# Patient Record
Sex: FEMALE | Race: BLACK OR AFRICAN AMERICAN | NOT HISPANIC OR LATINO | Employment: FULL TIME | URBAN - METROPOLITAN AREA
[De-identification: names, ages, dates, MRNs, and addresses within clinical notes are randomized per-mention and may not be internally consistent; named-entity substitution may affect disease eponyms.]

---

## 2017-01-16 ENCOUNTER — TELEPHONE (OUTPATIENT)
Dept: INTERNAL MEDICINE | Facility: CLINIC | Age: 47
End: 2017-01-16

## 2017-03-10 RX ORDER — SULFASALAZINE 500 MG/1
1000 TABLET ORAL 2 TIMES DAILY
Qty: 60 TABLET | Refills: 6 | Status: SHIPPED | OUTPATIENT
Start: 2017-03-10 | End: 2017-03-17 | Stop reason: SDUPTHER

## 2017-03-10 RX ORDER — ERGOCALCIFEROL 1.25 MG/1
50000 CAPSULE ORAL
Qty: 24 CAPSULE | Refills: 4 | Status: SHIPPED | OUTPATIENT
Start: 2017-03-13 | End: 2017-03-17 | Stop reason: SDUPTHER

## 2017-03-10 NOTE — TELEPHONE ENCOUNTER
----- Message from Silvestre Ocampo sent at 3/10/2017 10:04 AM CST -----  Contact: [facility] [phone]  Type: Rx    Name of medication(s):sulfaSALAzine (AZULFIDINE) 500 mg Tab,,, ergocalciferol (ERGOCALCIFEROL) 50,000 unit Cap    Is this a refill? New rx? refill    Who prescribed medication? Dr Robles    Pharmacy Name, Phone, & Location: [facility]     Comments:please advice    Thanks

## 2017-03-11 ENCOUNTER — LAB VISIT (OUTPATIENT)
Dept: LAB | Facility: HOSPITAL | Age: 47
End: 2017-03-11
Attending: INTERNAL MEDICINE
Payer: OTHER GOVERNMENT

## 2017-03-11 DIAGNOSIS — I10 ESSENTIAL HYPERTENSION: ICD-10-CM

## 2017-03-11 LAB
ALBUMIN SERPL BCP-MCNC: 3.5 G/DL
ALP SERPL-CCNC: 91 U/L
ALT SERPL W/O P-5'-P-CCNC: 7 U/L
ANION GAP SERPL CALC-SCNC: 11 MMOL/L
AST SERPL-CCNC: 17 U/L
BASOPHILS # BLD AUTO: 0.01 K/UL
BASOPHILS NFR BLD: 0.3 %
BILIRUB SERPL-MCNC: 0.3 MG/DL
BUN SERPL-MCNC: 10 MG/DL
CALCIUM SERPL-MCNC: 9.6 MG/DL
CHLORIDE SERPL-SCNC: 107 MMOL/L
CHOLEST/HDLC SERPL: 3.1 {RATIO}
CO2 SERPL-SCNC: 24 MMOL/L
CREAT SERPL-MCNC: 0.8 MG/DL
DIFFERENTIAL METHOD: ABNORMAL
EOSINOPHIL # BLD AUTO: 0.3 K/UL
EOSINOPHIL NFR BLD: 6.9 %
ERYTHROCYTE [DISTWIDTH] IN BLOOD BY AUTOMATED COUNT: 15.2 %
EST. GFR  (AFRICAN AMERICAN): >60 ML/MIN/1.73 M^2
EST. GFR  (NON AFRICAN AMERICAN): >60 ML/MIN/1.73 M^2
GLUCOSE SERPL-MCNC: 80 MG/DL
HCT VFR BLD AUTO: 37.8 %
HDL/CHOLESTEROL RATIO: 31.9 %
HDLC SERPL-MCNC: 163 MG/DL
HDLC SERPL-MCNC: 52 MG/DL
HGB BLD-MCNC: 11.7 G/DL
LDLC SERPL CALC-MCNC: 100.8 MG/DL
LYMPHOCYTES # BLD AUTO: 1.2 K/UL
LYMPHOCYTES NFR BLD: 31.3 %
MCH RBC QN AUTO: 25.5 PG
MCHC RBC AUTO-ENTMCNC: 31 %
MCV RBC AUTO: 83 FL
MONOCYTES # BLD AUTO: 0.3 K/UL
MONOCYTES NFR BLD: 7.2 %
NEUTROPHILS # BLD AUTO: 2.1 K/UL
NEUTROPHILS NFR BLD: 54.3 %
NONHDLC SERPL-MCNC: 111 MG/DL
PLATELET # BLD AUTO: 294 K/UL
PMV BLD AUTO: 9.5 FL
POTASSIUM SERPL-SCNC: 4.1 MMOL/L
PROT SERPL-MCNC: 8.3 G/DL
RBC # BLD AUTO: 4.58 M/UL
SODIUM SERPL-SCNC: 142 MMOL/L
TRIGL SERPL-MCNC: 51 MG/DL
TSH SERPL DL<=0.005 MIU/L-ACNC: 2.1 UIU/ML
WBC # BLD AUTO: 3.9 K/UL

## 2017-03-11 PROCEDURE — 85025 COMPLETE CBC W/AUTO DIFF WBC: CPT

## 2017-03-11 PROCEDURE — 84443 ASSAY THYROID STIM HORMONE: CPT

## 2017-03-11 PROCEDURE — 80053 COMPREHEN METABOLIC PANEL: CPT

## 2017-03-11 PROCEDURE — 80061 LIPID PANEL: CPT

## 2017-03-11 PROCEDURE — 36415 COLL VENOUS BLD VENIPUNCTURE: CPT

## 2017-03-17 ENCOUNTER — OFFICE VISIT (OUTPATIENT)
Dept: INTERNAL MEDICINE | Facility: CLINIC | Age: 47
End: 2017-03-17
Payer: OTHER GOVERNMENT

## 2017-03-17 VITALS
BODY MASS INDEX: 48.82 KG/M2 | OXYGEN SATURATION: 97 % | SYSTOLIC BLOOD PRESSURE: 110 MMHG | DIASTOLIC BLOOD PRESSURE: 70 MMHG | HEART RATE: 65 BPM | HEIGHT: 65 IN | WEIGHT: 293 LBS

## 2017-03-17 DIAGNOSIS — E55.9 VITAMIN D DEFICIENCY DISEASE: ICD-10-CM

## 2017-03-17 DIAGNOSIS — I10 ESSENTIAL HYPERTENSION: Primary | ICD-10-CM

## 2017-03-17 DIAGNOSIS — T78.3XXD ANGIOEDEMA, SUBSEQUENT ENCOUNTER: ICD-10-CM

## 2017-03-17 DIAGNOSIS — Z12.31 OTHER SCREENING MAMMOGRAM: ICD-10-CM

## 2017-03-17 DIAGNOSIS — M06.9 RHEUMATOID ARTHRITIS INVOLVING MULTIPLE SITES, UNSPECIFIED RHEUMATOID FACTOR PRESENCE: ICD-10-CM

## 2017-03-17 PROCEDURE — 99213 OFFICE O/P EST LOW 20 MIN: CPT | Mod: PBBFAC | Performed by: INTERNAL MEDICINE

## 2017-03-17 PROCEDURE — 99999 PR PBB SHADOW E&M-EST. PATIENT-LVL III: CPT | Mod: PBBFAC,,, | Performed by: INTERNAL MEDICINE

## 2017-03-17 PROCEDURE — 99214 OFFICE O/P EST MOD 30 MIN: CPT | Mod: S$PBB,,, | Performed by: INTERNAL MEDICINE

## 2017-03-17 RX ORDER — AMLODIPINE BESYLATE 5 MG/1
5 TABLET ORAL DAILY
Qty: 90 TABLET | Refills: 3 | Status: SHIPPED | OUTPATIENT
Start: 2017-03-17 | End: 2018-03-25 | Stop reason: SDUPTHER

## 2017-03-17 RX ORDER — ERGOCALCIFEROL 1.25 MG/1
50000 CAPSULE ORAL
Qty: 24 CAPSULE | Refills: 4 | Status: SHIPPED | OUTPATIENT
Start: 2017-03-20 | End: 2018-05-24 | Stop reason: SDUPTHER

## 2017-03-17 RX ORDER — SULFASALAZINE 500 MG/1
1000 TABLET ORAL 2 TIMES DAILY
Qty: 60 TABLET | Refills: 6 | Status: ON HOLD | OUTPATIENT
Start: 2017-03-17 | End: 2018-01-01 | Stop reason: HOSPADM

## 2017-03-17 NOTE — PROGRESS NOTES
CHIEF COMPLAINT: Follow up of rheumatoid arthritis, hypertension, vitamin D deficiency    HISTORY OF PRESENT ILLNESS: This is a 46 year old woman who presents for follow up of above.    She has been feeling well.  She is off lisinpril due to episode of angioedma. She has not had any other episodes of facial swelling. She is taking Norvasc 5 mg daily. BP has been doing well. No chest pain, headache, shortness of breath, palpitations cough, fever, chills, nausea, vomiting.       She is taking sulfasalazine 500 mg 2 tablets twice daily for her RA. Her inflammation rates are better but not normal.  No stomach upset from the sulfasalazine. Joints are doing well.       Work is going well. 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 Periods are getting more erratic.      She has adopted for 2 children ages 5 and 3 (siblings, boy and girl). She is enjoying parent [place]. Mother  on 12/7/15 from surgical complications of removal of her colon for colon mass - not cancerous yet. She had breast cancer in her 60's. ESRD on dialysis. She misses her mother. She feels that she is coping ok with her grief     She is taking vitamin D 50,000 units twice weekly     She has not had any periods. No hot flashes.         PAST MEDICAL HISTORY:   1. Early Rheumatoid arthritis  2. History of vitamin D deficiency.   3. History of amenorrhea   4. Morbid obesity.     PAST SURGICAL HISTORY: Negative.     ALLERGIES, MEDICATIONS: Updated on [facility]     SOCIAL HISTORY: No tobacco, alcohol use. 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 Father is living diabetes, hypertension and rheumatoid  "arthritis. Brother with diverticulosis. Brother healthy. Brother healthy.      PHYSICAL EXAMINATION:     /70  Pulse 65  Ht 5' 5" (1.651 m)  Wt (!) 152.6 kg (336 lb 6.8 oz)  SpO2 97%  BMI 55.98 kg/m2    General: Is alert, oriented, in no apparent distress. Affect is within   normal limits.   HEENT: Conjunctivae are anicteric. PERRL. TM's are clear. Oropharynx is   clear.   Neck: Supple. No cervical lymphadenopathy. No thyromegaly. Respiratory   effort is normal.   Lungs: Clear to auscultation bilaterally.   Heart: Regular rate and rhythm, no murmur, rub or gallop. No lower   extremity edema.     Labs 3/11/17 reviewed      ASSESSMENT AND PLAN:       1. Hypertension -stable on norvasc 5 mg daily.    2. Early Rheumatoid arthritis. -on sulfasalazine 500 mg 2 tablets twice daily.  Follow up with Dr Dillon  3. Obesity - continue to work on diet, exercise and weight loss.    4. Mild anemia -CBC stable  5. Vitamin D deficiency - vitamin D 50,000 units twice weekly  Screening - MMG 10/16. GYN exam 5/16  Will see her back in 6 months, sooner if problems arise      "

## 2017-10-10 ENCOUNTER — HOSPITAL ENCOUNTER (OUTPATIENT)
Dept: RADIOLOGY | Facility: HOSPITAL | Age: 47
Discharge: HOME | End: 2017-10-10
Attending: INTERNAL MEDICINE
Payer: OTHER GOVERNMENT

## 2017-10-10 DIAGNOSIS — Z12.31 OTHER SCREENING MAMMOGRAM: ICD-10-CM

## 2017-10-10 PROCEDURE — 77067 SCR MAMMO BI INCL CAD: CPT | Mod: TC

## 2017-10-10 PROCEDURE — 77067 SCR MAMMO BI INCL CAD: CPT | Mod: 26,,, | Performed by: RADIOLOGY

## 2017-10-16 NOTE — PROGRESS NOTES
CHIEF COMPLAINT:Annual exam    HISTORY OF PRESENT ILLNESS: This is a 47 year old woman who presents for follow up of above.     She has been feeling well.  She is off lisinpril due to episode of angioedma. She has not had any other episodes of facial swelling. She is taking Norvasc 5 mg daily. BP has been doing well. No chest pain, headache, shortness of breath, palpitations cough, fever, chills, nausea, vomiting, constipation, diarrhea,      She is taking sulfasalazine 500 mg 2 tablets twice daily for her RA. Her inflammation rates are better but not normal.  No stomach upset from the sulfasalazine. Joints have been achy the last several weeks.      Work is going well. 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       Periods are erratic. She has not had a period in 2 months. NO hot flashes or night sweats.      She has adopted for 2 children ages 6 and 4 (siblings, boy and girl). She is enjoying parent [employer]. Mother  on 12/7/15 from surgical complications of removal of her colon for colon mass - not cancerous yet. She had breast cancer in her 60's. ESRD on dialysis. She misses her mother. She feels that she is coping ok with her grief     She is taking vitamin D 50,000 units twice weekly          PAST MEDICAL HISTORY:   1. Early Rheumatoid arthritis  2. History of vitamin D deficiency.   3. History of amenorrhea   4. Morbid obesity.     PAST SURGICAL HISTORY: Negative.     ALLERGIES, MEDICATIONS: Updated on [facility]     SOCIAL HISTORY: No tobacco, alcohol use. 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 Father is living diabetes, hypertension and rheumatoid arthritis. Brother with diverticulosis. Brother healthy. Brother healthy.      PHYSICAL EXAMINATION:    /84  "  Pulse 60   Ht 5' 5" (1.651 m)   Wt (!) 160 kg (352 lb 11.8 oz)   SpO2 96%   BMI 58.70 kg/m²     General: Is alert, oriented, in no apparent distress. Affect is within   normal limits.   HEENT: Conjunctivae are anicteric. PERRL. TM's are clear. Oropharynx is   clear.   Neck: Supple. No cervical lymphadenopathy. No thyromegaly. Respiratory   effort is normal.   Lungs: Clear to auscultation bilaterally.   Heart: Regular rate and rhythm, no murmur, rub or gallop. No lower   extremity edema.      Labs 10/10/17 reviewed - ESR 70, CRP 7.8, CBC and CMP acceptable  mmg 10/10/17 reviewed      ASSESSMENT AND PLAN:      Annual exam - discussed diet, exercise and safety issues.       1. Hypertension -stable on norvasc 5 mg daily.    2. Early Rheumatoid arthritis. -on sulfasalazine 500 mg 2 tablets twice daily.  Follow up with Dr Dillon  3. Obesity - continue to work on diet, exercise and weight loss.    4. Mild anemia -CBC stable  5. Vitamin D deficiency - vitamin D 50,000 units twice weekly  6. High risk of breast cancer - MRI breast. And breast center evaluation.  Screening - MMG 10/17. GYN exam 5/16  Will see her back in 6 months, sooner if problems arise    "

## 2017-10-17 ENCOUNTER — OFFICE VISIT (OUTPATIENT)
Dept: INTERNAL MEDICINE | Facility: CLINIC | Age: 47
End: 2017-10-17
Payer: OTHER GOVERNMENT

## 2017-10-17 ENCOUNTER — IMMUNIZATION (OUTPATIENT)
Dept: INTERNAL MEDICINE | Facility: CLINIC | Age: 47
End: 2017-10-17
Payer: OTHER GOVERNMENT

## 2017-10-17 VITALS
BODY MASS INDEX: 48.82 KG/M2 | DIASTOLIC BLOOD PRESSURE: 84 MMHG | OXYGEN SATURATION: 96 % | HEIGHT: 65 IN | SYSTOLIC BLOOD PRESSURE: 130 MMHG | HEART RATE: 60 BPM | WEIGHT: 293 LBS

## 2017-10-17 DIAGNOSIS — M06.9 RHEUMATOID ARTHRITIS, INVOLVING UNSPECIFIED SITE, UNSPECIFIED RHEUMATOID FACTOR PRESENCE: ICD-10-CM

## 2017-10-17 DIAGNOSIS — E55.9 VITAMIN D DEFICIENCY DISEASE: ICD-10-CM

## 2017-10-17 DIAGNOSIS — Z91.89 AT HIGH RISK FOR BREAST CANCER: ICD-10-CM

## 2017-10-17 DIAGNOSIS — I10 ESSENTIAL HYPERTENSION: ICD-10-CM

## 2017-10-17 DIAGNOSIS — Z00.00 ROUTINE GENERAL MEDICAL EXAMINATION AT A HEALTH CARE FACILITY: Primary | ICD-10-CM

## 2017-10-17 PROCEDURE — G0008 ADMIN INFLUENZA VIRUS VAC: HCPCS | Mod: PBBFAC

## 2017-10-17 PROCEDURE — 99999 PR PBB SHADOW E&M-EST. PATIENT-LVL III: CPT | Mod: PBBFAC,,, | Performed by: INTERNAL MEDICINE

## 2017-10-17 PROCEDURE — 99213 OFFICE O/P EST LOW 20 MIN: CPT | Mod: PBBFAC,25 | Performed by: INTERNAL MEDICINE

## 2017-10-17 PROCEDURE — 99396 PREV VISIT EST AGE 40-64: CPT | Mod: S$PBB,,, | Performed by: INTERNAL MEDICINE

## 2017-11-13 ENCOUNTER — OFFICE VISIT (OUTPATIENT)
Dept: SURGERY | Facility: CLINIC | Age: 47
End: 2017-11-13
Payer: OTHER GOVERNMENT

## 2017-11-13 VITALS
HEART RATE: 57 BPM | HEIGHT: 65 IN | BODY MASS INDEX: 48.82 KG/M2 | SYSTOLIC BLOOD PRESSURE: 120 MMHG | DIASTOLIC BLOOD PRESSURE: 88 MMHG | TEMPERATURE: 98 F | WEIGHT: 293 LBS

## 2017-11-13 DIAGNOSIS — Z12.39 BREAST CANCER SCREENING, HIGH RISK PATIENT: Primary | ICD-10-CM

## 2017-11-13 PROCEDURE — 99999 PR PBB SHADOW E&M-EST. PATIENT-LVL III: CPT | Mod: PBBFAC,,, | Performed by: NURSE PRACTITIONER

## 2017-11-13 PROCEDURE — 99202 OFFICE O/P NEW SF 15 MIN: CPT | Mod: S$PBB,,, | Performed by: NURSE PRACTITIONER

## 2017-11-13 PROCEDURE — 99213 OFFICE O/P EST LOW 20 MIN: CPT | Mod: PBBFAC,PO | Performed by: NURSE PRACTITIONER

## 2017-11-13 NOTE — PROGRESS NOTES
Subjective:      Patient ID: Malika Hodgson is a 47 y.o. female.    Chief Complaint: Breast Cancer Screening (High Risk Screening)      HPI: (PF, EPF - 1-3) (Detailed, Comp, - 4) new patient presents to discuss breast cancer risk reported on recent mammogram. Patient denies palpable breast mass, pain, nipple discharge, redness, increased warmth. She reports recent normal clinical breast exam and does not desire CBE today    Menarche at 9    LMP 2017, irregular, no HRT, was previously on OCs      Screening mmg 10- with heterogeneously dense breasts, Tyrer-Cuzick: 29.31 %      Review of Systems  Objective:   Physical Exam  Assessment:       1. Breast cancer screening, high risk patient        Plan:       Reviewed results of mammogram and breast cancer risk per Tyrer Cuzick model. Discussed family history and sporadic verses family clustering verses hereditary breast cancer.   Discussed environmental and modifiable risk factors for breast cancer including obesity, alcohol use, smoking.   Discussed there are several models available to estimate a woman's risk for breast cancer with differences in variables contributing to the reported estimated risk compared to the general population risk for breast cancer. Tyrer Cuzick is the model chosen by [facility] and calculated risk was discussed. Discussed an individuals risk may be lower or higher than the score provided, and for this patient the score indicated may be higher than her actual risk. Her family history is suggestive of a sporadic breast cancer. Discussed general population risk for breast cancer and high risk now being defined by ACS and NCCN as 20% or greater. Discussed current recommendations for risk management by ACS and NCCN including increased breast cancer screenings: semiannual CBE, annual mmg and annual MRI to alternate every 6 months. Discussed pros and cons of screening breast MRI.  Discussed healthy diet and exercise, and  limit alcohol intake to less than one drink/day    At this time, she is somewhat hesitant to have MRI secondary to fears of enclosed spaces and her weight. I will plan on her returning in 6 months for CBE and breast MRI which she can cancel if she elects not to proceed at that time, monthly BSE encouraged. Discussed s/s of breast cancer

## 2017-11-13 NOTE — LETTER
November 13, 2017      Sarah Robles MD  [address]  [place] [address]           [facility]  [address] [facility] [address]  Phone: [phone]  Fax: [phone]          Patient: Malika Hodgson   MR Number: [id number]   YOB: 1970   Date of Visit: 11/13/2017       Dear Dr. Sarah Robles:    Thank you for referring Malika Hodgson to me for evaluation. Attached you will find relevant portions of my assessment and plan of care.    If you have questions, please do not hesitate to call me. I look forward to following Malika Hodgson along with you.    Sincerely,    Petra Gómez, MIRANDA-ERNESTINAP    Enclosure  CC:  No Recipients    If you would like to receive this communication electronically, please contact [email or web address] or [phone] to request more information on [employer] Link access.    For providers and/or their staff who would like to refer a patient to [facility], please contact us through our one-stop-shop provider referral line, [facility], at [phone].    If you feel you have received this communication in error or would no longer like to receive these types of communications, please e-mail [email or web address]

## 2017-11-15 ENCOUNTER — PATIENT MESSAGE (OUTPATIENT)
Dept: INTERNAL MEDICINE | Facility: CLINIC | Age: 47
End: 2017-11-15

## 2017-11-15 ENCOUNTER — TELEPHONE (OUTPATIENT)
Dept: INTERNAL MEDICINE | Facility: CLINIC | Age: 47
End: 2017-11-15

## 2017-11-15 NOTE — TELEPHONE ENCOUNTER
Pt is calling because she received a letter from her insurance regarding stating that pcp referred her to see Dr. Carlee Rendon an oncologist here at [facility]. Pt would like to know what is going on as she has not been told she needed to see someone. Pt also states that she has received a bill for services that she didn't receive here at [facility]. Pt would like a message sent back to her via my [facility] as she is at work and cant respond.

## 2017-11-15 NOTE — TELEPHONE ENCOUNTER
----- Message from Keeley Hyde sent at 11/15/2017 11:04 AM CST -----  Contact: self [phone]  Patient requesting a call back to discuss a PA referral and her health , stated she received a letter for insurance company she need to discuss  . Please advise, Thanks

## 2017-12-08 ENCOUNTER — LAB VISIT (OUTPATIENT)
Dept: LAB | Facility: HOSPITAL | Age: 47
End: 2017-12-08
Attending: INTERNAL MEDICINE
Payer: OTHER GOVERNMENT

## 2017-12-08 ENCOUNTER — OFFICE VISIT (OUTPATIENT)
Dept: RHEUMATOLOGY | Facility: CLINIC | Age: 47
End: 2017-12-08
Payer: OTHER GOVERNMENT

## 2017-12-08 VITALS
BODY MASS INDEX: 48.82 KG/M2 | TEMPERATURE: 98 F | WEIGHT: 293 LBS | SYSTOLIC BLOOD PRESSURE: 166 MMHG | HEART RATE: 76 BPM | HEIGHT: 65 IN | DIASTOLIC BLOOD PRESSURE: 86 MMHG

## 2017-12-08 DIAGNOSIS — M06.9 RA (RHEUMATOID ARTHRITIS): ICD-10-CM

## 2017-12-08 DIAGNOSIS — R70.0 ELEVATED SEDIMENTATION RATE: ICD-10-CM

## 2017-12-08 DIAGNOSIS — D84.9 IMMUNOSUPPRESSION: ICD-10-CM

## 2017-12-08 DIAGNOSIS — M05.79 RHEUMATOID ARTHRITIS INVOLVING MULTIPLE SITES WITH POSITIVE RHEUMATOID FACTOR: Primary | ICD-10-CM

## 2017-12-08 DIAGNOSIS — E66.01 MORBID OBESITY WITH BMI OF 50.0-59.9, ADULT: ICD-10-CM

## 2017-12-08 LAB
ALBUMIN SERPL BCP-MCNC: 3.2 G/DL
ALP SERPL-CCNC: 101 U/L
ALT SERPL W/O P-5'-P-CCNC: 6 U/L
ANION GAP SERPL CALC-SCNC: 6 MMOL/L
AST SERPL-CCNC: 15 U/L
BASOPHILS # BLD AUTO: 0.02 K/UL
BASOPHILS NFR BLD: 0.4 %
BILIRUB SERPL-MCNC: 0.2 MG/DL
BUN SERPL-MCNC: 12 MG/DL
CALCIUM SERPL-MCNC: 9.7 MG/DL
CHLORIDE SERPL-SCNC: 105 MMOL/L
CO2 SERPL-SCNC: 29 MMOL/L
CREAT SERPL-MCNC: 0.8 MG/DL
CRP SERPL-MCNC: 27.3 MG/L
DIFFERENTIAL METHOD: ABNORMAL
EOSINOPHIL # BLD AUTO: 0.2 K/UL
EOSINOPHIL NFR BLD: 4.5 %
ERYTHROCYTE [DISTWIDTH] IN BLOOD BY AUTOMATED COUNT: 15 %
ERYTHROCYTE [SEDIMENTATION RATE] IN BLOOD BY WESTERGREN METHOD: 89 MM/HR
EST. GFR  (AFRICAN AMERICAN): >60 ML/MIN/1.73 M^2
EST. GFR  (NON AFRICAN AMERICAN): >60 ML/MIN/1.73 M^2
GLUCOSE SERPL-MCNC: 84 MG/DL
HCT VFR BLD AUTO: 35.6 %
HGB BLD-MCNC: 10.6 G/DL
IMM GRANULOCYTES # BLD AUTO: 0.02 K/UL
IMM GRANULOCYTES NFR BLD AUTO: 0.4 %
LYMPHOCYTES # BLD AUTO: 1.2 K/UL
LYMPHOCYTES NFR BLD: 25.9 %
MCH RBC QN AUTO: 24.9 PG
MCHC RBC AUTO-ENTMCNC: 29.8 G/DL
MCV RBC AUTO: 84 FL
MONOCYTES # BLD AUTO: 0.3 K/UL
MONOCYTES NFR BLD: 7.3 %
NEUTROPHILS # BLD AUTO: 2.9 K/UL
NEUTROPHILS NFR BLD: 61.5 %
NRBC BLD-RTO: 0 /100 WBC
PLATELET # BLD AUTO: 293 K/UL
PMV BLD AUTO: 9.3 FL
POTASSIUM SERPL-SCNC: 4.4 MMOL/L
PROT SERPL-MCNC: 8.7 G/DL
RBC # BLD AUTO: 4.26 M/UL
SODIUM SERPL-SCNC: 140 MMOL/L
WBC # BLD AUTO: 4.64 K/UL

## 2017-12-08 PROCEDURE — 99999 PR PBB SHADOW E&M-EST. PATIENT-LVL III: CPT | Mod: PBBFAC,,, | Performed by: INTERNAL MEDICINE

## 2017-12-08 PROCEDURE — 99214 OFFICE O/P EST MOD 30 MIN: CPT | Mod: S$PBB,,, | Performed by: INTERNAL MEDICINE

## 2017-12-08 PROCEDURE — 36415 COLL VENOUS BLD VENIPUNCTURE: CPT

## 2017-12-08 PROCEDURE — 99213 OFFICE O/P EST LOW 20 MIN: CPT | Mod: PBBFAC | Performed by: INTERNAL MEDICINE

## 2017-12-08 PROCEDURE — 85025 COMPLETE CBC W/AUTO DIFF WBC: CPT

## 2017-12-08 PROCEDURE — 85651 RBC SED RATE NONAUTOMATED: CPT

## 2017-12-08 PROCEDURE — 86140 C-REACTIVE PROTEIN: CPT

## 2017-12-08 PROCEDURE — 80053 COMPREHEN METABOLIC PANEL: CPT

## 2017-12-08 NOTE — PATIENT INSTRUCTIONS
Hydroxychloroquine tablets  What is this medicine?  HYDROXYCHLOROQUINE (rogelio drox ee KLOR oh kwin) is used to treat rheumatoid arthritis and systemic lupus erythematosus. It is also used to treat malaria.  How should I use this medicine?  Take this medicine by mouth with a glass of water. Follow the directions on the prescription label. If this medicine upsets your stomach take it with food or milk. Take your doses at regular intervals. Do not take your medicine more often than directed.  Talk to your pediatrician regarding the use of this medicine in children. Special care may be needed.  What side effects may I notice from receiving this medicine?  Side effects that you should report to your doctor or health care professional as soon as possible:  · allergic reactions like skin rash, itching or hives, swelling of the face, lips, or tongue  · change in vision  · fever, infection  · hearing loss or ringing  · muscle weakness, tremor, or numbness  · redness, blistering, peeling or loosening of the skin, including inside the mouth  · seizures  · unusual bleeding or bruising  · unusually weak or tired  Side effects that usually do not require medical attention (report to your doctor or health care professional if they continue or are bothersome):  · change in coloration of the mouth or skin  · dizziness  · hair loss, lightening  · headache  · irritability, nervousness, nightmares  · loss of appetite  · stomach upset, diarrhea  What may interact with this medicine?  · antacids  · botulinum toxins  · digoxin  · kaolin  · penicillamine  What if I miss a dose?  If you miss a dose, take it as soon as you can. If it is almost time for your next dose, take only that dose. Do not take double or extra doses.  Where should I keep my medicine?  Keep out of the reach of children. In children, this medicine can cause overdose with small doses.  Store at room temperature between 15 and 30 degrees C (59 and 86 degrees F). Protect  from moisture and light. Throw away any unused medicine after the expiration date.  What should I tell my health care provider before I take this medicine?  They need to know if you have any of these conditions:  · alcoholism  · anemia or other blood disorder  · eye disease  · glucose 6-phosphate dehydrogenase (G6PD) deficiency  · liver disease  · porphyria  · psoriasis  · an unusual or allergic reaction to chloroquine, hydroxychloroquine, other medicines, foods, dyes, or preservatives  · pregnant or trying to get pregnant  · breast-feeding  What should I watch for while using this medicine?  Visit your doctor or health care professional for regular check ups. Tell your doctor if your symptoms do not improve. Arthritis symptoms may take several weeks to improve. If you are taking this medicine for a long time, you will need important blood work done. You will also need to have your eyes checked as directed.  This medicine can make you more sensitive to the sun. Keep out of the sun. If you cannot avoid being in the sun, wear protective clothing and use sunscreen. Do not use sun lamps or tanning beds/booths.  Avoid antacids and kaolin containing products for 2 hours before and after taking a dose of this medicine.  NOTE:This sheet is a summary. It may not cover all possible information. If you have questions about this medicine, talk to your doctor, pharmacist, or health care provider. Copyright© 2017 [employer]

## 2017-12-08 NOTE — PROGRESS NOTES
"Subjective:       Patient ID: Malika Hodgson is a 47 y.o. female.    Chief Complaint: Rheumatoid Arthritis      HPI:  Malika Hodgson is a 47 y.o. female diagnosed with early RA in 2/2009.   Now on sulfasalazine 1,000 mg twice daily being refilled by Dr. Robles.  She has been lost to follow up since 5/2016.      Notes pain and mild swelling since Sept 2017.    Prior to Sept 2017 would miss the morning dose.  Improves with naproxen and worsens with cold temps.  30 minutes of morning stiffness.  This morning had sharp pain under right shoulder blade.   No pain today but goes up to 7/10 ache causing difficulty lifting glass and gripping.       Review of Systems   Constitutional: Negative.    HENT: Negative.    Eyes: Negative.    Respiratory: Negative.    Cardiovascular: Negative.    Gastrointestinal: Positive for diarrhea (2 x this month; possibly lactose intolerant).   Endocrine: Negative.    Genitourinary: Negative.    Musculoskeletal: Positive for arthralgias and joint swelling.   Skin: Negative.    Allergic/Immunologic: Negative.    Neurological: Negative.    Hematological: Negative.    Psychiatric/Behavioral: Negative.          Objective:   BP (!) 178/96 (BP Location: Right arm, Patient Position: Sitting, BP Method: Large (Automatic))   Pulse 76   Temp 98.4 °F (36.9 °C)   Ht 5' 5" (1.651 m)   Wt (!) 161.9 kg (356 lb 14.4 oz)   BMI 59.39 kg/m²      Physical Exam   Constitutional: She is oriented to person, place, and time and well-developed, well-nourished, and in no distress.   HENT:   Head: Normocephalic and atraumatic.   Eyes: Conjunctivae and EOM are normal.   Neck: Neck supple.   Cardiovascular: Normal rate, regular rhythm and normal heart sounds.    Pulmonary/Chest: Effort normal and breath sounds normal.   Abdominal: Soft. Bowel sounds are normal.   Neurological: She is alert and oriented to person, place, and time. Gait normal.   Skin: Skin is warm and dry.     Psychiatric: Mood and affect normal. "   Musculoskeletal:   28 joint count: 2 swollen (bilateral 3rd PIP) and 2 tender same              LABS    Component      Latest Ref Rng & Units 10/10/2017 3/11/2017   WBC      3.90 - 12.70 K/uL 4.23 3.90   RBC      4.00 - 5.40 M/uL 4.63 4.58   Hemoglobin      12.0 - 16.0 g/dL 11.6 (L) 11.7 (L)   Hematocrit      37.0 - 48.5 % 37.7 37.8   MCV      82 - 98 fL 81 (L) 83   MCH      27.0 - 31.0 pg 25.1 (L) 25.5 (L)   MCHC      32.0 - 36.0 g/dL 30.8 (L) 31.0 (L)   RDW      11.5 - 14.5 % 15.5 (H) 15.2 (H)   Platelets      150 - 350 K/uL 295 294   MPV      9.2 - 12.9 fL 9.1 (L) 9.5   Gran #      1.8 - 7.7 K/uL 2.6 2.1   Lymph #      1.0 - 4.8 K/uL 1.1 1.2   Mono #      0.3 - 1.0 K/uL 0.3 0.3   Eos #      0.0 - 0.5 K/uL 0.2 0.3   Baso #      0.00 - 0.20 K/uL 0.02 0.01   Gran%      38.0 - 73.0 % 60.5 54.3   Lymph%      18.0 - 48.0 % 26.5 31.3   Mono%      4.0 - 15.0 % 7.3 7.2   Eosinophil%      0.0 - 8.0 % 5.0 6.9   Basophil%      0.0 - 1.9 % 0.5 0.3   Differential Method       Automated Automated   Sodium      136 - 145 mmol/L 140 142   Potassium      3.5 - 5.1 mmol/L 4.1 4.1   Chloride      95 - 110 mmol/L 105 107   CO2      23 - 29 mmol/L 27 24   Glucose      70 - 110 mg/dL 95 80   BUN, Bld      6 - 20 mg/dL 15 10   Creatinine      0.5 - 1.4 mg/dL 0.8 0.8   Calcium      8.7 - 10.5 mg/dL 9.6 9.6   Total Protein      6.0 - 8.4 g/dL 8.8 (H) 8.3   Albumin      3.5 - 5.2 g/dL 3.3 (L) 3.5   Total Bilirubin      0.1 - 1.0 mg/dL 0.2 0.3   Alkaline Phosphatase      55 - 135 U/L 92 91   AST      10 - 40 U/L 18 17   ALT      10 - 44 U/L 11 7 (L)   Anion Gap      8 - 16 mmol/L 8 11   eGFR if African American      >60 mL/min/1.73 m:2 >60 >60.0   eGFR if non African American      >60 mL/min/1.73 m:2 >60 >60.0   Cholesterol      120 - 199 mg/dL  163   Triglycerides      30 - 150 mg/dL  51   HDL      40 - 75 mg/dL  52   LDL Cholesterol      63.0 - 159.0 mg/dL  100.8   HDL/Chol Ratio      20.0 - 50.0 %  31.9   Total Cholesterol/HDL Ratio       2.0 - 5.0  3.1   Non-HDL Cholesterol      mg/dL  111   TSH      0.400 - 4.000 uIU/mL  2.102   CRP      0.0 - 8.2 mg/L 7.8    Sed Rate      0 - 20 mm/Hr 70 (H)        Assessment:       1. Rheumatoid arthritis. Manifested by previous small joint involvement of the hands (now resolved), +RF/CCP and elevated inflammatory markers.  Resolution of left elbow contracture and improved inflammatory markers. Now active on SSZ.  2. Elevated ESR    3. Morbid obesity. Patient has not been exercising at [place].  4. Abnormal SPEP without monoclonal gammopathy  5. Elevated BP. Repeat 166/86.  Patient to inform primary doctor if remain high      Plan:       1. Check labs   2. Encouraged again regular exercise and consider Weight Watchers.   3. Consider increase sulfasalazine 1500 mg bid versus adding another DMARD plaquenil (risk ocular toxicity discussed) vs MTX  RTO 6 months/prn

## 2017-12-09 ENCOUNTER — OFFICE VISIT (OUTPATIENT)
Dept: URGENT CARE | Facility: CLINIC | Age: 47
End: 2017-12-09
Payer: OTHER GOVERNMENT

## 2017-12-09 VITALS
WEIGHT: 293 LBS | OXYGEN SATURATION: 97 % | HEIGHT: 65 IN | SYSTOLIC BLOOD PRESSURE: 143 MMHG | TEMPERATURE: 98 F | DIASTOLIC BLOOD PRESSURE: 87 MMHG | HEART RATE: 80 BPM | BODY MASS INDEX: 48.82 KG/M2

## 2017-12-09 DIAGNOSIS — J40 BRONCHITIS: Primary | ICD-10-CM

## 2017-12-09 DIAGNOSIS — R05.9 COUGH: ICD-10-CM

## 2017-12-09 PROCEDURE — 99214 OFFICE O/P EST MOD 30 MIN: CPT | Mod: S$GLB,,, | Performed by: NURSE PRACTITIONER

## 2017-12-09 RX ORDER — BENZONATATE 100 MG/1
200 CAPSULE ORAL EVERY 6 HOURS PRN
Qty: 30 CAPSULE | Refills: 0 | Status: SHIPPED | OUTPATIENT
Start: 2017-12-09 | End: 2018-03-01

## 2017-12-09 RX ORDER — ALBUTEROL SULFATE 90 UG/1
2 AEROSOL, METERED RESPIRATORY (INHALATION) EVERY 6 HOURS PRN
Qty: 18 G | Refills: 0 | Status: ON HOLD | OUTPATIENT
Start: 2017-12-09 | End: 2018-01-01 | Stop reason: HOSPADM

## 2017-12-09 RX ORDER — DOXYCYCLINE 100 MG/1
100 CAPSULE ORAL 2 TIMES DAILY
Qty: 20 CAPSULE | Refills: 0 | Status: ON HOLD | OUTPATIENT
Start: 2017-12-09 | End: 2018-01-01 | Stop reason: HOSPADM

## 2017-12-09 NOTE — PROGRESS NOTES
"Subjective:       Patient ID: Malika Hodgson is a 47 y.o. female.    Vitals:  height is 5' 5" (1.651 m) and weight is 161.5 kg (356 lb) (abnormal). Her temperature is 98 °F (36.7 °C). Her blood pressure is 143/87 (abnormal) and her pulse is 80. Her oxygen saturation is 97%.     Chief Complaint: URI    URI    This is a new problem. Episode onset: 2-3 days. The problem has been unchanged. There has been no fever. Associated symptoms include chest pain, congestion and coughing. Pertinent negatives include no abdominal pain, ear pain, headaches, nausea, sore throat or wheezing. Treatments tried: flonase/zyrtec. The treatment provided mild relief.     Review of Systems   Constitution: Negative for chills, fever and malaise/fatigue.   HENT: Positive for congestion. Negative for ear pain, hoarse voice and sore throat.    Eyes: Negative for discharge and redness.   Cardiovascular: Positive for chest pain. Negative for dyspnea on exertion and leg swelling.   Respiratory: Positive for cough and shortness of breath. Negative for sputum production and wheezing.    Musculoskeletal: Negative for myalgias.   Gastrointestinal: Negative for abdominal pain and nausea.   Neurological: Negative for headaches.       Objective:      Physical Exam   Constitutional: She is oriented to person, place, and time. She appears well-developed and well-nourished. She is cooperative.  Non-toxic appearance. She does not appear ill. No distress.   HENT:   Head: Normocephalic and atraumatic.   Right Ear: Hearing, tympanic membrane and ear canal normal.   Left Ear: Hearing, tympanic membrane and ear canal normal.   Nose: Nose normal. No mucosal edema, rhinorrhea or nasal deformity. No epistaxis. Right sinus exhibits no maxillary sinus tenderness and no frontal sinus tenderness. Left sinus exhibits no maxillary sinus tenderness and no frontal sinus tenderness.   Mouth/Throat: Uvula is midline and mucous membranes are normal. No trismus in the jaw. " Normal dentition. No uvula swelling. Posterior oropharyngeal erythema present.   Eyes: Conjunctivae and lids are normal. No scleral icterus.   Sclera clear bilat   Neck: Trachea normal, full passive range of motion without pain and phonation normal. Neck supple.   Cardiovascular: Normal rate, regular rhythm and normal pulses.    Pulmonary/Chest: Effort normal. No respiratory distress. She has decreased breath sounds.   Abdominal: Soft. Normal appearance. She exhibits no distension. There is no tenderness.   Musculoskeletal: She exhibits no edema or deformity.   Neurological: She is alert and oriented to person, place, and time. She exhibits normal muscle tone. Coordination normal.   Skin: Skin is warm, dry and intact. She is not diaphoretic. No pallor.   Psychiatric: Her speech is normal. Cognition and memory are normal.   Nursing note and vitals reviewed.      Assessment:       1. Bronchitis    2. Cough        Plan:       Patient Instructions     Bronchitis, Antibiotic Treatment (Adult)    Bronchitis is an infection of the air passages (bronchial tubes) in your lungs. It often occurs when you have a cold. This illness is contagious during the first few days and is spread through the air by coughing and sneezing, or by direct contact (touching the sick person and then touching your own eyes, nose, or mouth).  Symptoms of bronchitis include cough with mucus (phlegm) and low-grade fever. Bronchitis usually lasts 7 to 14 days. Mild cases can be treated with simple home remedies. More severe infection is treated with an antibiotic.  Home care  Follow these guidelines when caring for yourself at home:  · If your symptoms are severe, rest at home for the first 2 to 3 days. When you go back to your usual activities, don't let yourself get too tired.  · Do not smoke. Also avoid being exposed to secondhand smoke.  · You may use over-the-counter medicines to control fever or pain, unless another medicine was prescribed.  (Note: If you have chronic liver or kidney disease or have ever had a stomach ulcer or gastrointestinal bleeding, talk with your healthcare provider before using these medicines. Also talk to your provider if you are taking medicine to prevent blood clots.) Aspirin should never be given to anyone younger than 18 years of age who is ill with a viral infection or fever. It may cause severe liver or brain damage.  · Your appetite may be poor, so a light diet is fine. Avoid dehydration by drinking 6 to 8 glasses of fluids per day (such as water, soft drinks, sports drinks, juices, tea, or soup). Extra fluids will help loosen secretions in the nose and lungs.  · Over-the-counter cough, cold, and sore-throat medicines will not shorten the length of the illness, but they may be helpful to reduce symptoms. (Note: Do not use decongestants if you have high blood pressure.)  · Finish all antibiotic medicine. Do this even if you are feeling better after only a few days.  Follow-up care  Follow up with your healthcare provider, or as advised. If you had an X-ray or ECG (electrocardiogram), a specialist will review it. You will be notified of any new findings that may affect your care.  Note: If you are age 65 or older, or if you have a chronic lung disease or condition that affects your immune system, or you smoke, talk to your healthcare provider about having pneumococcal vaccinations and a yearly influenza vaccination (flu shot).  When to seek medical advice  Call your healthcare provider right away if any of these occur:  · Fever of 100.4°F (38°C) or higher  · Coughing up increased amounts of colored sputum  · Weakness, drowsiness, headache, facial pain, ear pain, or a stiff neck  Call 911, or get immediate medical care  Contact emergency services right away if any of these occur.  · Coughing up blood  · Worsening weakness, drowsiness, headache, or stiff neck  · Trouble breathing, wheezing, or pain with breathing  Date Last  Reviewed: 9/13/2015  © 0299-2085 [employer]. [address] [place] [address], [place] [address]. All rights reserved. This information is not intended as a substitute for professional medical care. Always follow your healthcare professional's instructions.              Bronchitis    Cough  -     X-Ray Chest PA And Lateral; Future; Expected date: 12/09/2017    Other orders  -     benzonatate (TESSALON PERLES) 100 MG capsule; Take 2 capsules (200 mg total) by mouth every 6 (six) hours as needed for Cough.  Dispense: 30 capsule; Refill: 0  -     doxycycline (VIBRAMYCIN) 100 MG Cap; Take 1 capsule (100 mg total) by mouth 2 (two) times daily.  Dispense: 20 capsule; Refill: 0

## 2017-12-09 NOTE — PATIENT INSTRUCTIONS
Bronchitis, Antibiotic Treatment (Adult)    Bronchitis is an infection of the air passages (bronchial tubes) in your lungs. It often occurs when you have a cold. This illness is contagious during the first few days and is spread through the air by coughing and sneezing, or by direct contact (touching the sick person and then touching your own eyes, nose, or mouth).  Symptoms of bronchitis include cough with mucus (phlegm) and low-grade fever. Bronchitis usually lasts 7 to 14 days. Mild cases can be treated with simple home remedies. More severe infection is treated with an antibiotic.  Home care  Follow these guidelines when caring for yourself at home:  · If your symptoms are severe, rest at home for the first 2 to 3 days. When you go back to your usual activities, don't let yourself get too tired.  · Do not smoke. Also avoid being exposed to secondhand smoke.  · You may use over-the-counter medicines to control fever or pain, unless another medicine was prescribed. (Note: If you have chronic liver or kidney disease or have ever had a stomach ulcer or gastrointestinal bleeding, talk with your healthcare provider before using these medicines. Also talk to your provider if you are taking medicine to prevent blood clots.) Aspirin should never be given to anyone younger than 18 years of age who is ill with a viral infection or fever. It may cause severe liver or brain damage.  · Your appetite may be poor, so a light diet is fine. Avoid dehydration by drinking 6 to 8 glasses of fluids per day (such as water, soft drinks, sports drinks, juices, tea, or soup). Extra fluids will help loosen secretions in the nose and lungs.  · Over-the-counter cough, cold, and sore-throat medicines will not shorten the length of the illness, but they may be helpful to reduce symptoms. (Note: Do not use decongestants if you have high blood pressure.)  · Finish all antibiotic medicine. Do this even if you are feeling better after only a  few days.  Follow-up care  Follow up with your healthcare provider, or as advised. If you had an X-ray or ECG (electrocardiogram), a specialist will review it. You will be notified of any new findings that may affect your care.  Note: If you are age 65 or older, or if you have a chronic lung disease or condition that affects your immune system, or you smoke, talk to your healthcare provider about having pneumococcal vaccinations and a yearly influenza vaccination (flu shot).  When to seek medical advice  Call your healthcare provider right away if any of these occur:  · Fever of 100.4°F (38°C) or higher  · Coughing up increased amounts of colored sputum  · Weakness, drowsiness, headache, facial pain, ear pain, or a stiff neck  Call 911, or get immediate medical care  Contact emergency services right away if any of these occur.  · Coughing up blood  · Worsening weakness, drowsiness, headache, or stiff neck  · Trouble breathing, wheezing, or pain with breathing  Date Last Reviewed: 9/13/2015  © 9534-6442 [employer]. [address] [place] [address], [place] [address]. All rights reserved. This information is not intended as a substitute for professional medical care. Always follow your healthcare professional's instructions.

## 2017-12-11 ENCOUNTER — PATIENT MESSAGE (OUTPATIENT)
Dept: RHEUMATOLOGY | Facility: CLINIC | Age: 47
End: 2017-12-11

## 2017-12-12 ENCOUNTER — TELEPHONE (OUTPATIENT)
Dept: URGENT CARE | Facility: CLINIC | Age: 47
End: 2017-12-12

## 2017-12-14 ENCOUNTER — PATIENT MESSAGE (OUTPATIENT)
Dept: INTERNAL MEDICINE | Facility: CLINIC | Age: 47
End: 2017-12-14

## 2017-12-14 DIAGNOSIS — R05.9 COUGH: Primary | ICD-10-CM

## 2017-12-15 ENCOUNTER — PATIENT MESSAGE (OUTPATIENT)
Dept: RHEUMATOLOGY | Facility: CLINIC | Age: 47
End: 2017-12-15

## 2017-12-18 RX ORDER — PREDNISONE 10 MG/1
10 TABLET ORAL DAILY
Qty: 5 TABLET | Refills: 0 | Status: ON HOLD | OUTPATIENT
Start: 2017-12-18 | End: 2018-01-01

## 2017-12-20 ENCOUNTER — HOSPITAL ENCOUNTER (INPATIENT)
Facility: HOSPITAL | Age: 47
LOS: 12 days | Discharge: HOME | DRG: 189 | End: 2018-01-01
Attending: EMERGENCY MEDICINE | Admitting: INTERNAL MEDICINE
Payer: OTHER GOVERNMENT

## 2017-12-20 DIAGNOSIS — E55.9 VITAMIN D DEFICIENCY DISEASE: ICD-10-CM

## 2017-12-20 DIAGNOSIS — E66.01 MORBID OBESITY WITH BMI OF 50.0-59.9, ADULT: ICD-10-CM

## 2017-12-20 DIAGNOSIS — M25.529 ELBOW JOINT PAIN: ICD-10-CM

## 2017-12-20 DIAGNOSIS — M06.9 RA (RHEUMATOID ARTHRITIS): ICD-10-CM

## 2017-12-20 DIAGNOSIS — I50.9 ACUTE CONGESTIVE HEART FAILURE: ICD-10-CM

## 2017-12-20 DIAGNOSIS — I30.9 ACUTE PERICARDITIS: ICD-10-CM

## 2017-12-20 DIAGNOSIS — R06.02 SOB (SHORTNESS OF BREATH): ICD-10-CM

## 2017-12-20 DIAGNOSIS — I31.39 PERICARDIAL EFFUSION: ICD-10-CM

## 2017-12-20 DIAGNOSIS — J96.01 ACUTE HYPOXEMIC RESPIRATORY FAILURE: Primary | ICD-10-CM

## 2017-12-20 DIAGNOSIS — G47.00 INSOMNIA: ICD-10-CM

## 2017-12-20 DIAGNOSIS — R00.1 BRADYCARDIA: ICD-10-CM

## 2017-12-20 DIAGNOSIS — R70.0 ELEVATED SEDIMENTATION RATE: ICD-10-CM

## 2017-12-20 DIAGNOSIS — D84.9 IMMUNOSUPPRESSION: ICD-10-CM

## 2017-12-20 DIAGNOSIS — I50.9 ACUTE CONGESTIVE HEART FAILURE, UNSPECIFIED CONGESTIVE HEART FAILURE TYPE: ICD-10-CM

## 2017-12-20 DIAGNOSIS — M05.79 RHEUMATOID ARTHRITIS INVOLVING MULTIPLE SITES WITH POSITIVE RHEUMATOID FACTOR: ICD-10-CM

## 2017-12-20 DIAGNOSIS — R91.8 GROUND GLASS OPACITY PRESENT ON IMAGING OF LUNG: ICD-10-CM

## 2017-12-20 DIAGNOSIS — I10 ESSENTIAL HYPERTENSION: ICD-10-CM

## 2017-12-20 DIAGNOSIS — J81.1 PULMONARY EDEMA: ICD-10-CM

## 2017-12-20 DIAGNOSIS — E66.9 OBESITY: ICD-10-CM

## 2017-12-20 DIAGNOSIS — M25.40 EFFUSION INTO JOINT: ICD-10-CM

## 2017-12-20 LAB
ALBUMIN SERPL BCP-MCNC: 2.7 G/DL
ALP SERPL-CCNC: 182 U/L
ALT SERPL W/O P-5'-P-CCNC: 11 U/L
ANION GAP SERPL CALC-SCNC: 10 MMOL/L
AST SERPL-CCNC: 24 U/L
BASOPHILS # BLD AUTO: 0.03 K/UL
BASOPHILS NFR BLD: 0.3 %
BILIRUB SERPL-MCNC: 0.3 MG/DL
BNP SERPL-MCNC: 33 PG/ML
BUN SERPL-MCNC: 16 MG/DL
BUN SERPL-MCNC: 16 MG/DL (ref 6–30)
CALCIUM SERPL-MCNC: 9.8 MG/DL
CHLORIDE SERPL-SCNC: 98 MMOL/L (ref 95–110)
CHLORIDE SERPL-SCNC: 99 MMOL/L
CO2 SERPL-SCNC: 27 MMOL/L
CREAT SERPL-MCNC: 0.7 MG/DL (ref 0.5–1.4)
CREAT SERPL-MCNC: 0.8 MG/DL
DIFFERENTIAL METHOD: ABNORMAL
EOSINOPHIL # BLD AUTO: 0.1 K/UL
EOSINOPHIL NFR BLD: 0.7 %
ERYTHROCYTE [DISTWIDTH] IN BLOOD BY AUTOMATED COUNT: 15.2 %
EST. GFR  (AFRICAN AMERICAN): >60 ML/MIN/1.73 M^2
EST. GFR  (NON AFRICAN AMERICAN): >60 ML/MIN/1.73 M^2
GLUCOSE SERPL-MCNC: 121 MG/DL
GLUCOSE SERPL-MCNC: 127 MG/DL (ref 70–110)
HCT VFR BLD AUTO: 35.6 %
HCT VFR BLD CALC: 36 %PCV (ref 36–54)
HGB BLD-MCNC: 11 G/DL
IMM GRANULOCYTES # BLD AUTO: 0.08 K/UL
IMM GRANULOCYTES NFR BLD AUTO: 0.8 %
LYMPHOCYTES # BLD AUTO: 1.4 K/UL
LYMPHOCYTES NFR BLD: 14.6 %
MCH RBC QN AUTO: 24.7 PG
MCHC RBC AUTO-ENTMCNC: 30.9 G/DL
MCV RBC AUTO: 80 FL
MONOCYTES # BLD AUTO: 0.9 K/UL
MONOCYTES NFR BLD: 9.9 %
NEUTROPHILS # BLD AUTO: 7 K/UL
NEUTROPHILS NFR BLD: 73.7 %
NRBC BLD-RTO: 0 /100 WBC
PLATELET # BLD AUTO: 515 K/UL
PMV BLD AUTO: 11 FL
POC IONIZED CALCIUM: 1.19 MMOL/L (ref 1.06–1.42)
POC TCO2 (MEASURED): 27 MMOL/L (ref 23–29)
POTASSIUM BLD-SCNC: 4 MMOL/L (ref 3.5–5.1)
POTASSIUM SERPL-SCNC: 4 MMOL/L
PROT SERPL-MCNC: 8.8 G/DL
RBC # BLD AUTO: 4.46 M/UL
SAMPLE: ABNORMAL
SODIUM BLD-SCNC: 136 MMOL/L (ref 136–145)
SODIUM SERPL-SCNC: 136 MMOL/L
TROPONIN I SERPL DL<=0.01 NG/ML-MCNC: <0.006 NG/ML
WBC # BLD AUTO: 9.48 K/UL

## 2017-12-20 PROCEDURE — 25000003 PHARM REV CODE 250: Performed by: INTERNAL MEDICINE

## 2017-12-20 PROCEDURE — 93005 ELECTROCARDIOGRAM TRACING: CPT

## 2017-12-20 PROCEDURE — 84484 ASSAY OF TROPONIN QUANT: CPT

## 2017-12-20 PROCEDURE — 80053 COMPREHEN METABOLIC PANEL: CPT

## 2017-12-20 PROCEDURE — 99223 1ST HOSP IP/OBS HIGH 75: CPT | Mod: ,,, | Performed by: INTERNAL MEDICINE

## 2017-12-20 PROCEDURE — 93010 ELECTROCARDIOGRAM REPORT: CPT | Mod: ,,, | Performed by: INTERNAL MEDICINE

## 2017-12-20 PROCEDURE — 85025 COMPLETE CBC W/AUTO DIFF WBC: CPT

## 2017-12-20 PROCEDURE — 99285 EMERGENCY DEPT VISIT HI MDM: CPT | Mod: ,,, | Performed by: PHYSICIAN ASSISTANT

## 2017-12-20 PROCEDURE — 11000001 HC ACUTE MED/SURG PRIVATE ROOM

## 2017-12-20 PROCEDURE — 83880 ASSAY OF NATRIURETIC PEPTIDE: CPT

## 2017-12-20 PROCEDURE — 63600175 PHARM REV CODE 636 W HCPCS: Performed by: INTERNAL MEDICINE

## 2017-12-20 PROCEDURE — 99285 EMERGENCY DEPT VISIT HI MDM: CPT

## 2017-12-20 PROCEDURE — A4216 STERILE WATER/SALINE, 10 ML: HCPCS | Performed by: INTERNAL MEDICINE

## 2017-12-20 RX ORDER — SULFASALAZINE 500 MG/1
1000 TABLET ORAL 2 TIMES DAILY
Status: DISCONTINUED | OUTPATIENT
Start: 2017-12-20 | End: 2017-12-27

## 2017-12-20 RX ORDER — FUROSEMIDE 10 MG/ML
40 INJECTION INTRAMUSCULAR; INTRAVENOUS
Status: DISCONTINUED | OUTPATIENT
Start: 2017-12-20 | End: 2017-12-20

## 2017-12-20 RX ORDER — ERGOCALCIFEROL 1.25 MG/1
50000 CAPSULE ORAL
Status: DISCONTINUED | OUTPATIENT
Start: 2017-12-21 | End: 2018-01-01 | Stop reason: HOSPADM

## 2017-12-20 RX ORDER — SODIUM CHLORIDE 0.9 % (FLUSH) 0.9 %
3 SYRINGE (ML) INJECTION EVERY 8 HOURS
Status: DISCONTINUED | OUTPATIENT
Start: 2017-12-20 | End: 2018-01-01 | Stop reason: HOSPADM

## 2017-12-20 RX ORDER — FUROSEMIDE 10 MG/ML
40 INJECTION INTRAMUSCULAR; INTRAVENOUS DAILY
Status: DISCONTINUED | OUTPATIENT
Start: 2017-12-20 | End: 2017-12-20

## 2017-12-20 RX ORDER — SODIUM CHLORIDE 0.9 % (FLUSH) 0.9 %
3 SYRINGE (ML) INJECTION EVERY 8 HOURS
Status: DISCONTINUED | OUTPATIENT
Start: 2017-12-20 | End: 2017-12-20

## 2017-12-20 RX ORDER — ONDANSETRON 4 MG/1
4 TABLET, ORALLY DISINTEGRATING ORAL EVERY 8 HOURS PRN
Status: DISCONTINUED | OUTPATIENT
Start: 2017-12-20 | End: 2018-01-01 | Stop reason: HOSPADM

## 2017-12-20 RX ORDER — PREDNISONE 10 MG/1
10 TABLET ORAL DAILY
Status: DISCONTINUED | OUTPATIENT
Start: 2017-12-21 | End: 2017-12-25

## 2017-12-20 RX ORDER — AMOXICILLIN 250 MG
1 CAPSULE ORAL 2 TIMES DAILY
Status: DISCONTINUED | OUTPATIENT
Start: 2017-12-20 | End: 2018-01-01 | Stop reason: HOSPADM

## 2017-12-20 RX ORDER — ONDANSETRON 2 MG/ML
4 INJECTION INTRAMUSCULAR; INTRAVENOUS EVERY 8 HOURS PRN
Status: DISCONTINUED | OUTPATIENT
Start: 2017-12-20 | End: 2018-01-01 | Stop reason: HOSPADM

## 2017-12-20 RX ORDER — FUROSEMIDE 10 MG/ML
40 INJECTION INTRAMUSCULAR; INTRAVENOUS DAILY
Status: DISCONTINUED | OUTPATIENT
Start: 2017-12-20 | End: 2017-12-21

## 2017-12-20 RX ORDER — AMLODIPINE BESYLATE 5 MG/1
5 TABLET ORAL DAILY
Status: DISCONTINUED | OUTPATIENT
Start: 2017-12-21 | End: 2018-01-01 | Stop reason: HOSPADM

## 2017-12-20 RX ORDER — BENZONATATE 100 MG/1
200 CAPSULE ORAL EVERY 6 HOURS PRN
Status: DISCONTINUED | OUTPATIENT
Start: 2017-12-20 | End: 2018-01-01 | Stop reason: HOSPADM

## 2017-12-20 RX ORDER — ENOXAPARIN SODIUM 100 MG/ML
40 INJECTION SUBCUTANEOUS
Status: DISCONTINUED | OUTPATIENT
Start: 2017-12-20 | End: 2018-01-01 | Stop reason: HOSPADM

## 2017-12-20 RX ADMIN — FUROSEMIDE 40 MG: 10 INJECTION, SOLUTION INTRAMUSCULAR; INTRAVENOUS at 06:12

## 2017-12-20 RX ADMIN — SODIUM CHLORIDE, PRESERVATIVE FREE 3 ML: 5 INJECTION INTRAVENOUS at 09:12

## 2017-12-20 RX ADMIN — ENOXAPARIN SODIUM 40 MG: 100 INJECTION SUBCUTANEOUS at 06:12

## 2017-12-20 NOTE — ED PROVIDER NOTES
Encounter Date: 12/20/2017       History     Chief Complaint   Patient presents with    Cough     seen in urgent care on 9th. dx with bronchitis. finished abx on monday. not feeling better     Patient is a 47-year-old female with past medical history rheumatoid arthritis and hypertension presents to the emergency department due to a 1-1/2 week history of worsening dyspnea on exertion.  Patient states she developed a cough and pain with inspiration on 12/9/2017.  Patient states that she was seen in the urgent care for above symptoms and was diagnosed with bronchitis/pneumonia and discharged home on 10 days of antibiotic as well as Tessalon Perles and Ventolin.  Patient states that she finished her antibiotics on the 19th of her symptoms have been worsening.  Patient states she attempted to return to work on 12/13/2017 however while ambulating from her car to work and had worsening dyspnea on exertion and had to stop to take a breath.  Patient states that since then her shortness of breath has continued to worsen.  Patient states that she is no longer able to do twice around the house to include unloading the  or the washing machine.  Patient states that while doing these activities she is having to stop to catch her breath.  Patient also complains of pain on her right side that is worse with inspiration.  States her cough has resolved.  Patient denies any orthopnea, PND, lower extremity swelling, or weight gain.          Review of patient's allergies indicates:   Allergen Reactions    Lisinopril Swelling     Mouth swelled, had to go to ED    Ventolin [albuterol] Shortness Of Breath     Past Medical History:   Diagnosis Date    Arthritis     Hypertension 11/11/2013    Insomnia 2/26/2013    Obesity 2/26/2013     History reviewed. No pertinent surgical history.  Family History   Problem Relation Age of Onset    Hypertension Father     Diabetes Father     Rheum arthritis Father      methotrexate     Diabetes Mother     Hypertension Mother     Breast cancer Mother 62    Kidney disease Mother      on dialysis    Rheum arthritis Paternal Grandmother     Breast cancer Other 45    Lupus Neg Hx     Inflammatory bowel disease Neg Hx     Psoriasis Neg Hx     Thyroid disease Neg Hx     Ovarian cancer Neg Hx      Social History   Substance Use Topics    Smoking status: Never Smoker    Smokeless tobacco: Never Used      Comment: nurse;     Alcohol use No     Review of Systems   Constitutional: Negative for activity change, appetite change, diaphoresis, fatigue and fever.   HENT: Negative for congestion, dental problem, drooling, ear pain, facial swelling, sore throat and trouble swallowing.    Eyes: Negative for pain, discharge and visual disturbance.   Respiratory: Positive for cough and shortness of breath. Negative for apnea and chest tightness.         Pain with deep inspiration    Cardiovascular: Negative for chest pain and palpitations.   Gastrointestinal: Negative for abdominal distention, anal bleeding, blood in stool, diarrhea, nausea and vomiting.   Endocrine: Negative for cold intolerance and polydipsia.   Genitourinary: Negative for decreased urine volume, difficulty urinating, enuresis, frequency and hematuria.   Musculoskeletal: Negative for arthralgias, gait problem, myalgias and neck stiffness.   Skin: Negative for color change and pallor.   Allergic/Immunologic: Negative for environmental allergies.   Neurological: Negative for dizziness, syncope, numbness and headaches.   Psychiatric/Behavioral: Negative for agitation, confusion and dysphoric mood.       Physical Exam     Initial Vitals [12/20/17 1046]   BP Pulse Resp Temp SpO2   (!) 160/83 100 20 98.4 °F (36.9 °C) 95 %      MAP       108.67         Physical Exam    Nursing note and vitals reviewed.  Constitutional: She appears well-developed and well-nourished. She is not diaphoretic. No distress.   HENT:   Head: Normocephalic and  atraumatic.   Neck: Normal range of motion. Neck supple.   Cardiovascular: Normal rate, regular rhythm and normal heart sounds. Exam reveals no gallop and no friction rub.    No murmur heard.  Pulmonary/Chest: Breath sounds normal. She has no wheezes. She has no rhonchi. She has no rales.   Abdominal: Soft. Bowel sounds are normal. There is no tenderness. There is no rebound and no guarding.   Musculoskeletal: Normal range of motion.   Neurological: She is alert and oriented to person, place, and time.   Skin: Skin is warm and dry. No rash noted. No erythema.   Psychiatric: She has a normal mood and affect.         ED Course   Procedures  Labs Reviewed   CBC W/ AUTO DIFFERENTIAL - Abnormal; Notable for the following:        Result Value    Hemoglobin 11.0 (*)     Hematocrit 35.6 (*)     MCV 80 (*)     MCH 24.7 (*)     MCHC 30.9 (*)     RDW 15.2 (*)     Platelets 515 (*)     Immature Granulocytes 0.8 (*)     Immature Grans (Abs) 0.08 (*)     Gran% 73.7 (*)     Lymph% 14.6 (*)     All other components within normal limits    Narrative:     MALACHI SHARED   COMPREHENSIVE METABOLIC PANEL - Abnormal; Notable for the following:     Glucose 121 (*)     Total Protein 8.8 (*)     Albumin 2.7 (*)     Alkaline Phosphatase 182 (*)     All other components within normal limits   ISTAT PROCEDURE - Abnormal; Notable for the following:     POC Glucose 127 (*)     All other components within normal limits   B-TYPE NATRIURETIC PEPTIDE    Narrative:     LAVENDER SHARED   TROPONIN I    Narrative:     LAVENDER SHARED   HEMOGLOBIN A1C   HEMOGLOBIN A1C     EKG Readings: (Independently Interpreted)   Initial Reading: No STEMI. Rhythm: Normal Sinus Rhythm. ST Segments: Non-Specific ST Segment Depression.                APC / Resident Notes:   Patient is a 47-year-old female with past medical history rheumatoid arthritis and hypertension presents to the emergency department due to a 1-1/2 week history of worsening dyspnea on exertion.   Physical exam reveals a female in no acute distress.  Tachypneic on exam.  Heart regular rate and rhythm.  Lungs clear to auscultation bilaterally.  Abdomen soft nontender nondistended.  Differential diagnosis includes but is not excluded to ACS rule out, PE, CHF.  Will obtain lab work and chest x-ray.    CBC shows hemoglobin of 11, hematocrit 35.6, platelets 515.  BNP 33.  Troponin 0.006.  X-ray shows cardiomegaly, moderate edema, DJD, and worsening.  Patient will be given IV Lasix and admitted to hospital medicine for further workup of CHF exacerbation.  Plan of treatment discussed with attending physician and she is agreeable.          Attending Attestation:     Physician Attestation Statement for NP/PA:   I have conducted a face to face encounter with this patient in addition to the NP/PA, due to Medical Complexity    Other NP/PA Attestation Additions:    History of Present Illness: 48 yo f, worsening SOB/LYMAN x weeks, hypoxic, very tachypneic.  CXR 2 weeks ago concerning for CHF, tx'd for bronchitis.  Repeat CXR today significant worsening, bilateral pleural effusions.  All c/w new onset CHF.  Will give O2, lasix, admit for further diuresis and work-up                   ED Course      Clinical Impression:   The primary encounter diagnosis was Acute congestive heart failure, unspecified congestive heart failure type. Diagnoses of SOB (shortness of breath) and Pulmonary edema were also pertinent to this visit.    Disposition:   Disposition: Admitted  Condition: Stable                        Urszula Valdez PA-C  12/20/17 1837       Urszula Valdez PA-C  12/20/17 1846       Urszula Valdez PA-C  12/20/17 1848

## 2017-12-20 NOTE — ED TRIAGE NOTES
Presents to ER with SOB and coughing.  States that she was diagnosed with Pneumonia 12/9/17, was given ABX, but is no better.    GENERAL: The patient is a severely obese female in no apparent distress. Alert and oriented x4.                                                HEENT: Head is normocephalic and atraumatic. Extraocular muscles are intact. Pupils are equal, round, and reactive to light and accommodation. Nares appeared normal. Mouth is well hydrated and without lesions. Mucous membranes are moist. Posterior pharynx clear of any exudate or lesions.    NECK: Supple. No carotid bruits. No lymphadenopathy or thyromegaly.    LUNGS: Clear to auscultation.    HEART: Regular rate and rhythm without murmur.     ABDOMEN: Soft, nontender, and nondistended. Positive bowel sounds. No hepatosplenomegaly was noted.     EXTREMITIES: Without any cyanosis, clubbing, rash, lesions or edema.     NEUROLOGIC: Cranial nerves II through XII are grossly intact.     PSYCHIATRIC: Flat affect, but denies suicidal or homicidal ideations.    SKIN: No ulceration or induration present.

## 2017-12-20 NOTE — SUBJECTIVE & OBJECTIVE
Past Medical History:   Diagnosis Date    Arthritis     Hypertension 2013    Insomnia 2013    Obesity 2013       History reviewed. No pertinent surgical history.    Review of patient's allergies indicates:   Allergen Reactions    Lisinopril Swelling     Mouth swelled, had to go to ED    Ventolin [albuterol] Shortness Of Breath       No current facility-administered medications on file prior to encounter.      Current Outpatient Prescriptions on File Prior to Encounter   Medication Sig    amlodipine (NORVASC) 5 MG tablet Take 1 tablet (5 mg total) by mouth once daily.    benzonatate (TESSALON PERLES) 100 MG capsule Take 2 capsules (200 mg total) by mouth every 6 (six) hours as needed for Cough.    ergocalciferol (ERGOCALCIFEROL) 50,000 unit Cap Take 1 capsule (50,000 Units total) by mouth twice a week.    multivitamin with iron Tab Take 1 tablet by mouth once daily.    predniSONE (DELTASONE) 10 MG tablet Take 1 tablet (10 mg total) by mouth once daily.    sulfaSALAzine (AZULFIDINE) 500 mg Tab Take 2 tablets (1,000 mg total) by mouth 2 (two) times daily.    albuterol 90 mcg/actuation inhaler Inhale 2 puffs into the lungs every 6 (six) hours as needed for Wheezing. Rescue    [] doxycycline (VIBRAMYCIN) 100 MG Cap Take 1 capsule (100 mg total) by mouth 2 (two) times daily.     Family History     Problem Relation (Age of Onset)    Breast cancer Mother (62), Other (45)    Diabetes Father, Mother    Hypertension Father, Mother    Kidney disease Mother    Rheum arthritis Father, Paternal Grandmother        Social History Main Topics    Smoking status: Never Smoker    Smokeless tobacco: Never Used      Comment: nurse;     Alcohol use No    Drug use: No    Sexual activity: Not Currently     Partners: Male     Birth control/ protection: None     Review of Systems   Constitutional: Positive for fatigue.   HENT: Negative.    Eyes: Negative.    Respiratory: Positive for cough and  shortness of breath.    Cardiovascular: Negative.  Negative for chest pain.   Gastrointestinal: Negative.    Musculoskeletal: Negative.    Skin: Negative.    Allergic/Immunologic: Positive for immunocompromised state.   Neurological: Negative.      Objective:     Vital Signs (Most Recent):  Temp: 99 °F (37.2 °C) (12/20/17 1640)  Pulse: 99 (12/20/17 1640)  Resp: 16 (12/20/17 1640)  BP: 137/84 (12/20/17 1640)  SpO2: 99 % (12/20/17 1640) Vital Signs (24h Range):  Temp:  [98.4 °F (36.9 °C)-99 °F (37.2 °C)] 99 °F (37.2 °C)  Pulse:  [] 99  Resp:  [16-20] 16  SpO2:  [91 %-99 %] 99 %  BP: (137-161)/(72-84) 137/84     Weight: (!) 158.8 kg (350 lb)  Body mass index is 58.24 kg/m².    Physical Exam   Constitutional: She appears well-developed.  Non-toxic appearance.   HENT:   Head: Normocephalic.   Mouth/Throat: Mucous membranes are not pale and not cyanotic.   Eyes: Conjunctivae and lids are normal. Pupils are equal.   Neck: Neck supple.   Cardiovascular: Normal rate, regular rhythm, S1 normal and S2 normal.    Pulmonary/Chest: Effort normal. She has decreased breath sounds in the right lower field.   Abdominal: Soft. Bowel sounds are normal. There is no tenderness.   Musculoskeletal: She exhibits no edema.   Neurological: She is alert. She is not disoriented.   Skin: Skin is warm and dry. No cyanosis. Nails show no clubbing.   Psychiatric: She has a normal mood and affect. Cognition and memory are normal.         CRANIAL NERVES     CN III, IV, VI   Pupils: equal       Significant Labs:     CBC:   Recent Labs  Lab 12/20/17  1330 12/20/17  1627   WBC 9.48  --    HGB 11.0*  --    HCT 35.6* 36   *  --      CMP:   Recent Labs  Lab 12/20/17  1626      K 4.0   CL 99   CO2 27   *   BUN 16   CREATININE 0.8   CALCIUM 9.8   PROT 8.8*   ALBUMIN 2.7*   BILITOT 0.3   ALKPHOS 182*   AST 24   ALT 11   ANIONGAP 10   EGFRNONAA >60.0     Cardiac Markers:   Recent Labs  Lab 12/20/17  1330   BNP 33     Troponin:    Recent Labs  Lab 12/20/17  1330   TROPONINI <0.006     Baselines:  Hemoglobin   Date Value Ref Range Status   12/20/2017 11.0 (L) 12.0 - 16.0 g/dL Final   12/08/2017 10.6 (L) 12.0 - 16.0 g/dL Final   10/10/2017 11.6 (L) 12.0 - 16.0 g/dL Final   03/11/2017 11.7 (L) 12.0 - 16.0 g/dL Final     Creatinine   Date Value Ref Range Status   12/20/2017 0.8 0.5 - 1.4 mg/dL Final   12/08/2017 0.8 0.5 - 1.4 mg/dL Final   10/10/2017 0.8 0.5 - 1.4 mg/dL Final   03/11/2017 0.8 0.5 - 1.4 mg/dL Final     Diagnostics:  No results found for: EF      Significant Imaging: CXR: I have reviewed all pertinent results/findings within the past 24 hours and my personal findings are:  pulmonary edema, possible effusions  EKG: I have reviewed all pertinent results/findings within the past 24 hours and my personal findings are: sinus tachycardia

## 2017-12-21 LAB
ALBUMIN SERPL BCP-MCNC: 2.5 G/DL
ANION GAP SERPL CALC-SCNC: 8 MMOL/L
BUN SERPL-MCNC: 20 MG/DL
CALCIUM SERPL-MCNC: 9.4 MG/DL
CHLORIDE SERPL-SCNC: 99 MMOL/L
CO2 SERPL-SCNC: 28 MMOL/L
CREAT SERPL-MCNC: 0.9 MG/DL
DIASTOLIC DYSFUNCTION: NO
EST. GFR  (AFRICAN AMERICAN): >60 ML/MIN/1.73 M^2
EST. GFR  (NON AFRICAN AMERICAN): >60 ML/MIN/1.73 M^2
ESTIMATED PA SYSTOLIC PRESSURE: 28.3
GLOBAL PERICARDIAL EFFUSION: ABNORMAL
GLUCOSE SERPL-MCNC: 112 MG/DL
MAGNESIUM SERPL-MCNC: 1.8 MG/DL
PHOSPHATE SERPL-MCNC: 3.5 MG/DL
POTASSIUM SERPL-SCNC: 4.1 MMOL/L
RETIRED EF AND QEF - SEE NOTES: 68 (ref 55–65)
SODIUM SERPL-SCNC: 135 MMOL/L
TRICUSPID VALVE REGURGITATION: ABNORMAL

## 2017-12-21 PROCEDURE — 63600175 PHARM REV CODE 636 W HCPCS: Performed by: INTERNAL MEDICINE

## 2017-12-21 PROCEDURE — 93306 TTE W/DOPPLER COMPLETE: CPT | Mod: 26,,, | Performed by: INTERNAL MEDICINE

## 2017-12-21 PROCEDURE — 99231 SBSQ HOSP IP/OBS SF/LOW 25: CPT | Mod: ,,, | Performed by: INTERNAL MEDICINE

## 2017-12-21 PROCEDURE — 11000001 HC ACUTE MED/SURG PRIVATE ROOM

## 2017-12-21 PROCEDURE — 94761 N-INVAS EAR/PLS OXIMETRY MLT: CPT

## 2017-12-21 PROCEDURE — 3E0234Z INTRODUCTION OF SERUM, TOXOID AND VACCINE INTO MUSCLE, PERCUTANEOUS APPROACH: ICD-10-PCS | Performed by: INTERNAL MEDICINE

## 2017-12-21 PROCEDURE — 36415 COLL VENOUS BLD VENIPUNCTURE: CPT

## 2017-12-21 PROCEDURE — 90670 PCV13 VACCINE IM: CPT | Performed by: INTERNAL MEDICINE

## 2017-12-21 PROCEDURE — 25000003 PHARM REV CODE 250: Performed by: INTERNAL MEDICINE

## 2017-12-21 PROCEDURE — 90471 IMMUNIZATION ADMIN: CPT | Performed by: INTERNAL MEDICINE

## 2017-12-21 PROCEDURE — A4216 STERILE WATER/SALINE, 10 ML: HCPCS | Performed by: INTERNAL MEDICINE

## 2017-12-21 PROCEDURE — 80069 RENAL FUNCTION PANEL: CPT

## 2017-12-21 PROCEDURE — 93306 TTE W/DOPPLER COMPLETE: CPT

## 2017-12-21 PROCEDURE — 63600175 PHARM REV CODE 636 W HCPCS: Performed by: PHYSICIAN ASSISTANT

## 2017-12-21 PROCEDURE — 83735 ASSAY OF MAGNESIUM: CPT

## 2017-12-21 RX ORDER — FUROSEMIDE 10 MG/ML
40 INJECTION INTRAMUSCULAR; INTRAVENOUS 2 TIMES DAILY
Status: DISCONTINUED | OUTPATIENT
Start: 2017-12-21 | End: 2017-12-22

## 2017-12-21 RX ADMIN — ENOXAPARIN SODIUM 40 MG: 100 INJECTION SUBCUTANEOUS at 05:12

## 2017-12-21 RX ADMIN — FUROSEMIDE 40 MG: 10 INJECTION, SOLUTION INTRAMUSCULAR; INTRAVENOUS at 05:12

## 2017-12-21 RX ADMIN — SODIUM CHLORIDE, PRESERVATIVE FREE 3 ML: 5 INJECTION INTRAVENOUS at 05:12

## 2017-12-21 RX ADMIN — SODIUM CHLORIDE, PRESERVATIVE FREE 3 ML: 5 INJECTION INTRAVENOUS at 09:12

## 2017-12-21 RX ADMIN — ERGOCALCIFEROL 50000 UNITS: 1.25 CAPSULE ORAL at 08:12

## 2017-12-21 RX ADMIN — FUROSEMIDE 40 MG: 10 INJECTION, SOLUTION INTRAMUSCULAR; INTRAVENOUS at 10:12

## 2017-12-21 RX ADMIN — AMLODIPINE BESYLATE 5 MG: 5 TABLET ORAL at 08:12

## 2017-12-21 RX ADMIN — SODIUM CHLORIDE, PRESERVATIVE FREE 3 ML: 5 INJECTION INTRAVENOUS at 02:12

## 2017-12-21 RX ADMIN — PNEUMOCOCCAL 13-VALENT CONJUGATE VACCINE 0.5 ML: 2.2; 2.2; 2.2; 2.2; 2.2; 4.4; 2.2; 2.2; 2.2; 2.2; 2.2; 2.2; 2.2 INJECTION, SUSPENSION INTRAMUSCULAR at 08:12

## 2017-12-21 RX ADMIN — PREDNISONE 10 MG: 10 TABLET ORAL at 08:12

## 2017-12-21 NOTE — PROGRESS NOTES
"Upon entering pt room to admin 5 AM medications, pt noted to be SOB and states "It's hot!" Pt lying on abd and noted to have RR of 32 with Sats of 90 on 3 L NC.  Pt assisted to sitting position and lungs auscultated, all lobes clear, but RLL noted to be diminished.  Pt RR decreased to 20 with Sats remaining 90-91% on 3 L NC.  DIEGO OROSCO paged, awaiting return call. Will monitor.   "

## 2017-12-21 NOTE — H&P
[facility] Hospital Medicine  History & Physical    Patient Name: Malika Hodgson  MRN: [id number]  Admission Date: 12/20/2017  Attending Physician: Stephanie Reece MD   Primary Care Provider: Sarah Robles MD    [facility] Medicine Team: [facility] HOSP MED D Stephanie Reece MD     Patient information was obtained from patient, past medical records and ER records.     Subjective:     Principal Problem:Acute congestive heart failure    Chief Complaint:   Chief Complaint   Patient presents with    Cough     seen in urgent care on 9th. dx with bronchitis. finished abx on monday. not feeling better        HPI: 47 y.o. female presented to the ER with past medical history of RA (rheumatoid arthritis), immunosuppression, hypertension, morbid obesity.  She was in her usual state of stable health until the acute onset of URI / pneumonia symptoms beginning 2 - 3 weeks prior to admission with gradually improving course after treatment with Tessalon and doxycycline. Patient drank an increased amount of fluid and ate mostly high-sodium soups during this time. She developed significant dyspnea on exertion. Pertinent associated symptoms include shortness of breath on exertion; denies orthopnea and LE swelling.    Past Medical History:   Diagnosis Date    Arthritis     Hypertension 11/11/2013    Insomnia 2/26/2013    Obesity 2/26/2013       History reviewed. No pertinent surgical history.    Review of patient's allergies indicates:   Allergen Reactions    Lisinopril Swelling     Mouth swelled, had to go to ED    Ventolin [albuterol] Shortness Of Breath       No current facility-administered medications on file prior to encounter.      Current Outpatient Prescriptions on File Prior to Encounter   Medication Sig    amlodipine (NORVASC) 5 MG tablet Take 1 tablet (5 mg total) by mouth once daily.    benzonatate (TESSALON PERLES) 100 MG capsule Take 2 capsules (200 mg total) by mouth every 6 (six) hours as  needed for Cough.    ergocalciferol (ERGOCALCIFEROL) 50,000 unit Cap Take 1 capsule (50,000 Units total) by mouth twice a week.    multivitamin with iron Tab Take 1 tablet by mouth once daily.    predniSONE (DELTASONE) 10 MG tablet Take 1 tablet (10 mg total) by mouth once daily.    sulfaSALAzine (AZULFIDINE) 500 mg Tab Take 2 tablets (1,000 mg total) by mouth 2 (two) times daily.    albuterol 90 mcg/actuation inhaler Inhale 2 puffs into the lungs every 6 (six) hours as needed for Wheezing. Rescue    [] doxycycline (VIBRAMYCIN) 100 MG Cap Take 1 capsule (100 mg total) by mouth 2 (two) times daily.     Family History     Problem Relation (Age of Onset)    Breast cancer Mother (62), Other (45)    Diabetes Father, Mother    Hypertension Father, Mother    Kidney disease Mother    Rheum arthritis Father, Paternal Grandmother        Social History Main Topics    Smoking status: Never Smoker    Smokeless tobacco: Never Used      Comment: nurse;     Alcohol use No    Drug use: No    Sexual activity: Not Currently     Partners: Male     Birth control/ protection: None     Review of Systems   Constitutional: Positive for fatigue.   HENT: Negative.    Eyes: Negative.    Respiratory: Positive for cough and shortness of breath.    Cardiovascular: Negative.  Negative for chest pain.   Gastrointestinal: Negative.    Musculoskeletal: Negative.    Skin: Negative.    Allergic/Immunologic: Positive for immunocompromised state.   Neurological: Negative.      Objective:     Vital Signs (Most Recent):  Temp: 99 °F (37.2 °C) (17 1640)  Pulse: 99 (17 1640)  Resp: 16 (17 1640)  BP: 137/84 (17 1640)  SpO2: 99 % (17 1640) Vital Signs (24h Range):  Temp:  [98.4 °F (36.9 °C)-99 °F (37.2 °C)] 99 °F (37.2 °C)  Pulse:  [] 99  Resp:  [16-20] 16  SpO2:  [91 %-99 %] 99 %  BP: (137-161)/(72-84) 137/84     Weight: (!) 158.8 kg (350 lb)  Body mass index is 58.24 kg/m².    Physical Exam    Constitutional: She appears well-developed.  Non-toxic appearance.   HENT:   Head: Normocephalic.   Mouth/Throat: Mucous membranes are not pale and not cyanotic.   Eyes: Conjunctivae and lids are normal. Pupils are equal.   Neck: Neck supple.   Cardiovascular: Normal rate, regular rhythm, S1 normal and S2 normal.    Pulmonary/Chest: Effort normal. She has decreased breath sounds in the right lower field.   Abdominal: Soft. Bowel sounds are normal. There is no tenderness.   Musculoskeletal: She exhibits no edema.   Neurological: She is alert. She is not disoriented.   Skin: Skin is warm and dry. No cyanosis. Nails show no clubbing.   Psychiatric: She has a normal mood and affect. Cognition and memory are normal.         CRANIAL NERVES     CN III, IV, VI   Pupils: equal       Significant Labs:     CBC:   Recent Labs  Lab 12/20/17  1330 12/20/17  1627   WBC 9.48  --    HGB 11.0*  --    HCT 35.6* 36   *  --      CMP:   Recent Labs  Lab 12/20/17  1626      K 4.0   CL 99   CO2 27   *   BUN 16   CREATININE 0.8   CALCIUM 9.8   PROT 8.8*   ALBUMIN 2.7*   BILITOT 0.3   ALKPHOS 182*   AST 24   ALT 11   ANIONGAP 10   EGFRNONAA >60.0     Cardiac Markers:   Recent Labs  Lab 12/20/17  1330   BNP 33     Troponin:   Recent Labs  Lab 12/20/17  1330   TROPONINI <0.006     Baselines:  Hemoglobin   Date Value Ref Range Status   12/20/2017 11.0 (L) 12.0 - 16.0 g/dL Final   12/08/2017 10.6 (L) 12.0 - 16.0 g/dL Final   10/10/2017 11.6 (L) 12.0 - 16.0 g/dL Final   03/11/2017 11.7 (L) 12.0 - 16.0 g/dL Final     Creatinine   Date Value Ref Range Status   12/20/2017 0.8 0.5 - 1.4 mg/dL Final   12/08/2017 0.8 0.5 - 1.4 mg/dL Final   10/10/2017 0.8 0.5 - 1.4 mg/dL Final   03/11/2017 0.8 0.5 - 1.4 mg/dL Final     Diagnostics:  No results found for: EF      Significant Imaging: CXR: I have reviewed all pertinent results/findings within the past 24 hours and my personal findings are:  pulmonary edema, possible effusions  EKG:  I have reviewed all pertinent results/findings within the past 24 hours and my personal findings are: sinus tachycardia    Assessment/Plan:     Current Hospital Problem List:    Active Hospital Problems    Diagnosis  POA    *Acute congestive heart failure [I50.9]  Yes    Immunosuppression [D89.9]  Yes    Vitamin D deficiency disease [E55.9]  Yes    Essential hypertension [I10]  Yes    RA (rheumatoid arthritis) [M06.9]  Yes      Resolved Hospital Problems    Diagnosis Date Resolved POA   No resolved problems to display.       Ordered Medications for management of current problems:    [START ON 12/21/2017] amLODIPine  5 mg Oral Daily    enoxaparin  40 mg Subcutaneous Q12H    [START ON 12/21/2017] ergocalciferol  50,000 Units Oral Twice Weekly    furosemide  40 mg Intravenous Daily    [START ON 12/21/2017] pneumoc 13-sydnie conj-dip cr(PF)  0.5 mL Intramuscular Daily    [START ON 12/21/2017] predniSONE  10 mg Oral Daily    senna-docusate 8.6-50 mg  1 tablet Oral BID    sodium chloride 0.9%  3 mL Intravenous Q8H    sulfaSALAzine  1,000 mg Oral BID       Anticipated Disposition: Home or Self Care    Assessment and Plan by Problem:    Acute congestive heart failure    Apparently new onset CHF. HF pathway initiated. Echo pending.  Lasix 40 mg IV initiated. No ACEi due to allergy.        Essential hypertension    Continuing current management with amlodipine for now.        RA (rheumatoid arthritis)    Continuing home medications.          VTE Risk Mitigation         Ordered     enoxaparin injection 40 mg  Every 12 hours (non-standard times)     Route:  Subcutaneous        12/20/17 1701     Medium Risk of VTE  Once      12/20/17 1701             Stephanie Reece MD  Department of Hospital Medicine   [facility]

## 2017-12-21 NOTE — PLAN OF CARE
12/21/17 1124   Discharge Assessment   Assessment Type Discharge Planning Assessment   Confirmed/corrected address and phone number on facesheet? Yes   Assessment information obtained from? Medical Record   Prior to hospitilization cognitive status: Alert/Oriented   Prior to hospitalization functional status: Independent   Current cognitive status: Alert/Oriented   Current Functional Status: Independent   Lives With alone   Able to Return to Prior Arrangements yes   Is patient able to care for self after discharge? Yes   Readmission Within The Last 30 Days no previous admission in last 30 days   Patient currently being followed by outpatient case management? No   Patient currently receives any other outside agency services? No   Equipment Currently Used at Home none   Do you have any problems affording any of your prescribed medications? No   Is the patient taking medications as prescribed? yes   Does the patient have transportation home? Yes   Does the patient receive services at the [facility]? No   Discharge Plan A Home   Discharge Plan B Home with family   Patient/Family In Agreement With Plan yes

## 2017-12-21 NOTE — HPI
47 y.o. female presented to the ER with past medical history of RA (rheumatoid arthritis), immunosuppression, hypertension, morbid obesity.  She was in her usual state of stable health until the acute onset of URI / pneumonia symptoms beginning 2 - 3 weeks prior to admission with gradually improving course after treatment with Tessalon and doxycycline. Patient drank an increased amount of fluid and ate mostly high-sodium soups during this time. She developed significant dyspnea on exertion. Pertinent associated symptoms include shortness of breath on exertion; denies orthopnea and LE swelling.

## 2017-12-21 NOTE — ASSESSMENT & PLAN NOTE
Apparently new onset CHF. HF pathway initiated. Echo pending.  Lasix 40 mg IV initiated. No ACEi due to allergy.

## 2017-12-21 NOTE — PROGRESS NOTES
Pt refused sulfasalazine d/t it not being coated and it giving her severe diarreah. PALOMA Nur notified and stated that it is ok to have pt not take tonight and bring home med for morning.  Pt notified and ok. Will monitor.

## 2017-12-21 NOTE — CONSULTS
Food & Nutrition  Education    Diet Education:  Heart Failure  Time Spent: 0  Learners: 0      Nutrition Education provided with handouts:       Comments:  Patient out of room for medical tests and information left at bedside.        All questions and concerns answered. Dietitian's contact information provided.       Follow-Up:    Please Re-consult as needed        Thanks!

## 2017-12-22 ENCOUNTER — PATIENT MESSAGE (OUTPATIENT)
Dept: INTERNAL MEDICINE | Facility: CLINIC | Age: 47
End: 2017-12-22

## 2017-12-22 PROBLEM — R91.8 GROUND GLASS OPACITY PRESENT ON IMAGING OF LUNG: Status: ACTIVE | Noted: 2017-12-22

## 2017-12-22 LAB
ALBUMIN SERPL BCP-MCNC: 2.5 G/DL
ANION GAP SERPL CALC-SCNC: 13 MMOL/L
BUN SERPL-MCNC: 20 MG/DL
CALCIUM SERPL-MCNC: 9.5 MG/DL
CHLORIDE SERPL-SCNC: 100 MMOL/L
CO2 SERPL-SCNC: 22 MMOL/L
CREAT SERPL-MCNC: 0.9 MG/DL
EST. GFR  (AFRICAN AMERICAN): >60 ML/MIN/1.73 M^2
EST. GFR  (NON AFRICAN AMERICAN): >60 ML/MIN/1.73 M^2
GLUCOSE SERPL-MCNC: 114 MG/DL
LACTATE SERPL-SCNC: 1.2 MMOL/L
MAGNESIUM SERPL-MCNC: 1.7 MG/DL
PHOSPHATE SERPL-MCNC: 3.1 MG/DL
POTASSIUM SERPL-SCNC: 4.3 MMOL/L
PROCALCITONIN SERPL IA-MCNC: 0.11 NG/ML
SODIUM SERPL-SCNC: 135 MMOL/L

## 2017-12-22 PROCEDURE — 27000221 HC OXYGEN, UP TO 24 HOURS

## 2017-12-22 PROCEDURE — 11000001 HC ACUTE MED/SURG PRIVATE ROOM

## 2017-12-22 PROCEDURE — 83735 ASSAY OF MAGNESIUM: CPT

## 2017-12-22 PROCEDURE — 83605 ASSAY OF LACTIC ACID: CPT

## 2017-12-22 PROCEDURE — A4216 STERILE WATER/SALINE, 10 ML: HCPCS | Performed by: INTERNAL MEDICINE

## 2017-12-22 PROCEDURE — 80069 RENAL FUNCTION PANEL: CPT

## 2017-12-22 PROCEDURE — 25000003 PHARM REV CODE 250: Performed by: INTERNAL MEDICINE

## 2017-12-22 PROCEDURE — 36415 COLL VENOUS BLD VENIPUNCTURE: CPT

## 2017-12-22 PROCEDURE — 84145 PROCALCITONIN (PCT): CPT

## 2017-12-22 PROCEDURE — 99232 SBSQ HOSP IP/OBS MODERATE 35: CPT | Mod: ,,, | Performed by: INTERNAL MEDICINE

## 2017-12-22 PROCEDURE — 25000003 PHARM REV CODE 250: Performed by: PHYSICIAN ASSISTANT

## 2017-12-22 PROCEDURE — 63600175 PHARM REV CODE 636 W HCPCS: Performed by: PHYSICIAN ASSISTANT

## 2017-12-22 PROCEDURE — 63600175 PHARM REV CODE 636 W HCPCS: Performed by: INTERNAL MEDICINE

## 2017-12-22 PROCEDURE — 87040 BLOOD CULTURE FOR BACTERIA: CPT

## 2017-12-22 RX ORDER — ACETAMINOPHEN 325 MG/1
650 TABLET ORAL EVERY 6 HOURS PRN
Status: DISCONTINUED | OUTPATIENT
Start: 2017-12-22 | End: 2018-01-01 | Stop reason: HOSPADM

## 2017-12-22 RX ORDER — MOXIFLOXACIN HYDROCHLORIDE 400 MG/1
400 TABLET ORAL DAILY
Status: DISCONTINUED | OUTPATIENT
Start: 2017-12-22 | End: 2017-12-27

## 2017-12-22 RX ORDER — FUROSEMIDE 40 MG/1
40 TABLET ORAL DAILY
Status: DISCONTINUED | OUTPATIENT
Start: 2017-12-23 | End: 2017-12-23

## 2017-12-22 RX ORDER — FUROSEMIDE 10 MG/ML
40 INJECTION INTRAMUSCULAR; INTRAVENOUS DAILY
Status: DISCONTINUED | OUTPATIENT
Start: 2017-12-23 | End: 2017-12-22

## 2017-12-22 RX ADMIN — PREDNISONE 10 MG: 10 TABLET ORAL at 08:12

## 2017-12-22 RX ADMIN — MOXIFLOXACIN HYDROCHLORIDE 400 MG: 400 TABLET, FILM COATED ORAL at 02:12

## 2017-12-22 RX ADMIN — FUROSEMIDE 40 MG: 10 INJECTION, SOLUTION INTRAMUSCULAR; INTRAVENOUS at 08:12

## 2017-12-22 RX ADMIN — ACETAMINOPHEN 650 MG: 325 TABLET ORAL at 09:12

## 2017-12-22 RX ADMIN — AMLODIPINE BESYLATE 5 MG: 5 TABLET ORAL at 08:12

## 2017-12-22 RX ADMIN — SODIUM CHLORIDE, PRESERVATIVE FREE 3 ML: 5 INJECTION INTRAVENOUS at 06:12

## 2017-12-22 RX ADMIN — SODIUM CHLORIDE, PRESERVATIVE FREE 3 ML: 5 INJECTION INTRAVENOUS at 09:12

## 2017-12-22 RX ADMIN — SODIUM CHLORIDE, PRESERVATIVE FREE 3 ML: 5 INJECTION INTRAVENOUS at 02:12

## 2017-12-22 RX ADMIN — ENOXAPARIN SODIUM 40 MG: 100 INJECTION SUBCUTANEOUS at 05:12

## 2017-12-22 RX ADMIN — ENOXAPARIN SODIUM 40 MG: 100 INJECTION SUBCUTANEOUS at 06:12

## 2017-12-22 RX ADMIN — SULFASALAZINE 1000 MG: 500 TABLET ORAL at 08:12

## 2017-12-22 NOTE — PROGRESS NOTES
[facility] Heart Failure Program consult complete. Explained program details including home monitoring expectations, scale + router setup, weekly PharmD calls, and follow up appointment with labs. Addressed pt's questions and concerns to his/her satisfaction. Pt declined enrollment in 30-day monitoring program.     Pt declined program enrollment due to uncertainty regarding CHF diagnosis after she spoke with care team. Advised patient that we will continue to monitor her during hospital stay and will reassess her eligibility for program if needed.     Removed from Heart Failure list.

## 2017-12-22 NOTE — SUBJECTIVE & OBJECTIVE
Interval History: Patient with no events overnight, no new complaints. Less shortness of breath on exertion.  Data Review: Results recorded below were reviewed 12/21/2017.    Review of Systems   Respiratory: Positive for shortness of breath. Negative for chest tightness.    Cardiovascular: Negative for chest pain and leg swelling.     Objective:     Vital Signs (Most Recent):  Temp: 99.3 °F (37.4 °C) (12/21/17 2018)  Pulse: 107 (12/21/17 2018)  Resp: 20 (12/21/17 2018)  BP: (!) 140/89 (12/21/17 2018)  SpO2: 97 % (12/21/17 2018) Vital Signs (24h Range):  Temp:  [98.4 °F (36.9 °C)-99.8 °F (37.7 °C)] 99.3 °F (37.4 °C)  Pulse:  [] 107  Resp:  [16-20] 20  SpO2:  [91 %-99 %] 97 %  BP: (140-193)/(74-95) 140/89     Weight: (!) 159.6 kg (351 lb 13.7 oz)  Body mass index is 58.55 kg/m².    Intake/Output Summary (Last 24 hours) at 12/21/17 2055  Last data filed at 12/21/17 1800   Gross per 24 hour   Intake             1380 ml   Output             1625 ml   Net             -245 ml      Physical Exam   Constitutional: She appears well-developed.  Non-toxic appearance.   HENT:   Head: Normocephalic.   Mouth/Throat: Mucous membranes are not pale and not cyanotic.   Eyes: Conjunctivae and lids are normal. Pupils are equal.   Neck: Neck supple.   Cardiovascular: Normal rate, regular rhythm, S1 normal and S2 normal.    Pulmonary/Chest: Effort normal. She has decreased breath sounds in the right lower field. She has rales in the right lower field.   Abdominal: Soft. Bowel sounds are normal. There is no tenderness.   Musculoskeletal: She exhibits no edema.   Neurological: She is alert. She is not disoriented.   Skin: Skin is warm and dry. No cyanosis. Nails show no clubbing.   Psychiatric: She has a normal mood and affect. Cognition and memory are normal.       Significant Labs:     CBC:   Recent Labs  Lab 12/20/17  1330 12/20/17  1627   WBC 9.48  --    HGB 11.0*  --    HCT 35.6* 36   *  --      CMP:   Recent Labs  Lab  12/20/17  1626 12/21/17  0352    135*   K 4.0 4.1   CL 99 99   CO2 27 28   * 112*   BUN 16 20   CREATININE 0.8 0.9   CALCIUM 9.8 9.4   PROT 8.8*  --    ALBUMIN 2.7* 2.5*   BILITOT 0.3  --    ALKPHOS 182*  --    AST 24  --    ALT 11  --    ANIONGAP 10 8   EGFRNONAA >60.0 >60.0     Cardiac Markers:   Recent Labs  Lab 12/20/17  1330   BNP 33     Magnesium:   Recent Labs  Lab 12/21/17  0352   MG 1.8     Troponin:   Recent Labs  Lab 12/20/17  1330   TROPONINI <0.006

## 2017-12-22 NOTE — ASSESSMENT & PLAN NOTE
Apparently new onset CHF. HF pathway initiated. BNP low (falsely due to obesity?).  Lasix 40 mg IV initiated. No ACEi due to allergy.  Echo: CONCLUSIONS     1 - Technically difficult study (BMI 58.6).     2 - Normal left ventricular systolic function (EF 65-70%).     3 - Normal right ventricular systolic function .     4 - The estimated PA systolic pressure is greater than 28 mmHg.     5 - There is a small to perhaps moderately sized pericardial effusion and some collapse of the RV free wall is seen on parasternal long axis imaging.  The IVC cannot be visualized but the relatively dynamic LV function would support the possibility of   the patient being volume depleted.  Respiratory variation is seen in mitral inflow, but this is of questionable reliability and better signals from LVOT outflow do not show significant variability (as would be seen in tamponade).

## 2017-12-22 NOTE — TELEPHONE ENCOUNTER
Called and spoke to pt on the phone. She wanted to let Dr. Sarah Robles know that she is currently in the hospital. She states that she would just feel more comfortable with her knowing that she has been admitted in the hosptial.

## 2017-12-22 NOTE — PROGRESS NOTES
[facility] Hospital Medicine  Progress Note    Patient Name: Malika Hodgson  MRN: [id number]  Patient Class: IP- Inpatient   Admission Date: 12/20/2017  Length of Stay: 1 days  Attending Physician: Stephanie Reece MD  Primary Care Provider: Sarah Robles MD    [facility] Medicine Team: [facility] HOSP MED D Stephanie Reece MD    Subjective:     Principal Problem:Acute congestive heart failure    HPI:  47 y.o. female presented to the ER with past medical history of RA (rheumatoid arthritis), immunosuppression, hypertension, morbid obesity.  She was in her usual state of stable health until the acute onset of URI / pneumonia symptoms beginning 2 - 3 weeks prior to admission with gradually improving course after treatment with Tessalon and doxycycline. Patient drank an increased amount of fluid and ate mostly high-sodium soups during this time. She developed significant dyspnea on exertion. Pertinent associated symptoms include shortness of breath on exertion; denies orthopnea and LE swelling.    Hospital Course:  No notes on file    Interval History: Patient with no events overnight, no new complaints. Less shortness of breath on exertion.  Data Review: Results recorded below were reviewed 12/21/2017.    Review of Systems   Respiratory: Positive for shortness of breath. Negative for chest tightness.    Cardiovascular: Negative for chest pain and leg swelling.     Objective:     Vital Signs (Most Recent):  Temp: 99.3 °F (37.4 °C) (12/21/17 2018)  Pulse: 107 (12/21/17 2018)  Resp: 20 (12/21/17 2018)  BP: (!) 140/89 (12/21/17 2018)  SpO2: 97 % (12/21/17 2018) Vital Signs (24h Range):  Temp:  [98.4 °F (36.9 °C)-99.8 °F (37.7 °C)] 99.3 °F (37.4 °C)  Pulse:  [] 107  Resp:  [16-20] 20  SpO2:  [91 %-99 %] 97 %  BP: (140-193)/(74-95) 140/89     Weight: (!) 159.6 kg (351 lb 13.7 oz)  Body mass index is 58.55 kg/m².    Intake/Output Summary (Last 24 hours) at 12/21/17 2055  Last data filed at 12/21/17  1800   Gross per 24 hour   Intake             1380 ml   Output             1625 ml   Net             -245 ml      Physical Exam   Constitutional: She appears well-developed.  Non-toxic appearance.   HENT:   Head: Normocephalic.   Mouth/Throat: Mucous membranes are not pale and not cyanotic.   Eyes: Conjunctivae and lids are normal. Pupils are equal.   Neck: Neck supple.   Cardiovascular: Normal rate, regular rhythm, S1 normal and S2 normal.    Pulmonary/Chest: Effort normal. She has decreased breath sounds in the right lower field. She has rales in the right lower field.   Abdominal: Soft. Bowel sounds are normal. There is no tenderness.   Musculoskeletal: She exhibits no edema.   Neurological: She is alert. She is not disoriented.   Skin: Skin is warm and dry. No cyanosis. Nails show no clubbing.   Psychiatric: She has a normal mood and affect. Cognition and memory are normal.       Significant Labs:     CBC:   Recent Labs  Lab 12/20/17  1330 12/20/17  1627   WBC 9.48  --    HGB 11.0*  --    HCT 35.6* 36   *  --      CMP:   Recent Labs  Lab 12/20/17  1626 12/21/17  0352    135*   K 4.0 4.1   CL 99 99   CO2 27 28   * 112*   BUN 16 20   CREATININE 0.8 0.9   CALCIUM 9.8 9.4   PROT 8.8*  --    ALBUMIN 2.7* 2.5*   BILITOT 0.3  --    ALKPHOS 182*  --    AST 24  --    ALT 11  --    ANIONGAP 10 8   EGFRNONAA >60.0 >60.0     Cardiac Markers:   Recent Labs  Lab 12/20/17  1330   BNP 33     Magnesium:   Recent Labs  Lab 12/21/17  0352   MG 1.8     Troponin:   Recent Labs  Lab 12/20/17  1330   TROPONINI <0.006     Assessment/Plan:      Current Hospital Problem List:    Active Hospital Problems    Diagnosis  POA    *Acute congestive heart failure [I50.9]  Yes     Priority: 1 - High    Essential hypertension [I10]  Yes     Priority: 2     Immunosuppression [D89.9]  Yes    Vitamin D deficiency disease [E55.9]  Yes    RA (rheumatoid arthritis) [M06.9]  Yes      Resolved Hospital Problems    Diagnosis Date  Resolved POA   No resolved problems to display.       Ordered Medications for management of current problems:    amLODIPine  5 mg Oral Daily    enoxaparin  40 mg Subcutaneous Q12H    ergocalciferol  50,000 Units Oral Twice Weekly    furosemide  40 mg Intravenous Daily    predniSONE  10 mg Oral Daily    senna-docusate 8.6-50 mg  1 tablet Oral BID    sodium chloride 0.9%  3 mL Intravenous Q8H    sulfaSALAzine  1,000 mg Oral BID       Anticipated Disposition: Home or Self Care    Assessment and Plan by Problem:    * Acute congestive heart failure    Apparently new onset CHF. HF pathway initiated. BNP low (falsely due to obesity?).  Lasix 40 mg IV initiated. No ACEi due to allergy.  Echo: CONCLUSIONS     1 - Technically difficult study (BMI 58.6).     2 - Normal left ventricular systolic function (EF 65-70%).     3 - Normal right ventricular systolic function .     4 - The estimated PA systolic pressure is greater than 28 mmHg.     5 - There is a small to perhaps moderately sized pericardial effusion and some collapse of the RV free wall is seen on parasternal long axis imaging.  The IVC cannot be visualized but the relatively dynamic LV function would support the possibility of   the patient being volume depleted.  Respiratory variation is seen in mitral inflow, but this is of questionable reliability and better signals from LVOT outflow do not show significant variability (as would be seen in tamponade).         Essential hypertension    Continuing current management with amlodipine for now.  Better controlled.        RA (rheumatoid arthritis)    Continuing home medications.          VTE Risk Mitigation         Ordered     enoxaparin injection 40 mg  Every 12 hours (non-standard times)     Route:  Subcutaneous        12/20/17 1701     Medium Risk of VTE  Once      12/20/17 1701              Stephanie Reece MD  Department of Hospital Medicine   [facility]

## 2017-12-23 LAB
ALBUMIN SERPL BCP-MCNC: 2.4 G/DL
ANION GAP SERPL CALC-SCNC: 9 MMOL/L
BACTERIA #/AREA URNS AUTO: NORMAL /HPF
BASOPHILS # BLD AUTO: 0.03 K/UL
BASOPHILS NFR BLD: 0.2 %
BILIRUB UR QL STRIP: NEGATIVE
BUN SERPL-MCNC: 23 MG/DL
CALCIUM SERPL-MCNC: 9.6 MG/DL
CHLORIDE SERPL-SCNC: 96 MMOL/L
CLARITY UR REFRACT.AUTO: ABNORMAL
CO2 SERPL-SCNC: 29 MMOL/L
COLOR UR AUTO: ABNORMAL
CREAT SERPL-MCNC: 1 MG/DL
DIFFERENTIAL METHOD: ABNORMAL
EOSINOPHIL # BLD AUTO: 0.1 K/UL
EOSINOPHIL NFR BLD: 0.7 %
ERYTHROCYTE [DISTWIDTH] IN BLOOD BY AUTOMATED COUNT: 14.7 %
EST. GFR  (AFRICAN AMERICAN): >60 ML/MIN/1.73 M^2
EST. GFR  (NON AFRICAN AMERICAN): >60 ML/MIN/1.73 M^2
GLUCOSE SERPL-MCNC: 130 MG/DL
GLUCOSE UR QL STRIP: NEGATIVE
HCT VFR BLD AUTO: 30.7 %
HGB BLD-MCNC: 9.5 G/DL
HGB UR QL STRIP: NEGATIVE
HYALINE CASTS UR QL AUTO: 0 /LPF
IMM GRANULOCYTES # BLD AUTO: 0.1 K/UL
IMM GRANULOCYTES NFR BLD AUTO: 0.8 %
KETONES UR QL STRIP: NEGATIVE
LEUKOCYTE ESTERASE UR QL STRIP: NEGATIVE
LYMPHOCYTES # BLD AUTO: 1.2 K/UL
LYMPHOCYTES NFR BLD: 9.9 %
MAGNESIUM SERPL-MCNC: 1.9 MG/DL
MCH RBC QN AUTO: 24.5 PG
MCHC RBC AUTO-ENTMCNC: 30.9 G/DL
MCV RBC AUTO: 79 FL
MICROSCOPIC COMMENT: NORMAL
MONOCYTES # BLD AUTO: 1.2 K/UL
MONOCYTES NFR BLD: 9.8 %
NEUTROPHILS # BLD AUTO: 9.5 K/UL
NEUTROPHILS NFR BLD: 78.6 %
NITRITE UR QL STRIP: NEGATIVE
NRBC BLD-RTO: 0 /100 WBC
PH UR STRIP: 5 [PH] (ref 5–8)
PHOSPHATE SERPL-MCNC: 3.3 MG/DL
PLATELET # BLD AUTO: 563 K/UL
PMV BLD AUTO: 8.5 FL
POTASSIUM SERPL-SCNC: 4.1 MMOL/L
PROT UR QL STRIP: ABNORMAL
RBC # BLD AUTO: 3.87 M/UL
RBC #/AREA URNS AUTO: 2 /HPF (ref 0–4)
SODIUM SERPL-SCNC: 134 MMOL/L
SP GR UR STRIP: 1.02 (ref 1–1.03)
SQUAMOUS #/AREA URNS AUTO: 13 /HPF
URN SPEC COLLECT METH UR: ABNORMAL
UROBILINOGEN UR STRIP-ACNC: NEGATIVE EU/DL
WBC # BLD AUTO: 12.12 K/UL
WBC #/AREA URNS AUTO: 3 /HPF (ref 0–5)

## 2017-12-23 PROCEDURE — 85025 COMPLETE CBC W/AUTO DIFF WBC: CPT

## 2017-12-23 PROCEDURE — 25000003 PHARM REV CODE 250: Performed by: INTERNAL MEDICINE

## 2017-12-23 PROCEDURE — 93005 ELECTROCARDIOGRAM TRACING: CPT

## 2017-12-23 PROCEDURE — 93010 ELECTROCARDIOGRAM REPORT: CPT | Mod: ,,, | Performed by: INTERNAL MEDICINE

## 2017-12-23 PROCEDURE — 36415 COLL VENOUS BLD VENIPUNCTURE: CPT

## 2017-12-23 PROCEDURE — A4216 STERILE WATER/SALINE, 10 ML: HCPCS | Performed by: INTERNAL MEDICINE

## 2017-12-23 PROCEDURE — 83735 ASSAY OF MAGNESIUM: CPT

## 2017-12-23 PROCEDURE — 63600175 PHARM REV CODE 636 W HCPCS: Performed by: INTERNAL MEDICINE

## 2017-12-23 PROCEDURE — 80069 RENAL FUNCTION PANEL: CPT

## 2017-12-23 PROCEDURE — 99900035 HC TECH TIME PER 15 MIN (STAT)

## 2017-12-23 PROCEDURE — 94664 DEMO&/EVAL PT USE INHALER: CPT

## 2017-12-23 PROCEDURE — 99232 SBSQ HOSP IP/OBS MODERATE 35: CPT | Mod: ,,, | Performed by: INTERNAL MEDICINE

## 2017-12-23 PROCEDURE — 81001 URINALYSIS AUTO W/SCOPE: CPT

## 2017-12-23 PROCEDURE — 27000221 HC OXYGEN, UP TO 24 HOURS

## 2017-12-23 PROCEDURE — 11000001 HC ACUTE MED/SURG PRIVATE ROOM

## 2017-12-23 RX ADMIN — SODIUM CHLORIDE, PRESERVATIVE FREE 3 ML: 5 INJECTION INTRAVENOUS at 05:12

## 2017-12-23 RX ADMIN — PREDNISONE 10 MG: 10 TABLET ORAL at 09:12

## 2017-12-23 RX ADMIN — SODIUM CHLORIDE, PRESERVATIVE FREE 3 ML: 5 INJECTION INTRAVENOUS at 10:12

## 2017-12-23 RX ADMIN — AMLODIPINE BESYLATE 5 MG: 5 TABLET ORAL at 09:12

## 2017-12-23 RX ADMIN — SULFASALAZINE 1000 MG: 500 TABLET ORAL at 09:12

## 2017-12-23 RX ADMIN — FUROSEMIDE 40 MG: 40 TABLET ORAL at 09:12

## 2017-12-23 RX ADMIN — MOXIFLOXACIN HYDROCHLORIDE 400 MG: 400 TABLET, FILM COATED ORAL at 09:12

## 2017-12-23 RX ADMIN — ENOXAPARIN SODIUM 40 MG: 100 INJECTION SUBCUTANEOUS at 05:12

## 2017-12-23 RX ADMIN — STANDARDIZED SENNA CONCENTRATE AND DOCUSATE SODIUM 1 TABLET: 8.6; 5 TABLET, FILM COATED ORAL at 09:12

## 2017-12-23 RX ADMIN — SODIUM CHLORIDE, PRESERVATIVE FREE 3 ML: 5 INJECTION INTRAVENOUS at 02:12

## 2017-12-23 NOTE — ASSESSMENT & PLAN NOTE
"Chest CT revealed: "Patchy irregular multifocal consolidative opacities throughout both lung fields, with associated peribronchial cuffing, endobronchial debris, and groundglass opacities. These findings in addition to a moderate volume pericardial effusion and small volume bilateral pleural effusions could represent an infectious process (likely viral), noninfectious serosal inflammatory disease (rheumatoid arthritis, lupus...), or malignancy. Multiple enlarged bilateral axillary lymph nodes, favored to be reactive in nature."   Monitor closely for tamponade from pericardial effusion.  Adding Avelox; ordered sputum culture and viral studies.  "

## 2017-12-23 NOTE — SUBJECTIVE & OBJECTIVE
Interval History: Patient with no events overnight, no new complaints. Less shortness of breath on exertion but persistent and associated with hypoxia.  Data Review: Results recorded below were reviewed 12/22/2017.    Review of Systems   Respiratory: Positive for shortness of breath. Negative for chest tightness.    Cardiovascular: Negative for chest pain and leg swelling.     Objective:     Vital Signs (Most Recent):  Temp: (!) 100.6 °F (38.1 °C) (12/22/17 2147)  Pulse: (!) 116 (12/22/17 1949)  Resp: (!) 22 (12/22/17 1943)  BP: (!) 127/92 (12/22/17 1943)  SpO2: 96 % (12/22/17 1943) Vital Signs (24h Range):  Temp:  [98.6 °F (37 °C)-100.6 °F (38.1 °C)] 100.6 °F (38.1 °C)  Pulse:  [103-116] 116  Resp:  [18-22] 22  SpO2:  [92 %-96 %] 96 %  BP: (127-136)/(76-92) 127/92     Weight: (!) 156.8 kg (345 lb 10.9 oz)  Body mass index is 57.52 kg/m².    Intake/Output Summary (Last 24 hours) at 12/22/17 2324  Last data filed at 12/22/17 1400   Gross per 24 hour   Intake              740 ml   Output             1450 ml   Net             -710 ml      Physical Exam   Constitutional: She appears well-developed.  Non-toxic appearance.   HENT:   Head: Normocephalic.   Mouth/Throat: Mucous membranes are not pale and not cyanotic.   Eyes: Conjunctivae and lids are normal. Pupils are equal.   Neck: Neck supple.   Cardiovascular: Normal rate, regular rhythm, S1 normal and S2 normal.    Pulmonary/Chest: Effort normal. She has decreased breath sounds in the right lower field. She has rales in the right lower field.   Abdominal: Soft. Bowel sounds are normal. There is no tenderness.   Musculoskeletal: She exhibits no edema.   Neurological: She is alert. She is not disoriented.   Skin: Skin is warm and dry. No cyanosis. Nails show no clubbing.   Psychiatric: She has a normal mood and affect. Cognition and memory are normal.       Significant Labs:     CBC: No results for input(s): WBC, HGB, HCT, PLT in the last 48 hours.  CMP:     Recent  Labs  Lab 12/21/17 0352 12/22/17  0522   * 135*   K 4.1 4.3   CL 99 100   CO2 28 22*   * 114*   BUN 20 20   CREATININE 0.9 0.9   CALCIUM 9.4 9.5   ALBUMIN 2.5* 2.5*   ANIONGAP 8 13   EGFRNONAA >60.0 >60.0     Magnesium:     Recent Labs  Lab 12/21/17 0352 12/22/17  0522   MG 1.8 1.7

## 2017-12-23 NOTE — NURSING
"Pt has responded well to the tylenol given for 100.6 temp, pt states "I feel like my breathing has opened up and my chest isn't as tight as its been and I am not struggling as hard to breathe" pt remained afebrile for the duration of the shift but has been showing multiple PVC's on the Tele monitor and palpated an irregular heart rhythm. Yvette Nur ordered an EKG, pt resting comfortably in chair stating she can "breathe easier sitting in the chair but it makes my back hurt", [facility]  "

## 2017-12-23 NOTE — ASSESSMENT & PLAN NOTE
Apparently new onset CHF. HF pathway initiated. BNP low (falsely due to obesity?).  Lasix 40 mg IV initiated. No ACEi due to allergy.  Echo: CONCLUSIONS     1 - Technically difficult study (BMI 58.6).     2 - Normal left ventricular systolic function (EF 65-70%).     3 - Normal right ventricular systolic function .     4 - The estimated PA systolic pressure is greater than 28 mmHg.     5 - There is a small to perhaps moderately sized pericardial effusion and some collapse of the RV free wall is seen on parasternal long axis imaging.  The IVC cannot be visualized but the relatively dynamic LV function would support the possibility of   the patient being volume depleted.  Respiratory variation is seen in mitral inflow, but this is of questionable reliability and better signals from LVOT outflow do not show significant variability (as would be seen in tamponade).   Appears to be euvolemic; decreasing Lasix to 40 mg po daily. Monitoring.

## 2017-12-23 NOTE — PROGRESS NOTES
[facility] Hospital Medicine  Progress Note    Patient Name: Malika Hodgson  MRN: [id number]  Patient Class: IP- Inpatient   Admission Date: 12/20/2017  Length of Stay: 2 days  Attending Physician: Stephanie Reece MD  Primary Care Provider: Sarah Robles MD    [facility] Medicine Team: [facility] [place] Stephanie Reece MD    Subjective:     Principal Problem:Acute congestive heart failure    HPI:  47 y.o. female presented to the ER with past medical history of RA (rheumatoid arthritis), immunosuppression, hypertension, morbid obesity.  She was in her usual state of stable health until the acute onset of URI / pneumonia symptoms beginning 2 - 3 weeks prior to admission with gradually improving course after treatment with Tessalon and doxycycline. Patient drank an increased amount of fluid and ate mostly high-sodium soups during this time. She developed significant dyspnea on exertion. Pertinent associated symptoms include shortness of breath on exertion; denies orthopnea and LE swelling.    Hospital Course:  No notes on file    Interval History: Patient with no events overnight, no new complaints. Less shortness of breath on exertion but persistent and associated with hypoxia.  Data Review: Results recorded below were reviewed 12/22/2017.    Review of Systems   Respiratory: Positive for shortness of breath. Negative for chest tightness.    Cardiovascular: Negative for chest pain and leg swelling.     Objective:     Vital Signs (Most Recent):  Temp: (!) 100.6 °F (38.1 °C) (12/22/17 2147)  Pulse: (!) 116 (12/22/17 1949)  Resp: (!) 22 (12/22/17 1943)  BP: (!) 127/92 (12/22/17 1943)  SpO2: 96 % (12/22/17 1943) Vital Signs (24h Range):  Temp:  [98.6 °F (37 °C)-100.6 °F (38.1 °C)] 100.6 °F (38.1 °C)  Pulse:  [103-116] 116  Resp:  [18-22] 22  SpO2:  [92 %-96 %] 96 %  BP: (127-136)/(76-92) 127/92     Weight: (!) 156.8 kg (345 lb 10.9 oz)  Body mass index is 57.52 kg/m².    Intake/Output Summary (Last 24  hours) at 12/22/17 2324  Last data filed at 12/22/17 1400   Gross per 24 hour   Intake              740 ml   Output             1450 ml   Net             -710 ml      Physical Exam   Constitutional: She appears well-developed.  Non-toxic appearance.   HENT:   Head: Normocephalic.   Mouth/Throat: Mucous membranes are not pale and not cyanotic.   Eyes: Conjunctivae and lids are normal. Pupils are equal.   Neck: Neck supple.   Cardiovascular: Normal rate, regular rhythm, S1 normal and S2 normal.    Pulmonary/Chest: Effort normal. She has decreased breath sounds in the right lower field. She has rales in the right lower field.   Abdominal: Soft. Bowel sounds are normal. There is no tenderness.   Musculoskeletal: She exhibits no edema.   Neurological: She is alert. She is not disoriented.   Skin: Skin is warm and dry. No cyanosis. Nails show no clubbing.   Psychiatric: She has a normal mood and affect. Cognition and memory are normal.       Significant Labs:     CBC: No results for input(s): WBC, HGB, HCT, PLT in the last 48 hours.  CMP:     Recent Labs  Lab 12/21/17  0352 12/22/17  0522   * 135*   K 4.1 4.3   CL 99 100   CO2 28 22*   * 114*   BUN 20 20   CREATININE 0.9 0.9   CALCIUM 9.4 9.5   ALBUMIN 2.5* 2.5*   ANIONGAP 8 13   EGFRNONAA >60.0 >60.0     Magnesium:     Recent Labs  Lab 12/21/17  0352 12/22/17  0522   MG 1.8 1.7     Assessment/Plan:      Current Hospital Problem List:    Active Hospital Problems    Diagnosis  POA    *Acute congestive heart failure [I50.9]  Yes     Priority: 1 - High    Ground glass opacity present on imaging of lung [R91.8]  Yes     Priority: 2     RA (rheumatoid arthritis) [M06.9]  Yes     Priority: 3     Essential hypertension [I10]  Yes     Priority: 4     Immunosuppression [D89.9]  Yes    Vitamin D deficiency disease [E55.9]  Yes      Resolved Hospital Problems    Diagnosis Date Resolved POA   No resolved problems to display.       Ordered Medications for management  "of current problems:    amLODIPine  5 mg Oral Daily    enoxaparin  40 mg Subcutaneous Q12H    ergocalciferol  50,000 Units Oral Twice Weekly    [START ON 12/23/2017] furosemide  40 mg Oral Daily    moxifloxacin  400 mg Oral Daily    predniSONE  10 mg Oral Daily    senna-docusate 8.6-50 mg  1 tablet Oral BID    sodium chloride 0.9%  3 mL Intravenous Q8H    sulfaSALAzine  1,000 mg Oral BID       Anticipated Disposition: Home or Self Care    Assessment and Plan by Problem:    * Acute congestive heart failure    Apparently new onset CHF. HF pathway initiated. BNP low (falsely due to obesity?).  Lasix 40 mg IV initiated. No ACEi due to allergy.  Echo: CONCLUSIONS     1 - Technically difficult study (BMI 58.6).     2 - Normal left ventricular systolic function (EF 65-70%).     3 - Normal right ventricular systolic function .     4 - The estimated PA systolic pressure is greater than 28 mmHg.     5 - There is a small to perhaps moderately sized pericardial effusion and some collapse of the RV free wall is seen on parasternal long axis imaging.  The IVC cannot be visualized but the relatively dynamic LV function would support the possibility of   the patient being volume depleted.  Respiratory variation is seen in mitral inflow, but this is of questionable reliability and better signals from LVOT outflow do not show significant variability (as would be seen in tamponade).   Appears to be euvolemic; decreasing Lasix to 40 mg po daily. Monitoring.        Ground glass opacity present on imaging of lung    Chest CT revealed: "Patchy irregular multifocal consolidative opacities throughout both lung fields, with associated peribronchial cuffing, endobronchial debris, and groundglass opacities. These findings in addition to a moderate volume pericardial effusion and small volume bilateral pleural effusions could represent an infectious process (likely viral), noninfectious serosal inflammatory disease (rheumatoid " "arthritis, lupus...), or malignancy. Multiple enlarged bilateral axillary lymph nodes, favored to be reactive in nature."   Monitor closely for tamponade from pericardial effusion.  Adding Avelox; ordered sputum culture and viral studies.        RA (rheumatoid arthritis)    Continuing home medications.        Essential hypertension    Continuing current management with amlodipine for now.  Better controlled.          VTE Risk Mitigation         Ordered     enoxaparin injection 40 mg  Every 12 hours (non-standard times)     Route:  Subcutaneous        12/20/17 1701     Medium Risk of VTE  Once      12/20/17 1701              Stephanie Reece MD  Department of Hospital Medicine   [facility]  "

## 2017-12-24 PROBLEM — J96.01 ACUTE HYPOXEMIC RESPIRATORY FAILURE: Status: ACTIVE | Noted: 2017-12-24

## 2017-12-24 LAB
ALBUMIN SERPL BCP-MCNC: 2.3 G/DL
ANION GAP SERPL CALC-SCNC: 10 MMOL/L
BASOPHILS # BLD AUTO: 0.03 K/UL
BASOPHILS NFR BLD: 0.3 %
BUN SERPL-MCNC: 19 MG/DL
CALCIUM SERPL-MCNC: 9.3 MG/DL
CHLORIDE SERPL-SCNC: 96 MMOL/L
CO2 SERPL-SCNC: 29 MMOL/L
CREAT SERPL-MCNC: 0.8 MG/DL
DIFFERENTIAL METHOD: ABNORMAL
EOSINOPHIL # BLD AUTO: 0.1 K/UL
EOSINOPHIL NFR BLD: 0.8 %
ERYTHROCYTE [DISTWIDTH] IN BLOOD BY AUTOMATED COUNT: 14.8 %
EST. GFR  (AFRICAN AMERICAN): >60 ML/MIN/1.73 M^2
EST. GFR  (NON AFRICAN AMERICAN): >60 ML/MIN/1.73 M^2
GLUCOSE SERPL-MCNC: 120 MG/DL
HCT VFR BLD AUTO: 29.7 %
HGB BLD-MCNC: 9 G/DL
IMM GRANULOCYTES # BLD AUTO: 0.11 K/UL
IMM GRANULOCYTES NFR BLD AUTO: 1 %
LYMPHOCYTES # BLD AUTO: 1.3 K/UL
LYMPHOCYTES NFR BLD: 12.2 %
MAGNESIUM SERPL-MCNC: 1.8 MG/DL
MCH RBC QN AUTO: 24 PG
MCHC RBC AUTO-ENTMCNC: 30.3 G/DL
MCV RBC AUTO: 79 FL
MONOCYTES # BLD AUTO: 1 K/UL
MONOCYTES NFR BLD: 8.9 %
NEUTROPHILS # BLD AUTO: 8.2 K/UL
NEUTROPHILS NFR BLD: 76.8 %
NRBC BLD-RTO: 0 /100 WBC
PHOSPHATE SERPL-MCNC: 3.2 MG/DL
PLATELET # BLD AUTO: 565 K/UL
PMV BLD AUTO: 8.6 FL
POTASSIUM SERPL-SCNC: 3.8 MMOL/L
RBC # BLD AUTO: 3.75 M/UL
SODIUM SERPL-SCNC: 135 MMOL/L
WBC # BLD AUTO: 10.62 K/UL

## 2017-12-24 PROCEDURE — 94640 AIRWAY INHALATION TREATMENT: CPT

## 2017-12-24 PROCEDURE — 36415 COLL VENOUS BLD VENIPUNCTURE: CPT

## 2017-12-24 PROCEDURE — 25000003 PHARM REV CODE 250: Performed by: INTERNAL MEDICINE

## 2017-12-24 PROCEDURE — 87541 LEGION PNEUMO DNA AMP PROB: CPT

## 2017-12-24 PROCEDURE — 80069 RENAL FUNCTION PANEL: CPT

## 2017-12-24 PROCEDURE — 63600175 PHARM REV CODE 636 W HCPCS: Performed by: INTERNAL MEDICINE

## 2017-12-24 PROCEDURE — 25000242 PHARM REV CODE 250 ALT 637 W/ HCPCS: Performed by: INTERNAL MEDICINE

## 2017-12-24 PROCEDURE — 87205 SMEAR GRAM STAIN: CPT

## 2017-12-24 PROCEDURE — A4216 STERILE WATER/SALINE, 10 ML: HCPCS | Performed by: INTERNAL MEDICINE

## 2017-12-24 PROCEDURE — 94664 DEMO&/EVAL PT USE INHALER: CPT

## 2017-12-24 PROCEDURE — 87070 CULTURE OTHR SPECIMN AEROBIC: CPT

## 2017-12-24 PROCEDURE — 83735 ASSAY OF MAGNESIUM: CPT

## 2017-12-24 PROCEDURE — 99232 SBSQ HOSP IP/OBS MODERATE 35: CPT | Mod: ,,, | Performed by: INTERNAL MEDICINE

## 2017-12-24 PROCEDURE — 25000003 PHARM REV CODE 250: Performed by: PHYSICIAN ASSISTANT

## 2017-12-24 PROCEDURE — 85025 COMPLETE CBC W/AUTO DIFF WBC: CPT

## 2017-12-24 PROCEDURE — 27000221 HC OXYGEN, UP TO 24 HOURS

## 2017-12-24 PROCEDURE — 94761 N-INVAS EAR/PLS OXIMETRY MLT: CPT

## 2017-12-24 PROCEDURE — 99900035 HC TECH TIME PER 15 MIN (STAT)

## 2017-12-24 PROCEDURE — 11000001 HC ACUTE MED/SURG PRIVATE ROOM

## 2017-12-24 RX ORDER — CARVEDILOL 3.12 MG/1
3.12 TABLET ORAL 2 TIMES DAILY
Status: DISCONTINUED | OUTPATIENT
Start: 2017-12-24 | End: 2018-01-01 | Stop reason: HOSPADM

## 2017-12-24 RX ORDER — IPRATROPIUM BROMIDE 0.5 MG/2.5ML
0.5 SOLUTION RESPIRATORY (INHALATION)
Status: DISCONTINUED | OUTPATIENT
Start: 2017-12-24 | End: 2018-01-01 | Stop reason: HOSPADM

## 2017-12-24 RX ORDER — METRONIDAZOLE 500 MG/1
500 TABLET ORAL EVERY 8 HOURS
Status: DISCONTINUED | OUTPATIENT
Start: 2017-12-24 | End: 2017-12-27

## 2017-12-24 RX ADMIN — SODIUM CHLORIDE, PRESERVATIVE FREE 3 ML: 5 INJECTION INTRAVENOUS at 03:12

## 2017-12-24 RX ADMIN — ENOXAPARIN SODIUM 40 MG: 100 INJECTION SUBCUTANEOUS at 05:12

## 2017-12-24 RX ADMIN — IPRATROPIUM BROMIDE 0.5 MG: 0.5 SOLUTION RESPIRATORY (INHALATION) at 07:12

## 2017-12-24 RX ADMIN — SULFASALAZINE 1000 MG: 500 TABLET ORAL at 09:12

## 2017-12-24 RX ADMIN — MOXIFLOXACIN HYDROCHLORIDE 400 MG: 400 TABLET, FILM COATED ORAL at 09:12

## 2017-12-24 RX ADMIN — METRONIDAZOLE 500 MG: 500 TABLET ORAL at 08:12

## 2017-12-24 RX ADMIN — AMLODIPINE BESYLATE 5 MG: 5 TABLET ORAL at 09:12

## 2017-12-24 RX ADMIN — SODIUM CHLORIDE, PRESERVATIVE FREE 3 ML: 5 INJECTION INTRAVENOUS at 09:12

## 2017-12-24 RX ADMIN — ACETAMINOPHEN 650 MG: 325 TABLET ORAL at 08:12

## 2017-12-24 RX ADMIN — CARVEDILOL 3.12 MG: 3.12 TABLET, FILM COATED ORAL at 09:12

## 2017-12-24 RX ADMIN — IPRATROPIUM BROMIDE 0.5 MG: 0.5 SOLUTION RESPIRATORY (INHALATION) at 02:12

## 2017-12-24 RX ADMIN — CEFTRIAXONE SODIUM 2 G: 2 INJECTION, POWDER, FOR SOLUTION INTRAMUSCULAR; INTRAVENOUS at 08:12

## 2017-12-24 RX ADMIN — SODIUM CHLORIDE, PRESERVATIVE FREE 3 ML: 5 INJECTION INTRAVENOUS at 06:12

## 2017-12-24 RX ADMIN — IPRATROPIUM BROMIDE 0.5 MG: 0.5 SOLUTION RESPIRATORY (INHALATION) at 09:12

## 2017-12-24 RX ADMIN — CARVEDILOL 3.12 MG: 3.12 TABLET, FILM COATED ORAL at 08:12

## 2017-12-24 RX ADMIN — PREDNISONE 10 MG: 10 TABLET ORAL at 09:12

## 2017-12-24 NOTE — PLAN OF CARE
Problem: Patient Care Overview  Goal: Plan of Care Review  Outcome: Ongoing (interventions implemented as appropriate)  Pt sleeping comfortably in bed at 0600 med pass. Per pt, she is feeling much better and breathing much easier at this point during her care. Pt SR up x2, call bell in reach, bed in low and locked position, skid proof socks on. Care plan explained to patient, no additional complaints at this time. [place]

## 2017-12-24 NOTE — SUBJECTIVE & OBJECTIVE
Interval History: Patient with no events overnight, no new complaints. Feeling slightly better. Had low grade fever after Avelox started.  Data Review: Results recorded below were reviewed 12/23/2017.    Review of Systems   Respiratory: Positive for shortness of breath. Negative for chest tightness.    Cardiovascular: Negative for chest pain and leg swelling.     Objective:     Vital Signs (Most Recent):  Temp: 98.9 °F (37.2 °C) (12/23/17 1950)  Pulse: (!) 113 (12/23/17 1950)  Resp: 16 (12/23/17 1950)  BP: 133/84 (12/23/17 1950)  SpO2: 97 % (12/23/17 1950) Vital Signs (24h Range):  Temp:  [98.2 °F (36.8 °C)-100.6 °F (38.1 °C)] 98.9 °F (37.2 °C)  Pulse:  [] 113  Resp:  [16-22] 16  SpO2:  [94 %-97 %] 97 %  BP: (119-148)/(75-86) 133/84     Weight: (!) 156.4 kg (344 lb 12.8 oz)  Body mass index is 57.38 kg/m².    Intake/Output Summary (Last 24 hours) at 12/23/17 2135  Last data filed at 12/23/17 1959   Gross per 24 hour   Intake              420 ml   Output              300 ml   Net              120 ml      Physical Exam   Constitutional: She appears well-developed.  Non-toxic appearance.   HENT:   Head: Normocephalic.   Mouth/Throat: Mucous membranes are not pale and not cyanotic.   Eyes: Conjunctivae and lids are normal. Pupils are equal.   Neck: Neck supple.   Cardiovascular: Regular rhythm, S1 normal and S2 normal.  Tachycardia present.    Pulmonary/Chest: Effort normal. She has decreased breath sounds in the right lower field. She has rales in the right lower field.   Abdominal: Soft. Bowel sounds are normal. There is no tenderness.   Musculoskeletal: She exhibits no edema.   Neurological: She is alert. She is not disoriented.   Skin: Skin is warm and dry. No cyanosis. Nails show no clubbing.   Psychiatric: She has a normal mood and affect. Cognition and memory are normal.       Significant Labs:     CBC:     Recent Labs  Lab 12/23/17  0550   WBC 12.12   HGB 9.5*   HCT 30.7*   *     CMP:     Recent  Labs  Lab 12/22/17  0522 12/23/17  0550   * 134*   K 4.3 4.1    96   CO2 22* 29   * 130*   BUN 20 23*   CREATININE 0.9 1.0   CALCIUM 9.5 9.6   ALBUMIN 2.5* 2.4*   ANIONGAP 13 9   EGFRNONAA >60.0 >60.0     Magnesium:     Recent Labs  Lab 12/22/17  0522 12/23/17  0550   MG 1.7 1.9

## 2017-12-24 NOTE — ASSESSMENT & PLAN NOTE
Apparently new onset CHF. HF pathway initiated. BNP low (falsely due to obesity?).  Lasix 40 mg IV initiated. No ACEi due to allergy.  Echo: CONCLUSIONS     1 - Technically difficult study (BMI 58.6).     2 - Normal left ventricular systolic function (EF 65-70%).     3 - Normal right ventricular systolic function .     4 - The estimated PA systolic pressure is greater than 28 mmHg.     5 - There is a small to perhaps moderately sized pericardial effusion and some collapse of the RV free wall is seen on parasternal long axis imaging.  The IVC cannot be visualized but the relatively dynamic LV function would support the possibility of   the patient being volume depleted.  Respiratory variation is seen in mitral inflow, but this is of questionable reliability and better signals from LVOT outflow do not show significant variability (as would be seen in tamponade).   Appears to be euvolemic; decreasing Lasix to 40 mg po daily. Monitoring.  Mild tachycardia; discontinuing Lasix. Appears compensated (suspect pulmonary hypertension)

## 2017-12-24 NOTE — ASSESSMENT & PLAN NOTE
"Chest CT revealed: "Patchy irregular multifocal consolidative opacities throughout both lung fields, with associated peribronchial cuffing, endobronchial debris, and groundglass opacities. These findings in addition to a moderate volume pericardial effusion and small volume bilateral pleural effusions could represent an infectious process (likely viral), noninfectious serosal inflammatory disease (rheumatoid arthritis, lupus...), or malignancy. Multiple enlarged bilateral axillary lymph nodes, favored to be reactive in nature."   Monitor closely for tamponade from pericardial effusion.  Added Avelox 12/22/17; ordered sputum culture and viral studies.  Added acapella.  "

## 2017-12-24 NOTE — PROGRESS NOTES
Home Oxygen Evaluation    Date Performed: 2017    1) Patient's Home O2 Sat on room air, while at rest: 88%        If O2 sats on room air at rest are 88% or below, patient qualifies. No additional testing needed. Document N/A in steps 2 and 3. If 89% or above, complete steps 2.      2) Patient's O2 Sat on room air while exercisin%        If O2 sats on room air while exercising remain 89% or above patient does not qualify, no further testing needed Document N/A in step 3. If O2 sats on room air while exercising are 88% or below, continue to step 3.      3) Patient's O2 Sat while exercising on O2: 92% at 2 LPM         (Must show improvement from #2 for patients to qualify)    If O2 sats improve on oxygen, patient qualifies for portable oxygen. If not, the patient does not qualify.

## 2017-12-24 NOTE — PLAN OF CARE
Problem: Patient Care Overview  Goal: Plan of Care Review  Outcome: Ongoing (interventions implemented as appropriate)  Patient remained free from falls/injuries, tolerated treatment regimen, discussed plan of care with patient

## 2017-12-24 NOTE — PROGRESS NOTES
[facility] Hospital Medicine  Progress Note    Patient Name: Malika Hodgson  MRN: [id number]  Patient Class: IP- Inpatient   Admission Date: 12/20/2017  Length of Stay: 3 days  Attending Physician: Stephanie Reece MD  Primary Care Provider: Sarah Robles MD    [facility] Medicine Team: [facility] [place] MED D Stephanie Reece MD    Subjective:     Principal Problem:Acute congestive heart failure    HPI:  47 y.o. female presented to the ER with past medical history of RA (rheumatoid arthritis), immunosuppression, hypertension, morbid obesity.  She was in her usual state of stable health until the acute onset of URI / pneumonia symptoms beginning 2 - 3 weeks prior to admission with gradually improving course after treatment with Tessalon and doxycycline. Patient drank an increased amount of fluid and ate mostly high-sodium soups during this time. She developed significant dyspnea on exertion. Pertinent associated symptoms include shortness of breath on exertion; denies orthopnea and LE swelling.    Hospital Course:  No notes on file    Interval History: Patient with no events overnight, no new complaints. Feeling slightly better. Had low grade fever after Avelox started.  Data Review: Results recorded below were reviewed 12/23/2017.    Review of Systems   Respiratory: Positive for shortness of breath. Negative for chest tightness.    Cardiovascular: Negative for chest pain and leg swelling.     Objective:     Vital Signs (Most Recent):  Temp: 98.9 °F (37.2 °C) (12/23/17 1950)  Pulse: (!) 113 (12/23/17 1950)  Resp: 16 (12/23/17 1950)  BP: 133/84 (12/23/17 1950)  SpO2: 97 % (12/23/17 1950) Vital Signs (24h Range):  Temp:  [98.2 °F (36.8 °C)-100.6 °F (38.1 °C)] 98.9 °F (37.2 °C)  Pulse:  [] 113  Resp:  [16-22] 16  SpO2:  [94 %-97 %] 97 %  BP: (119-148)/(75-86) 133/84     Weight: (!) 156.4 kg (344 lb 12.8 oz)  Body mass index is 57.38 kg/m².    Intake/Output Summary (Last 24 hours) at 12/23/17  2135  Last data filed at 12/23/17 1959   Gross per 24 hour   Intake              420 ml   Output              300 ml   Net              120 ml      Physical Exam   Constitutional: She appears well-developed.  Non-toxic appearance.   HENT:   Head: Normocephalic.   Mouth/Throat: Mucous membranes are not pale and not cyanotic.   Eyes: Conjunctivae and lids are normal. Pupils are equal.   Neck: Neck supple.   Cardiovascular: Regular rhythm, S1 normal and S2 normal.  Tachycardia present.    Pulmonary/Chest: Effort normal. She has decreased breath sounds in the right lower field. She has rales in the right lower field.   Abdominal: Soft. Bowel sounds are normal. There is no tenderness.   Musculoskeletal: She exhibits no edema.   Neurological: She is alert. She is not disoriented.   Skin: Skin is warm and dry. No cyanosis. Nails show no clubbing.   Psychiatric: She has a normal mood and affect. Cognition and memory are normal.       Significant Labs:     CBC:     Recent Labs  Lab 12/23/17  0550   WBC 12.12   HGB 9.5*   HCT 30.7*   *     CMP:     Recent Labs  Lab 12/22/17  0522 12/23/17  0550   * 134*   K 4.3 4.1    96   CO2 22* 29   * 130*   BUN 20 23*   CREATININE 0.9 1.0   CALCIUM 9.5 9.6   ALBUMIN 2.5* 2.4*   ANIONGAP 13 9   EGFRNONAA >60.0 >60.0     Magnesium:     Recent Labs  Lab 12/22/17  0522 12/23/17  0550   MG 1.7 1.9     Assessment/Plan:      Current Hospital Problem List:    Active Hospital Problems    Diagnosis  POA    *Acute congestive heart failure [I50.9]  Yes     Priority: 2     Ground glass opacity present on imaging of lung [R91.8]  Yes     Priority: 1 - High    RA (rheumatoid arthritis) [M06.9]  Yes     Priority: 3     Essential hypertension [I10]  Yes     Priority: 4     Immunosuppression [D89.9]  Yes    Vitamin D deficiency disease [E55.9]  Yes      Resolved Hospital Problems    Diagnosis Date Resolved POA   No resolved problems to display.       Ordered Medications for  "management of current problems:    amLODIPine  5 mg Oral Daily    enoxaparin  40 mg Subcutaneous Q12H    ergocalciferol  50,000 Units Oral Twice Weekly    moxifloxacin  400 mg Oral Daily    predniSONE  10 mg Oral Daily    senna-docusate 8.6-50 mg  1 tablet Oral BID    sodium chloride 0.9%  3 mL Intravenous Q8H    sulfaSALAzine  1,000 mg Oral BID       Risk  Patient has a condition that poses threat to life and bodily function: Respiratory Distress    Anticipated Disposition: Home or Self Care    Assessment and Plan by Problem:    * Acute congestive heart failure    Apparently new onset CHF. HF pathway initiated. BNP low (falsely due to obesity?).  Lasix 40 mg IV initiated. No ACEi due to allergy.  Echo: CONCLUSIONS     1 - Technically difficult study (BMI 58.6).     2 - Normal left ventricular systolic function (EF 65-70%).     3 - Normal right ventricular systolic function .     4 - The estimated PA systolic pressure is greater than 28 mmHg.     5 - There is a small to perhaps moderately sized pericardial effusion and some collapse of the RV free wall is seen on parasternal long axis imaging.  The IVC cannot be visualized but the relatively dynamic LV function would support the possibility of   the patient being volume depleted.  Respiratory variation is seen in mitral inflow, but this is of questionable reliability and better signals from LVOT outflow do not show significant variability (as would be seen in tamponade).   Appears to be euvolemic; decreasing Lasix to 40 mg po daily. Monitoring.  Mild tachycardia; discontinuing Lasix. Appears compensated (suspect pulmonary hypertension)        Ground glass opacity present on imaging of lung    Chest CT revealed: "Patchy irregular multifocal consolidative opacities throughout both lung fields, with associated peribronchial cuffing, endobronchial debris, and groundglass opacities. These findings in addition to a moderate volume pericardial effusion and small " "volume bilateral pleural effusions could represent an infectious process (likely viral), noninfectious serosal inflammatory disease (rheumatoid arthritis, lupus...), or malignancy. Multiple enlarged bilateral axillary lymph nodes, favored to be reactive in nature."   Monitor closely for tamponade from pericardial effusion.  Added Avelox 12/22/17; ordered sputum culture and viral studies.  Added acapella.        RA (rheumatoid arthritis)    Continuing home medications.  Follow up with Rheumatology after possible infection resolves.        Essential hypertension    Continuing current management with amlodipine for now.  Better controlled.        Vitamin D deficiency disease    Continuing home medications.          VTE Risk Mitigation         Ordered     enoxaparin injection 40 mg  Every 12 hours (non-standard times)     Route:  Subcutaneous        12/20/17 1701     Medium Risk of VTE  Once      12/20/17 1701              Stephanie Reece MD  Department of Hospital Medicine   [facility]  "

## 2017-12-25 LAB
ALBUMIN SERPL BCP-MCNC: 2.3 G/DL
ANION GAP SERPL CALC-SCNC: 6 MMOL/L
BASOPHILS # BLD AUTO: 0.03 K/UL
BASOPHILS NFR BLD: 0.3 %
BUN SERPL-MCNC: 18 MG/DL
CALCIUM SERPL-MCNC: 9.4 MG/DL
CHLORIDE SERPL-SCNC: 96 MMOL/L
CO2 SERPL-SCNC: 32 MMOL/L
CREAT SERPL-MCNC: 0.9 MG/DL
DIFFERENTIAL METHOD: ABNORMAL
EOSINOPHIL # BLD AUTO: 0.2 K/UL
EOSINOPHIL NFR BLD: 1.5 %
ERYTHROCYTE [DISTWIDTH] IN BLOOD BY AUTOMATED COUNT: 14.7 %
EST. GFR  (AFRICAN AMERICAN): >60 ML/MIN/1.73 M^2
EST. GFR  (NON AFRICAN AMERICAN): >60 ML/MIN/1.73 M^2
GLUCOSE SERPL-MCNC: 115 MG/DL
HCT VFR BLD AUTO: 29.2 %
HGB BLD-MCNC: 9 G/DL
IMM GRANULOCYTES # BLD AUTO: 0.08 K/UL
IMM GRANULOCYTES NFR BLD AUTO: 0.8 %
LYMPHOCYTES # BLD AUTO: 1.3 K/UL
LYMPHOCYTES NFR BLD: 13.3 %
MAGNESIUM SERPL-MCNC: 2.1 MG/DL
MCH RBC QN AUTO: 24.5 PG
MCHC RBC AUTO-ENTMCNC: 30.8 G/DL
MCV RBC AUTO: 80 FL
MONOCYTES # BLD AUTO: 1.1 K/UL
MONOCYTES NFR BLD: 11.2 %
NEUTROPHILS # BLD AUTO: 7.3 K/UL
NEUTROPHILS NFR BLD: 72.9 %
NRBC BLD-RTO: 0 /100 WBC
PHOSPHATE SERPL-MCNC: 3.2 MG/DL
PLATELET # BLD AUTO: 565 K/UL
PMV BLD AUTO: 8.7 FL
POTASSIUM SERPL-SCNC: 4.1 MMOL/L
RBC # BLD AUTO: 3.67 M/UL
SODIUM SERPL-SCNC: 134 MMOL/L
WBC # BLD AUTO: 9.95 K/UL

## 2017-12-25 PROCEDURE — 25000242 PHARM REV CODE 250 ALT 637 W/ HCPCS: Performed by: INTERNAL MEDICINE

## 2017-12-25 PROCEDURE — 80069 RENAL FUNCTION PANEL: CPT

## 2017-12-25 PROCEDURE — 27000173 HC ACAPELLA DEVICE DH OR DM

## 2017-12-25 PROCEDURE — 94664 DEMO&/EVAL PT USE INHALER: CPT

## 2017-12-25 PROCEDURE — 94761 N-INVAS EAR/PLS OXIMETRY MLT: CPT

## 2017-12-25 PROCEDURE — 85025 COMPLETE CBC W/AUTO DIFF WBC: CPT

## 2017-12-25 PROCEDURE — 99232 SBSQ HOSP IP/OBS MODERATE 35: CPT | Mod: ,,, | Performed by: INTERNAL MEDICINE

## 2017-12-25 PROCEDURE — 83735 ASSAY OF MAGNESIUM: CPT

## 2017-12-25 PROCEDURE — 27000221 HC OXYGEN, UP TO 24 HOURS

## 2017-12-25 PROCEDURE — 63600175 PHARM REV CODE 636 W HCPCS: Performed by: INTERNAL MEDICINE

## 2017-12-25 PROCEDURE — 11000001 HC ACUTE MED/SURG PRIVATE ROOM

## 2017-12-25 PROCEDURE — 99900035 HC TECH TIME PER 15 MIN (STAT)

## 2017-12-25 PROCEDURE — 36415 COLL VENOUS BLD VENIPUNCTURE: CPT

## 2017-12-25 PROCEDURE — A4216 STERILE WATER/SALINE, 10 ML: HCPCS | Performed by: INTERNAL MEDICINE

## 2017-12-25 PROCEDURE — 94640 AIRWAY INHALATION TREATMENT: CPT

## 2017-12-25 PROCEDURE — 25000003 PHARM REV CODE 250: Performed by: INTERNAL MEDICINE

## 2017-12-25 RX ADMIN — METRONIDAZOLE 500 MG: 500 TABLET ORAL at 05:12

## 2017-12-25 RX ADMIN — CEFTRIAXONE SODIUM 2 G: 2 INJECTION, POWDER, FOR SOLUTION INTRAMUSCULAR; INTRAVENOUS at 08:12

## 2017-12-25 RX ADMIN — SODIUM CHLORIDE, PRESERVATIVE FREE 3 ML: 5 INJECTION INTRAVENOUS at 08:12

## 2017-12-25 RX ADMIN — PREDNISONE 10 MG: 10 TABLET ORAL at 08:12

## 2017-12-25 RX ADMIN — SULFASALAZINE 1000 MG: 500 TABLET ORAL at 08:12

## 2017-12-25 RX ADMIN — IPRATROPIUM BROMIDE 0.5 MG: 0.5 SOLUTION RESPIRATORY (INHALATION) at 07:12

## 2017-12-25 RX ADMIN — ENOXAPARIN SODIUM 40 MG: 100 INJECTION SUBCUTANEOUS at 05:12

## 2017-12-25 RX ADMIN — MOXIFLOXACIN HYDROCHLORIDE 400 MG: 400 TABLET, FILM COATED ORAL at 08:12

## 2017-12-25 RX ADMIN — CARVEDILOL 3.12 MG: 3.12 TABLET, FILM COATED ORAL at 08:12

## 2017-12-25 RX ADMIN — SODIUM CHLORIDE, PRESERVATIVE FREE 3 ML: 5 INJECTION INTRAVENOUS at 01:12

## 2017-12-25 RX ADMIN — METRONIDAZOLE 500 MG: 500 TABLET ORAL at 08:12

## 2017-12-25 RX ADMIN — IPRATROPIUM BROMIDE 0.5 MG: 0.5 SOLUTION RESPIRATORY (INHALATION) at 01:12

## 2017-12-25 RX ADMIN — AMLODIPINE BESYLATE 5 MG: 5 TABLET ORAL at 08:12

## 2017-12-25 RX ADMIN — ERGOCALCIFEROL 50000 UNITS: 1.25 CAPSULE ORAL at 08:12

## 2017-12-25 RX ADMIN — METRONIDAZOLE 500 MG: 500 TABLET ORAL at 01:12

## 2017-12-25 NOTE — ASSESSMENT & PLAN NOTE
Apparently new onset CHF. HF pathway initiated. BNP low (falsely due to obesity?).  Lasix 40 mg IV initiated. No ACEi due to allergy.  Echo: CONCLUSIONS     1 - Technically difficult study (BMI 58.6).     2 - Normal left ventricular systolic function (EF 65-70%).     3 - Normal right ventricular systolic function .     4 - The estimated PA systolic pressure is greater than 28 mmHg.     5 - There is a small to perhaps moderately sized pericardial effusion and some collapse of the RV free wall is seen on parasternal long axis imaging.  The IVC cannot be visualized but the relatively dynamic LV function would support the possibility of   the patient being volume depleted.  Respiratory variation is seen in mitral inflow, but this is of questionable reliability and better signals from LVOT outflow do not show significant variability (as would be seen in tamponade).   Appears to be euvolemic; decreasing Lasix to 40 mg po daily. Monitoring.  Mild tachycardia; discontinuing Lasix. Appears compensated (suspect pulmonary hypertension).

## 2017-12-25 NOTE — PLAN OF CARE
Problem: Fall Risk (Adult)  Goal: Absence of Falls  Patient will demonstrate the desired outcomes by discharge/transition of care.   Fall precaution maintained this shift call bell in reach bed in low position no acute distress noted. Vs stable o2 at 2L. Instructed pt to call for assistance

## 2017-12-25 NOTE — ASSESSMENT & PLAN NOTE
"Chest CT revealed: "Patchy irregular multifocal consolidative opacities throughout both lung fields, with associated peribronchial cuffing, endobronchial debris, and groundglass opacities. These findings in addition to a moderate volume pericardial effusion and small volume bilateral pleural effusions could represent an infectious process (likely viral), noninfectious serosal inflammatory disease (rheumatoid arthritis, lupus...), or malignancy. Multiple enlarged bilateral axillary lymph nodes, favored to be reactive in nature."   Monitor closely for tamponade from pericardial effusion.  Added Avelox 12/22/17; ordered sputum culture and viral studies.  Added acapella. Added Atrovent nebs as patient allergic to albuterol.  Patient remains hypoxic. Maximizing antibiotic coverage (do not suspect MRSA at this point) with Avelox, Flagyl, and ceftriaxone.  "

## 2017-12-25 NOTE — SUBJECTIVE & OBJECTIVE
Interval History: Patient with no events overnight, no new complaints. Feeling slightly better; remains hypoxic.  Data Review: Results recorded below were reviewed 12/24/2017.    Review of Systems   Constitutional: Positive for fever.   Respiratory: Positive for shortness of breath. Negative for chest tightness.    Cardiovascular: Negative for chest pain and leg swelling.     Objective:     Vital Signs (Most Recent):  Temp: 98.7 °F (37.1 °C) (12/24/17 1518)  Pulse: 108 (12/24/17 1900)  Resp: 17 (12/24/17 1518)  BP: (!) 154/77 (12/24/17 1518)  SpO2: (!) 92 % (12/24/17 1518) Vital Signs (24h Range):  Temp:  [98.1 °F (36.7 °C)-99 °F (37.2 °C)] 98.7 °F (37.1 °C)  Pulse:  [] 108  Resp:  [16-20] 17  SpO2:  [92 %-97 %] 92 %  BP: (131-154)/(70-95) 154/77     Weight: (!) 156.2 kg (344 lb 5.7 oz)  Body mass index is 57.3 kg/m².    Intake/Output Summary (Last 24 hours) at 12/24/17 1934  Last data filed at 12/24/17 0900   Gross per 24 hour   Intake              720 ml   Output              450 ml   Net              270 ml      Physical Exam   Constitutional: She appears well-developed.  Non-toxic appearance.   HENT:   Head: Normocephalic.   Mouth/Throat: Mucous membranes are not pale and not cyanotic.   Eyes: Conjunctivae and lids are normal. Pupils are equal.   Neck: Neck supple.   Cardiovascular: Regular rhythm, S1 normal and S2 normal.  Tachycardia present.    Pulmonary/Chest: Effort normal. She has decreased breath sounds in the right lower field. She has wheezes. She has rales in the right lower field.   Abdominal: Soft. Bowel sounds are normal. There is no tenderness.   Musculoskeletal: She exhibits no edema.   Neurological: She is alert. She is not disoriented.   Skin: Skin is warm and dry. No cyanosis. Nails show no clubbing.   Psychiatric: She has a normal mood and affect. Cognition and memory are normal.       Significant Labs:     CBC:     Recent Labs  Lab 12/23/17  0550 12/24/17  0505   WBC 12.12 10.62   HGB  9.5* 9.0*   HCT 30.7* 29.7*   * 565*     CMP:     Recent Labs  Lab 12/23/17  0550 12/24/17  0505   * 135*   K 4.1 3.8   CL 96 96   CO2 29 29   * 120*   BUN 23* 19   CREATININE 1.0 0.8   CALCIUM 9.6 9.3   ALBUMIN 2.4* 2.3*   ANIONGAP 9 10   EGFRNONAA >60.0 >60.0     Magnesium:     Recent Labs  Lab 12/23/17  0550 12/24/17  0505   MG 1.9 1.8

## 2017-12-25 NOTE — PROGRESS NOTES
[facility] Hospital Medicine  Progress Note    Patient Name: Malika Hodgson  MRN: [id number]  Patient Class: IP- Inpatient   Admission Date: 12/20/2017  Length of Stay: 4 days  Attending Physician: Stephanie Reece MD  Primary Care Provider: Sarah Robles MD    [facility] [facility] Stephanie Reece [place]    Subjective:     Principal Problem:Acute congestive heart failure    HPI:  47 y.o. female presented to the ER with past medical history of RA (rheumatoid arthritis), immunosuppression, hypertension, morbid obesity.  She was in her usual state of stable health until the acute onset of URI / pneumonia symptoms beginning 2 - 3 weeks prior to admission with gradually improving course after treatment with Tessalon and doxycycline. Patient drank an increased amount of fluid and ate mostly high-sodium soups during this time. She developed significant dyspnea on exertion. Pertinent associated symptoms include shortness of breath on exertion; denies orthopnea and LE swelling.    Hospital Course:  No notes on file    Interval History: Patient with no events overnight, no new complaints. Feeling slightly better; remains hypoxic.  Data Review: Results recorded below were reviewed 12/24/2017.    Review of Systems   Constitutional: Positive for fever.   Respiratory: Positive for shortness of breath. Negative for chest tightness.    Cardiovascular: Negative for chest pain and leg swelling.     Objective:     Vital Signs (Most Recent):  Temp: 98.7 °F (37.1 °C) (12/24/17 1518)  Pulse: 108 (12/24/17 1900)  Resp: 17 (12/24/17 1518)  BP: (!) 154/77 (12/24/17 1518)  SpO2: (!) 92 % (12/24/17 1518) Vital Signs (24h Range):  Temp:  [98.1 °F (36.7 °C)-99 °F (37.2 °C)] 98.7 °F (37.1 °C)  Pulse:  [] 108  Resp:  [16-20] 17  SpO2:  [92 %-97 %] 92 %  BP: (131-154)/(70-95) 154/77     Weight: (!) 156.2 kg (344 lb 5.7 oz)  Body mass index is 57.3 kg/m².    Intake/Output Summary (Last 24 hours) at  12/24/17 1934  Last data filed at 12/24/17 0900   Gross per 24 hour   Intake              720 ml   Output              450 ml   Net              270 ml      Physical Exam   Constitutional: She appears well-developed.  Non-toxic appearance.   HENT:   Head: Normocephalic.   Mouth/Throat: Mucous membranes are not pale and not cyanotic.   Eyes: Conjunctivae and lids are normal. Pupils are equal.   Neck: Neck supple.   Cardiovascular: Regular rhythm, S1 normal and S2 normal.  Tachycardia present.    Pulmonary/Chest: Effort normal. She has decreased breath sounds in the right lower field. She has wheezes. She has rales in the right lower field.   Abdominal: Soft. Bowel sounds are normal. There is no tenderness.   Musculoskeletal: She exhibits no edema.   Neurological: She is alert. She is not disoriented.   Skin: Skin is warm and dry. No cyanosis. Nails show no clubbing.   Psychiatric: She has a normal mood and affect. Cognition and memory are normal.       Significant Labs:     CBC:     Recent Labs  Lab 12/23/17  0550 12/24/17  0505   WBC 12.12 10.62   HGB 9.5* 9.0*   HCT 30.7* 29.7*   * 565*     CMP:     Recent Labs  Lab 12/23/17  0550 12/24/17  0505   * 135*   K 4.1 3.8   CL 96 96   CO2 29 29   * 120*   BUN 23* 19   CREATININE 1.0 0.8   CALCIUM 9.6 9.3   ALBUMIN 2.4* 2.3*   ANIONGAP 9 10   EGFRNONAA >60.0 >60.0     Magnesium:     Recent Labs  Lab 12/23/17  0550 12/24/17  0505   MG 1.9 1.8     Assessment/Plan:      Current Hospital Problem List:    Active Hospital Problems    Diagnosis  POA    *Acute congestive heart failure [I50.9]  Yes     Priority: 2     Ground glass opacity present on imaging of lung [R91.8]  Yes     Priority: 1 - High    RA (rheumatoid arthritis) [M06.9]  Yes     Priority: 3     Essential hypertension [I10]  Yes     Priority: 4     Immunosuppression [D89.9]  Yes    Vitamin D deficiency disease [E55.9]  Yes      Resolved Hospital Problems    Diagnosis Date Resolved POA   No  "resolved problems to display.       Ordered Medications for management of current problems:    amLODIPine  5 mg Oral Daily    carvedilol  3.125 mg Oral BID    cefTRIAXone (ROCEPHIN) IVPB  2 g Intravenous Q24H    enoxaparin  40 mg Subcutaneous Q12H    ergocalciferol  50,000 Units Oral Twice Weekly    ipratropium  0.5 mg Nebulization TID WAKE    metroNIDAZOLE  500 mg Oral Q8H    moxifloxacin  400 mg Oral Daily    predniSONE  10 mg Oral Daily    senna-docusate 8.6-50 mg  1 tablet Oral BID    sodium chloride 0.9%  3 mL Intravenous Q8H    sulfaSALAzine  1,000 mg Oral BID       Risk  Patient has a condition that poses threat to life and bodily function: Severe Respiratory Distress    Anticipated Disposition: Home or Self Care    Assessment and Plan by Problem:    * Acute congestive heart failure    Apparently new onset CHF. HF pathway initiated. BNP low (falsely due to obesity?).  Lasix 40 mg IV initiated. No ACEi due to allergy.  Echo: CONCLUSIONS     1 - Technically difficult study (BMI 58.6).     2 - Normal left ventricular systolic function (EF 65-70%).     3 - Normal right ventricular systolic function .     4 - The estimated PA systolic pressure is greater than 28 mmHg.     5 - There is a small to perhaps moderately sized pericardial effusion and some collapse of the RV free wall is seen on parasternal long axis imaging.  The IVC cannot be visualized but the relatively dynamic LV function would support the possibility of   the patient being volume depleted.  Respiratory variation is seen in mitral inflow, but this is of questionable reliability and better signals from LVOT outflow do not show significant variability (as would be seen in tamponade).   Appears to be euvolemic; decreasing Lasix to 40 mg po daily. Monitoring.  Mild tachycardia; discontinuing Lasix. Appears compensated (suspect pulmonary hypertension).        Ground glass opacity present on imaging of lung    Chest CT revealed: "Patchy " "irregular multifocal consolidative opacities throughout both lung fields, with associated peribronchial cuffing, endobronchial debris, and groundglass opacities. These findings in addition to a moderate volume pericardial effusion and small volume bilateral pleural effusions could represent an infectious process (likely viral), noninfectious serosal inflammatory disease (rheumatoid arthritis, lupus...), or malignancy. Multiple enlarged bilateral axillary lymph nodes, favored to be reactive in nature."   Monitor closely for tamponade from pericardial effusion.  Added Avelox 12/22/17; ordered sputum culture and viral studies.  Added acapella. Added Atrovent nebs as patient allergic to albuterol.  Patient remains hypoxic. Maximizing antibiotic coverage (do not suspect MRSA at this point) with Avelox, Flagyl, and ceftriaxone.        RA (rheumatoid arthritis)    Continuing home medications.  Follow up with Rheumatology after possible infection resolves.        Essential hypertension    Continuing current management with amlodipine for now.  Better controlled initially but now remaining above goal; added Coreg.        Vitamin D deficiency disease    Continuing home medications.          VTE Risk Mitigation         Ordered     enoxaparin injection 40 mg  Every 12 hours (non-standard times)     Route:  Subcutaneous        12/20/17 1701     Medium Risk of VTE  Once      12/20/17 1701              Stephanie Reece MD  Department of Hospital Medicine   [facility]  "

## 2017-12-25 NOTE — ASSESSMENT & PLAN NOTE
Continuing current management with amlodipine for now.  Better controlled initially but now remaining above goal; added Coreg.

## 2017-12-26 PROBLEM — I31.39 PERICARDIAL EFFUSION: Status: ACTIVE | Noted: 2017-12-26

## 2017-12-26 LAB
ALBUMIN SERPL BCP-MCNC: 2.3 G/DL
ANION GAP SERPL CALC-SCNC: 9 MMOL/L
BASOPHILS # BLD AUTO: 0.02 K/UL
BASOPHILS NFR BLD: 0.2 %
BUN SERPL-MCNC: 15 MG/DL
CALCIUM SERPL-MCNC: 9.2 MG/DL
CHLORIDE SERPL-SCNC: 97 MMOL/L
CO2 SERPL-SCNC: 30 MMOL/L
CREAT SERPL-MCNC: 0.8 MG/DL
DIFFERENTIAL METHOD: ABNORMAL
EOSINOPHIL # BLD AUTO: 0.2 K/UL
EOSINOPHIL NFR BLD: 1.6 %
ERYTHROCYTE [DISTWIDTH] IN BLOOD BY AUTOMATED COUNT: 14.7 %
EST. GFR  (AFRICAN AMERICAN): >60 ML/MIN/1.73 M^2
EST. GFR  (NON AFRICAN AMERICAN): >60 ML/MIN/1.73 M^2
GLUCOSE SERPL-MCNC: 122 MG/DL
HCT VFR BLD AUTO: 28.5 %
HGB BLD-MCNC: 8.7 G/DL
IMM GRANULOCYTES # BLD AUTO: 0.09 K/UL
IMM GRANULOCYTES NFR BLD AUTO: 1 %
LYMPHOCYTES # BLD AUTO: 1.4 K/UL
LYMPHOCYTES NFR BLD: 15.5 %
MAGNESIUM SERPL-MCNC: 2 MG/DL
MCH RBC QN AUTO: 24.4 PG
MCHC RBC AUTO-ENTMCNC: 30.5 G/DL
MCV RBC AUTO: 80 FL
MONOCYTES # BLD AUTO: 1 K/UL
MONOCYTES NFR BLD: 10.8 %
NEUTROPHILS # BLD AUTO: 6.5 K/UL
NEUTROPHILS NFR BLD: 70.9 %
NRBC BLD-RTO: 0 /100 WBC
PHOSPHATE SERPL-MCNC: 3.2 MG/DL
PLATELET # BLD AUTO: 559 K/UL
PMV BLD AUTO: 8.6 FL
POTASSIUM SERPL-SCNC: 4 MMOL/L
RBC # BLD AUTO: 3.57 M/UL
SODIUM SERPL-SCNC: 136 MMOL/L
WBC # BLD AUTO: 9.17 K/UL

## 2017-12-26 PROCEDURE — 36415 COLL VENOUS BLD VENIPUNCTURE: CPT

## 2017-12-26 PROCEDURE — 93307 TTE W/O DOPPLER COMPLETE: CPT | Mod: 26,,, | Performed by: INTERNAL MEDICINE

## 2017-12-26 PROCEDURE — A4216 STERILE WATER/SALINE, 10 ML: HCPCS | Performed by: INTERNAL MEDICINE

## 2017-12-26 PROCEDURE — 80069 RENAL FUNCTION PANEL: CPT

## 2017-12-26 PROCEDURE — 94761 N-INVAS EAR/PLS OXIMETRY MLT: CPT

## 2017-12-26 PROCEDURE — 83735 ASSAY OF MAGNESIUM: CPT

## 2017-12-26 PROCEDURE — 99223 1ST HOSP IP/OBS HIGH 75: CPT | Mod: ,,, | Performed by: INTERNAL MEDICINE

## 2017-12-26 PROCEDURE — 94640 AIRWAY INHALATION TREATMENT: CPT

## 2017-12-26 PROCEDURE — 94664 DEMO&/EVAL PT USE INHALER: CPT

## 2017-12-26 PROCEDURE — 25000003 PHARM REV CODE 250: Performed by: PHYSICIAN ASSISTANT

## 2017-12-26 PROCEDURE — 25000003 PHARM REV CODE 250: Performed by: INTERNAL MEDICINE

## 2017-12-26 PROCEDURE — 93308 TTE F-UP OR LMTD: CPT

## 2017-12-26 PROCEDURE — 63600175 PHARM REV CODE 636 W HCPCS: Performed by: INTERNAL MEDICINE

## 2017-12-26 PROCEDURE — 99232 SBSQ HOSP IP/OBS MODERATE 35: CPT | Mod: ,,, | Performed by: INTERNAL MEDICINE

## 2017-12-26 PROCEDURE — 11000001 HC ACUTE MED/SURG PRIVATE ROOM

## 2017-12-26 PROCEDURE — 27000221 HC OXYGEN, UP TO 24 HOURS

## 2017-12-26 PROCEDURE — 25000242 PHARM REV CODE 250 ALT 637 W/ HCPCS: Performed by: INTERNAL MEDICINE

## 2017-12-26 PROCEDURE — 85025 COMPLETE CBC W/AUTO DIFF WBC: CPT

## 2017-12-26 RX ADMIN — AMLODIPINE BESYLATE 5 MG: 5 TABLET ORAL at 09:12

## 2017-12-26 RX ADMIN — ENOXAPARIN SODIUM 40 MG: 100 INJECTION SUBCUTANEOUS at 05:12

## 2017-12-26 RX ADMIN — ACETAMINOPHEN 650 MG: 325 TABLET ORAL at 09:12

## 2017-12-26 RX ADMIN — SODIUM CHLORIDE, PRESERVATIVE FREE 3 ML: 5 INJECTION INTRAVENOUS at 09:12

## 2017-12-26 RX ADMIN — IPRATROPIUM BROMIDE 0.5 MG: 0.5 SOLUTION RESPIRATORY (INHALATION) at 07:12

## 2017-12-26 RX ADMIN — CEFTRIAXONE SODIUM 2 G: 2 INJECTION, POWDER, FOR SOLUTION INTRAMUSCULAR; INTRAVENOUS at 09:12

## 2017-12-26 RX ADMIN — IPRATROPIUM BROMIDE 0.5 MG: 0.5 SOLUTION RESPIRATORY (INHALATION) at 01:12

## 2017-12-26 RX ADMIN — MOXIFLOXACIN HYDROCHLORIDE 400 MG: 400 TABLET, FILM COATED ORAL at 09:12

## 2017-12-26 RX ADMIN — METRONIDAZOLE 500 MG: 500 TABLET ORAL at 09:12

## 2017-12-26 RX ADMIN — METRONIDAZOLE 500 MG: 500 TABLET ORAL at 02:12

## 2017-12-26 RX ADMIN — CARVEDILOL 3.12 MG: 3.12 TABLET, FILM COATED ORAL at 09:12

## 2017-12-26 RX ADMIN — IPRATROPIUM BROMIDE 0.5 MG: 0.5 SOLUTION RESPIRATORY (INHALATION) at 08:12

## 2017-12-26 RX ADMIN — SULFASALAZINE 1000 MG: 500 TABLET ORAL at 09:12

## 2017-12-26 RX ADMIN — METRONIDAZOLE 500 MG: 500 TABLET ORAL at 05:12

## 2017-12-26 RX ADMIN — SODIUM CHLORIDE, PRESERVATIVE FREE 3 ML: 5 INJECTION INTRAVENOUS at 05:12

## 2017-12-26 RX ADMIN — SODIUM CHLORIDE, PRESERVATIVE FREE 3 ML: 5 INJECTION INTRAVENOUS at 02:12

## 2017-12-26 NOTE — PLAN OF CARE
Problem: Patient Care Overview  Goal: Plan of Care Review  Outcome: Ongoing (interventions implemented as appropriate)  Pt is free from injury and falls during shift. Pt shows no signs of acute distress. Pt denies pain. AAO. Vitals stable. Up in chair during most of shift- more comfortable for pt. Skin intact. Bath during shift. Satting well on 2.5 L NC, only c/o SOB with exertion. I & O documented in flow sheet. Care plan reviewed with pt- pt verbalized understanding.Bed is in low position with breaks locked. Side rails are up x 2. Environment is clutter free. Call light is within reach of the patient. Will continue to monitor.

## 2017-12-26 NOTE — ASSESSMENT & PLAN NOTE
"Chest CT revealed: "Patchy irregular multifocal consolidative opacities throughout both lung fields, with associated peribronchial cuffing, endobronchial debris, and groundglass opacities. These findings in addition to a moderate volume pericardial effusion and small volume bilateral pleural effusions could represent an infectious process (likely viral), noninfectious serosal inflammatory disease (rheumatoid arthritis, lupus...), or malignancy. Multiple enlarged bilateral axillary lymph nodes, favored to be reactive in nature."   Monitor closely for tamponade from pericardial effusion.  Added Avelox 12/22/17; ordered sputum culture and viral studies.  Added acapella. Added Atrovent nebs as patient allergic to albuterol.  Patient remains hypoxic. Maximizing antibiotic coverage (do not suspect MRSA at this point) with Avelox, Flagyl, and ceftriaxone.  No improvement. Prednisone was started as OP for allergic reaction to albuterol; discontinued Prednisone 12/25/2017.  Consulting Pulmonology. Sputum culture and respiratory pathogen panel pending.  "

## 2017-12-26 NOTE — SUBJECTIVE & OBJECTIVE
Interval History: Patient with no events overnight, no new complaints. No improvement; remains hypoxic.  Data Review: Results recorded below were reviewed 12/26/2017.    Review of Systems   Constitutional: Positive for fatigue.   Respiratory: Positive for shortness of breath. Negative for chest tightness.    Cardiovascular: Negative for chest pain and leg swelling.     Objective:     Vital Signs (Most Recent):  Temp: 97.8 °F (36.6 °C) (12/26/17 1146)  Pulse: 96 (12/26/17 1500)  Resp: 16 (12/26/17 1321)  BP: 131/88 (12/26/17 1146)  SpO2: (!) 93 % (12/26/17 1500) Vital Signs (24h Range):  Temp:  [97.8 °F (36.6 °C)-99.3 °F (37.4 °C)] 97.8 °F (36.6 °C)  Pulse:  [] 96  Resp:  [16-22] 16  SpO2:  [92 %-98 %] 93 %  BP: (122-138)/(64-88) 131/88     Weight: (!) 160.7 kg (354 lb 4.5 oz)  Body mass index is 58.96 kg/m².    Intake/Output Summary (Last 24 hours) at 12/26/17 1532  Last data filed at 12/26/17 1430   Gross per 24 hour   Intake              600 ml   Output                0 ml   Net              600 ml      Physical Exam   Constitutional: She appears well-developed.  Non-toxic appearance.   HENT:   Head: Normocephalic.   Mouth/Throat: Mucous membranes are not pale and not cyanotic.   Eyes: Conjunctivae and lids are normal. Pupils are equal.   Neck: Neck supple.   Cardiovascular: Normal rate, regular rhythm, S1 normal and S2 normal.    Pulmonary/Chest: Effort normal. She has decreased breath sounds in the right lower field. She has wheezes. She has rales in the right lower field.   Abdominal: Soft. Bowel sounds are normal. There is no tenderness.   Musculoskeletal: She exhibits no edema.   Neurological: She is alert. She is not disoriented.   Skin: Skin is warm and dry. No cyanosis. Nails show no clubbing.   Psychiatric: She has a normal mood and affect. Cognition and memory are normal.       Significant Labs:     CBC:     Recent Labs  Lab 12/25/17  0336 12/26/17  0500   WBC 9.95 9.17   HGB 9.0* 8.7*   HCT 29.2*  28.5*   * 559*     CMP:     Recent Labs  Lab 12/25/17  0336 12/26/17  0500   * 136   K 4.1 4.0   CL 96 97   CO2 32* 30*   * 122*   BUN 18 15   CREATININE 0.9 0.8   CALCIUM 9.4 9.2   ALBUMIN 2.3* 2.3*   ANIONGAP 6* 9   EGFRNONAA >60.0 >60.0     Magnesium:     Recent Labs  Lab 12/25/17  0336 12/26/17  0500   MG 2.1 2.0

## 2017-12-26 NOTE — PROGRESS NOTES
[facility] Hospital Medicine  Progress Note    Patient Name: Malika Hodgson  MRN: [id number]  Patient Class: IP- Inpatient   Admission Date: 12/20/2017  Length of Stay: 5 days  Attending Physician: Stephanie Reece MD  Primary Care Provider: Sarah Robles MD    [facility]: [facility] Stephanie Reece MD    Subjective:     Principal Problem:Acute congestive heart failure    HPI:  47 y.o. female presented to the ER with past medical history of RA (rheumatoid arthritis), immunosuppression, hypertension, morbid obesity.  She was in her usual state of stable health until the acute onset of URI / pneumonia symptoms beginning 2 - 3 weeks prior to admission with gradually improving course after treatment with Tessalon and doxycycline. Patient drank an increased amount of fluid and ate mostly high-sodium soups during this time. She developed significant dyspnea on exertion. Pertinent associated symptoms include shortness of breath on exertion; denies orthopnea and LE swelling.    Hospital Course:  No notes on file    Interval History: Patient with no events overnight, no new complaints. No significant improvement; remains hypoxic.  Data Review: Results recorded below were reviewed 12/25/2017.    Review of Systems   Constitutional: Positive for fatigue.   Respiratory: Positive for shortness of breath. Negative for chest tightness.    Cardiovascular: Negative for chest pain and leg swelling.     Objective:     Vital Signs (Most Recent):  Temp: 98.1 °F (36.7 °C) (12/25/17 1607)  Pulse: 97 (12/25/17 1607)  Resp: 19 (12/25/17 1607)  BP: 135/88 (12/25/17 1607)  SpO2: 96 % (12/25/17 1607) Vital Signs (24h Range):  Temp:  [98 °F (36.7 °C)-98.6 °F (37 °C)] 98.1 °F (36.7 °C)  Pulse:  [] 97  Resp:  [17-20] 19  SpO2:  [92 %-97 %] 96 %  BP: (110-156)/(79-98) 135/88     Weight: (!) 157.4 kg (347 lb 0.1 oz)  Body mass index is 57.74 kg/m².    Intake/Output Summary (Last 24 hours) at 12/25/17  1854  Last data filed at 12/25/17 1505   Gross per 24 hour   Intake              330 ml   Output                0 ml   Net              330 ml      Physical Exam   Constitutional: She appears well-developed.  Non-toxic appearance.   HENT:   Head: Normocephalic.   Mouth/Throat: Mucous membranes are not pale and not cyanotic.   Eyes: Conjunctivae and lids are normal. Pupils are equal.   Neck: Neck supple.   Cardiovascular: Regular rhythm, S1 normal and S2 normal.  Tachycardia present.    Pulmonary/Chest: Effort normal. She has decreased breath sounds in the right lower field. She has wheezes. She has rales in the right lower field.   Abdominal: Soft. Bowel sounds are normal. There is no tenderness.   Musculoskeletal: She exhibits no edema.   Neurological: She is alert. She is not disoriented.   Skin: Skin is warm and dry. No cyanosis. Nails show no clubbing.   Psychiatric: She has a normal mood and affect. Cognition and memory are normal.       Significant Labs:     CBC:     Recent Labs  Lab 12/24/17  0505 12/25/17  0336   WBC 10.62 9.95   HGB 9.0* 9.0*   HCT 29.7* 29.2*   * 565*     CMP:     Recent Labs  Lab 12/24/17  0505 12/25/17  0336   * 134*   K 3.8 4.1   CL 96 96   CO2 29 32*   * 115*   BUN 19 18   CREATININE 0.8 0.9   CALCIUM 9.3 9.4   ALBUMIN 2.3* 2.3*   ANIONGAP 10 6*   EGFRNONAA >60.0 >60.0     Magnesium:     Recent Labs  Lab 12/24/17  0505 12/25/17  0336   MG 1.8 2.1     Assessment/Plan:      Current Hospital Problem List:    Active Hospital Problems    Diagnosis  POA    *Acute congestive heart failure [I50.9]  Yes     Priority: 2     Acute hypoxemic respiratory failure [J96.01]  Yes     Priority: 1 - High    Ground glass opacity present on imaging of lung [R91.8]  Yes     Priority: 1 - High    RA (rheumatoid arthritis) [M06.9]  Yes     Priority: 3     Essential hypertension [I10]  Yes     Priority: 4     Immunosuppression [D89.9]  Yes    Vitamin D deficiency disease [E55.9]  Yes       Resolved Hospital Problems    Diagnosis Date Resolved POA   No resolved problems to display.       Ordered Medications for management of current problems:    amLODIPine  5 mg Oral Daily    carvedilol  3.125 mg Oral BID    cefTRIAXone (ROCEPHIN) IVPB  2 g Intravenous Q24H    enoxaparin  40 mg Subcutaneous Q12H    ergocalciferol  50,000 Units Oral Twice Weekly    ipratropium  0.5 mg Nebulization TID WAKE    metroNIDAZOLE  500 mg Oral Q8H    moxifloxacin  400 mg Oral Daily    senna-docusate 8.6-50 mg  1 tablet Oral BID    sodium chloride 0.9%  3 mL Intravenous Q8H    sulfaSALAzine  1,000 mg Oral BID       Risk  Patient has a condition that poses threat to life and bodily function: Severe Respiratory Distress    Anticipated Disposition: Home or Self Care    Assessment and Plan by Problem:    * Acute congestive heart failure    Apparently new onset CHF. HF pathway initiated. BNP low (falsely due to obesity?).  Lasix 40 mg IV initiated. No ACEi due to allergy.  Echo: CONCLUSIONS     1 - Technically difficult study (BMI 58.6).     2 - Normal left ventricular systolic function (EF 65-70%).     3 - Normal right ventricular systolic function .     4 - The estimated PA systolic pressure is greater than 28 mmHg.     5 - There is a small to perhaps moderately sized pericardial effusion and some collapse of the RV free wall is seen on parasternal long axis imaging.  The IVC cannot be visualized but the relatively dynamic LV function would support the possibility of   the patient being volume depleted.  Respiratory variation is seen in mitral inflow, but this is of questionable reliability and better signals from LVOT outflow do not show significant variability (as would be seen in tamponade).   Appears to be euvolemic; decreasing Lasix to 40 mg po daily. Monitoring.  Mild tachycardia; discontinuing Lasix. Appears compensated (suspect pulmonary hypertension).        Acute hypoxemic respiratory failure    Etiology  "unclear. No history of hypoxia.  Cultures and viral studies are pending.        Ground glass opacity present on imaging of lung    Chest CT revealed: "Patchy irregular multifocal consolidative opacities throughout both lung fields, with associated peribronchial cuffing, endobronchial debris, and groundglass opacities. These findings in addition to a moderate volume pericardial effusion and small volume bilateral pleural effusions could represent an infectious process (likely viral), noninfectious serosal inflammatory disease (rheumatoid arthritis, lupus...), or malignancy. Multiple enlarged bilateral axillary lymph nodes, favored to be reactive in nature."   Monitor closely for tamponade from pericardial effusion.  Added Avelox 12/22/17; ordered sputum culture and viral studies.  Added acapella. Added Atrovent nebs as patient allergic to albuterol.  Patient remains hypoxic. Maximizing antibiotic coverage (do not suspect MRSA at this point) with Avelox, Flagyl, and ceftriaxone.  No improvement. Prednisone was started as OP for allergic reaction to albuterol; discontinued Prednisone 12/25/2017.  Consulting Pulmonology. Sputum culture and respiratory pathogen panel pending.        RA (rheumatoid arthritis)    Continuing home medications.  Follow up with Rheumatology after possible infection resolves.        Essential hypertension    Continuing current management with amlodipine for now.  Better controlled initially but now remaining above goal; added Coreg.        Vitamin D deficiency disease    Continuing home medications.          VTE Risk Mitigation         Ordered     enoxaparin injection 40 mg  Every 12 hours (non-standard times)     Route:  Subcutaneous        12/20/17 1701     Medium Risk of VTE  Once      12/20/17 1701              Stephanie Reece MD  Department of Hospital Medicine   [facility]  "

## 2017-12-26 NOTE — PLAN OF CARE
Problem: Patient Care Overview  Goal: Plan of Care Review  Outcome: Ongoing (interventions implemented as appropriate)  Pt is free from injury and falls during shift. Pt shows no signs of acute distress. Pt c/o shoulder pain once with relief from tylenol. AAO. Vitals stable. Up in chair during most of shift- more comfortable for pt. Skin intact. Satting well on 2 L NC with SOB on exertion. I & O documented in flow sheet. 2 BM during shift. Care plan reviewed with pt- pt verbalized understanding.Bed is in low position with breaks locked. Side rails are up x 2. Environment is clutter free. Call light is within reach of the patient. Will continue to monitor.

## 2017-12-26 NOTE — SUBJECTIVE & OBJECTIVE
Interval History: Patient with no events overnight, no new complaints. No significant improvement; remains hypoxic.  Data Review: Results recorded below were reviewed 12/25/2017.    Review of Systems   Constitutional: Positive for fatigue.   Respiratory: Positive for shortness of breath. Negative for chest tightness.    Cardiovascular: Negative for chest pain and leg swelling.     Objective:     Vital Signs (Most Recent):  Temp: 98.1 °F (36.7 °C) (12/25/17 1607)  Pulse: 97 (12/25/17 1607)  Resp: 19 (12/25/17 1607)  BP: 135/88 (12/25/17 1607)  SpO2: 96 % (12/25/17 1607) Vital Signs (24h Range):  Temp:  [98 °F (36.7 °C)-98.6 °F (37 °C)] 98.1 °F (36.7 °C)  Pulse:  [] 97  Resp:  [17-20] 19  SpO2:  [92 %-97 %] 96 %  BP: (110-156)/(79-98) 135/88     Weight: (!) 157.4 kg (347 lb 0.1 oz)  Body mass index is 57.74 kg/m².    Intake/Output Summary (Last 24 hours) at 12/25/17 1854  Last data filed at 12/25/17 1505   Gross per 24 hour   Intake              330 ml   Output                0 ml   Net              330 ml      Physical Exam   Constitutional: She appears well-developed.  Non-toxic appearance.   HENT:   Head: Normocephalic.   Mouth/Throat: Mucous membranes are not pale and not cyanotic.   Eyes: Conjunctivae and lids are normal. Pupils are equal.   Neck: Neck supple.   Cardiovascular: Regular rhythm, S1 normal and S2 normal.  Tachycardia present.    Pulmonary/Chest: Effort normal. She has decreased breath sounds in the right lower field. She has wheezes. She has rales in the right lower field.   Abdominal: Soft. Bowel sounds are normal. There is no tenderness.   Musculoskeletal: She exhibits no edema.   Neurological: She is alert. She is not disoriented.   Skin: Skin is warm and dry. No cyanosis. Nails show no clubbing.   Psychiatric: She has a normal mood and affect. Cognition and memory are normal.       Significant Labs:     CBC:     Recent Labs  Lab 12/24/17  0505 12/25/17  0336   WBC 10.62 9.95   HGB 9.0*  9.0*   HCT 29.7* 29.2*   * 565*     CMP:     Recent Labs  Lab 12/24/17  0505 12/25/17  0336   * 134*   K 3.8 4.1   CL 96 96   CO2 29 32*   * 115*   BUN 19 18   CREATININE 0.8 0.9   CALCIUM 9.3 9.4   ALBUMIN 2.3* 2.3*   ANIONGAP 10 6*   EGFRNONAA >60.0 >60.0     Magnesium:     Recent Labs  Lab 12/24/17  0505 12/25/17  0336   MG 1.8 2.1

## 2017-12-27 LAB
ALBUMIN SERPL BCP-MCNC: 2.2 G/DL
ALBUMIN SERPL BCP-MCNC: 2.2 G/DL
ALP SERPL-CCNC: 97 U/L
ALT SERPL W/O P-5'-P-CCNC: 14 U/L
ANION GAP SERPL CALC-SCNC: 9 MMOL/L
AST SERPL-CCNC: 26 U/L
BACTERIA BLD CULT: NORMAL
BACTERIA BLD CULT: NORMAL
BACTERIA SPEC AEROBE CULT: NORMAL
BASOPHILS # BLD AUTO: 0.03 K/UL
BASOPHILS NFR BLD: 0.3 %
BILIRUB DIRECT SERPL-MCNC: 0.2 MG/DL
BILIRUB SERPL-MCNC: 0.3 MG/DL
BILIRUB UR QL STRIP: NEGATIVE
BUN SERPL-MCNC: 12 MG/DL
C3 SERPL-MCNC: 137 MG/DL
C4 SERPL-MCNC: 15 MG/DL
CALCIUM SERPL-MCNC: 9.1 MG/DL
CHLORIDE SERPL-SCNC: 97 MMOL/L
CK MB SERPL-MCNC: 0.7 NG/ML
CK MB SERPL-RTO: 2.1 %
CK SERPL-CCNC: 33 U/L
CLARITY UR REFRACT.AUTO: CLEAR
CO2 SERPL-SCNC: 30 MMOL/L
COLOR UR AUTO: NORMAL
CREAT SERPL-MCNC: 0.8 MG/DL
CREAT UR-MCNC: 9 MG/DL
CRP SERPL-MCNC: 169.9 MG/L
DIASTOLIC DYSFUNCTION: NO
DIFFERENTIAL METHOD: ABNORMAL
EOSINOPHIL # BLD AUTO: 0.3 K/UL
EOSINOPHIL NFR BLD: 2.5 %
ERYTHROCYTE [DISTWIDTH] IN BLOOD BY AUTOMATED COUNT: 14.9 %
ERYTHROCYTE [SEDIMENTATION RATE] IN BLOOD BY WESTERGREN METHOD: 113 MM/HR
EST. GFR  (AFRICAN AMERICAN): >60 ML/MIN/1.73 M^2
EST. GFR  (NON AFRICAN AMERICAN): >60 ML/MIN/1.73 M^2
ESTIMATED PA SYSTOLIC PRESSURE: 43.96
GLOBAL PERICARDIAL EFFUSION: ABNORMAL
GLOBAL PERICARDIAL EFFUSION: ABNORMAL
GLUCOSE SERPL-MCNC: 111 MG/DL
GLUCOSE UR QL STRIP: NEGATIVE
GRAM STN SPEC: NORMAL
HBV CORE AB SERPL QL IA: NEGATIVE
HBV SURFACE AB SER-ACNC: POSITIVE M[IU]/ML
HBV SURFACE AG SERPL QL IA: NEGATIVE
HCT VFR BLD AUTO: 30 %
HCV AB SERPL QL IA: NEGATIVE
HGB BLD-MCNC: 9.2 G/DL
HGB UR QL STRIP: NEGATIVE
HIV 1+2 AB+HIV1 P24 AG SERPL QL IA: NEGATIVE
IMM GRANULOCYTES # BLD AUTO: 0.1 K/UL
IMM GRANULOCYTES NFR BLD AUTO: 1 %
KETONES UR QL STRIP: NEGATIVE
LEUKOCYTE ESTERASE UR QL STRIP: NEGATIVE
LYMPHOCYTES # BLD AUTO: 1.8 K/UL
LYMPHOCYTES NFR BLD: 17.9 %
MAGNESIUM SERPL-MCNC: 1.9 MG/DL
MCH RBC QN AUTO: 24.5 PG
MCHC RBC AUTO-ENTMCNC: 30.7 G/DL
MCV RBC AUTO: 80 FL
MONOCYTES # BLD AUTO: 1 K/UL
MONOCYTES NFR BLD: 10.1 %
NEUTROPHILS # BLD AUTO: 6.9 K/UL
NEUTROPHILS NFR BLD: 68.2 %
NITRITE UR QL STRIP: NEGATIVE
NRBC BLD-RTO: 0 /100 WBC
PH UR STRIP: 6 [PH] (ref 5–8)
PHOSPHATE SERPL-MCNC: 3.4 MG/DL
PLATELET # BLD AUTO: 552 K/UL
PMV BLD AUTO: 8.6 FL
POTASSIUM SERPL-SCNC: 3.9 MMOL/L
PROCALCITONIN SERPL IA-MCNC: 0.08 NG/ML
PROT SERPL-MCNC: 7.9 G/DL
PROT UR QL STRIP: NEGATIVE
PROT UR-MCNC: <7 MG/DL
PROT/CREAT RATIO, UR: ABNORMAL
RBC # BLD AUTO: 3.75 M/UL
RETIRED EF AND QEF - SEE NOTES: 60 (ref 55–65)
RETIRED EF AND QEF - SEE NOTES: 65 (ref 55–65)
SODIUM SERPL-SCNC: 136 MMOL/L
SP GR UR STRIP: 1 (ref 1–1.03)
TRICUSPID VALVE REGURGITATION: ABNORMAL
URN SPEC COLLECT METH UR: NORMAL
UROBILINOGEN UR STRIP-ACNC: NEGATIVE EU/DL
WBC # BLD AUTO: 10.14 K/UL

## 2017-12-27 PROCEDURE — 86706 HEP B SURFACE ANTIBODY: CPT

## 2017-12-27 PROCEDURE — 99233 SBSQ HOSP IP/OBS HIGH 50: CPT | Mod: ,,, | Performed by: INTERNAL MEDICINE

## 2017-12-27 PROCEDURE — 86703 HIV-1/HIV-2 1 RESULT ANTBDY: CPT

## 2017-12-27 PROCEDURE — 27000221 HC OXYGEN, UP TO 24 HOURS

## 2017-12-27 PROCEDURE — 83516 IMMUNOASSAY NONANTIBODY: CPT | Mod: 59

## 2017-12-27 PROCEDURE — 25000003 PHARM REV CODE 250: Performed by: PHYSICIAN ASSISTANT

## 2017-12-27 PROCEDURE — 86334 IMMUNOFIX E-PHORESIS SERUM: CPT

## 2017-12-27 PROCEDURE — 86235 NUCLEAR ANTIGEN ANTIBODY: CPT | Mod: 59

## 2017-12-27 PROCEDURE — 82553 CREATINE MB FRACTION: CPT

## 2017-12-27 PROCEDURE — 84165 PROTEIN E-PHORESIS SERUM: CPT | Mod: 26,,, | Performed by: PATHOLOGY

## 2017-12-27 PROCEDURE — 86140 C-REACTIVE PROTEIN: CPT

## 2017-12-27 PROCEDURE — 80076 HEPATIC FUNCTION PANEL: CPT

## 2017-12-27 PROCEDURE — 86704 HEP B CORE ANTIBODY TOTAL: CPT

## 2017-12-27 PROCEDURE — 99223 1ST HOSP IP/OBS HIGH 75: CPT | Mod: ,,, | Performed by: INTERNAL MEDICINE

## 2017-12-27 PROCEDURE — 86235 NUCLEAR ANTIGEN ANTIBODY: CPT

## 2017-12-27 PROCEDURE — 86334 IMMUNOFIX E-PHORESIS SERUM: CPT | Mod: 26,,, | Performed by: PATHOLOGY

## 2017-12-27 PROCEDURE — 86480 TB TEST CELL IMMUN MEASURE: CPT

## 2017-12-27 PROCEDURE — 63600175 PHARM REV CODE 636 W HCPCS: Performed by: INTERNAL MEDICINE

## 2017-12-27 PROCEDURE — 11000001 HC ACUTE MED/SURG PRIVATE ROOM

## 2017-12-27 PROCEDURE — 81003 URINALYSIS AUTO W/O SCOPE: CPT

## 2017-12-27 PROCEDURE — 85651 RBC SED RATE NONAUTOMATED: CPT

## 2017-12-27 PROCEDURE — 86160 COMPLEMENT ANTIGEN: CPT

## 2017-12-27 PROCEDURE — 94664 DEMO&/EVAL PT USE INHALER: CPT

## 2017-12-27 PROCEDURE — 93306 TTE W/DOPPLER COMPLETE: CPT

## 2017-12-27 PROCEDURE — 25000242 PHARM REV CODE 250 ALT 637 W/ HCPCS: Performed by: INTERNAL MEDICINE

## 2017-12-27 PROCEDURE — 93306 TTE W/DOPPLER COMPLETE: CPT | Mod: 26,,, | Performed by: INTERNAL MEDICINE

## 2017-12-27 PROCEDURE — A4216 STERILE WATER/SALINE, 10 ML: HCPCS | Performed by: INTERNAL MEDICINE

## 2017-12-27 PROCEDURE — 25000003 PHARM REV CODE 250: Performed by: INTERNAL MEDICINE

## 2017-12-27 PROCEDURE — 94761 N-INVAS EAR/PLS OXIMETRY MLT: CPT

## 2017-12-27 PROCEDURE — 84165 PROTEIN E-PHORESIS SERUM: CPT

## 2017-12-27 PROCEDURE — 94640 AIRWAY INHALATION TREATMENT: CPT

## 2017-12-27 PROCEDURE — 83516 IMMUNOASSAY NONANTIBODY: CPT

## 2017-12-27 PROCEDURE — 86039 ANTINUCLEAR ANTIBODIES (ANA): CPT

## 2017-12-27 PROCEDURE — 86255 FLUORESCENT ANTIBODY SCREEN: CPT | Mod: 91

## 2017-12-27 PROCEDURE — 85025 COMPLETE CBC W/AUTO DIFF WBC: CPT

## 2017-12-27 PROCEDURE — 84156 ASSAY OF PROTEIN URINE: CPT

## 2017-12-27 PROCEDURE — 83735 ASSAY OF MAGNESIUM: CPT

## 2017-12-27 PROCEDURE — 86803 HEPATITIS C AB TEST: CPT

## 2017-12-27 PROCEDURE — 80069 RENAL FUNCTION PANEL: CPT

## 2017-12-27 PROCEDURE — 84145 PROCALCITONIN (PCT): CPT

## 2017-12-27 PROCEDURE — 86038 ANTINUCLEAR ANTIBODIES: CPT

## 2017-12-27 PROCEDURE — 99900035 HC TECH TIME PER 15 MIN (STAT)

## 2017-12-27 PROCEDURE — 99232 SBSQ HOSP IP/OBS MODERATE 35: CPT | Mod: ,,, | Performed by: INTERNAL MEDICINE

## 2017-12-27 PROCEDURE — 87340 HEPATITIS B SURFACE AG IA: CPT

## 2017-12-27 PROCEDURE — 36415 COLL VENOUS BLD VENIPUNCTURE: CPT

## 2017-12-27 PROCEDURE — 83520 IMMUNOASSAY QUANT NOS NONAB: CPT

## 2017-12-27 RX ORDER — FUROSEMIDE 10 MG/ML
80 INJECTION INTRAMUSCULAR; INTRAVENOUS ONCE
Status: COMPLETED | OUTPATIENT
Start: 2017-12-27 | End: 2017-12-27

## 2017-12-27 RX ORDER — AZITHROMYCIN 250 MG/1
250 TABLET, FILM COATED ORAL DAILY
Status: COMPLETED | OUTPATIENT
Start: 2017-12-27 | End: 2018-01-01

## 2017-12-27 RX ADMIN — CEFTRIAXONE SODIUM 2 G: 2 INJECTION, POWDER, FOR SOLUTION INTRAMUSCULAR; INTRAVENOUS at 09:12

## 2017-12-27 RX ADMIN — SODIUM CHLORIDE, PRESERVATIVE FREE 3 ML: 5 INJECTION INTRAVENOUS at 06:12

## 2017-12-27 RX ADMIN — METHYLPREDNISOLONE SODIUM SUCCINATE 16 MG: 40 INJECTION, POWDER, FOR SOLUTION INTRAMUSCULAR; INTRAVENOUS at 04:12

## 2017-12-27 RX ADMIN — FUROSEMIDE 80 MG: 10 INJECTION, SOLUTION INTRAMUSCULAR; INTRAVENOUS at 11:12

## 2017-12-27 RX ADMIN — CARVEDILOL 3.12 MG: 3.12 TABLET, FILM COATED ORAL at 10:12

## 2017-12-27 RX ADMIN — METRONIDAZOLE 500 MG: 500 TABLET ORAL at 10:12

## 2017-12-27 RX ADMIN — ENOXAPARIN SODIUM 40 MG: 100 INJECTION SUBCUTANEOUS at 05:12

## 2017-12-27 RX ADMIN — SULFASALAZINE 1000 MG: 500 TABLET ORAL at 09:12

## 2017-12-27 RX ADMIN — MOXIFLOXACIN HYDROCHLORIDE 400 MG: 400 TABLET, FILM COATED ORAL at 09:12

## 2017-12-27 RX ADMIN — IPRATROPIUM BROMIDE 0.5 MG: 0.5 SOLUTION RESPIRATORY (INHALATION) at 01:12

## 2017-12-27 RX ADMIN — ACETAMINOPHEN 650 MG: 325 TABLET ORAL at 01:12

## 2017-12-27 RX ADMIN — IPRATROPIUM BROMIDE 0.5 MG: 0.5 SOLUTION RESPIRATORY (INHALATION) at 08:12

## 2017-12-27 RX ADMIN — SODIUM CHLORIDE, PRESERVATIVE FREE 3 ML: 5 INJECTION INTRAVENOUS at 01:12

## 2017-12-27 RX ADMIN — METHYLPREDNISOLONE SODIUM SUCCINATE 16 MG: 40 INJECTION, POWDER, FOR SOLUTION INTRAMUSCULAR; INTRAVENOUS at 10:12

## 2017-12-27 RX ADMIN — METRONIDAZOLE 500 MG: 500 TABLET ORAL at 01:12

## 2017-12-27 RX ADMIN — CARVEDILOL 3.12 MG: 3.12 TABLET, FILM COATED ORAL at 09:12

## 2017-12-27 RX ADMIN — AMLODIPINE BESYLATE 5 MG: 5 TABLET ORAL at 09:12

## 2017-12-27 RX ADMIN — IPRATROPIUM BROMIDE 0.5 MG: 0.5 SOLUTION RESPIRATORY (INHALATION) at 09:12

## 2017-12-27 RX ADMIN — ENOXAPARIN SODIUM 40 MG: 100 INJECTION SUBCUTANEOUS at 06:12

## 2017-12-27 RX ADMIN — METRONIDAZOLE 500 MG: 500 TABLET ORAL at 06:12

## 2017-12-27 RX ADMIN — SODIUM CHLORIDE, PRESERVATIVE FREE 3 ML: 5 INJECTION INTRAVENOUS at 10:12

## 2017-12-27 NOTE — SUBJECTIVE & OBJECTIVE
Past Medical History:   Diagnosis Date    Arthritis     Hypertension 11/11/2013    Insomnia 2/26/2013    Obesity 2/26/2013       History reviewed. No pertinent surgical history.    Review of patient's allergies indicates:   Allergen Reactions    Lisinopril Swelling     Mouth swelled, had to go to ED    Ventolin [albuterol] Shortness Of Breath       Family History     Problem Relation (Age of Onset)    Breast cancer Mother (62), Other (45)    Diabetes Father, Mother    Hypertension Father, Mother    Kidney disease Mother    Rheum arthritis Father, Paternal Grandmother        Social History Main Topics    Smoking status: Never Smoker    Smokeless tobacco: Never Used      Comment: nurse;     Alcohol use No    Drug use: No    Sexual activity: Not Currently     Partners: Male     Birth control/ protection: None         Review of Systems       Positive findings are in BOLD. Otherwise negative ROS as below.     CONSTITUTIONAL: weight loss, fever, chills, weakness or fatigue.   HEENT: visual loss, blurred vision, double vision or yellow sclerae. Ears, Nose, Throat: No hearing loss, sneezing, congestion, runny nose or sore throat.   CARDIOVASCULAR: chest pain, chest pressure or chest discomfort. No palpitations or edema.   RESPIRATORY: shortness of breath, cough or sputum.   GASTROINTESTINAL:anorexia, nausea, vomiting or diarrhea. No abdominal pain or blood.   NEUROLOGICAL: headache, dizziness, syncope, paralysis, ataxia, numbness or tingling in the extremities. No change in bowel or bladder control.   MUSCULOSKELETAL: muscle, back pain, joint pain or stiffness.   HEMATOLOGIC: anemia, bleeding or bruising.   LYMPHATICS: enlarged nodes. No history of splenectomy.   PSYCHIATRIC: history of depression or anxiety.   ENDOCRINOLOGIC: No reports of sweating, cold or heat intolerance. No polyuria or polydipsia.     Objective:     Vital Signs (Most Recent):  Temp: 98.6 °F (37 °C) (12/26/17 1559)  Pulse: 103  (12/26/17 1559)  Resp: 15 (12/26/17 1559)  BP: 126/74 (12/26/17 1559)  SpO2: 95 % (12/26/17 1559) Vital Signs (24h Range):  Temp:  [97.8 °F (36.6 °C)-99.3 °F (37.4 °C)] 98.6 °F (37 °C)  Pulse:  [] 103  Resp:  [15-22] 15  SpO2:  [92 %-98 %] 95 %  BP: (122-138)/(64-88) 126/74     Weight: (!) 160.7 kg (354 lb 4.5 oz)  Body mass index is 58.96 kg/m².      Intake/Output Summary (Last 24 hours) at 12/26/17 1855  Last data filed at 12/26/17 1430   Gross per 24 hour   Intake              600 ml   Output                0 ml   Net              600 ml       Physical Exam     Gen: alert and oriented. Obese, on Nc able to speak small sentences without having to pause.   HEENT: oral mucosa moist, free of lesions, no dental abnormalities, neck free of lymphadenopathy. JVD negative, no eye abnormalities. Pupils appears equal and reactive.   Chest: Bilateral breath sounds present. No wheezing.   Heart: RRR, No M/G/r.   Abdomen: soft, non tender, no masses. No g/r/r  Extremities: Mild pitting edema. No cyanosis. No deformities.       Vents:       Lines/Drains/Airways     Peripheral Intravenous Line                 Peripheral IV - Single Lumen 12/25/17 1015 Left Forearm 1 day                Significant Labs:    CBC/Anemia Profile:    Recent Labs  Lab 12/25/17  0336 12/26/17  0500   WBC 9.95 9.17   HGB 9.0* 8.7*   HCT 29.2* 28.5*   * 559*   MCV 80* 80*   RDW 14.7* 14.7*        Chemistries:    Recent Labs  Lab 12/25/17  0336 12/26/17  0500   * 136   K 4.1 4.0   CL 96 97   CO2 32* 30*   BUN 18 15   CREATININE 0.9 0.8   CALCIUM 9.4 9.2   ALBUMIN 2.3* 2.3*   MG 2.1 2.0   PHOS 3.2 3.2       Significant Imaging:   I have reviewed and interpreted all pertinent imaging results/findings within the past 24 hours.

## 2017-12-27 NOTE — ASSESSMENT & PLAN NOTE
47F with RA, morbid obesity here with 3 weeks of shortness of breath, orthopnea. Overall, feeling slightly better but still with persistent SOB, tachypnea, hypoxia. Has failed to significantly improve with multiple courses of abx (currently on broad spectrum), corticosteroids. Cardiology consulted to re-evaluate for tamponade in this setting. CT with significant bilateral opacities- infectious vs inflammatory vs autoimmune. No signs of significant volume overload. Echo without significant change from prior and of note, tamponade should not be causing her hypoxia.  -No tamponade physiology evident on bedside echo.  Would consider ILD given RA, CT findings, and hypoxia, agree with rheum consult   -Would also give mild additional diuresis as IVC dilated without significant respiratory variation suggestive of increased CVP  -Given difficulty of volume assessment, if not felt due to ILD or fails to improve, could consider RHC    -Although pericardial effusion would not likely account for patient's hypoxia, lasix usage may cause intravascular depletion and worsen tamponade. Rec stopping lasix mommentarily, if possible.  -Will repeat ECHO today to see if worsening effusion, and follow serial limited ECHO's to follow, if so. -Will determine if therapeutic tap is necessary.

## 2017-12-27 NOTE — CONSULTS
"[facility]  Rheumatology  Consult Note    Patient Name: Malika Hodgson  MRN: [id number]  Admission Date: 12/20/2017  Hospital Length of Stay: 7 days  Code Status: Full Code   Attending Provider: Ayse Reece MD  Primary Care Physician: Sarah Robles MD  Principal Problem:Acute congestive heart failure    Inpatient consult to Rheumatology  Consult performed by: KRANTHI SETHI  Consult ordered by: AYSE REECE        Subjective:     HPI: 47YF with RA ( +RF, +CCP), Hypovitaminosis D,Morbid Obesity presented with 3 week history of chest tightness, dyspnea, cough. Initially treated with doxycycline 12/12 at Urgent care and prednisone 10mg X 5days was added by PCP 12/14 with no improvement in her symptoms. Pt was then admitted for further workup. Pt was started on Rocephin and Flagyly 12/24,and moxifloxacin 12/22 for possible infection and was also diuresed for possible volume overload. Pt also received prednisone 10mg daily during hospital admission 12/21-12/25. CT chest 12/22 showed patchy irregular multifocal consolidative opacities throughout both lung fields, with associated peribronchial cuffing, endobronchial debris, and groundglass opacities in addition to a moderate volume pericardial effusion and small volume bilateral pleural effusions. 2D ECHO 12/21 showed EF 65-70% with small to perhaps moderately sized pericardial effusion and some collapse of the RV free wall is seen on parasternal long axis imaging.    Pulmonary was consulted and recommended Cardiology for evaluation for tamponade with pericardial effusion, trial of steriods and stated that bronch would not be tolerated at this time. As per Cardiology "No tamponade physiology evident on bedside echo or exam, give mild diuresis and may consider RHC .        RA history  Initially seen and dx by Dr Dillon ( Rheumatologist) in 02/2009 however pt opted not to start DMARD therapy as joint scans were normal and took NSAIDs " "instead prn in 2013. Pt was started on Sulfasalazine 07/15 due to elevated inflammatory makers (ESR 90,CRP 14.5) and L elbow contracture which initially improved after medication was started. Pt was then lost to follow up from 05/16 until 12/17 at which she was taking SSZ 1000mg BID which being managed by her PCP. On last correspondence 12/15/17, due to elevated inflammatory makers and joint pains plan was to increase SSZ to 1500mg BID but pt reported diagnosis of bronchitis/pneumonia.    Prior to admission, pt reported daily joint pains since Sept 2017 and was more complaint with her medication. Previously missing 1-2X weekly.     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 Adopted children.       Rheumatology was consulted for " RA,GGO on chest CT and pleural effusion".        Past Medical History:   Diagnosis Date    Arthritis     Hypertension 11/11/2013    Insomnia 2/26/2013    Obesity 2/26/2013       History reviewed. No pertinent surgical history.    Immunization History   Administered Date(s) Administered    DTP 1968, 1968, 1968, 04/04/1972    Hepatitis A 03/03/2014    Hepatitis A, Pediatric/Adolescent, 2 Dose 03/03/2014    Hepatitis B, Adult 05/24/2010, 06/24/2010, 12/30/2010    IPV 1968, 1968, 04/04/1972, 08/08/1978    Influenza 09/19/2007, 01/16/2009, 10/15/2010    Influenza - Quadrivalent - PF 10/17/2017    MMR 04/03/1995, 03/14/2009    Measles 04/01/1995    Meningococcal Conjugate (MCV4P) 05/24/2010    Mumps 04/01/1995 "    Pneumococcal Conjugate - 13 Valent 12/21/2017    Rubella 04/01/1995    Td (ADULT) 08/08/1978, 04/01/1995, 04/03/1995    Tdap 03/14/2009    Typhoid 03/03/2014    Typhoid - ViCPs 03/03/2014    Yellow Fever 03/03/2014       Review of patient's allergies indicates:   Allergen Reactions    Lisinopril Swelling     Mouth swelled, had to go to ED    Ventolin [albuterol] Shortness Of Breath     Current Facility-Administered Medications   Medication Frequency    acetaminophen tablet 650 mg Q6H PRN    amLODIPine tablet 5 mg Daily    benzonatate capsule 200 mg Q6H PRN    carvedilol tablet 3.125 mg BID    cefTRIAXone (ROCEPHIN) 2 g in dextrose 5 % 50 mL IVPB Q24H    enoxaparin injection 40 mg Q12H    ergocalciferol capsule 50,000 Units Twice Weekly    influenza (FLUZONE,FLUARIX QUADRIVALENT) vaccine 0.5 mL vaccine x 1 dose    ipratropium 0.02 % nebulizer solution 0.5 mg TID WAKE    metroNIDAZOLE tablet 500 mg Q8H    moxifloxacin tablet 400 mg Daily    ondansetron disintegrating tablet 4 mg Q8H PRN    ondansetron injection 4 mg Q8H PRN    senna-docusate 8.6-50 mg per tablet 1 tablet BID    sodium chloride 0.9% flush 3 mL Q8H    sulfaSALAzine tablet 1,000 mg BID     Family History     Problem Relation (Age of Onset)    Breast cancer Mother (62), Other (45)    Diabetes Father, Mother    Hypertension Father, Mother    Kidney disease Mother    Rheum arthritis Father, Paternal Grandmother        Social History Main Topics    Smoking status: Never Smoker    Smokeless tobacco: Never Used      Comment: nurse;     Alcohol use No    Drug use: No    Sexual activity: Not Currently     Partners: Male     Birth control/ protection: None     Review of Systems   Constitutional: Positive for fever.   HENT: Negative for dental problem, mouth sores and trouble swallowing.    Eyes: Negative for pain, redness and visual disturbance.   Respiratory: Positive for cough and shortness of breath.    Cardiovascular:  Positive for chest pain. Negative for palpitations and leg swelling.        Chest pain with inspiration which has resolved since hospital admission   Gastrointestinal: Negative for blood in stool, diarrhea and vomiting.   Genitourinary: Negative for difficulty urinating, frequency, hematuria and urgency.   Musculoskeletal: Positive for arthralgias and joint swelling. Negative for back pain and neck pain.   Skin: Negative for color change, rash and wound.   Allergic/Immunologic: Positive for immunocompromised state.   Neurological: Negative for dizziness, tremors and numbness.   Hematological: Does not bruise/bleed easily.   Psychiatric/Behavioral: Negative for decreased concentration and sleep disturbance. The patient is not nervous/anxious.      Objective:     Vital Signs (Most Recent):  Temp: 98.2 °F (36.8 °C) (12/27/17 0804)  Pulse: 96 (12/27/17 0859)  Resp: 20 (12/27/17 0851)  BP: 128/71 (12/27/17 0804)  SpO2: 96 % (12/27/17 0851)  O2 Device (Oxygen Therapy): nasal cannula w/ humidification (12/27/17 0851) Vital Signs (24h Range):  Temp:  [97.8 °F (36.6 °C)-100.4 °F (38 °C)] 98.2 °F (36.8 °C)  Pulse:  [] 96  Resp:  [15-20] 20  SpO2:  [92 %-96 %] 96 %  BP: (126-138)/(71-90) 128/71     Weight: (!) 159.8 kg (352 lb 4.7 oz) (12/27/17 0557)  Body mass index is 58.62 kg/m².  Body surface area is 2.71 meters squared.      Intake/Output Summary (Last 24 hours) at 12/27/17 0965  Last data filed at 12/26/17 1430   Gross per 24 hour   Intake              600 ml   Output                0 ml   Net              600 ml       Physical Exam   Constitutional: She is oriented to person, place, and time and well-developed, well-nourished, and in no distress.   Morbidly obese   HENT:   Head: Normocephalic and atraumatic.   Eyes: EOM are normal. Pupils are equal, round, and reactive to light.   Neck: Normal range of motion. Neck supple.   Cardiovascular: Normal rate and regular rhythm.    Distant heart sounds   Pulmonary/Chest:  Effort normal.   Diffuse crackles throughout all lung field   Abdominal: Soft. Bowel sounds are normal.   Lymphadenopathy:     She has no cervical adenopathy.   Neurological: She is alert and oriented to person, place, and time.   Skin: Skin is warm and dry. No rash noted.     Psychiatric: Affect normal.   Musculoskeletal: Normal range of motion. She exhibits no edema, tenderness or deformity.   Cspine FROM no tenderness  Tspine FROM no tenderness  Lspine FROM no tenderness.  TMJ: unremarkable  Shoulders: FROM; no synovitis;  Elbows: FROM; no synovitis; no tophi or nodules  Wrists: FROM; no synovitis;   MCPs: FROM; no synovitis; no metacarpalgia;  ok;  PIPs:FROM; no synovitis;   DIPs: FROM; no synovitis;   HIPS: FROM  Knees: FROM; no synovitis; no instability;  Ankles: FROM: no synovitis   Toes: ok; no metatarsalgia            Significant Labs:  Results for MARTIN BERGER (MRN [id number]) as of 12/27/2017 09:24   Ref. Range 12/26/2017 05:00 12/27/2017 04:49   WBC Latest Ref Range: 3.90 - 12.70 K/uL 9.17 10.14   RBC Latest Ref Range: 4.00 - 5.40 M/uL 3.57 (L) 3.75 (L)   Hemoglobin Latest Ref Range: 12.0 - 16.0 g/dL 8.7 (L) 9.2 (L)   Hematocrit Latest Ref Range: 37.0 - 48.5 % 28.5 (L) 30.0 (L)   MCV Latest Ref Range: 82 - 98 fL 80 (L) 80 (L)   MCH Latest Ref Range: 27.0 - 31.0 pg 24.4 (L) 24.5 (L)   MCHC Latest Ref Range: 32.0 - 36.0 g/dL 30.5 (L) 30.7 (L)   RDW Latest Ref Range: 11.5 - 14.5 % 14.7 (H) 14.9 (H)   Platelets Latest Ref Range: 150 - 350 K/uL 559 (H) 552 (H)   MPV Latest Ref Range: 9.2 - 12.9 fL 8.6 (L) 8.6 (L)   Gran% Latest Ref Range: 38.0 - 73.0 % 70.9 68.2   Gran # Latest Ref Range: 1.8 - 7.7 K/uL 6.5 6.9   Immature Granulocytes Latest Ref Range: 0.0 - 0.5 % 1.0 (H) 1.0 (H)   Immature Grans (Abs) Latest Ref Range: 0.00 - 0.04 K/uL 0.09 (H) 0.10 (H)   Lymph% Latest Ref Range: 18.0 - 48.0 % 15.5 (L) 17.9 (L)   Lymph # Latest Ref Range: 1.0 - 4.8 K/uL 1.4 1.8   Mono% Latest Ref Range: 4.0 - 15.0 %  10.8 10.1   Mono # Latest Ref Range: 0.3 - 1.0 K/uL 1.0 1.0   Eosinophil% Latest Ref Range: 0.0 - 8.0 % 1.6 2.5   Eos # Latest Ref Range: 0.0 - 0.5 K/uL 0.2 0.3   Basophil% Latest Ref Range: 0.0 - 1.9 % 0.2 0.3   Baso # Latest Ref Range: 0.00 - 0.20 K/uL 0.02 0.03   nRBC Latest Ref Range: 0 /100 WBC 0 0     Results for MARTIN BERGER (MRN [id number]) as of 12/27/2017 09:24   Ref. Range 12/27/2017 04:49   Sodium Latest Ref Range: 136 - 145 mmol/L 136   Potassium Latest Ref Range: 3.5 - 5.1 mmol/L 3.9   Chloride Latest Ref Range: 95 - 110 mmol/L 97   CO2 Latest Ref Range: 23 - 29 mmol/L 30 (H)   Anion Gap Latest Ref Range: 8 - 16 mmol/L 9   BUN, Bld Latest Ref Range: 6 - 20 mg/dL 12   Creatinine Latest Ref Range: 0.5 - 1.4 mg/dL 0.8   eGFR if non African American Latest Ref Range: >60 mL/min/1.73 m^2 >60.0   eGFR if African American Latest Ref Range: >60 mL/min/1.73 m^2 >60.0   Glucose Latest Ref Range: 70 - 110 mg/dL 111 (H)   Calcium Latest Ref Range: 8.7 - 10.5 mg/dL 9.1   Phosphorus Latest Ref Range: 2.7 - 4.5 mg/dL 3.4   Magnesium Latest Ref Range: 1.6 - 2.6 mg/dL 1.9   Albumin Latest Ref Range: 3.5 - 5.2 g/dL 2.2 (L)       Results for MARTIN BERGER (MRN [id number]) as of 12/27/2017 09:24   Ref. Range 1/28/2009 09:50   TIARA Latest Ref Range: Neg <1:160  Negative     Results for MARTIN BERGER (MRN [id number]) as of 12/27/2017 09:24   Ref. Range 2/26/2013 16:42 5/22/2013 10:41 12/12/2014 07:56   CCP Antibodies Latest Ref Range: 0 - 4.9 U/mL 15.8 (H)     Rheumatoid Factor Latest Ref Range: 0.0 - 15.0 IU/mL 60 (H)  43.0 (H)     Results for MARTIN BERGER (MRN [id number]) as of 12/27/2017 09:24   Ref. Range 12/23/2017 00:23   Specimen UA Unknown Urine, Clean Catch   Color, UA Latest Ref Range: Yellow, Straw, Mai  Mai   pH, UA Latest Ref Range: 5.0 - 8.0  5.0   Specific Gravity, UA Latest Ref Range: 1.005 - 1.030  1.025   Appearance, UA Latest Ref Range: Clear  Cloudy (A)   Protein, UA Latest Ref Range: Negative  1+  (A)   Glucose, UA Latest Ref Range: Negative  Negative   Ketones, UA Latest Ref Range: Negative  Negative   Occult Blood UA Latest Ref Range: Negative  Negative   Nitrite, UA Latest Ref Range: Negative  Negative   Urobilinogen, UA Latest Ref Range: <2.0 EU/dL Negative   Bilirubin (UA) Latest Ref Range: Negative  Negative   Leukocytes, UA Latest Ref Range: Negative  Negative   RBC, UA Latest Ref Range: 0 - 4 /hpf 2   WBC, UA Latest Ref Range: 0 - 5 /hpf 3   Bacteria, UA Latest Ref Range: None-Occ /hpf None   Squam Epithel, UA Latest Units: /hpf 13   Hyaline Casts, UA Latest Ref Range: 0-1/lpf /lpf 0   Microscopic Comment Unknown SEE COMMENT     Significant Imaging:  CT chest 12/22/17  Patchy irregular multifocal consolidative opacities throughout both lung fields, with associated peribronchial cuffing, endobronchial debris, and groundglass opacities. These findings in addition to a moderate volume pericardial effusion and small volume bilateral pleural effusions could represent an infectious process (likely viral), noninfectious serosal inflammatory disease (rheumatoid arthritis, lupus...), or malignancy.    Multiple enlarged bilateral axillary lymph nodes, favored to be reactive in nature    2D ECHO 12/21/17  CONCLUSIONS     1 - Technically difficult study (BMI 58.6).     2 - Normal left ventricular systolic function (EF 65-70%).     3 - Normal right ventricular systolic function .     4 - The estimated PA systolic pressure is greater than 28 mmHg.     5 - There is a small to perhaps moderately sized pericardial effusion and some collapse of the RV free wall is seen on parasternal long axis imaging.  The IVC cannot be visualized but the relatively dynamic LV function would support the possibility of   the patient being volume depleted.  Respiratory variation is seen in mitral inflow, but this is of questionable reliability and better signals from LVOT outflow do not show significant variability (as would be seen in  tamponade).    Xray arthritis survey 05/13  Moderate degenerative change seen in the lower cervical spine.  Atlanto-odontoid relationship is normal in flexion.  Knee show minimal degenerative changes.  Hands and wrists show minimal degenerative change with no erosions.  Both feet show mild hallux valgus deformity bilaterally.  No erosive changes    Assessment/Plan:     RA (rheumatoid arthritis)    47YF with RA ( +RF, +CCP), Hypovitaminosis D,Morbid Obesity presented with 3 week history of chest tightness, dyspnea, cough.    Initially seen and dx by Dr Dillon ( Rheumatologist) in 02/2009 however pt opted not to start DMARD therapy as joint scans were normal and took NSAIDs instead prn in 2013. Pt was started on Sulfasalazine 07/15 due to elevated inflammatory makers (ESR 90,CRP 14.5) and L elbow contracture which initially improved after medication was started. Pt was then lost to follow up from 05/16 until 12/17 at which she was taking SSZ 1000mg BID which being managed by her PCP. On last correspondence 12/15/17, due to elevated inflammatory makers and joint pains plan was to increase SSZ to 1500mg BID but pt reported diagnosis of bronchitis/pneumonia.    Pt with poor improvement in symptoms despite abx and low dose prednisone. CT chest with patchy consolidative irregular groundglass opacities. CBC shows no leucocytosis, microcytic anemia and elevated plts(likley related to inflammatory process). Metabolic profile shows normal renal function, elevated alkphos, low albumin. pro calcitonin wnl. Blood cx NGTD. Resp cx: normal syeda. Physical exam with no signs of active synovitis.     DDx Drug induced ILD (SSZ) vs RA-ILD vs infection vs malignancy vs other autoimmune disease    Plan  - Will repeat pro calcitonin and please start trial of methylprednisolone 16mg q8  - F/u pending labs (TIARA,C3,C4, Quantiferon gold TB, Hep panel,HIV,SPEP,IBIS,ESR,CRP,ANCA, MPO, PR3, scleroderma panel)  - Hold SSZ for now  - please consult  ID to assist with abx management  - Pt needs bronch to r/o other atypical infections(fungal) especially with steroid initiation, appreciate pulmonary reccs              Thank you for your consult. I will follow-up with patient. Please contact us if you have any additional questions.    Rene Mon MD  Rheumatology  [facility]    Patient seen and examined with   I agree with her recommendations.    47 year old  female with seropositive RF,CCP positive since 2009 fairly asymtpmatic since 2009,except for one episode of left elbow effusion in 2015 needing initiation of ssz now being admitted for sob,cough and dyspnea on exertion.It appears that her joint disease started first in September and this was followed by pulmonary symptoms.Antibiotics and low dose prednisone didn't seem to have helped her    Agree with treating her for RA-ILD,in the setting of active joint disease  Pleural and pericardial effusion might be due to active Ra,tamponade ruled out by cardiology.    Would appreciate if pulmonary team does bronchoscopy and ID helps manage any active infections    Will offer IV steroids for 3 days and taper to oral prednisone 0.5 mg/kg BW  PCP prophylaxis  Bone health    Stop sulfasalazine in the event this is SSZ related lung disease    Would repeat chest CT and echo to look for resolution of chest CT findings and effusions    Will have her see  in her clinic and steroids can be tapered,cellcept vs rituximab can be discussed as outpatient    Thank you for consulting  We will continue to follow the patient

## 2017-12-27 NOTE — CONSULTS
[facility]  Pulmonology  Consult Note    Patient Name: Malika Hogdson  MRN: [id number]  Admission Date: 12/20/2017  Hospital Length of Stay: 6 days  Code Status: Full Code  Attending Physician: Stephanie Reece MD  Primary Care Provider: Sarah Robles MD   Principal Problem: Acute congestive heart failure    Consults  Subjective:     HPI:  48 y/o Female with h/o RA (managed with sulfasalazine), Morbid obesity who presents this admission with complaints of cough, SOB.     Per history, she first started experiencing sx 2-3 weeks ago. Was evaluated at Urgent care and prescribed doxycycline. After which she was seen by PCP who added 10 mg of prednisone. Despite this, she notes no improvement in her sx prompting hospital admission. Here, she has been treated with abx for a possible infectious etiology and undergone diuresis for possible volume overload. A CT chest obtained revealed a pericardial effusion + bilateral parenchymal opacities.     Since admission, her prednisone has been stopped and overall, she does note feeling better. She is currently on rocephin, flagyl and moxifloxacin. Remains hypoxic requiring 2-3L Nc. We are consulted to assist with evaluation of pulmonary problems.     Past Medical History:   Diagnosis Date    Arthritis     Hypertension 11/11/2013    Insomnia 2/26/2013    Obesity 2/26/2013       History reviewed. No pertinent surgical history.    Review of patient's allergies indicates:   Allergen Reactions    Lisinopril Swelling     Mouth swelled, had to go to ED    Ventolin [albuterol] Shortness Of Breath       Family History     Problem Relation (Age of Onset)    Breast cancer Mother (62), Other (45)    Diabetes Father, Mother    Hypertension Father, Mother    Kidney disease Mother    Rheum arthritis Father, Paternal Grandmother        Social History Main Topics    Smoking status: Never Smoker    Smokeless tobacco: Never Used      Comment: nurse;     Alcohol  use No    Drug use: No    Sexual activity: Not Currently     Partners: Male     Birth control/ protection: None         Review of Systems       Positive findings are in BOLD. Otherwise negative ROS as below.     CONSTITUTIONAL: weight loss, fever, chills, weakness or fatigue.   HEENT: visual loss, blurred vision, double vision or yellow sclerae. Ears, Nose, Throat: No hearing loss, sneezing, congestion, runny nose or sore throat.   CARDIOVASCULAR: chest pain, chest pressure or chest discomfort. No palpitations or edema.   RESPIRATORY: shortness of breath, cough or sputum.   GASTROINTESTINAL:anorexia, nausea, vomiting or diarrhea. No abdominal pain or blood.   NEUROLOGICAL: headache, dizziness, syncope, paralysis, ataxia, numbness or tingling in the extremities. No change in bowel or bladder control.   MUSCULOSKELETAL: muscle, back pain, joint pain or stiffness.   HEMATOLOGIC: anemia, bleeding or bruising.   LYMPHATICS: enlarged nodes. No history of splenectomy.   PSYCHIATRIC: history of depression or anxiety.   ENDOCRINOLOGIC: No reports of sweating, cold or heat intolerance. No polyuria or polydipsia.     Objective:     Vital Signs (Most Recent):  Temp: 98.6 °F (37 °C) (12/26/17 1559)  Pulse: 103 (12/26/17 1559)  Resp: 15 (12/26/17 1559)  BP: 126/74 (12/26/17 1559)  SpO2: 95 % (12/26/17 1559) Vital Signs (24h Range):  Temp:  [97.8 °F (36.6 °C)-99.3 °F (37.4 °C)] 98.6 °F (37 °C)  Pulse:  [] 103  Resp:  [15-22] 15  SpO2:  [92 %-98 %] 95 %  BP: (122-138)/(64-88) 126/74     Weight: (!) 160.7 kg (354 lb 4.5 oz)  Body mass index is 58.96 kg/m².      Intake/Output Summary (Last 24 hours) at 12/26/17 1855  Last data filed at 12/26/17 1430   Gross per 24 hour   Intake              600 ml   Output                0 ml   Net              600 ml       Physical Exam     Gen: alert and oriented. Obese, on Nc able to speak small sentences without having to pause.   HEENT: oral mucosa moist, free of lesions, no dental  abnormalities, neck free of lymphadenopathy. JVD negative, no eye abnormalities. Pupils appears equal and reactive.   Chest: Bilateral breath sounds present. No wheezing.   Heart: RRR, No M/G/r.   Abdomen: soft, non tender, no masses. No g/r/r  Extremities: Mild pitting edema. No cyanosis. No deformities.       Vents:       Lines/Drains/Airways     Peripheral Intravenous Line                 Peripheral IV - Single Lumen 12/25/17 1015 Left Forearm 1 day                Significant Labs:    CBC/Anemia Profile:    Recent Labs  Lab 12/25/17  0336 12/26/17  0500   WBC 9.95 9.17   HGB 9.0* 8.7*   HCT 29.2* 28.5*   * 559*   MCV 80* 80*   RDW 14.7* 14.7*        Chemistries:    Recent Labs  Lab 12/25/17  0336 12/26/17  0500   * 136   K 4.1 4.0   CL 96 97   CO2 32* 30*   BUN 18 15   CREATININE 0.9 0.8   CALCIUM 9.4 9.2   ALBUMIN 2.3* 2.3*   MG 2.1 2.0   PHOS 3.2 3.2       Significant Imaging:   I have reviewed and interpreted all pertinent imaging results/findings within the past 24 hours.    Assessment/Plan:     Acute hypoxemic respiratory failure    Ct chest with Bilateral Pulmonary opacities     Suspected etiologies for hypoxemic respiratory failure: Infectious etiologies seem less likely given patient was on no immuno-supression long term as an outpatient. Additionally, she is afebrile, normal WBC, pro calcitonin, lactate. She has not really responded to ABx therapy either. Given that lately her rheumatological disease has been more active, it is plausible that her parenchymal opacities represent organizing pneumonia secondary to RA.     May consider a trial of prednisone 40 mg.   Follow up respiratory pathogen panel. Could continue antibiotics to treat for a full course of pneumonia for 7 days.   Given moderate sized pericardial effusion present on Ct- and Echo suggestive of possible compression of RV from effusion- Would recommend a cardiology consult to evaluate further.   Continue diuresis as tolerated.                Thank you for your consult. I will follow-up with patient. Please contact us if you have any additional questions.     Sharmila Noriega,   Pulmonology  [facility]

## 2017-12-27 NOTE — PHYSICIAN QUERY
"PT Name: Malika Hodgson  MR #: [id number]    Physician Query Form - Heart  Condition Clarification     CDS/: Bernardino Horner Jr, RN               Contact information:[phone]  This form is a permanent document in the medical record.     Query Date: December 27, 2017    By submitting this query, we are merely seeking further clarification of documentation. Please utilize your independent clinical judgment when addressing the question(s) below.    The medical record contains the following   Indicators     Supporting Clinical Findings Location in Medical Record   x BNP 33 12/20 Labs    EF      Radiology findings     x Echo Results Diastolic function is normal.     Normal left ventricular systolic function (EF 65-70%) 12/21 2D Echo    12/21 2D Echo    "Ascites" documented      "SOB" or "LYMAN" documented     x "Hypoxia" documented Acute hypoxemic respiratory failure 12/26 Hospital Medicine Progress Note   x Heart Failure documented Acute congestive heart failure   Apparently new onset CHF. HF pathway initiated. BNP low (falsely due to obesity?). 12/26 Hospital Medicine Progress Note    "Edema" documented      Diuretics/Meds     x Treatment: furosemide injection 40 mg Daily  furosemide injection 40 mg 2 Times Daily  furosemide injection 80 mg Once 12/20-12/21 MAR  12/21-12/22 MAR  12/27 MAR    Other:          Provider, please specify diagnosis or diagnoses associated with above clinical findings.                               [  ] Acute Diastolic Heart Failure ( EF > 40)*  [ xxxxx ] Other (please specify): __CHF RULED OUT______________________  [  ] Clinically Undetermined            *American [employer] Association                                                                                                          Please document in your progress notes daily for the duration of treatment until resolved and include in your discharge summary.    "

## 2017-12-27 NOTE — PROGRESS NOTES
[facility]  Cardiology  Progress Note    Patient Name: Malika Hodgson  MRN: [id number]  Admission Date: 12/20/2017  Hospital Length of Stay: 7 days  Code Status: Full Code   Attending Physician: Stephanie Reece MD   Primary Care Physician: Sarah Robles MD  Expected Discharge Date: 1/2/2018  Principal Problem:Acute congestive heart failure    Subjective:     HPI:  47 y.o. female presented to the ER with past medical history of RA (rheumatoid arthritis), immunosuppression, hypertension, morbid obesity.  She was in her usual state of stable health until the acute onset of URI / pneumonia symptoms beginning 2 - 3 weeks prior to admission with gradually improving course after treatment with Tessalon and doxycycline. Patient drank an increased amount of fluid and ate mostly high-sodium soups during this time. She developed significant dyspnea on exertion. Pertinent associated symptoms include shortness of breath on exertion; denies orthopnea and LE swelling.    Interval History: NAEON. Patient was started back on lasix this morning.    Review of Systems   Constitution: Negative for chills and malaise/fatigue.   HENT: Negative for congestion.    Cardiovascular: Positive for dyspnea on exertion and orthopnea. Negative for chest pain, irregular heartbeat, leg swelling, near-syncope, palpitations, paroxysmal nocturnal dyspnea and syncope.   Respiratory: Positive for cough and shortness of breath.    Skin: Negative for color change and rash.   Musculoskeletal: Negative for joint swelling.   Gastrointestinal: Negative for abdominal pain, diarrhea, melena, nausea and vomiting.   Genitourinary: Negative for hematuria.   Neurological: Negative for dizziness, focal weakness, headaches and light-headedness.   Psychiatric/Behavioral: Negative.      Objective:     Vital Signs (Most Recent):  Temp: 98.8 °F (37.1 °C) (12/27/17 1340)  Pulse: 104 (12/27/17 1340)  Resp: 19 (12/27/17 1340)  BP: (!) 136/90 (12/27/17  1340)  SpO2: 95 % (12/27/17 1340) Vital Signs (24h Range):  Temp:  [98.1 °F (36.7 °C)-100.4 °F (38 °C)] 98.8 °F (37.1 °C)  Pulse:  [] 104  Resp:  [15-20] 19  SpO2:  [90 %-96 %] 95 %  BP: (126-138)/(71-90) 136/90     Weight: (!) 159.8 kg (352 lb 4.7 oz)  Body mass index is 58.62 kg/m².     SpO2: 95 %  O2 Device (Oxygen Therapy): nasal cannula      Intake/Output Summary (Last 24 hours) at 12/27/17 1414  Last data filed at 12/27/17 1345   Gross per 24 hour   Intake              750 ml   Output             1500 ml   Net             -750 ml       Lines/Drains/Airways     Peripheral Intravenous Line                 Peripheral IV - Single Lumen 12/25/17 1015 Left Forearm 2 days                Physical Exam   Constitutional: She is oriented to person, place, and time. She appears well-developed and well-nourished. No distress.   Morbidly obese   HENT:   Head: Normocephalic and atraumatic.   Eyes: EOM are normal. Pupils are equal, round, and reactive to light. No scleral icterus.   Neck: Neck supple. No JVD present.   Cardiovascular: Normal rate, regular rhythm, normal heart sounds and intact distal pulses.  Exam reveals no gallop and no friction rub.    No murmur heard.  Pulmonary/Chest: No respiratory distress. She has no wheezes. She has rales.   +Tubular breath sounds and Egophony in RLL   Abdominal: Soft. Bowel sounds are normal. She exhibits no distension. There is no tenderness.   Musculoskeletal: Normal range of motion. She exhibits no edema.   Neurological: She is alert and oriented to person, place, and time. No cranial nerve deficit.   Skin: Skin is warm and dry. No erythema.   Psychiatric: She has a normal mood and affect.   Nursing note and vitals reviewed.      Significant Labs: All pertinent lab results from the last 24 hours have been reviewed.    Significant Imaging:     Imaging Results          CT Chest Without Contrast (Final result)  Result time 12/22/17 11:39:17    Final result by Shyanne Parsons,  MD (12/22/17 11:39:17)                 Impression:      Patchy irregular multifocal consolidative opacities throughout both lung fields, with associated peribronchial cuffing, endobronchial debris, and groundglass opacities. These findings in addition to a moderate volume pericardial effusion and small volume bilateral pleural effusions could represent an infectious process (likely viral), noninfectious serosal inflammatory disease (rheumatoid arthritis, lupus...), or malignancy.    Multiple enlarged bilateral axillary lymph nodes, favored to be reactive in nature.        ______________________________________     Electronically signed by resident: CANDIE KAUFMAN MD  Date:     12/22/17  Time:    11:35            As the supervising and teaching physician, I personally reviewed the images and resident's interpretation and I agree with the findings.            Electronically signed by: Shyanne Parsons MD  Date:     12/22/17  Time:    11:39              Narrative:    CT CHEST    TIME OF PROCEDURE: 12/22/17 09:40:00  ACCESSION #: [id number]    TECHNIQUE: The chest was surveyed from the lung apices through the costophrenic angles without the use of intravenous contrast material.  Data was reformatted for contiguous 5 mm images in the axial, sagittal, and coronal planes.  Data was post processed for extraction of 1.25 mm images for high-resolution CT imaging and 8-mm maximum intensity projection (MIP) images at 2-mm increments confined to the axial plane without additional radiation to the patient.     TOTAL EXAM DLP: 714.95 mGy-cm.    COMPARISON: Shortness of breath, edema, pericardial effusion.          FINDINGS:  Examination quality is significantly degraded by the patient's body habitus.    The structures at the base of the neck reveal no convincing evidence of disease.    There is a left-sided aortic arch with three branch vessels.  The thoracic aorta maintains normal caliber, contour, and course without significant  atherosclerotic calcification.    The heart is not enlarged. There is a moderate volume pericardial effusion.    The pulmonary arteries distribute normally.      There are multiple enlarged bilateral axillary lymph nodes. The largest on the right measures 1.5 cm. The largest on the left measures 2.1 cm. No definite mediastinal lymphadenopathy.    The esophagus maintains a normal course and caliber.    Degenerative changes of the spine are noted.    Limited views of the abdomen demonstrate nothing unusual.    The trachea and proximal airways are patent. There is a mild degree of tracheomalacia.    The lungs are symmetrically expanded. There are small volume bilateral pleural effusions, right greater than left, with minimal associated compressive atelectasis of adjacent lung parenchyma. There are patchy irregular multifocal consolidative opacities throughout both lung fields along with scattered peribronchial cuffing, endobronchial debris, and groundglass opacities. Findings are nonspecific and could represent infectious etiology, noninfectious serosal inflammatory process, or malignancy.                             X-Ray Chest PA And Lateral (Final result)  Result time 12/20/17 14:23:09    Final result by Maurizio Garcia III, MD (12/20/17 14:23:09)                 Narrative:    2 views: There is cardiomegaly, moderate edema, DJD, and worsening.      Electronically signed by: MAURIZIO GARCIA MD  Date:     12/20/17  Time:    14:23                               Echocardiogram:   2D echo with color flow doppler:   Results for orders placed or performed during the hospital encounter of 12/20/17   2D echo with color flow doppler   Result Value Ref Range    EF 68 55 - 65    Diastolic Dysfunction No     Est. PA Systolic Pressure 28.3     Pericardial Effusion SMALL (A)     Tricuspid Valve Regurgitation TRIVIAL        EKG 12/23/17:  Sinus tachycardia with Ventricular trigeminy  Nonspecific ST and T wave abnormality    -Abnormal  ECG when compared with ECG of 20-DEC-2017 13:49,  Premature ventricular complexes are now Present    Assessment and Plan:     47F with RA, morbid obesity here with 3 weeks of shortness of breath, orthopnea. Overall, feeling slightly better but still with persistent SOB, tachypnea, hypoxia. Has failed to significantly improve with multiple courses of abx (currently on broad spectrum), corticosteroids. Cardiology consulted to re-evaluate for tamponade in this setting.    Pericardial effusion    CT with significant bilateral opacities- infectious vs inflammatory vs autoimmune. No signs of significant volume overload. Echo without significant change from prior and of note, tamponade should not be causing her hypoxia.  -No tamponade physiology evident on bedside echo.  Would consider ILD given RA, CT findings, and hypoxia, agree with rheum consult   -Would also give mild additional diuresis as IVC dilated without significant respiratory variation suggestive of increased CVP  -Given difficulty of volume assessment, if not felt due to ILD or fails to improve, could consider RHC    -Although pericardial effusion would not likely account for patient's hypoxia, lasix usage may cause intravascular depletion and worsen effusion effects. Rec stopping lasix mommentarily, if possible.  -Will repeat ECHO today to see if worsening effusion, and follow serial limited ECHO's, if so.   -Will determine if therapeutic tap is necessary.              VTE Risk Mitigation         Ordered     enoxaparin injection 40 mg  Every 12 hours (non-standard times)     Route:  Subcutaneous        12/20/17 1701     Medium Risk of VTE  Once      12/20/17 1701          Hai Ramos MD  Cardiology  [facility]

## 2017-12-27 NOTE — ASSESSMENT & PLAN NOTE
Continuing current management with amlodipine for now.  Better controlled initially but then remaining above goal; added Coreg.  Improved.

## 2017-12-27 NOTE — PROGRESS NOTES
Pt transported to echo at this time via stretcher. 2 L NC in place.     1620- Back from echo at this time. Will continue to monitor.

## 2017-12-27 NOTE — ASSESSMENT & PLAN NOTE
Ct chest with Bilateral Pulmonary opacities     Suspected etiologies for hypoxemic respiratory failure: Infectious etiologies less likely (see consult note from 12/26 for full discussion). Plausible that her parenchymal opacities represent organizing pneumonia secondary to RA.     Cardiology eval with follow up of pericardial effusion is noted.   Consider trial of steroids to eval if her SOB improves. Rheumatological evaluation is noted.   Has developed microcytic anemia since admission - which may be contributing to her SOB. Consider evaluation with reticulocyte count, Fe studies, B12, folate.   Given Hypoxia - would suggest CT PE protocol to evaluate for pulmonary embolus.   Agree with cardiology recomendation of additional diuresis. Per patient, previously prescribed dose of furosemide were ineffective as she did not feel like she was making more urine than usual.

## 2017-12-27 NOTE — HPI
"47YF with RA ( +RF, +CCP), Hypovitaminosis D,Morbid Obesity presented with 3 week history of chest tightness, dyspnea, cough. Initially treated with doxycycline 12/12 at Urgent care and prednisone 10mg X 5days was added by PCP 12/14 with no improvement in her symptoms. Pt was then admitted for further workup. Pt was started on Rocephin and Flagyly 12/24,and moxifloxacin 12/22 for possible infection and was also diuresed for possible volume overload. Pt also received prednisone 10mg daily during hospital admission 12/21-12/25. CT chest 12/22 showed patchy irregular multifocal consolidative opacities throughout both lung fields, with associated peribronchial cuffing, endobronchial debris, and groundglass opacities in addition to a moderate volume pericardial effusion and small volume bilateral pleural effusions. 2D ECHO 12/21 showed EF 65-70% with small to perhaps moderately sized pericardial effusion and some collapse of the RV free wall is seen on parasternal long axis imaging.    Pulmonary was consulted and recommended Cardiology for evaluation for tamponade with pericardial effusion, trial of steriods and stated that bronch would not be tolerated at this time. As per Cardiology "No tamponade physiology evident on bedside echo or exam, give mild diuresis and may consider RHC .        RA history  Initially seen and dx by Dr Dillon ( Rheumatologist) in 02/2009 however pt opted not to start DMARD therapy as joint scans were normal and took NSAIDs instead prn in 2013. Pt was started on Sulfasalazine 07/15 due to elevated inflammatory makers (ESR 90,CRP 14.5) and L elbow contracture which initially improved after medication was started. Pt was then lost to follow up from 05/16 until 12/17 at which she was taking SSZ 1000mg BID which being managed by her PCP. On last correspondence 12/15/17, due to elevated inflammatory makers and joint pains plan was to increase SSZ to 1500mg BID but pt reported diagnosis of " "bronchitis/pneumonia.    Prior to admission, pt reported daily joint pains since Sept 2017 and was more complaint with her medication. Previously missing 1-2X weekly.     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 Adopted children.       Rheumatology was consulted for " RA,GGO on chest CT and pleural effusion".      "

## 2017-12-27 NOTE — ASSESSMENT & PLAN NOTE
Concern for symptomatic effusion, tamponade. Agree with Pulmonology that effusion may be significant. Consulting Cardiology.

## 2017-12-27 NOTE — ASSESSMENT & PLAN NOTE
47F with RA, morbid obesity here with 3 weeks of shortness of breath, orthopnea. Overall, feeling slightly better but still with persistent SOB, tachypnea, hypoxia. Has failed to significantly improve with multiple courses of abx (currently on broad spectrum), corticosteroids. Cardiology consulted to re-evaluate for tamponade in this setting. CT with significant bilateral opacities- infectious vs inflammatory vs autoimmune. No signs of significant volume overload. Echo without significant change from prior and of note, tamponade should not be causing her hypoxia.    -No tamponade physiology evident on bedside echo or exam (pulsus paradoxus <10, no significant JVD though limited by habitus). Would consider ILD given RA, CT findings, and hypoxia, agree with rheum consult   -Would also give mild additional diuresis as IVC dilated without significant respiratory variation suggestive of increased CVP  -Given difficulty of volume assessment, if not felt due to ILD or fails to improve, could consider RHC

## 2017-12-27 NOTE — PROGRESS NOTES
[facility]  Pulmonology  Progress Note    Patient Name: Malika Hodgson  MRN: [id number]  Admission Date: 12/20/2017  Hospital Length of Stay: 7 days  Code Status: Full Code  Attending Provider: Stephanie Reece MD  Primary Care Provider: Sarah Robles MD   Principal Problem: Acute congestive heart failure    Subjective:     Interval History:    Feels about the same to slightly improved. Noticeably less tachypneic today.   Temp of 100.4 F last night.      Objective:     Vital Signs (Most Recent):  Temp: 98.8 °F (37.1 °C) (12/27/17 1340)  Pulse: 104 (12/27/17 1340)  Resp: 19 (12/27/17 1340)  BP: (!) 136/90 (12/27/17 1340)  SpO2: 95 % (12/27/17 1340) Vital Signs (24h Range):  Temp:  [98.1 °F (36.7 °C)-100.4 °F (38 °C)] 98.8 °F (37.1 °C)  Pulse:  [] 104  Resp:  [15-20] 19  SpO2:  [90 %-96 %] 95 %  BP: (126-138)/(71-90) 136/90     Weight: (!) 159.8 kg (352 lb 4.7 oz)  Body mass index is 58.62 kg/m².      Intake/Output Summary (Last 24 hours) at 12/27/17 1421  Last data filed at 12/27/17 1345   Gross per 24 hour   Intake              750 ml   Output             1500 ml   Net             -750 ml       Physical Exam     Gen: alert and oriented. Obese, on Nc able to speak small sentences without having to pause.   HEENT: oral mucosa moist, free of lesions, no dental abnormalities, neck free of lymphadenopathy. JVD negative, no eye abnormalities. Pupils appears equal and reactive.   Chest: Bilateral breath sounds present. No wheezing.   Heart: RRR, No M/G/r.   Abdomen: soft, non tender, no masses. No g/r/r  Extremities: Mild pitting edema. No cyanosis. No deformities.     Vents:       Lines/Drains/Airways     Peripheral Intravenous Line                 Peripheral IV - Single Lumen 12/25/17 1015 Left Forearm 2 days                Significant Labs:    CBC/Anemia Profile:    Recent Labs  Lab 12/26/17  0500 12/27/17  0449   WBC 9.17 10.14   HGB 8.7* 9.2*   HCT 28.5* 30.0*   * 552*   MCV 80* 80*    RDW 14.7* 14.9*        Chemistries:    Recent Labs  Lab 12/26/17  0500 12/27/17  0449    136   K 4.0 3.9   CL 97 97   CO2 30* 30*   BUN 15 12   CREATININE 0.8 0.8   CALCIUM 9.2 9.1   ALBUMIN 2.3* 2.2*  2.2*   PROT  --  7.9   BILITOT  --  0.3   ALKPHOS  --  97   ALT  --  14   AST  --  26   MG 2.0 1.9   PHOS 3.2 3.4       Significant Imaging:  I have reviewed and interpreted all pertinent imaging results/findings within the past 24 hours.    Assessment/Plan:     Acute hypoxemic respiratory failure    Ct chest with Bilateral Pulmonary opacities     Suspected etiologies for hypoxemic respiratory failure: Infectious etiologies less likely (see consult note from 12/26 for full discussion). Plausible that her parenchymal opacities represent organizing pneumonia secondary to RA.     Cardiology eval with follow up of pericardial effusion is noted.   Consider trial of steroids to eval if her SOB improves. Rheumatological evaluation is noted.   Has developed microcytic anemia since admission - which may be contributing to her SOB. Consider evaluation with reticulocyte count, Fe studies, B12, folate.   Given Hypoxia - would suggest CT PE protocol to evaluate for pulmonary embolus.   Agree with cardiology recomendation of additional diuresis. Per patient, previously prescribed dose of furosemide were ineffective as she did not feel like she was making more urine than usual.                  Sharmila Noriega, DO  Pulmonology  [facility]

## 2017-12-27 NOTE — ASSESSMENT & PLAN NOTE
"Chest CT revealed: "Patchy irregular multifocal consolidative opacities throughout both lung fields, with associated peribronchial cuffing, endobronchial debris, and groundglass opacities. These findings in addition to a moderate volume pericardial effusion and small volume bilateral pleural effusions could represent an infectious process (likely viral), noninfectious serosal inflammatory disease (rheumatoid arthritis, lupus...), or malignancy. Multiple enlarged bilateral axillary lymph nodes, favored to be reactive in nature."   Monitor closely for tamponade from pericardial effusion.  Added Avelox 12/22/17; ordered sputum culture and viral studies.  Added acapella. Added Atrovent nebs as patient allergic to albuterol.  Patient remains hypoxic. Maximizing antibiotic coverage (do not suspect MRSA at this point) with Avelox, Flagyl, and ceftriaxone.  No improvement. Prednisone was started as OP for allergic reaction to albuterol; discontinued Prednisone 12/25/2017.  Consulted Pulmonology. Sputum culture and respiratory pathogen panel pending.  Concern for lack of significant improvement despite diuresis, antibiotics, and supportive care.  "

## 2017-12-27 NOTE — ASSESSMENT & PLAN NOTE
Ct chest with Bilateral Pulmonary opacities     Suspected etiologies for hypoxemic respiratory failure: Infectious etiologies seem less likely given patient was on no immuno-supression long term as an outpatient. Additionally, she is afebrile, normal WBC, pro calcitonin, lactate. She has not really responded to ABx therapy either. Given that lately her rheumatological disease has been more active, it is plausible that her parenchymal opacities represent organizing pneumonia secondary to RA.     May consider a trial of prednisone 40 mg.   Follow up respiratory pathogen panel. Could continue antibiotics to treat for a full course of pneumonia for 7 days.   Given moderate sized pericardial effusion present on Ct- and Echo suggestive of possible compression of RV from effusion- Would recommend a cardiology consult to evaluate further.   Continue diuresis as tolerated.

## 2017-12-27 NOTE — HPI
46 y/o Female with h/o RA (managed with sulfasalazine), Morbid obesity who presents this admission with complaints of cough, SOB.     Per history, she first started experiencing sx 2-3 weeks ago. Was evaluated at Urgent care and prescribed doxycycline. After which she was seen by PCP who added 10 mg of prednisone. Despite this, she notes no improvement in her sx prompting hospital admission. Here, she has been treated with abx for a possible infectious etiology and undergone diuresis for possible volume overload. A CT chest obtained revealed a pericardial effusion + bilateral parenchymal opacities.     Since admission, her prednisone has been stopped and overall, she does note feeling better. She is currently on rocephin, flagyl and moxifloxacin. Remains hypoxic requiring 2-3L Nc. We are consulted to assist with evaluation of pulmonary problems.

## 2017-12-27 NOTE — ASSESSMENT & PLAN NOTE
Apparently new onset CHF. HF pathway initiated. BNP low (falsely due to obesity?).  Lasix 40 mg IV initiated. No ACEi due to allergy.  Echo: CONCLUSIONS     1 - Technically difficult study (BMI 58.6).     2 - Normal left ventricular systolic function (EF 65-70%).     3 - Normal right ventricular systolic function .     4 - The estimated PA systolic pressure is greater than 28 mmHg.     5 - There is a small to perhaps moderately sized pericardial effusion and some collapse of the RV free wall is seen on parasternal long axis imaging.  The IVC cannot be visualized but the relatively dynamic LV function would support the possibility of   the patient being volume depleted.  Respiratory variation is seen in mitral inflow, but this is of questionable reliability and better signals from LVOT outflow do not show significant variability (as would be seen in tamponade).   Appears to be euvolemic; decreased Lasix to 40 mg po daily. Monitoring.  Mild tachycardia; discontinued Lasix 12/24/17. Appears compensated (suspect pulmonary hypertension).

## 2017-12-27 NOTE — SUBJECTIVE & OBJECTIVE
Interval History:    Feels about the same to slightly improved. Noticeably less tachypneic today.   Temp of 100.4 F last night.      Objective:     Vital Signs (Most Recent):  Temp: 98.8 °F (37.1 °C) (12/27/17 1340)  Pulse: 104 (12/27/17 1340)  Resp: 19 (12/27/17 1340)  BP: (!) 136/90 (12/27/17 1340)  SpO2: 95 % (12/27/17 1340) Vital Signs (24h Range):  Temp:  [98.1 °F (36.7 °C)-100.4 °F (38 °C)] 98.8 °F (37.1 °C)  Pulse:  [] 104  Resp:  [15-20] 19  SpO2:  [90 %-96 %] 95 %  BP: (126-138)/(71-90) 136/90     Weight: (!) 159.8 kg (352 lb 4.7 oz)  Body mass index is 58.62 kg/m².      Intake/Output Summary (Last 24 hours) at 12/27/17 1421  Last data filed at 12/27/17 1345   Gross per 24 hour   Intake              750 ml   Output             1500 ml   Net             -750 ml       Physical Exam     Gen: alert and oriented. Obese, on Nc able to speak small sentences without having to pause.   HEENT: oral mucosa moist, free of lesions, no dental abnormalities, neck free of lymphadenopathy. JVD negative, no eye abnormalities. Pupils appears equal and reactive.   Chest: Bilateral breath sounds present. No wheezing.   Heart: RRR, No M/G/r.   Abdomen: soft, non tender, no masses. No g/r/r  Extremities: Mild pitting edema. No cyanosis. No deformities.     Vents:       Lines/Drains/Airways     Peripheral Intravenous Line                 Peripheral IV - Single Lumen 12/25/17 1015 Left Forearm 2 days                Significant Labs:    CBC/Anemia Profile:    Recent Labs  Lab 12/26/17  0500 12/27/17  0449   WBC 9.17 10.14   HGB 8.7* 9.2*   HCT 28.5* 30.0*   * 552*   MCV 80* 80*   RDW 14.7* 14.9*        Chemistries:    Recent Labs  Lab 12/26/17  0500 12/27/17  0449    136   K 4.0 3.9   CL 97 97   CO2 30* 30*   BUN 15 12   CREATININE 0.8 0.8   CALCIUM 9.2 9.1   ALBUMIN 2.3* 2.2*  2.2*   PROT  --  7.9   BILITOT  --  0.3   ALKPHOS  --  97   ALT  --  14   AST  --  26   MG 2.0 1.9   PHOS 3.2 3.4       Significant  Imaging:  I have reviewed and interpreted all pertinent imaging results/findings within the past 24 hours.

## 2017-12-27 NOTE — ASSESSMENT & PLAN NOTE
47YF with RA ( +RF, +CCP), Hypovitaminosis D,Morbid Obesity presented with 3 week history of chest tightness, dyspnea, cough.    Initially seen and dx by Dr Dillon ( Rheumatologist) in 02/2009 however pt opted not to start DMARD therapy as joint scans were normal and took NSAIDs instead prn in 2013. Pt was started on Sulfasalazine 07/15 due to elevated inflammatory makers (ESR 90,CRP 14.5) and L elbow contracture which initially improved after medication was started. Pt was then lost to follow up from 05/16 until 12/17 at which she was taking SSZ 1000mg BID which being managed by her PCP. On last correspondence 12/15/17, due to elevated inflammatory makers and joint pains plan was to increase SSZ to 1500mg BID but pt reported diagnosis of bronchitis/pneumonia.    Pt with poor improvement in symptoms despite abx and low dose prednisone. CT chest with patchy consolidative irregular groundglass opacities. CBC shows no leucocytosis, microcytic anemia and elevated plts(likley related to inflammatory process). Metabolic profile shows normal renal function, elevated alkphos, low albumin. pro calcitonin wnl. Blood cx NGTD. Resp cx: normal syeda    DDx Drug induced ILD (SSZ) vs RA-ILD vs infection vs malignancy vs other autoimmune disease    Plan  - Will repeat pro calcitonin and please start trial of methylprednisolone 16mg q8  - F/u pending labs (TIARA,C3,C4, Quantiferon gold TB, Hep panel,HIV,SPEP,IBIS,ESR,CRP,ANCA, MPO, PR3, scleroderma panel)  - Hold SSZ for now  - please consult ID to assist with abx management  - Pt needs bronch to r/o other atypical infections(fungal) especially with steroid initiation, appreciate pulmonary reccs

## 2017-12-27 NOTE — ASSESSMENT & PLAN NOTE
Continuing home medication.  Recent increase in inflammatory markers associated with URI.  Consulting Rheumatology.

## 2017-12-27 NOTE — SUBJECTIVE & OBJECTIVE
Past Medical History:   Diagnosis Date    Arthritis     Hypertension 11/11/2013    Insomnia 2/26/2013    Obesity 2/26/2013       History reviewed. No pertinent surgical history.    Review of patient's allergies indicates:   Allergen Reactions    Lisinopril Swelling     Mouth swelled, had to go to ED    Ventolin [albuterol] Shortness Of Breath       No current facility-administered medications on file prior to encounter.      Current Outpatient Prescriptions on File Prior to Encounter   Medication Sig    amlodipine (NORVASC) 5 MG tablet Take 1 tablet (5 mg total) by mouth once daily.    benzonatate (TESSALON PERLES) 100 MG capsule Take 2 capsules (200 mg total) by mouth every 6 (six) hours as needed for Cough.    ergocalciferol (ERGOCALCIFEROL) 50,000 unit Cap Take 1 capsule (50,000 Units total) by mouth twice a week.    multivitamin with iron Tab Take 1 tablet by mouth once daily.    sulfaSALAzine (AZULFIDINE) 500 mg Tab Take 2 tablets (1,000 mg total) by mouth 2 (two) times daily.    albuterol 90 mcg/actuation inhaler Inhale 2 puffs into the lungs every 6 (six) hours as needed for Wheezing. Rescue     Family History     Problem Relation (Age of Onset)    Breast cancer Mother (62), Other (45)    Diabetes Father, Mother    Hypertension Father, Mother    Kidney disease Mother    Rheum arthritis Father, Paternal Grandmother        Social History Main Topics    Smoking status: Never Smoker    Smokeless tobacco: Never Used      Comment: nurse;     Alcohol use No    Drug use: No    Sexual activity: Not Currently     Partners: Male     Birth control/ protection: None     Review of Systems   Constitution: Negative for fever and malaise/fatigue.   Cardiovascular: Positive for dyspnea on exertion and orthopnea. Negative for chest pain, irregular heartbeat, leg swelling, near-syncope, palpitations, paroxysmal nocturnal dyspnea and syncope.   Respiratory: Positive for cough and shortness of breath.     Skin: Negative for color change and rash.   Gastrointestinal: Negative for abdominal pain, nausea and vomiting.   Neurological: Negative for dizziness, focal weakness and headaches.     Objective:     Vital Signs (Most Recent):  Temp: (!) 100.4 °F (38 °C) (12/26/17 2337)  Pulse: 108 (12/26/17 2337)  Resp: 16 (12/26/17 2337)  BP: (!) 134/90 (12/26/17 2337)  SpO2: (!) 92 % (12/26/17 2337) Vital Signs (24h Range):  Temp:  [97.8 °F (36.6 °C)-100.4 °F (38 °C)] 100.4 °F (38 °C)  Pulse:  [] 108  Resp:  [15-22] 16  SpO2:  [92 %-96 %] 92 %  BP: (122-134)/(64-90) 134/90     Weight: (!) 160.7 kg (354 lb 4.5 oz)  Body mass index is 58.96 kg/m².    SpO2: (!) 92 %  O2 Device (Oxygen Therapy): nasal cannula      Intake/Output Summary (Last 24 hours) at 12/27/17 0049  Last data filed at 12/26/17 1430   Gross per 24 hour   Intake              600 ml   Output                0 ml   Net              600 ml       Lines/Drains/Airways     Peripheral Intravenous Line                 Peripheral IV - Single Lumen 12/25/17 1015 Left Forearm 1 day                Physical Exam   Constitutional: She is oriented to person, place, and time. She appears well-nourished. No distress.   HENT:   Head: Atraumatic.   Eyes: EOM are normal. No scleral icterus.   Neck: Neck supple. No JVD present.   Cardiovascular: Normal rate, regular rhythm, normal heart sounds and intact distal pulses.  Exam reveals no gallop and no friction rub.    No murmur heard.  Pulsus paradoxus 6-8 mm Hg   Pulmonary/Chest: No respiratory distress. She has no wheezes. She has rales (dry, worse in bases).   Mildly increased WOB, tachypneic to mid 20s   Abdominal: Soft. Bowel sounds are normal. She exhibits no distension. There is no tenderness.   Musculoskeletal: She exhibits no edema.   Neurological: She is alert and oriented to person, place, and time. No cranial nerve deficit.   Skin: Skin is warm and dry. No erythema.       Significant Labs:   CMP   Recent Labs  Lab  12/25/17  0336 12/26/17  0500   * 136   K 4.1 4.0   CL 96 97   CO2 32* 30*   * 122*   BUN 18 15   CREATININE 0.9 0.8   CALCIUM 9.4 9.2   ALBUMIN 2.3* 2.3*   ANIONGAP 6* 9   ESTGFRAFRICA >60.0 >60.0   EGFRNONAA >60.0 >60.0   , CBC   Recent Labs  Lab 12/25/17  0336 12/26/17  0500   WBC 9.95 9.17   HGB 9.0* 8.7*   HCT 29.2* 28.5*   * 559*   , Lipid Panel No results for input(s): CHOL, HDL, LDLCALC, TRIG, CHOLHDL in the last 48 hours. and Troponin No results for input(s): TROPONINI in the last 48 hours.    Significant Imaging: Echocardiogram:   2D echo with color flow doppler:   Results for orders placed or performed during the hospital encounter of 12/20/17   2D echo with color flow doppler   Result Value Ref Range    EF 68 55 - 65    Diastolic Dysfunction No     Est. PA Systolic Pressure 28.3     Pericardial Effusion SMALL (A)     Tricuspid Valve Regurgitation TRIVIAL     and EKG: sinus rhythm, trigeminy, nml axis, low voltage in precordial leads

## 2017-12-27 NOTE — CONSULTS
[facility]  Cardiology  Consult Note    Patient Name: Malika Hodgson  MRN: [id number]  Admission Date: 12/20/2017  Hospital Length of Stay: 7 days  Code Status: Full Code   Attending Provider: Ayse Reece MD   Consulting Provider: Octavio Zhong MD  Primary Care Physician: Sarah Robles MD  Principal Problem:Acute congestive heart failure    Patient information was obtained from patient and ER records.     Inpatient consult to Cardiology  Consult performed by: OCTAVIO ZHONG  Consult ordered by: AYSE REECE        Subjective:     Chief Complaint:  Shortness of breath    HPI:   47F with hx of RA, morbid obesity here with 3 weeks of increased dyspnea, chest tightness, orthopnea. Initially treated with steroids and doxy as outpatient then re-presented here. Here, treated for pneumonia with prednisone and broad spectrum antibiotics as well as diuresed for possible CHF. BNP 33, CXR without significant pulm edema. Echo here very difficult; nml Ef, small-moderate effusion with respiratory mitral inflow variation, some RV free wall collapse but not to degree to be consistent with tamponade. CT with moderate effusion and bilateral GGO/parenchymal opacities.   SOB has failed to improve, she is also now hypoxic on 2L NC which is new onset as well. Reports not having prior respiratory issues. Has known Ra, recent flare several months ago. Rheumatology consult pending as well.    Past Medical History:   Diagnosis Date    Arthritis     Hypertension 11/11/2013    Insomnia 2/26/2013    Obesity 2/26/2013       History reviewed. No pertinent surgical history.    Review of patient's allergies indicates:   Allergen Reactions    Lisinopril Swelling     Mouth swelled, had to go to ED    Ventolin [albuterol] Shortness Of Breath       No current facility-administered medications on file prior to encounter.      Current Outpatient Prescriptions on File Prior to Encounter   Medication Sig     amlodipine (NORVASC) 5 MG tablet Take 1 tablet (5 mg total) by mouth once daily.    benzonatate (TESSALON PERLES) 100 MG capsule Take 2 capsules (200 mg total) by mouth every 6 (six) hours as needed for Cough.    ergocalciferol (ERGOCALCIFEROL) 50,000 unit Cap Take 1 capsule (50,000 Units total) by mouth twice a week.    multivitamin with iron Tab Take 1 tablet by mouth once daily.    sulfaSALAzine (AZULFIDINE) 500 mg Tab Take 2 tablets (1,000 mg total) by mouth 2 (two) times daily.    albuterol 90 mcg/actuation inhaler Inhale 2 puffs into the lungs every 6 (six) hours as needed for Wheezing. Rescue     Family History     Problem Relation (Age of Onset)    Breast cancer Mother (62), Other (45)    Diabetes Father, Mother    Hypertension Father, Mother    Kidney disease Mother    Rheum arthritis Father, Paternal Grandmother        Social History Main Topics    Smoking status: Never Smoker    Smokeless tobacco: Never Used      Comment: nurse;     Alcohol use No    Drug use: No    Sexual activity: Not Currently     Partners: Male     Birth control/ protection: None     Review of Systems   Constitution: Negative for fever and malaise/fatigue.   Cardiovascular: Positive for dyspnea on exertion and orthopnea. Negative for chest pain, irregular heartbeat, leg swelling, near-syncope, palpitations, paroxysmal nocturnal dyspnea and syncope.   Respiratory: Positive for cough and shortness of breath.    Skin: Negative for color change and rash.   Gastrointestinal: Negative for abdominal pain, nausea and vomiting.   Neurological: Negative for dizziness, focal weakness and headaches.     Objective:     Vital Signs (Most Recent):  Temp: (!) 100.4 °F (38 °C) (12/26/17 2337)  Pulse: 108 (12/26/17 2337)  Resp: 16 (12/26/17 2337)  BP: (!) 134/90 (12/26/17 2337)  SpO2: (!) 92 % (12/26/17 2337) Vital Signs (24h Range):  Temp:  [97.8 °F (36.6 °C)-100.4 °F (38 °C)] 100.4 °F (38 °C)  Pulse:  [] 108  Resp:  [15-22]  16  SpO2:  [92 %-96 %] 92 %  BP: (122-134)/(64-90) 134/90     Weight: (!) 160.7 kg (354 lb 4.5 oz)  Body mass index is 58.96 kg/m².    SpO2: (!) 92 %  O2 Device (Oxygen Therapy): nasal cannula      Intake/Output Summary (Last 24 hours) at 12/27/17 0049  Last data filed at 12/26/17 1430   Gross per 24 hour   Intake              600 ml   Output                0 ml   Net              600 ml       Lines/Drains/Airways     Peripheral Intravenous Line                 Peripheral IV - Single Lumen 12/25/17 1015 Left Forearm 1 day                Physical Exam   Constitutional: She is oriented to person, place, and time. She appears well-nourished. No distress.   HENT:   Head: Atraumatic.   Eyes: EOM are normal. No scleral icterus.   Neck: Neck supple. No JVD present.   Cardiovascular: Normal rate, regular rhythm, normal heart sounds and intact distal pulses.  Exam reveals no gallop and no friction rub.    No murmur heard.  Pulsus paradoxus 6-8 mm Hg   Pulmonary/Chest: No respiratory distress. She has no wheezes. She has rales (dry, worse in bases).   Mildly increased WOB, tachypneic to mid 20s   Abdominal: Soft. Bowel sounds are normal. She exhibits no distension. There is no tenderness.   Musculoskeletal: She exhibits no edema.   Neurological: She is alert and oriented to person, place, and time. No cranial nerve deficit.   Skin: Skin is warm and dry. No erythema.       Significant Labs:   CMP   Recent Labs  Lab 12/25/17  0336 12/26/17  0500   * 136   K 4.1 4.0   CL 96 97   CO2 32* 30*   * 122*   BUN 18 15   CREATININE 0.9 0.8   CALCIUM 9.4 9.2   ALBUMIN 2.3* 2.3*   ANIONGAP 6* 9   ESTGFRAFRICA >60.0 >60.0   EGFRNONAA >60.0 >60.0   , CBC   Recent Labs  Lab 12/25/17  0336 12/26/17  0500   WBC 9.95 9.17   HGB 9.0* 8.7*   HCT 29.2* 28.5*   * 559*   , Lipid Panel No results for input(s): CHOL, HDL, LDLCALC, TRIG, CHOLHDL in the last 48 hours. and Troponin No results for input(s): TROPONINI in the last 48  hours.    Significant Imaging: Echocardiogram:   2D echo with color flow doppler:   Results for orders placed or performed during the hospital encounter of 12/20/17   2D echo with color flow doppler   Result Value Ref Range    EF 68 55 - 65    Diastolic Dysfunction No     Est. PA Systolic Pressure 28.3     Pericardial Effusion SMALL (A)     Tricuspid Valve Regurgitation TRIVIAL     and EKG: sinus rhythm, trigeminy, nml axis, low voltage in precordial leads    Bedside echo: difficult study due to habitus  EF grossly normal, RV with free wall bowing (seen only in PLAX view) secondary to moderate anterior effusion, mitral inflow variation but less than 25%, no significant LVOT respiratory variation no RA collapse, IVC enlarged (1.9cm, no variation with sniff) suggesting elevated CVP    Assessment and Plan:     Pericardial effusion    47F with RA, morbid obesity here with 3 weeks of shortness of breath, orthopnea. Overall, feeling slightly better but still with persistent SOB, tachypnea, hypoxia. Has failed to significantly improve with multiple courses of abx (currently on broad spectrum), corticosteroids. Cardiology consulted to re-evaluate for tamponade in this setting. CT with significant bilateral opacities- infectious vs inflammatory vs autoimmune. No signs of significant volume overload. Echo without significant change from prior and of note, tamponade should not be causing her hypoxia.    -No tamponade physiology evident on bedside echo or exam (pulsus paradoxus <10, no significant JVD though limited by habitus). Would consider ILD given RA, CT findings, and hypoxia, agree with rheum consult   -Would also give mild additional diuresis as IVC dilated without significant respiratory variation suggestive of increased CVP  -Given difficulty of volume assessment, if not felt due to ILD or fails to improve, could consider RHC             VTE Risk Mitigation         Ordered     enoxaparin injection 40 mg  Every 12  hours (non-standard times)     Route:  Subcutaneous        12/20/17 1701     Medium Risk of VTE  Once      12/20/17 1701        Thank you for your consult. Discussed with Dr. Joce Rajput MD  Cardiology PGY4  [phone]  [facility]

## 2017-12-27 NOTE — SUBJECTIVE & OBJECTIVE
Interval History: NAEON. Patient was started back on lasix this morning.    Review of Systems   Constitution: Negative for chills and malaise/fatigue.   HENT: Negative for congestion.    Cardiovascular: Positive for dyspnea on exertion and orthopnea. Negative for chest pain, irregular heartbeat, leg swelling, near-syncope, palpitations, paroxysmal nocturnal dyspnea and syncope.   Respiratory: Positive for cough and shortness of breath.    Skin: Negative for color change and rash.   Musculoskeletal: Negative for joint swelling.   Gastrointestinal: Negative for abdominal pain, diarrhea, melena, nausea and vomiting.   Genitourinary: Negative for hematuria.   Neurological: Negative for dizziness, focal weakness, headaches and light-headedness.   Psychiatric/Behavioral: Negative.      Objective:     Vital Signs (Most Recent):  Temp: 98.8 °F (37.1 °C) (12/27/17 1340)  Pulse: 104 (12/27/17 1340)  Resp: 19 (12/27/17 1340)  BP: (!) 136/90 (12/27/17 1340)  SpO2: 95 % (12/27/17 1340) Vital Signs (24h Range):  Temp:  [98.1 °F (36.7 °C)-100.4 °F (38 °C)] 98.8 °F (37.1 °C)  Pulse:  [] 104  Resp:  [15-20] 19  SpO2:  [90 %-96 %] 95 %  BP: (126-138)/(71-90) 136/90     Weight: (!) 159.8 kg (352 lb 4.7 oz)  Body mass index is 58.62 kg/m².     SpO2: 95 %  O2 Device (Oxygen Therapy): nasal cannula      Intake/Output Summary (Last 24 hours) at 12/27/17 1414  Last data filed at 12/27/17 1345   Gross per 24 hour   Intake              750 ml   Output             1500 ml   Net             -750 ml       Lines/Drains/Airways     Peripheral Intravenous Line                 Peripheral IV - Single Lumen 12/25/17 1015 Left Forearm 2 days                Physical Exam   Constitutional: She is oriented to person, place, and time. She appears well-developed and well-nourished. No distress.   Morbidly obese   HENT:   Head: Normocephalic and atraumatic.   Eyes: EOM are normal. Pupils are equal, round, and reactive to light. No scleral icterus.    Neck: Neck supple. No JVD present.   Cardiovascular: Normal rate, regular rhythm, normal heart sounds and intact distal pulses.  Exam reveals no gallop and no friction rub.    No murmur heard.  Pulmonary/Chest: No respiratory distress. She has no wheezes. She has rales.   +Tubular breath sounds and Egophony in RLL   Abdominal: Soft. Bowel sounds are normal. She exhibits no distension. There is no tenderness.   Musculoskeletal: Normal range of motion. She exhibits no edema.   Neurological: She is alert and oriented to person, place, and time. No cranial nerve deficit.   Skin: Skin is warm and dry. No erythema.   Psychiatric: She has a normal mood and affect.   Nursing note and vitals reviewed.      Significant Labs: All pertinent lab results from the last 24 hours have been reviewed.    Significant Imaging:     Imaging Results          CT Chest Without Contrast (Final result)  Result time 12/22/17 11:39:17    Final result by Shyanne Parsons MD (12/22/17 11:39:17)                 Impression:      Patchy irregular multifocal consolidative opacities throughout both lung fields, with associated peribronchial cuffing, endobronchial debris, and groundglass opacities. These findings in addition to a moderate volume pericardial effusion and small volume bilateral pleural effusions could represent an infectious process (likely viral), noninfectious serosal inflammatory disease (rheumatoid arthritis, lupus...), or malignancy.    Multiple enlarged bilateral axillary lymph nodes, favored to be reactive in nature.        ______________________________________     Electronically signed by resident: CANDIE KAUFMAN MD  Date:     12/22/17  Time:    11:35            As the supervising and teaching physician, I personally reviewed the images and resident's interpretation and I agree with the findings.            Electronically signed by: Shyanne Parsons MD  Date:     12/22/17  Time:    11:39              Narrative:    CT CHEST    TIME  OF PROCEDURE: 12/22/17 09:40:00  ACCESSION #: [id number]    TECHNIQUE: The chest was surveyed from the lung apices through the costophrenic angles without the use of intravenous contrast material.  Data was reformatted for contiguous 5 mm images in the axial, sagittal, and coronal planes.  Data was post processed for extraction of 1.25 mm images for high-resolution CT imaging and 8-mm maximum intensity projection (MIP) images at 2-mm increments confined to the axial plane without additional radiation to the patient.     TOTAL EXAM DLP: 714.95 mGy-cm.    COMPARISON: Shortness of breath, edema, pericardial effusion.          FINDINGS:  Examination quality is significantly degraded by the patient's body habitus.    The structures at the base of the neck reveal no convincing evidence of disease.    There is a left-sided aortic arch with three branch vessels.  The thoracic aorta maintains normal caliber, contour, and course without significant atherosclerotic calcification.    The heart is not enlarged. There is a moderate volume pericardial effusion.    The pulmonary arteries distribute normally.      There are multiple enlarged bilateral axillary lymph nodes. The largest on the right measures 1.5 cm. The largest on the left measures 2.1 cm. No definite mediastinal lymphadenopathy.    The esophagus maintains a normal course and caliber.    Degenerative changes of the spine are noted.    Limited views of the abdomen demonstrate nothing unusual.    The trachea and proximal airways are patent. There is a mild degree of tracheomalacia.    The lungs are symmetrically expanded. There are small volume bilateral pleural effusions, right greater than left, with minimal associated compressive atelectasis of adjacent lung parenchyma. There are patchy irregular multifocal consolidative opacities throughout both lung fields along with scattered peribronchial cuffing, endobronchial debris, and groundglass opacities. Findings are  nonspecific and could represent infectious etiology, noninfectious serosal inflammatory process, or malignancy.                             X-Ray Chest PA And Lateral (Final result)  Result time 12/20/17 14:23:09    Final result by Maurizio Garcia III, MD (12/20/17 14:23:09)                 Narrative:    2 views: There is cardiomegaly, moderate edema, DJD, and worsening.      Electronically signed by: MAURIZIO GARCIA MD  Date:     12/20/17  Time:    14:23                               Echocardiogram:   2D echo with color flow doppler:   Results for orders placed or performed during the hospital encounter of 12/20/17   2D echo with color flow doppler   Result Value Ref Range    EF 68 55 - 65    Diastolic Dysfunction No     Est. PA Systolic Pressure 28.3     Pericardial Effusion SMALL (A)     Tricuspid Valve Regurgitation TRIVIAL        EKG 12/23/17:  Sinus tachycardia with Ventricular trigeminy  Nonspecific ST and T wave abnormality    -Abnormal ECG when compared with ECG of 20-DEC-2017 13:49,  Premature ventricular complexes are now Present

## 2017-12-27 NOTE — ASSESSMENT & PLAN NOTE
47YF with RA ( +RF, +CCP), Hypovitaminosis D,Morbid Obesity presented with 3 week history of chest tightness, dyspnea, cough.    Initially seen and dx by Dr Dillon ( Rheumatologist) in 02/2009 however pt opted not to start DMARD therapy as joint scans were normal and took NSAIDs instead prn in 2013. Pt was started on Sulfasalazine 07/15 due to elevated inflammatory makers (ESR 90,CRP 14.5) and L elbow contracture which initially improved after medication was started. Pt was then lost to follow up from 05/16 until 12/17 at which she was taking SSZ 1000mg BID which being managed by her PCP. On last correspondence 12/15/17, due to elevated inflammatory makers and joint pains plan was to increase SSZ to 1500mg BID but pt reported diagnosis of bronchitis/pneumonia and held off.     Pt with poor improvement in symptoms despite abx and low dose prednisone. CT chest with patchy consolidative irregular groundglass opacities. CBC shows no leucocytosis, microcytic anemia and elevated plts(likley related to inflammatory process). Metabolic profile shows normal renal function, elevated alkphos, low albumin. pro calcitonin wnl. Blood cx NGTD. Resp cx: normal syeda. Physical exam with no signs of active synovitis.     DDx Drug induced ILD (SSZ) vs RA related lung disease vs infection vs malignancy vs other autoimmune disease    Resp viral panel +ve corona virus and TEM - Staphylococcus aureus,TEM klebsiella could have also contributed to pt's symptoms. Labs indeterminate TB, neg MPO,ANCA neg, scl neg, normal complements. Hep B sab +ve related to vaccine, SPEP showed normal total protein, IBIS with no monoclonal peaks, elevated inflammatory markers. TIARA that was initially negative 2009 now positive 1: 2560 speckled, +SSA,SSB, rest of profile still pending. SLE now in differential, although pt does not meet SLICC criteria for now, pending TIARA profile, APS workup and NILS.      Pt still not significantly improved now with increase in  pleural effusion on CTA chest done 12/28/17.     Plan  - continue methylprednisolone 16mg q8 started on 12/27 D3 then switch to prednisone 60mg daily  - please order a repeat CT chest on 12/31/17 to evaluate pleural effusion  - F/u pending labs ( APS,NILS, CH50, PR3, scleroderma panel)  - continue to hold SSZ for now until clinic followup when it will be addressed  - appreciate pulmonary reccs  - if pt symptoms improves and repeat CT chest shows some improvement/stable and not worsening of pleural effusion, will consider discharge and will schedule a follow up appt with Dr Dillon ( Rheumatology) in clinic

## 2017-12-27 NOTE — SUBJECTIVE & OBJECTIVE
Past Medical History:   Diagnosis Date    Arthritis     Hypertension 11/11/2013    Insomnia 2/26/2013    Obesity 2/26/2013       History reviewed. No pertinent surgical history.    Immunization History   Administered Date(s) Administered    DTP 1968, 1968, 1968, 04/04/1972    Hepatitis A 03/03/2014    Hepatitis A, Pediatric/Adolescent, 2 Dose 03/03/2014    Hepatitis B, Adult 05/24/2010, 06/24/2010, 12/30/2010    IPV 1968, 1968, 04/04/1972, 08/08/1978    Influenza 09/19/2007, 01/16/2009, 10/15/2010    Influenza - Quadrivalent - PF 10/17/2017    MMR 04/03/1995, 03/14/2009    Measles 04/01/1995    Meningococcal Conjugate (MCV4P) 05/24/2010    Mumps 04/01/1995    Pneumococcal Conjugate - 13 Valent 12/21/2017    Rubella 04/01/1995    Td (ADULT) 08/08/1978, 04/01/1995, 04/03/1995    Tdap 03/14/2009    Typhoid 03/03/2014    Typhoid - ViCPs 03/03/2014    Yellow Fever 03/03/2014       Review of patient's allergies indicates:   Allergen Reactions    Lisinopril Swelling     Mouth swelled, had to go to ED    Ventolin [albuterol] Shortness Of Breath     Current Facility-Administered Medications   Medication Frequency    acetaminophen tablet 650 mg Q6H PRN    amLODIPine tablet 5 mg Daily    benzonatate capsule 200 mg Q6H PRN    carvedilol tablet 3.125 mg BID    cefTRIAXone (ROCEPHIN) 2 g in dextrose 5 % 50 mL IVPB Q24H    enoxaparin injection 40 mg Q12H    ergocalciferol capsule 50,000 Units Twice Weekly    influenza (FLUZONE,FLUARIX QUADRIVALENT) vaccine 0.5 mL vaccine x 1 dose    ipratropium 0.02 % nebulizer solution 0.5 mg TID WAKE    metroNIDAZOLE tablet 500 mg Q8H    moxifloxacin tablet 400 mg Daily    ondansetron disintegrating tablet 4 mg Q8H PRN    ondansetron injection 4 mg Q8H PRN    senna-docusate 8.6-50 mg per tablet 1 tablet BID    sodium chloride 0.9% flush 3 mL Q8H    sulfaSALAzine tablet 1,000 mg BID     Family History     Problem Relation (Age  of Onset)    Breast cancer Mother (62), Other (45)    Diabetes Father, Mother    Hypertension Father, Mother    Kidney disease Mother    Rheum arthritis Father, Paternal Grandmother        Social History Main Topics    Smoking status: Never Smoker    Smokeless tobacco: Never Used      Comment: nurse;     Alcohol use No    Drug use: No    Sexual activity: Not Currently     Partners: Male     Birth control/ protection: None     Review of Systems   Constitutional: Positive for fever.   HENT: Negative for dental problem, mouth sores and trouble swallowing.    Eyes: Negative for pain, redness and visual disturbance.   Respiratory: Positive for cough and shortness of breath.    Cardiovascular: Positive for chest pain. Negative for palpitations and leg swelling.        Chest pain with inspiration which has resolved since hospital admission   Gastrointestinal: Negative for blood in stool, diarrhea and vomiting.   Genitourinary: Negative for difficulty urinating, frequency, hematuria and urgency.   Musculoskeletal: Positive for arthralgias and joint swelling. Negative for back pain and neck pain.   Skin: Negative for color change, rash and wound.   Allergic/Immunologic: Positive for immunocompromised state.   Neurological: Negative for dizziness, tremors and numbness.   Hematological: Does not bruise/bleed easily.   Psychiatric/Behavioral: Negative for decreased concentration and sleep disturbance. The patient is not nervous/anxious.      Objective:     Vital Signs (Most Recent):  Temp: 98.2 °F (36.8 °C) (12/27/17 0804)  Pulse: 96 (12/27/17 0859)  Resp: 20 (12/27/17 0851)  BP: 128/71 (12/27/17 0804)  SpO2: 96 % (12/27/17 0851)  O2 Device (Oxygen Therapy): nasal cannula w/ humidification (12/27/17 0851) Vital Signs (24h Range):  Temp:  [97.8 °F (36.6 °C)-100.4 °F (38 °C)] 98.2 °F (36.8 °C)  Pulse:  [] 96  Resp:  [15-20] 20  SpO2:  [92 %-96 %] 96 %  BP: (126-138)/(71-90) 128/71     Weight: (!) 159.8 kg (352  lb 4.7 oz) (12/27/17 0557)  Body mass index is 58.62 kg/m².  Body surface area is 2.71 meters squared.      Intake/Output Summary (Last 24 hours) at 12/27/17 0990  Last data filed at 12/26/17 1430   Gross per 24 hour   Intake              600 ml   Output                0 ml   Net              600 ml       Physical Exam   Constitutional: She is oriented to person, place, and time and well-developed, well-nourished, and in no distress.   Morbidly obese   HENT:   Head: Normocephalic and atraumatic.   Eyes: EOM are normal. Pupils are equal, round, and reactive to light.   Neck: Normal range of motion. Neck supple.   Cardiovascular: Normal rate and regular rhythm.    Distant heart sounds   Pulmonary/Chest: Effort normal.   Diffuse crackles throughout all lung field   Abdominal: Soft. Bowel sounds are normal.   Lymphadenopathy:     She has no cervical adenopathy.   Neurological: She is alert and oriented to person, place, and time.   Skin: Skin is warm and dry. No rash noted.     Psychiatric: Affect normal.   Musculoskeletal: Normal range of motion. She exhibits no edema, tenderness or deformity.   Cspine FROM no tenderness  Tspine FROM no tenderness  Lspine FROM no tenderness.  TMJ: unremarkable  Shoulders: FROM; no synovitis;  Elbows: FROM; no synovitis; no tophi or nodules  Wrists: FROM; no synovitis;   MCPs: FROM; no synovitis; no metacarpalgia;  ok;  PIPs:FROM; no synovitis;   DIPs: FROM; no synovitis;   HIPS: FROM  Knees: FROM; no synovitis; no instability;  Ankles: FROM: no synovitis   Toes: ok; no metatarsalgia            Significant Labs:  Results for MARTIN BERGER (MRN [id number]) as of 12/27/2017 09:24   Ref. Range 12/26/2017 05:00 12/27/2017 04:49   WBC Latest Ref Range: 3.90 - 12.70 K/uL 9.17 10.14   RBC Latest Ref Range: 4.00 - 5.40 M/uL 3.57 (L) 3.75 (L)   Hemoglobin Latest Ref Range: 12.0 - 16.0 g/dL 8.7 (L) 9.2 (L)   Hematocrit Latest Ref Range: 37.0 - 48.5 % 28.5 (L) 30.0 (L)   MCV Latest Ref Range:  82 - 98 fL 80 (L) 80 (L)   MCH Latest Ref Range: 27.0 - 31.0 pg 24.4 (L) 24.5 (L)   MCHC Latest Ref Range: 32.0 - 36.0 g/dL 30.5 (L) 30.7 (L)   RDW Latest Ref Range: 11.5 - 14.5 % 14.7 (H) 14.9 (H)   Platelets Latest Ref Range: 150 - 350 K/uL 559 (H) 552 (H)   MPV Latest Ref Range: 9.2 - 12.9 fL 8.6 (L) 8.6 (L)   Gran% Latest Ref Range: 38.0 - 73.0 % 70.9 68.2   Gran # Latest Ref Range: 1.8 - 7.7 K/uL 6.5 6.9   Immature Granulocytes Latest Ref Range: 0.0 - 0.5 % 1.0 (H) 1.0 (H)   Immature Grans (Abs) Latest Ref Range: 0.00 - 0.04 K/uL 0.09 (H) 0.10 (H)   Lymph% Latest Ref Range: 18.0 - 48.0 % 15.5 (L) 17.9 (L)   Lymph # Latest Ref Range: 1.0 - 4.8 K/uL 1.4 1.8   Mono% Latest Ref Range: 4.0 - 15.0 % 10.8 10.1   Mono # Latest Ref Range: 0.3 - 1.0 K/uL 1.0 1.0   Eosinophil% Latest Ref Range: 0.0 - 8.0 % 1.6 2.5   Eos # Latest Ref Range: 0.0 - 0.5 K/uL 0.2 0.3   Basophil% Latest Ref Range: 0.0 - 1.9 % 0.2 0.3   Baso # Latest Ref Range: 0.00 - 0.20 K/uL 0.02 0.03   nRBC Latest Ref Range: 0 /100 WBC 0 0     Results for MARTIN BERGER (MRN [id number]) as of 12/27/2017 09:24   Ref. Range 12/27/2017 04:49   Sodium Latest Ref Range: 136 - 145 mmol/L 136   Potassium Latest Ref Range: 3.5 - 5.1 mmol/L 3.9   Chloride Latest Ref Range: 95 - 110 mmol/L 97   CO2 Latest Ref Range: 23 - 29 mmol/L 30 (H)   Anion Gap Latest Ref Range: 8 - 16 mmol/L 9   BUN, Bld Latest Ref Range: 6 - 20 mg/dL 12   Creatinine Latest Ref Range: 0.5 - 1.4 mg/dL 0.8   eGFR if non African American Latest Ref Range: >60 mL/min/1.73 m^2 >60.0   eGFR if African American Latest Ref Range: >60 mL/min/1.73 m^2 >60.0   Glucose Latest Ref Range: 70 - 110 mg/dL 111 (H)   Calcium Latest Ref Range: 8.7 - 10.5 mg/dL 9.1   Phosphorus Latest Ref Range: 2.7 - 4.5 mg/dL 3.4   Magnesium Latest Ref Range: 1.6 - 2.6 mg/dL 1.9   Albumin Latest Ref Range: 3.5 - 5.2 g/dL 2.2 (L)       Results for MARTIN BERGER (MRN [id number]) as of 12/27/2017 09:24   Ref. Range 1/28/2009 09:50    TIARA Latest Ref Range: Neg <1:160  Negative     Results for MARTIN BERGER (MRN [id number]) as of 12/27/2017 09:24   Ref. Range 2/26/2013 16:42 5/22/2013 10:41 12/12/2014 07:56   CCP Antibodies Latest Ref Range: 0 - 4.9 U/mL 15.8 (H)     Rheumatoid Factor Latest Ref Range: 0.0 - 15.0 IU/mL 60 (H)  43.0 (H)     Results for MARTIN BERGER (MRN [id number]) as of 12/27/2017 09:24   Ref. Range 12/23/2017 00:23   Specimen UA Unknown Urine, Clean Catch   Color, UA Latest Ref Range: Yellow, Straw, Mai  Mai   pH, UA Latest Ref Range: 5.0 - 8.0  5.0   Specific Gravity, UA Latest Ref Range: 1.005 - 1.030  1.025   Appearance, UA Latest Ref Range: Clear  Cloudy (A)   Protein, UA Latest Ref Range: Negative  1+ (A)   Glucose, UA Latest Ref Range: Negative  Negative   Ketones, UA Latest Ref Range: Negative  Negative   Occult Blood UA Latest Ref Range: Negative  Negative   Nitrite, UA Latest Ref Range: Negative  Negative   Urobilinogen, UA Latest Ref Range: <2.0 EU/dL Negative   Bilirubin (UA) Latest Ref Range: Negative  Negative   Leukocytes, UA Latest Ref Range: Negative  Negative   RBC, UA Latest Ref Range: 0 - 4 /hpf 2   WBC, UA Latest Ref Range: 0 - 5 /hpf 3   Bacteria, UA Latest Ref Range: None-Occ /hpf None   Squam Epithel, UA Latest Units: /hpf 13   Hyaline Casts, UA Latest Ref Range: 0-1/lpf /lpf 0   Microscopic Comment Unknown SEE COMMENT     Significant Imaging:  CT chest 12/22/17  Patchy irregular multifocal consolidative opacities throughout both lung fields, with associated peribronchial cuffing, endobronchial debris, and groundglass opacities. These findings in addition to a moderate volume pericardial effusion and small volume bilateral pleural effusions could represent an infectious process (likely viral), noninfectious serosal inflammatory disease (rheumatoid arthritis, lupus...), or malignancy.    Multiple enlarged bilateral axillary lymph nodes, favored to be reactive in nature    2D ECHO 12/21/17  CONCLUSIONS      1 - Technically difficult study (BMI 58.6).     2 - Normal left ventricular systolic function (EF 65-70%).     3 - Normal right ventricular systolic function .     4 - The estimated PA systolic pressure is greater than 28 mmHg.     5 - There is a small to perhaps moderately sized pericardial effusion and some collapse of the RV free wall is seen on parasternal long axis imaging.  The IVC cannot be visualized but the relatively dynamic LV function would support the possibility of   the patient being volume depleted.  Respiratory variation is seen in mitral inflow, but this is of questionable reliability and better signals from LVOT outflow do not show significant variability (as would be seen in tamponade).    Xray arthritis survey 05/13  Moderate degenerative change seen in the lower cervical spine.  Atlanto-odontoid relationship is normal in flexion.  Knee show minimal degenerative changes.  Hands and wrists show minimal degenerative change with no erosions.  Both feet show mild hallux valgus deformity bilaterally.  No erosive changes

## 2017-12-27 NOTE — PROGRESS NOTES
[facility] Hospital Medicine  Progress Note    Patient Name: Malika Hodgson  MRN: [id number]  Patient Class: IP- Inpatient   Admission Date: 12/20/2017  Length of Stay: 6 days  Attending Physician: Stephanie Reece MD  Primary Care Provider: Sarah Robles MD    [facility] Medicine Team: [facility] D Stephanie Reece MD    Subjective:     Principal Problem:Acute congestive heart failure    HPI:  47 y.o. female presented to the ER with past medical history of RA (rheumatoid arthritis), immunosuppression, hypertension, morbid obesity.  She was in her usual state of stable health until the acute onset of URI / pneumonia symptoms beginning 2 - 3 weeks prior to admission with gradually improving course after treatment with Tessalon and doxycycline. Patient drank an increased amount of fluid and ate mostly high-sodium soups during this time. She developed significant dyspnea on exertion. Pertinent associated symptoms include shortness of breath on exertion; denies orthopnea and LE swelling.    Hospital Course:  No notes on file    Interval History: Patient with no events overnight, no new complaints. No improvement; remains hypoxic.  Data Review: Results recorded below were reviewed 12/26/2017.    Review of Systems   Constitutional: Positive for fatigue.   Respiratory: Positive for shortness of breath. Negative for chest tightness.    Cardiovascular: Negative for chest pain and leg swelling.     Objective:     Vital Signs (Most Recent):  Temp: 97.8 °F (36.6 °C) (12/26/17 1146)  Pulse: 96 (12/26/17 1500)  Resp: 16 (12/26/17 1321)  BP: 131/88 (12/26/17 1146)  SpO2: (!) 93 % (12/26/17 1500) Vital Signs (24h Range):  Temp:  [97.8 °F (36.6 °C)-99.3 °F (37.4 °C)] 97.8 °F (36.6 °C)  Pulse:  [] 96  Resp:  [16-22] 16  SpO2:  [92 %-98 %] 93 %  BP: (122-138)/(64-88) 131/88     Weight: (!) 160.7 kg (354 lb 4.5 oz)  Body mass index is 58.96 kg/m².    Intake/Output Summary (Last 24 hours) at 12/26/17  1532  Last data filed at 12/26/17 1430   Gross per 24 hour   Intake              600 ml   Output                0 ml   Net              600 ml      Physical Exam   Constitutional: She appears well-developed.  Non-toxic appearance.   HENT:   Head: Normocephalic.   Mouth/Throat: Mucous membranes are not pale and not cyanotic.   Eyes: Conjunctivae and lids are normal. Pupils are equal.   Neck: Neck supple.   Cardiovascular: Normal rate, regular rhythm, S1 normal and S2 normal.    Pulmonary/Chest: Effort normal. She has decreased breath sounds in the right lower field. She has wheezes. She has rales in the right lower field.   Abdominal: Soft. Bowel sounds are normal. There is no tenderness.   Musculoskeletal: She exhibits no edema.   Neurological: She is alert. She is not disoriented.   Skin: Skin is warm and dry. No cyanosis. Nails show no clubbing.   Psychiatric: She has a normal mood and affect. Cognition and memory are normal.       Significant Labs:     CBC:     Recent Labs  Lab 12/25/17  0336 12/26/17  0500   WBC 9.95 9.17   HGB 9.0* 8.7*   HCT 29.2* 28.5*   * 559*     CMP:     Recent Labs  Lab 12/25/17  0336 12/26/17  0500   * 136   K 4.1 4.0   CL 96 97   CO2 32* 30*   * 122*   BUN 18 15   CREATININE 0.9 0.8   CALCIUM 9.4 9.2   ALBUMIN 2.3* 2.3*   ANIONGAP 6* 9   EGFRNONAA >60.0 >60.0     Magnesium:     Recent Labs  Lab 12/25/17  0336 12/26/17  0500   MG 2.1 2.0     Assessment/Plan:      Current Hospital Problem List:    Active Hospital Problems    Diagnosis  POA    *Acute congestive heart failure [I50.9]  Yes     Priority: 2     Pericardial effusion [I31.3]  Yes     Priority: 1 - High    Acute hypoxemic respiratory failure [J96.01]  Yes     Priority: 1 - High    Ground glass opacity present on imaging of lung [R91.8]  Yes     Priority: 1 - High    RA (rheumatoid arthritis) [M06.9]  Yes     Priority: 3     Essential hypertension [I10]  Yes     Priority: 4     Immunosuppression [D89.9]   Yes    Vitamin D deficiency disease [E55.9]  Yes      Resolved Hospital Problems    Diagnosis Date Resolved POA   No resolved problems to display.       Ordered Medications for management of current problems:    amLODIPine  5 mg Oral Daily    carvedilol  3.125 mg Oral BID    cefTRIAXone (ROCEPHIN) IVPB  2 g Intravenous Q24H    enoxaparin  40 mg Subcutaneous Q12H    ergocalciferol  50,000 Units Oral Twice Weekly    ipratropium  0.5 mg Nebulization TID WAKE    metroNIDAZOLE  500 mg Oral Q8H    moxifloxacin  400 mg Oral Daily    senna-docusate 8.6-50 mg  1 tablet Oral BID    sodium chloride 0.9%  3 mL Intravenous Q8H    sulfaSALAzine  1,000 mg Oral BID       Risk  Patient has a condition that poses threat to life and bodily function: Respiratory Distress    Anticipated Disposition: Home or Self Care    Assessment and Plan by Problem:    * Acute congestive heart failure    Apparently new onset CHF. HF pathway initiated. BNP low (falsely due to obesity?).  Lasix 40 mg IV initiated. No ACEi due to allergy.  Echo: CONCLUSIONS     1 - Technically difficult study (BMI 58.6).     2 - Normal left ventricular systolic function (EF 65-70%).     3 - Normal right ventricular systolic function .     4 - The estimated PA systolic pressure is greater than 28 mmHg.     5 - There is a small to perhaps moderately sized pericardial effusion and some collapse of the RV free wall is seen on parasternal long axis imaging.  The IVC cannot be visualized but the relatively dynamic LV function would support the possibility of   the patient being volume depleted.  Respiratory variation is seen in mitral inflow, but this is of questionable reliability and better signals from LVOT outflow do not show significant variability (as would be seen in tamponade).   Appears to be euvolemic; decreased Lasix to 40 mg po daily. Monitoring.  Mild tachycardia; discontinued Lasix 12/24/17. Appears compensated (suspect pulmonary hypertension).       "  Pericardial effusion    Concern for symptomatic effusion, tamponade. Agree with Pulmonology that effusion may be significant. Consulting Cardiology.        Acute hypoxemic respiratory failure    Etiology unclear. No history of hypoxia.  Cultures and viral studies are pending.        Ground glass opacity present on imaging of lung    Chest CT revealed: "Patchy irregular multifocal consolidative opacities throughout both lung fields, with associated peribronchial cuffing, endobronchial debris, and groundglass opacities. These findings in addition to a moderate volume pericardial effusion and small volume bilateral pleural effusions could represent an infectious process (likely viral), noninfectious serosal inflammatory disease (rheumatoid arthritis, lupus...), or malignancy. Multiple enlarged bilateral axillary lymph nodes, favored to be reactive in nature."   Monitor closely for tamponade from pericardial effusion.  Added Avelox 12/22/17; ordered sputum culture and viral studies.  Added acapella. Added Atrovent nebs as patient allergic to albuterol.  Patient remains hypoxic. Maximizing antibiotic coverage (do not suspect MRSA at this point) with Avelox, Flagyl, and ceftriaxone.  No improvement. Prednisone was started as OP for allergic reaction to albuterol; discontinued Prednisone 12/25/2017.  Consulted Pulmonology. Sputum culture and respiratory pathogen panel pending.  Concern for lack of significant improvement despite diuresis, antibiotics, and supportive care.        RA (rheumatoid arthritis)    Continuing home medication.  Recent increase in inflammatory markers associated with URI.  Consulting Rheumatology.        Essential hypertension    Continuing current management with amlodipine for now.  Better controlled initially but then remaining above goal; added Coreg.  Improved.        Vitamin D deficiency disease    Continuing home medications.          VTE Risk Mitigation         Ordered     enoxaparin " injection 40 mg  Every 12 hours (non-standard times)     Route:  Subcutaneous        12/20/17 1701     Medium Risk of VTE  Once      12/20/17 1701              Stephanie Reece MD  Department of Hospital Medicine   [facility]

## 2017-12-27 NOTE — HPI
47F with hx of RA, morbid obesity here with 3 weeks of increased dyspnea, chest tightness, orthopnea. Initially treated with steroids and doxy as outpatient then re-presented here. Here, treated for pneumonia with prednisone and broad spectrum antibiotics as well as diuresed for possible CHF. BNP 33, CXR without significant pulm edema. Echo here very difficult; nml Ef, small-moderate effusion with respiratory mitral inflow variation, some RV free wall collapse but not to degree to be consistent with tamponade. CT with moderate effusion and bilateral GGO/parenchymal opacities.   SOB has failed to improve, she is also now hypoxic on 2L NC which is new onset as well. Reports not having prior respiratory issues. Has known Ra, recent flare several months ago. Rheumatology consult pending as well.

## 2017-12-28 LAB
ALBUMIN SERPL BCP-MCNC: 2.3 G/DL
ALBUMIN SERPL ELPH-MCNC: 2.46 G/DL
ALPHA1 GLOB SERPL ELPH-MCNC: 0.62 G/DL
ALPHA2 GLOB SERPL ELPH-MCNC: 1.14 G/DL
ANA SER QL IF: POSITIVE
ANA TITR SER IF: NORMAL {TITER}
ANION GAP SERPL CALC-SCNC: 10 MMOL/L
ANTI-SSA ANTIBODY: 210.16 EU
ANTI-SSA INTERPRETATION: POSITIVE
ANTI-SSB ANTIBODY: 178.44 EU
ANTI-SSB INTERPRETATION: POSITIVE
B-GLOBULIN SERPL ELPH-MCNC: 1 G/DL
BASOPHILS # BLD AUTO: 0.01 K/UL
BASOPHILS NFR BLD: 0.1 %
BUN SERPL-MCNC: 14 MG/DL
CALCIUM SERPL-MCNC: 9.1 MG/DL
CHLORIDE SERPL-SCNC: 96 MMOL/L
CO2 SERPL-SCNC: 29 MMOL/L
CREAT SERPL-MCNC: 0.8 MG/DL
DIASTOLIC DYSFUNCTION: NO
DIFFERENTIAL METHOD: ABNORMAL
ENTEROVIRUS: NOT DETECTED
ENTEROVIRUS: NOT DETECTED
EOSINOPHIL # BLD AUTO: 0 K/UL
EOSINOPHIL NFR BLD: 0.1 %
ERYTHROCYTE [DISTWIDTH] IN BLOOD BY AUTOMATED COUNT: 14.7 %
EST. GFR  (AFRICAN AMERICAN): >60 ML/MIN/1.73 M^2
EST. GFR  (NON AFRICAN AMERICAN): >60 ML/MIN/1.73 M^2
GAMMA GLOB SERPL ELPH-MCNC: 1.89 G/DL
GLOBAL PERICARDIAL EFFUSION: ABNORMAL
GLUCOSE SERPL-MCNC: 154 MG/DL
HCT VFR BLD AUTO: 30.5 %
HGB BLD-MCNC: 9.2 G/DL
HUMAN BOCAVIRUS: NOT DETECTED
HUMAN BOCAVIRUS: NOT DETECTED
HUMAN CORONAVIRUS, COMMON COLD VIRUS: POSITIVE
HUMAN CORONAVIRUS, COMMON COLD VIRUS: POSITIVE
IMM GRANULOCYTES # BLD AUTO: 0.09 K/UL
IMM GRANULOCYTES NFR BLD AUTO: 0.8 %
INFLUENZA A - H1N1-09: NOT DETECTED
INFLUENZA A - H1N1-09: NOT DETECTED
INTERPRETATION SERPL IFE-IMP: NORMAL
LEGIONELLA PNEUMOPHILA: NOT DETECTED
LYMPHOCYTES # BLD AUTO: 0.8 K/UL
LYMPHOCYTES NFR BLD: 7.1 %
MAGNESIUM SERPL-MCNC: 1.9 MG/DL
MCH RBC QN AUTO: 23.8 PG
MCHC RBC AUTO-ENTMCNC: 30.2 G/DL
MCV RBC AUTO: 79 FL
MONOCYTES # BLD AUTO: 0.6 K/UL
MONOCYTES NFR BLD: 5.2 %
MORAXELLA CATARRHALIS: NOT DETECTED
NEUTROPHILS # BLD AUTO: 9.7 K/UL
NEUTROPHILS NFR BLD: 86.7 %
NRBC BLD-RTO: 0 /100 WBC
PARAINFLUENZA: NOT DETECTED
PARAINFLUENZA: NOT DETECTED
PATHOLOGIST INTERPRETATION IFE: NORMAL
PATHOLOGIST INTERPRETATION SPE: NORMAL
PHOSPHATE SERPL-MCNC: 3 MG/DL
PLATELET # BLD AUTO: 560 K/UL
PMV BLD AUTO: 8.8 FL
POTASSIUM SERPL-SCNC: 4.5 MMOL/L
PROT SERPL-MCNC: 7.1 G/DL
RBC # BLD AUTO: 3.87 M/UL
RETIRED EF AND QEF - SEE NOTES: 65 (ref 55–65)
RVP - ADENOVIRUS: NOT DETECTED
RVP - ADENOVIRUS: NOT DETECTED
RVP - HUMAN METAPNEUMOVIRUS (HMPV): NOT DETECTED
RVP - HUMAN METAPNEUMOVIRUS (HMPV): NOT DETECTED
RVP - INFLUENZA A: NOT DETECTED
RVP - INFLUENZA A: NOT DETECTED
RVP - INFLUENZA B: NOT DETECTED
RVP - INFLUENZA B: NOT DETECTED
RVP - RESPIRATORY SYNCTIAL VIRUS (RSV) A: NOT DETECTED
RVP - RESPIRATORY SYNCTIAL VIRUS (RSV) A: NOT DETECTED
RVP - RESPIRATORY VIRAL PANEL, SOURCE: ABNORMAL
RVP - RESPIRATORY VIRAL PANEL, SOURCE: ABNORMAL
RVP - RHINOVIRUS: NOT DETECTED
RVP - RHINOVIRUS: NOT DETECTED
SODIUM SERPL-SCNC: 135 MMOL/L
TEM - ACINETOBACTER BAUMANNII: NOT DETECTED
TEM - BORDETELLA PERTUSSIS: NOT DETECTED
TEM - CHLAMYDOPHILA PNEUMONIAE: NOT DETECTED
TEM - KLEBSIELLA PNEUMONIAE: POSITIVE
TEM - MRSA: NOT DETECTED
TEM - MYCOPLASMA PNEUMONIAE: NOT DETECTED
TEM - NEISSERIA MENINGITIDIS: NOT DETECTED
TEM - PANTON-VALENTINE: NOT DETECTED
TEM - PSEUDOMONAS AERUGINOSA: NOT DETECTED
TEM - STAPHYLOCOCCUS AUREUS: POSITIVE
TEM - STREPTOCOCCUS PNEUMONIAE: NOT DETECTED
TEM - STREPTOCOCCUS PYOGENES A: NOT DETECTED
TEM- HAEMOPHILUS INFLUENZAE B: NOT DETECTED
TEM- HAEMOPHILUS INFLUENZAE: NOT DETECTED
WBC # BLD AUTO: 11.2 K/UL

## 2017-12-28 PROCEDURE — 87449 NOS EACH ORGANISM AG IA: CPT

## 2017-12-28 PROCEDURE — 25000003 PHARM REV CODE 250: Performed by: INTERNAL MEDICINE

## 2017-12-28 PROCEDURE — 25500020 PHARM REV CODE 255: Performed by: INTERNAL MEDICINE

## 2017-12-28 PROCEDURE — 36415 COLL VENOUS BLD VENIPUNCTURE: CPT

## 2017-12-28 PROCEDURE — 93308 TTE F-UP OR LMTD: CPT | Mod: 26,,, | Performed by: INTERNAL MEDICINE

## 2017-12-28 PROCEDURE — 94640 AIRWAY INHALATION TREATMENT: CPT

## 2017-12-28 PROCEDURE — 25000242 PHARM REV CODE 250 ALT 637 W/ HCPCS: Performed by: INTERNAL MEDICINE

## 2017-12-28 PROCEDURE — 83735 ASSAY OF MAGNESIUM: CPT

## 2017-12-28 PROCEDURE — 11000001 HC ACUTE MED/SURG PRIVATE ROOM

## 2017-12-28 PROCEDURE — 94761 N-INVAS EAR/PLS OXIMETRY MLT: CPT

## 2017-12-28 PROCEDURE — 63600175 PHARM REV CODE 636 W HCPCS: Performed by: INTERNAL MEDICINE

## 2017-12-28 PROCEDURE — 99900035 HC TECH TIME PER 15 MIN (STAT)

## 2017-12-28 PROCEDURE — 85025 COMPLETE CBC W/AUTO DIFF WBC: CPT

## 2017-12-28 PROCEDURE — 94664 DEMO&/EVAL PT USE INHALER: CPT

## 2017-12-28 PROCEDURE — 80069 RENAL FUNCTION PANEL: CPT

## 2017-12-28 PROCEDURE — A4216 STERILE WATER/SALINE, 10 ML: HCPCS | Performed by: INTERNAL MEDICINE

## 2017-12-28 PROCEDURE — 27000221 HC OXYGEN, UP TO 24 HOURS

## 2017-12-28 PROCEDURE — 99232 SBSQ HOSP IP/OBS MODERATE 35: CPT | Mod: ,,, | Performed by: INTERNAL MEDICINE

## 2017-12-28 PROCEDURE — 93308 TTE F-UP OR LMTD: CPT

## 2017-12-28 PROCEDURE — 99233 SBSQ HOSP IP/OBS HIGH 50: CPT | Mod: ,,, | Performed by: INTERNAL MEDICINE

## 2017-12-28 PROCEDURE — 93321 DOPPLER ECHO F-UP/LMTD STD: CPT | Mod: 26,,, | Performed by: INTERNAL MEDICINE

## 2017-12-28 RX ADMIN — IPRATROPIUM BROMIDE 0.5 MG: 0.5 SOLUTION RESPIRATORY (INHALATION) at 07:12

## 2017-12-28 RX ADMIN — CARVEDILOL 3.12 MG: 3.12 TABLET, FILM COATED ORAL at 09:12

## 2017-12-28 RX ADMIN — METHYLPREDNISOLONE SODIUM SUCCINATE 16 MG: 40 INJECTION, POWDER, FOR SOLUTION INTRAMUSCULAR; INTRAVENOUS at 09:12

## 2017-12-28 RX ADMIN — ENOXAPARIN SODIUM 40 MG: 100 INJECTION SUBCUTANEOUS at 05:12

## 2017-12-28 RX ADMIN — SODIUM CHLORIDE, PRESERVATIVE FREE 3 ML: 5 INJECTION INTRAVENOUS at 06:12

## 2017-12-28 RX ADMIN — AMLODIPINE BESYLATE 5 MG: 5 TABLET ORAL at 08:12

## 2017-12-28 RX ADMIN — AZITHROMYCIN 250 MG: 250 TABLET, FILM COATED ORAL at 08:12

## 2017-12-28 RX ADMIN — METHYLPREDNISOLONE SODIUM SUCCINATE 16 MG: 40 INJECTION, POWDER, FOR SOLUTION INTRAMUSCULAR; INTRAVENOUS at 06:12

## 2017-12-28 RX ADMIN — ERGOCALCIFEROL 50000 UNITS: 1.25 CAPSULE ORAL at 09:12

## 2017-12-28 RX ADMIN — METHYLPREDNISOLONE SODIUM SUCCINATE 16 MG: 40 INJECTION, POWDER, FOR SOLUTION INTRAMUSCULAR; INTRAVENOUS at 01:12

## 2017-12-28 RX ADMIN — SODIUM CHLORIDE, PRESERVATIVE FREE 3 ML: 5 INJECTION INTRAVENOUS at 01:12

## 2017-12-28 RX ADMIN — IOHEXOL 100 ML: 350 INJECTION, SOLUTION INTRAVENOUS at 10:12

## 2017-12-28 RX ADMIN — ENOXAPARIN SODIUM 40 MG: 100 INJECTION SUBCUTANEOUS at 06:12

## 2017-12-28 RX ADMIN — AZITHROMYCIN 250 MG: 250 TABLET, FILM COATED ORAL at 12:12

## 2017-12-28 RX ADMIN — IPRATROPIUM BROMIDE 0.5 MG: 0.5 SOLUTION RESPIRATORY (INHALATION) at 12:12

## 2017-12-28 RX ADMIN — SODIUM CHLORIDE, PRESERVATIVE FREE 3 ML: 5 INJECTION INTRAVENOUS at 09:12

## 2017-12-28 RX ADMIN — CEFTRIAXONE SODIUM 2 G: 2 INJECTION, POWDER, FOR SOLUTION INTRAMUSCULAR; INTRAVENOUS at 09:12

## 2017-12-28 RX ADMIN — CARVEDILOL 3.12 MG: 3.12 TABLET, FILM COATED ORAL at 08:12

## 2017-12-28 NOTE — PROGRESS NOTES
[facility] Hospital Medicine  Progress Note    Patient Name: Malika Hodgson  MRN: [id number]  Patient Class: IP- Inpatient   Admission Date: 12/20/2017  Length of Stay: 7 days  Attending Physician: Stephanie Reece MD  Primary Care Provider: Sarah Robles MD    [facility] Medicine [facility]: [facility] [place] Stephanie Reece MD    Subjective:     Principal Problem:Acute hypoxemic respiratory failure    HPI:  47 y.o. female presented to the ER with past medical history of RA (rheumatoid arthritis), immunosuppression, hypertension, morbid obesity.  She was in her usual state of stable health until the acute onset of URI / pneumonia symptoms beginning 2 - 3 weeks prior to admission with gradually improving course after treatment with Tessalon and doxycycline. Patient drank an increased amount of fluid and ate mostly high-sodium soups during this time. She developed significant dyspnea on exertion. Pertinent associated symptoms include shortness of breath on exertion; denies orthopnea and LE swelling.    Hospital Course:  No notes on file    Interval History: Patient with no events overnight, no new complaints. Subjective improvement only; remains hypoxic.  Data Review: Results recorded below were reviewed 12/27/2017.    Review of Systems   Constitutional: Positive for fatigue.   Respiratory: Positive for shortness of breath. Negative for chest tightness.    Cardiovascular: Negative for chest pain and leg swelling.     Objective:     Vital Signs (Most Recent):  Temp: 98.8 °F (37.1 °C) (12/27/17 2056)  Pulse: (!) 111 (12/27/17 2126)  Resp: 18 (12/27/17 2126)  BP: 133/85 (12/27/17 2056)  SpO2: (!) 93 % (12/27/17 2126) Vital Signs (24h Range):  Temp:  [98.1 °F (36.7 °C)-100.4 °F (38 °C)] 98.8 °F (37.1 °C)  Pulse:  [] 111  Resp:  [16-94] 18  SpO2:  [90 %-96 %] 93 %  BP: (128-138)/(71-90) 133/85     Weight: (!) 159.8 kg (352 lb 4.7 oz)  Body mass index is 58.62 kg/m².    Intake/Output Summary (Last 24  hours) at 12/27/17 2241  Last data filed at 12/27/17 1635   Gross per 24 hour   Intake              650 ml   Output             2100 ml   Net            -1450 ml      Physical Exam   Constitutional: She appears well-developed.  Non-toxic appearance.   HENT:   Head: Normocephalic.   Mouth/Throat: Mucous membranes are not pale and not cyanotic.   Eyes: Conjunctivae and lids are normal. Pupils are equal.   Neck: Neck supple.   Cardiovascular: Normal rate, regular rhythm, S1 normal and S2 normal.    Pulmonary/Chest: Effort normal. She has decreased breath sounds in the right lower field. She has rales.   Abdominal: Soft. Bowel sounds are normal. There is no tenderness.   Musculoskeletal: She exhibits no edema.   Neurological: She is alert. She is not disoriented.   Skin: Skin is warm and dry. No cyanosis. Nails show no clubbing.   Psychiatric: She has a normal mood and affect. Cognition and memory are normal.       Significant Labs:     CBC:     Recent Labs  Lab 12/26/17  0500 12/27/17  0449   WBC 9.17 10.14   HGB 8.7* 9.2*   HCT 28.5* 30.0*   * 552*     CMP:     Recent Labs  Lab 12/26/17  0500 12/27/17  0449    136   K 4.0 3.9   CL 97 97   CO2 30* 30*   * 111*   BUN 15 12   CREATININE 0.8 0.8   CALCIUM 9.2 9.1   PROT  --  7.9   ALBUMIN 2.3* 2.2*  2.2*   BILITOT  --  0.3   ALKPHOS  --  97   AST  --  26   ALT  --  14   ANIONGAP 9 9   EGFRNONAA >60.0 >60.0     Magnesium:     Recent Labs  Lab 12/26/17  0500 12/27/17  0449   MG 2.0 1.9     Assessment/Plan:      Current Hospital Problem List:    Active Hospital Problems    Diagnosis  POA    *Acute hypoxemic respiratory failure [J96.01]  Yes     Priority: 1 - High    Pericardial effusion [I31.3]  Yes     Priority: 1 - High    Ground glass opacity present on imaging of lung [R91.8]  Yes     Priority: 1 - High    Acute congestive heart failure [I50.9]  Yes     Priority: 2     RA (rheumatoid arthritis) [M06.9]  Yes     Priority: 3     Essential  "hypertension [I10]  Yes     Priority: 4     Immunosuppression [D89.9]  Yes    Vitamin D deficiency disease [E55.9]  Yes      Resolved Hospital Problems    Diagnosis Date Resolved POA   No resolved problems to display.       Ordered Medications for management of current problems:    amLODIPine  5 mg Oral Daily    azithromycin  250 mg Oral Daily    carvedilol  3.125 mg Oral BID    cefTRIAXone (ROCEPHIN) IVPB  2 g Intravenous Q24H    enoxaparin  40 mg Subcutaneous Q12H    ergocalciferol  50,000 Units Oral Twice Weekly    ipratropium  0.5 mg Nebulization TID WAKE    methylPREDNISolone sodium succinate  16 mg Intravenous Q8H    senna-docusate 8.6-50 mg  1 tablet Oral BID    sodium chloride 0.9%  3 mL Intravenous Q8H       Risk  Patient has a condition that poses threat to life and bodily function: Severe Respiratory Distress    Anticipated Disposition: Home or Self Care    Assessment and Plan by Problem:    * Acute hypoxemic respiratory failure    Etiology unclear. No history of hypoxia.  Cultures and viral studies are pending.  Pulmonology, Cardiology,  and Rheumatology consulted.  Work up ongoing.        Pericardial effusion    Concern for symptomatic effusion, tamponade. Agree with Pulmonology that effusion may be significant.   Consulted Cardiology. Multiple Echos 12/27/2017 to assess for tamponade.   Rheumatology consulted.        Ground glass opacity present on imaging of lung    Chest CT revealed: "Patchy irregular multifocal consolidative opacities throughout both lung fields, with associated peribronchial cuffing, endobronchial debris, and groundglass opacities. These findings in addition to a moderate volume pericardial effusion and small volume bilateral pleural effusions could represent an infectious process (likely viral), noninfectious serosal inflammatory disease (rheumatoid arthritis, lupus...), or malignancy. Multiple enlarged bilateral axillary lymph nodes, favored to be reactive in nature." "   Monitor closely for tamponade from pericardial effusion.  Added Avelox 12/22/17; ordered sputum culture and viral studies.  Added acapella. Added Atrovent nebs as patient allergic to albuterol.  Patient remains hypoxic. Maximizing antibiotic coverage (do not suspect MRSA at this point) with Avelox, Flagyl, and ceftriaxone.  No improvement. Prednisone was started as OP for allergic reaction to albuterol; discontinued Prednisone 12/25/2017.  Consulted Pulmonology. Sputum culture and respiratory pathogen panel pending.  Concern for lack of significant improvement despite diuresis, antibiotics, and supportive care.  Discontinuing Flagyl and Avelox. Continuing current management with ceftriaxone and adding azithromycin.        Acute congestive heart failure    Apparently new onset CHF. HF pathway initiated. BNP low (falsely due to obesity?).  Lasix 40 mg IV initiated. No ACEi due to allergy.  Echo: CONCLUSIONS     1 - Technically difficult study (BMI 58.6).     2 - Normal left ventricular systolic function (EF 65-70%).     3 - Normal right ventricular systolic function .     4 - The estimated PA systolic pressure is greater than 28 mmHg.     5 - There is a small to perhaps moderately sized pericardial effusion and some collapse of the RV free wall is seen on parasternal long axis imaging.  The IVC cannot be visualized but the relatively dynamic LV function would support the possibility of   the patient being volume depleted.  Respiratory variation is seen in mitral inflow, but this is of questionable reliability and better signals from LVOT outflow do not show significant variability (as would be seen in tamponade).   Appears to be euvolemic; decreased Lasix to 40 mg po daily. Monitoring.  Mild tachycardia; discontinued Lasix 12/24/17. Appears compensated (suspect pulmonary hypertension).  Trial of diuresis 12/27/2017 as per Cardiology's recommendation; no improvement in hypoxia.   Doses not appear to be in  decompensated HF.        RA (rheumatoid arthritis)    Continuing home medication.  Recent increase in inflammatory markers associated with URI.  Consulted Rheumatology. Held SSZ 12/27/2017 as per recommendations..        Essential hypertension    Continuing current management with amlodipine for now.  Better controlled initially but then remaining above goal; added Coreg.  Improved.        Vitamin D deficiency disease    Continuing home medications.          VTE Risk Mitigation         Ordered     enoxaparin injection 40 mg  Every 12 hours (non-standard times)     Route:  Subcutaneous        12/20/17 1701     Medium Risk of VTE  Once      12/20/17 1701              Stephanie Reece MD  Department of Hospital Medicine   [facility]

## 2017-12-28 NOTE — ASSESSMENT & PLAN NOTE
Etiology unclear. No history of hypoxia.  Cultures and viral studies are pending.  Pulmonology, Cardiology,  and Rheumatology consulted.  Work up ongoing.

## 2017-12-28 NOTE — ASSESSMENT & PLAN NOTE
"Chest CT revealed: "Patchy irregular multifocal consolidative opacities throughout both lung fields, with associated peribronchial cuffing, endobronchial debris, and groundglass opacities. These findings in addition to a moderate volume pericardial effusion and small volume bilateral pleural effusions could represent an infectious process (likely viral), noninfectious serosal inflammatory disease (rheumatoid arthritis, lupus...), or malignancy. Multiple enlarged bilateral axillary lymph nodes, favored to be reactive in nature."   Monitor closely for tamponade from pericardial effusion.  Added Avelox 12/22/17; ordered sputum culture and viral studies.  Added acapella. Added Atrovent nebs as patient allergic to albuterol.  Patient remains hypoxic. Maximizing antibiotic coverage (do not suspect MRSA at this point) with Avelox, Flagyl, and ceftriaxone.  No improvement. Prednisone was started as OP for allergic reaction to albuterol; discontinued Prednisone 12/25/2017.  Consulted Pulmonology. Sputum culture and respiratory pathogen panel pending.  Concern for lack of significant improvement despite diuresis, antibiotics, and supportive care.  Discontinuing Flagyl and Avelox. Continuing current management with ceftriaxone and adding azithromycin.  "

## 2017-12-28 NOTE — PLAN OF CARE
"Problem: Patient Care Overview  Goal: Plan of Care Review  Outcome: Ongoing (interventions implemented as appropriate)  Pt is free from injury and falls during shift. Pt shows no signs of acute distress. Denies pain. AAO. Vitals stable. Up in chair during most of shift- more comfortable for pt. Skin intact. I & O documented in flow sheet. Pt states "I feel much better today." Upon nursing assessment, pt looks and sounds much better as compared to past two days. Care plan reviewed with pt- pt verbalized understanding.Bed is in low position with breaks locked. Side rails are up x 2. Environment is clutter free. Call light is within reach of the patient. Will continue to monitor.             "

## 2017-12-28 NOTE — SUBJECTIVE & OBJECTIVE
Interval History: Patient with no events overnight, no new complaints. Subjective improvement only; remains hypoxic.  Data Review: Results recorded below were reviewed 12/27/2017.    Review of Systems   Constitutional: Positive for fatigue.   Respiratory: Positive for shortness of breath. Negative for chest tightness.    Cardiovascular: Negative for chest pain and leg swelling.     Objective:     Vital Signs (Most Recent):  Temp: 98.8 °F (37.1 °C) (12/27/17 2056)  Pulse: (!) 111 (12/27/17 2126)  Resp: 18 (12/27/17 2126)  BP: 133/85 (12/27/17 2056)  SpO2: (!) 93 % (12/27/17 2126) Vital Signs (24h Range):  Temp:  [98.1 °F (36.7 °C)-100.4 °F (38 °C)] 98.8 °F (37.1 °C)  Pulse:  [] 111  Resp:  [16-94] 18  SpO2:  [90 %-96 %] 93 %  BP: (128-138)/(71-90) 133/85     Weight: (!) 159.8 kg (352 lb 4.7 oz)  Body mass index is 58.62 kg/m².    Intake/Output Summary (Last 24 hours) at 12/27/17 2241  Last data filed at 12/27/17 1635   Gross per 24 hour   Intake              650 ml   Output             2100 ml   Net            -1450 ml      Physical Exam   Constitutional: She appears well-developed.  Non-toxic appearance.   HENT:   Head: Normocephalic.   Mouth/Throat: Mucous membranes are not pale and not cyanotic.   Eyes: Conjunctivae and lids are normal. Pupils are equal.   Neck: Neck supple.   Cardiovascular: Normal rate, regular rhythm, S1 normal and S2 normal.    Pulmonary/Chest: Effort normal. She has decreased breath sounds in the right lower field. She has rales.   Abdominal: Soft. Bowel sounds are normal. There is no tenderness.   Musculoskeletal: She exhibits no edema.   Neurological: She is alert. She is not disoriented.   Skin: Skin is warm and dry. No cyanosis. Nails show no clubbing.   Psychiatric: She has a normal mood and affect. Cognition and memory are normal.       Significant Labs:     CBC:     Recent Labs  Lab 12/26/17  0500 12/27/17  0449   WBC 9.17 10.14   HGB 8.7* 9.2*   HCT 28.5* 30.0*   * 552*      CMP:     Recent Labs  Lab 12/26/17  0500 12/27/17  0449    136   K 4.0 3.9   CL 97 97   CO2 30* 30*   * 111*   BUN 15 12   CREATININE 0.8 0.8   CALCIUM 9.2 9.1   PROT  --  7.9   ALBUMIN 2.3* 2.2*  2.2*   BILITOT  --  0.3   ALKPHOS  --  97   AST  --  26   ALT  --  14   ANIONGAP 9 9   EGFRNONAA >60.0 >60.0     Magnesium:     Recent Labs  Lab 12/26/17  0500 12/27/17  0449   MG 2.0 1.9

## 2017-12-28 NOTE — PROGRESS NOTES
[facility]  Cardiology  Progress Note    Patient Name: Malika Hodgson  MRN: [id number]  Admission Date: 12/20/2017  Hospital Length of Stay: 8 days  Code Status: Full Code   Attending Physician: Stephanie Reece MD   Primary Care Physician: Sarah Robles MD  Expected Discharge Date: 1/2/2018  Principal Problem:Acute hypoxemic respiratory failure    Subjective:       HPI:  47 y.o. female presented to the ER with past medical history of RA (rheumatoid arthritis), immunosuppression, hypertension, morbid obesity.  She was in her usual state of stable health until the acute onset of URI / pneumonia symptoms beginning 2 - 3 weeks prior to admission with gradually improving course after treatment with Tessalon and doxycycline. Patient drank an increased amount of fluid and ate mostly high-sodium soups during this time. She developed significant dyspnea on exertion. Pertinent associated symptoms include shortness of breath on exertion; denies orthopnea and LE swelling.    Interval History: NAEON. Patient still requiring additional O2. ECHO showed stable Moderate Pericardial Effusion.     Review of Systems   Constitution: Negative for chills and malaise/fatigue.   HENT: Negative for congestion.    Cardiovascular: Positive for dyspnea on exertion and orthopnea. Negative for chest pain, irregular heartbeat, leg swelling, near-syncope, palpitations, paroxysmal nocturnal dyspnea and syncope.   Respiratory: Positive for cough and shortness of breath.    Skin: Negative for color change and rash.   Musculoskeletal: Negative for joint swelling.   Gastrointestinal: Negative for abdominal pain, diarrhea, melena, nausea and vomiting.   Genitourinary: Negative for hematuria.   Neurological: Negative for dizziness, focal weakness, headaches and light-headedness.   Psychiatric/Behavioral: Negative.      Objective:     Vital Signs (Most Recent):  Temp: 98.4 °F (36.9 °C) (12/28/17 0803)  Pulse: 92 (12/28/17  0826)  Resp: 16 (12/28/17 0803)  BP: (!) 157/87 (12/28/17 0803)  SpO2: 96 % (12/28/17 0803) Vital Signs (24h Range):  Temp:  [98.2 °F (36.8 °C)-98.8 °F (37.1 °C)] 98.4 °F (36.9 °C)  Pulse:  [] 92  Resp:  [16-94] 16  SpO2:  [90 %-96 %] 96 %  BP: (132-157)/(84-90) 157/87     Weight: (!) 159.8 kg (352 lb 4.7 oz)  Body mass index is 58.62 kg/m².     SpO2: 96 %  O2 Device (Oxygen Therapy): room air      Intake/Output Summary (Last 24 hours) at 12/28/17 0858  Last data filed at 12/27/17 1635   Gross per 24 hour   Intake              650 ml   Output             2100 ml   Net            -1450 ml       Lines/Drains/Airways     Peripheral Intravenous Line                 Peripheral IV - Single Lumen 12/25/17 1015 Left Forearm 2 days                Physical Exam   Constitutional: She is oriented to person, place, and time. She appears well-developed and well-nourished. No distress.   Morbidly obese   HENT:   Head: Normocephalic and atraumatic.   Eyes: EOM are normal. Pupils are equal, round, and reactive to light. No scleral icterus.   Neck: Neck supple. No JVD present.   Cardiovascular: Normal rate, regular rhythm, normal heart sounds and intact distal pulses.  Exam reveals no gallop and no friction rub.    No murmur heard.  Pulmonary/Chest: No respiratory distress. She has no wheezes. She has rales.   +Tubular breath sounds and Egophony in RLL   Abdominal: Soft. Bowel sounds are normal. She exhibits no distension. There is no tenderness.   Musculoskeletal: Normal range of motion. She exhibits no edema.   Neurological: She is alert and oriented to person, place, and time. No cranial nerve deficit.   Skin: Skin is warm and dry. No erythema.   Psychiatric: She has a normal mood and affect.   Nursing note and vitals reviewed.      Significant Labs: All pertinent lab results from the last 24 hours have been reviewed.    Significant Imaging:     Imaging Results          CT Chest Without Contrast (Final result)  Result time  12/22/17 11:39:17    Final result by Shyanne Parsons MD (12/22/17 11:39:17)                 Impression:      Patchy irregular multifocal consolidative opacities throughout both lung fields, with associated peribronchial cuffing, endobronchial debris, and groundglass opacities. These findings in addition to a moderate volume pericardial effusion and small volume bilateral pleural effusions could represent an infectious process (likely viral), noninfectious serosal inflammatory disease (rheumatoid arthritis, lupus...), or malignancy.    Multiple enlarged bilateral axillary lymph nodes, favored to be reactive in nature.        ______________________________________     Electronically signed by resident: CANDIE KAUFMAN MD  Date:     12/22/17  Time:    11:35            As the supervising and teaching physician, I personally reviewed the images and resident's interpretation and I agree with the findings.            Electronically signed by: Shyanne Parsons MD  Date:     12/22/17  Time:    11:39              Narrative:    CT CHEST    TIME OF PROCEDURE: 12/22/17 09:40:00  ACCESSION #: [id number]    TECHNIQUE: The chest was surveyed from the lung apices through the costophrenic angles without the use of intravenous contrast material.  Data was reformatted for contiguous 5 mm images in the axial, sagittal, and coronal planes.  Data was post processed for extraction of 1.25 mm images for high-resolution CT imaging and 8-mm maximum intensity projection (MIP) images at 2-mm increments confined to the axial plane without additional radiation to the patient.     TOTAL EXAM DLP: 714.95 mGy-cm.    COMPARISON: Shortness of breath, edema, pericardial effusion.          FINDINGS:  Examination quality is significantly degraded by the patient's body habitus.    The structures at the base of the neck reveal no convincing evidence of disease.    There is a left-sided aortic arch with three branch vessels.  The thoracic aorta maintains normal  caliber, contour, and course without significant atherosclerotic calcification.    The heart is not enlarged. There is a moderate volume pericardial effusion.    The pulmonary arteries distribute normally.      There are multiple enlarged bilateral axillary lymph nodes. The largest on the right measures 1.5 cm. The largest on the left measures 2.1 cm. No definite mediastinal lymphadenopathy.    The esophagus maintains a normal course and caliber.    Degenerative changes of the spine are noted.    Limited views of the abdomen demonstrate nothing unusual.    The trachea and proximal airways are patent. There is a mild degree of tracheomalacia.    The lungs are symmetrically expanded. There are small volume bilateral pleural effusions, right greater than left, with minimal associated compressive atelectasis of adjacent lung parenchyma. There are patchy irregular multifocal consolidative opacities throughout both lung fields along with scattered peribronchial cuffing, endobronchial debris, and groundglass opacities. Findings are nonspecific and could represent infectious etiology, noninfectious serosal inflammatory process, or malignancy.                             X-Ray Chest PA And Lateral (Final result)  Result time 12/20/17 14:23:09    Final result by Maurizio Garcia III, MD (12/20/17 14:23:09)                 Narrative:    2 views: There is cardiomegaly, moderate edema, DJD, and worsening.      Electronically signed by: MAURIZIO GARCIA MD  Date:     12/20/17  Time:    14:23                               Echocardiogram:   2D echo with color flow doppler:   Results for orders placed or performed during the hospital encounter of 12/20/17   2D echo with color flow doppler   Result Value Ref Range    EF 60 55 - 65    Est. PA Systolic Pressure 43.96 (A)     Pericardial Effusion MODERATE (A)     Tricuspid Valve Regurgitation TRIVIAL      Assessment and Plan:     47F with RA, morbid obesity here with 3 weeks of shortness  of breath, orthopnea. Overall, feeling slightly better but still with persistent SOB, tachypnea, hypoxia. Has failed to significantly improve with multiple courses of abx (currently on broad spectrum), corticosteroids. Cardiology consulted to re-evaluate for tamponade in this setting.     Pericardial effusion    CT with significant bilateral opacities- infectious vs inflammatory vs autoimmune. No signs of significant volume overload. Echo without significant change from prior and of note, tamponade should not be causing her hypoxia.  -No tamponade physiology evident on bedside echo.  Would consider ILD given RA, CT findings, and hypoxia, agree with rheum consult   -Given difficulty of volume assessment, if not felt due to ILD or fails to improve, could consider RHC   -Although pericardial effusion would not likely account for patient's hypoxia, lasix usage may cause intravascular depletion and worsen tamponade. Rec stopping lasix mommentarily, if possible.       -Repeat ECHOs showing stable moderate pericardial effusion.   No tamponade signs, patient asymptomatic in association with fluid.   Therapeutic tap not necessary at this juncture.   Rec ECHO as outpatient follow up only. No need for any more repeat/serial ECHOs this hospital stay, unless patient becomes hemodynamically unstable and clinically required.                VTE Risk Mitigation         Ordered     enoxaparin injection 40 mg  Every 12 hours (non-standard times)     Route:  Subcutaneous        12/20/17 1701     Medium Risk of VTE  Once      12/20/17 1701          Will sign-off now. Please call us with any questions or for any assistance whatsoever.     Hai Ramos MD  Cardiology  [facility]

## 2017-12-28 NOTE — ASSESSMENT & PLAN NOTE
Apparently new onset CHF. HF pathway initiated. BNP low (falsely due to obesity?).  Lasix 40 mg IV initiated. No ACEi due to allergy.  Echo: CONCLUSIONS     1 - Technically difficult study (BMI 58.6).     2 - Normal left ventricular systolic function (EF 65-70%).     3 - Normal right ventricular systolic function .     4 - The estimated PA systolic pressure is greater than 28 mmHg.     5 - There is a small to perhaps moderately sized pericardial effusion and some collapse of the RV free wall is seen on parasternal long axis imaging.  The IVC cannot be visualized but the relatively dynamic LV function would support the possibility of   the patient being volume depleted.  Respiratory variation is seen in mitral inflow, but this is of questionable reliability and better signals from LVOT outflow do not show significant variability (as would be seen in tamponade).   Appears to be euvolemic; decreased Lasix to 40 mg po daily. Monitoring.  Mild tachycardia; discontinued Lasix 12/24/17. Appears compensated (suspect pulmonary hypertension).  Trial of diuresis 12/27/2017 as per Cardiology's recommendation; no improvement in hypoxia.   Doses not appear to be in decompensated HF.

## 2017-12-28 NOTE — SUBJECTIVE & OBJECTIVE
Interval History: NAEON. Patient still requiring additional O2. ECHO showed stable Moderate Pericardial Effusion.     Review of Systems   Constitution: Negative for chills and malaise/fatigue.   HENT: Negative for congestion.    Cardiovascular: Positive for dyspnea on exertion and orthopnea. Negative for chest pain, irregular heartbeat, leg swelling, near-syncope, palpitations, paroxysmal nocturnal dyspnea and syncope.   Respiratory: Positive for cough and shortness of breath.    Skin: Negative for color change and rash.   Musculoskeletal: Negative for joint swelling.   Gastrointestinal: Negative for abdominal pain, diarrhea, melena, nausea and vomiting.   Genitourinary: Negative for hematuria.   Neurological: Negative for dizziness, focal weakness, headaches and light-headedness.   Psychiatric/Behavioral: Negative.      Objective:     Vital Signs (Most Recent):  Temp: 98.4 °F (36.9 °C) (12/28/17 0803)  Pulse: 92 (12/28/17 0826)  Resp: 16 (12/28/17 0803)  BP: (!) 157/87 (12/28/17 0803)  SpO2: 96 % (12/28/17 0803) Vital Signs (24h Range):  Temp:  [98.2 °F (36.8 °C)-98.8 °F (37.1 °C)] 98.4 °F (36.9 °C)  Pulse:  [] 92  Resp:  [16-94] 16  SpO2:  [90 %-96 %] 96 %  BP: (132-157)/(84-90) 157/87     Weight: (!) 159.8 kg (352 lb 4.7 oz)  Body mass index is 58.62 kg/m².     SpO2: 96 %  O2 Device (Oxygen Therapy): room air      Intake/Output Summary (Last 24 hours) at 12/28/17 0858  Last data filed at 12/27/17 1635   Gross per 24 hour   Intake              650 ml   Output             2100 ml   Net            -1450 ml       Lines/Drains/Airways     Peripheral Intravenous Line                 Peripheral IV - Single Lumen 12/25/17 1015 Left Forearm 2 days                Physical Exam   Constitutional: She is oriented to person, place, and time. She appears well-developed and well-nourished. No distress.   Morbidly obese   HENT:   Head: Normocephalic and atraumatic.   Eyes: EOM are normal. Pupils are equal, round, and  reactive to light. No scleral icterus.   Neck: Neck supple. No JVD present.   Cardiovascular: Normal rate, regular rhythm, normal heart sounds and intact distal pulses.  Exam reveals no gallop and no friction rub.    No murmur heard.  Pulmonary/Chest: No respiratory distress. She has no wheezes. She has rales.   +Tubular breath sounds and Egophony in RLL   Abdominal: Soft. Bowel sounds are normal. She exhibits no distension. There is no tenderness.   Musculoskeletal: Normal range of motion. She exhibits no edema.   Neurological: She is alert and oriented to person, place, and time. No cranial nerve deficit.   Skin: Skin is warm and dry. No erythema.   Psychiatric: She has a normal mood and affect.   Nursing note and vitals reviewed.      Significant Labs: All pertinent lab results from the last 24 hours have been reviewed.    Significant Imaging:     Imaging Results          CT Chest Without Contrast (Final result)  Result time 12/22/17 11:39:17    Final result by Shyanne Parsons MD (12/22/17 11:39:17)                 Impression:      Patchy irregular multifocal consolidative opacities throughout both lung fields, with associated peribronchial cuffing, endobronchial debris, and groundglass opacities. These findings in addition to a moderate volume pericardial effusion and small volume bilateral pleural effusions could represent an infectious process (likely viral), noninfectious serosal inflammatory disease (rheumatoid arthritis, lupus...), or malignancy.    Multiple enlarged bilateral axillary lymph nodes, favored to be reactive in nature.        ______________________________________     Electronically signed by resident: CANDIE KAUFMAN MD  Date:     12/22/17  Time:    11:35            As the supervising and teaching physician, I personally reviewed the images and resident's interpretation and I agree with the findings.            Electronically signed by: Shyanne Parsons MD  Date:     12/22/17  Time:    11:39               Narrative:    CT CHEST    TIME OF PROCEDURE: 12/22/17 09:40:00  ACCESSION #: [id number]    TECHNIQUE: The chest was surveyed from the lung apices through the costophrenic angles without the use of intravenous contrast material.  Data was reformatted for contiguous 5 mm images in the axial, sagittal, and coronal planes.  Data was post processed for extraction of 1.25 mm images for high-resolution CT imaging and 8-mm maximum intensity projection (MIP) images at 2-mm increments confined to the axial plane without additional radiation to the patient.     TOTAL EXAM DLP: 714.95 mGy-cm.    COMPARISON: Shortness of breath, edema, pericardial effusion.          FINDINGS:  Examination quality is significantly degraded by the patient's body habitus.    The structures at the base of the neck reveal no convincing evidence of disease.    There is a left-sided aortic arch with three branch vessels.  The thoracic aorta maintains normal caliber, contour, and course without significant atherosclerotic calcification.    The heart is not enlarged. There is a moderate volume pericardial effusion.    The pulmonary arteries distribute normally.      There are multiple enlarged bilateral axillary lymph nodes. The largest on the right measures 1.5 cm. The largest on the left measures 2.1 cm. No definite mediastinal lymphadenopathy.    The esophagus maintains a normal course and caliber.    Degenerative changes of the spine are noted.    Limited views of the abdomen demonstrate nothing unusual.    The trachea and proximal airways are patent. There is a mild degree of tracheomalacia.    The lungs are symmetrically expanded. There are small volume bilateral pleural effusions, right greater than left, with minimal associated compressive atelectasis of adjacent lung parenchyma. There are patchy irregular multifocal consolidative opacities throughout both lung fields along with scattered peribronchial cuffing, endobronchial debris, and  groundglass opacities. Findings are nonspecific and could represent infectious etiology, noninfectious serosal inflammatory process, or malignancy.                             X-Ray Chest PA And Lateral (Final result)  Result time 12/20/17 14:23:09    Final result by Maurizio Garcia III, MD (12/20/17 14:23:09)                 Narrative:    2 views: There is cardiomegaly, moderate edema, DJD, and worsening.      Electronically signed by: MAURIZIO GARCIA MD  Date:     12/20/17  Time:    14:23                               Echocardiogram:   2D echo with color flow doppler:   Results for orders placed or performed during the hospital encounter of 12/20/17   2D echo with color flow doppler   Result Value Ref Range    EF 60 55 - 65    Est. PA Systolic Pressure 43.96 (A)     Pericardial Effusion MODERATE (A)     Tricuspid Valve Regurgitation TRIVIAL

## 2017-12-28 NOTE — ASSESSMENT & PLAN NOTE
Concern for symptomatic effusion, tamponade. Agree with Pulmonology that effusion may be significant.   Consulted Cardiology. Multiple Echos 12/27/2017 to assess for tamponade.   Rheumatology consulted.

## 2017-12-28 NOTE — ASSESSMENT & PLAN NOTE
Continuing home medication.  Recent increase in inflammatory markers associated with URI.  Consulted Rheumatology. Held SSZ 12/27/2017 as per recommendations..

## 2017-12-28 NOTE — ASSESSMENT & PLAN NOTE
47F with RA, morbid obesity here with 3 weeks of shortness of breath, orthopnea. Overall, feeling slightly better but still with persistent SOB, tachypnea, hypoxia. Has failed to significantly improve with multiple courses of abx (currently on broad spectrum), corticosteroids. Cardiology consulted to re-evaluate for tamponade in this setting. CT with significant bilateral opacities- infectious vs inflammatory vs autoimmune. No signs of significant volume overload. Echo without significant change from prior and of note, tamponade should not be causing her hypoxia.  -No tamponade physiology evident on bedside echo.  Would consider ILD given RA, CT findings, and hypoxia, agree with rheum consult   -Would also give mild additional diuresis as IVC dilated without significant respiratory variation suggestive of increased CVP  -Given difficulty of volume assessment, if not felt due to ILD or fails to improve, could consider RHC  -Although pericardial effusion would not likely account for patient's hypoxia, lasix usage may cause intravascular depletion and worsen tamponade. Rec stopping lasix mommentarily, if possible.     -Repeat ECHOs showing stable moderate pericardial effusion. Patient asymptomatic in association with fluid. Therapeutic tap not necessary at this juncture. Rec limited ECHO prior to d/c to make sure pericardial fluid is still stable, or if patient becomes hemodynamically unstable.

## 2017-12-29 ENCOUNTER — TELEPHONE (OUTPATIENT)
Dept: PULMONOLOGY | Facility: CLINIC | Age: 47
End: 2017-12-29

## 2017-12-29 DIAGNOSIS — B97.29 PNEUMONIA DUE TO HUMAN CORONAVIRUS: Primary | ICD-10-CM

## 2017-12-29 DIAGNOSIS — J12.89 PNEUMONIA DUE TO HUMAN CORONAVIRUS: Primary | ICD-10-CM

## 2017-12-29 LAB
ALBUMIN SERPL BCP-MCNC: 2.2 G/DL
ANCA AB TITR SER IF: NORMAL TITER
ANION GAP SERPL CALC-SCNC: 7 MMOL/L
BASOPHILS # BLD AUTO: 0.03 K/UL
BASOPHILS NFR BLD: 0.2 %
BUN SERPL-MCNC: 13 MG/DL
CALCIUM SERPL-MCNC: 9.2 MG/DL
CHLORIDE SERPL-SCNC: 98 MMOL/L
CO2 SERPL-SCNC: 33 MMOL/L
CREAT SERPL-MCNC: 0.8 MG/DL
DIFFERENTIAL METHOD: ABNORMAL
ENA SCL70 AB SER-ACNC: 6 UNITS
EOSINOPHIL # BLD AUTO: 0 K/UL
EOSINOPHIL NFR BLD: 0 %
ERYTHROCYTE [DISTWIDTH] IN BLOOD BY AUTOMATED COUNT: 15 %
EST. GFR  (AFRICAN AMERICAN): >60 ML/MIN/1.73 M^2
EST. GFR  (NON AFRICAN AMERICAN): >60 ML/MIN/1.73 M^2
GLUCOSE SERPL-MCNC: 150 MG/DL
HCT VFR BLD AUTO: 30.3 %
HGB BLD-MCNC: 9.1 G/DL
IMM GRANULOCYTES # BLD AUTO: 0.14 K/UL
IMM GRANULOCYTES NFR BLD AUTO: 1 %
LYMPHOCYTES # BLD AUTO: 1.3 K/UL
LYMPHOCYTES NFR BLD: 9.4 %
MAGNESIUM SERPL-MCNC: 2.3 MG/DL
MCH RBC QN AUTO: 24.4 PG
MCHC RBC AUTO-ENTMCNC: 30 G/DL
MCV RBC AUTO: 81 FL
MITOGEN NIL: 0.21 IU/ML
MONOCYTES # BLD AUTO: 1.1 K/UL
MONOCYTES NFR BLD: 7.7 %
MYELOPEROXIDASE AB SER-ACNC: 3 UNITS
NEUTROPHILS # BLD AUTO: 11.6 K/UL
NEUTROPHILS NFR BLD: 81.7 %
NIL: 0.04 IU/ML
NRBC BLD-RTO: 0 /100 WBC
P-ANCA TITR SER IF: NORMAL TITER
PHOSPHATE SERPL-MCNC: 2.6 MG/DL
PLATELET # BLD AUTO: 569 K/UL
PMV BLD AUTO: 9 FL
POTASSIUM SERPL-SCNC: 4.6 MMOL/L
RBC # BLD AUTO: 3.73 M/UL
SODIUM SERPL-SCNC: 138 MMOL/L
TB ANTIGEN NIL: -0.01 IU/ML
TB ANTIGEN: 0.02 IU/ML
TB GOLD: ABNORMAL
WBC # BLD AUTO: 14.25 K/UL

## 2017-12-29 PROCEDURE — 99233 SBSQ HOSP IP/OBS HIGH 50: CPT | Mod: ,,, | Performed by: INTERNAL MEDICINE

## 2017-12-29 PROCEDURE — 94664 DEMO&/EVAL PT USE INHALER: CPT

## 2017-12-29 PROCEDURE — 94761 N-INVAS EAR/PLS OXIMETRY MLT: CPT

## 2017-12-29 PROCEDURE — A4216 STERILE WATER/SALINE, 10 ML: HCPCS | Performed by: INTERNAL MEDICINE

## 2017-12-29 PROCEDURE — 36415 COLL VENOUS BLD VENIPUNCTURE: CPT

## 2017-12-29 PROCEDURE — 25000003 PHARM REV CODE 250: Performed by: INTERNAL MEDICINE

## 2017-12-29 PROCEDURE — 27000221 HC OXYGEN, UP TO 24 HOURS

## 2017-12-29 PROCEDURE — 63600175 PHARM REV CODE 636 W HCPCS: Performed by: INTERNAL MEDICINE

## 2017-12-29 PROCEDURE — 80069 RENAL FUNCTION PANEL: CPT

## 2017-12-29 PROCEDURE — 85025 COMPLETE CBC W/AUTO DIFF WBC: CPT

## 2017-12-29 PROCEDURE — 25000242 PHARM REV CODE 250 ALT 637 W/ HCPCS: Performed by: INTERNAL MEDICINE

## 2017-12-29 PROCEDURE — 11000001 HC ACUTE MED/SURG PRIVATE ROOM

## 2017-12-29 PROCEDURE — 94640 AIRWAY INHALATION TREATMENT: CPT

## 2017-12-29 PROCEDURE — 99232 SBSQ HOSP IP/OBS MODERATE 35: CPT | Mod: ,,, | Performed by: INTERNAL MEDICINE

## 2017-12-29 PROCEDURE — 83735 ASSAY OF MAGNESIUM: CPT

## 2017-12-29 RX ADMIN — AZITHROMYCIN 250 MG: 250 TABLET, FILM COATED ORAL at 08:12

## 2017-12-29 RX ADMIN — AMLODIPINE BESYLATE 5 MG: 5 TABLET ORAL at 08:12

## 2017-12-29 RX ADMIN — ENOXAPARIN SODIUM 40 MG: 100 INJECTION SUBCUTANEOUS at 05:12

## 2017-12-29 RX ADMIN — SODIUM CHLORIDE, PRESERVATIVE FREE 3 ML: 5 INJECTION INTRAVENOUS at 05:12

## 2017-12-29 RX ADMIN — METHYLPREDNISOLONE SODIUM SUCCINATE 16 MG: 40 INJECTION, POWDER, FOR SOLUTION INTRAMUSCULAR; INTRAVENOUS at 01:12

## 2017-12-29 RX ADMIN — CARVEDILOL 3.12 MG: 3.12 TABLET, FILM COATED ORAL at 08:12

## 2017-12-29 RX ADMIN — IPRATROPIUM BROMIDE 0.5 MG: 0.5 SOLUTION RESPIRATORY (INHALATION) at 08:12

## 2017-12-29 RX ADMIN — SODIUM CHLORIDE, PRESERVATIVE FREE 3 ML: 5 INJECTION INTRAVENOUS at 09:12

## 2017-12-29 RX ADMIN — SODIUM CHLORIDE, PRESERVATIVE FREE 3 ML: 5 INJECTION INTRAVENOUS at 01:12

## 2017-12-29 RX ADMIN — METHYLPREDNISOLONE SODIUM SUCCINATE 16 MG: 40 INJECTION, POWDER, FOR SOLUTION INTRAMUSCULAR; INTRAVENOUS at 05:12

## 2017-12-29 RX ADMIN — IPRATROPIUM BROMIDE 0.5 MG: 0.5 SOLUTION RESPIRATORY (INHALATION) at 09:12

## 2017-12-29 RX ADMIN — METHYLPREDNISOLONE SODIUM SUCCINATE 16 MG: 40 INJECTION, POWDER, FOR SOLUTION INTRAMUSCULAR; INTRAVENOUS at 09:12

## 2017-12-29 RX ADMIN — CARVEDILOL 3.12 MG: 3.12 TABLET, FILM COATED ORAL at 09:12

## 2017-12-29 RX ADMIN — IPRATROPIUM BROMIDE 0.5 MG: 0.5 SOLUTION RESPIRATORY (INHALATION) at 01:12

## 2017-12-29 NOTE — ASSESSMENT & PLAN NOTE
This is a case of hypoxic respiratory failure with bilateral GGO noted on CT - this is most likely related to her rheumatoid arthritis with superimposed URI. Viral panel came back (+) for coronavirus. In the setting of normal WBC count, normal procalcitonin, and afebrile status, this is unlikely to be 2/2 to bacterial etiology. For this reason, would not recommend bronchoscopy - patient is a high risk candidate for complications. Patient continues to improve while on steroids and is feeling much better today. Could consider discontinuing antibiotics at this time.    CTA was (-) for PE. Pericardial and pleural effusions likely related to her rheumatoid arthritis and possibly contributing to some of her SOB. Additionally, can try higher Lasix dose for diuresis to see if patient feels some relief as last dose given did not seem to have any effect on her urination.    Patient will need pulmonary follow up with Dr. Latham in 4 weeks with CXR.

## 2017-12-29 NOTE — ASSESSMENT & PLAN NOTE
Etiology unclear. No history of hypoxia.  Cultures and viral studies are pending.  Pulmonology, Cardiology, and Rheumatology consulted.  Work up inconclusive. Doubt infectious etiology at this point. Vital panel was positive for Human Coronavirus.  Continuing supportive care..

## 2017-12-29 NOTE — PROGRESS NOTES
[facility]  Pulmonology  Progress Note    Patient Name: Malika Hodgson  MRN: [id number]  Admission Date: 12/20/2017  Hospital Length of Stay: 9 days  Code Status: Full Code  Attending Provider: Stephanie Reece MD  Primary Care Provider: Sarah Robles MD   Principal Problem: Acute hypoxemic respiratory failure    Subjective:     Interval History: No acute events. Patient continues to feel better today.    Objective:     Vital Signs (Most Recent):  Temp: 98.2 °F (36.8 °C) (12/29/17 0853)  Pulse: 96 (12/29/17 0853)  Resp: 19 (12/29/17 0853)  BP: (!) 150/72 (12/29/17 0853)  SpO2: (!) 92 % (12/29/17 0853) Vital Signs (24h Range):  Temp:  [97.5 °F (36.4 °C)-98.5 °F (36.9 °C)] 98.2 °F (36.8 °C)  Pulse:  [] 96  Resp:  [16-20] 19  SpO2:  [92 %-95 %] 92 %  BP: (124-150)/(60-78) 150/72     Weight: (!) 159.8 kg (352 lb 4.7 oz)  Body mass index is 58.62 kg/m².    Intake/Output Summary (Last 24 hours) at 12/29/17 0855  Last data filed at 12/29/17 0506   Gross per 24 hour   Intake              360 ml   Output                0 ml   Net              360 ml     Physical Exam   Constitutional: She is oriented to person, place, and time. She appears well-developed and well-nourished.   HENT:   Head: Normocephalic and atraumatic.   Eyes: Conjunctivae are normal. Pupils are equal, round, and reactive to light.   Neck: Normal range of motion. Neck supple.   Cardiovascular: Normal rate, regular rhythm and normal heart sounds.    Pulmonary/Chest: Effort normal.   +bibasilar crackles   Abdominal: Soft. Bowel sounds are normal.   Musculoskeletal: Normal range of motion.   Neurological: She is alert and oriented to person, place, and time.   Skin: Skin is warm and dry.   Nursing note and vitals reviewed.    Lines/Drains/Airways     Peripheral Intravenous Line                 Peripheral IV - Single Lumen 12/25/17 1015 Left Forearm 3 days              Significant Labs:    CBC/Anemia Profile:    Recent Labs  Lab  12/28/17  0400   WBC 11.20   HGB 9.2*   HCT 30.5*   *   MCV 79*   RDW 14.7*     Chemistries:    Recent Labs  Lab 12/28/17  0400 12/29/17  0437   * 138   K 4.5 4.6   CL 96 98   CO2 29 33*   BUN 14 13   CREATININE 0.8 0.8   CALCIUM 9.1 9.2   ALBUMIN 2.3* 2.2*   MG 1.9 2.3   PHOS 3.0 2.6*     All pertinent labs within the past 24 hours have been reviewed.    Significant Imaging:  I have reviewed and interpreted all pertinent imaging results/findings within the past 24 hours.    Assessment/Plan:     * Acute hypoxemic respiratory failure    This is a case of hypoxic respiratory failure with bilateral GGO noted on CT - this is most likely related to her rheumatoid arthritis with superimposed URI. Viral panel came back (+) for coronavirus. In the setting of normal WBC count, normal procalcitonin, and afebrile status, this is unlikely to be 2/2 to bacterial etiology. For this reason, would not recommend bronchoscopy - patient is a high risk candidate for complications. Patient continues to improve while on steroids and is feeling much better today. Could consider discontinuing antibiotics at this time.    CTA was (-) for PE. Pericardial and pleural effusions likely related to her rheumatoid arthritis and possibly contributing to some of her SOB. Additionally, can try higher Lasix dose for diuresis to see if patient feels some relief as last dose given did not seem to have any effect on her urination.    Patient will need pulmonary follow up with Dr. Latham in 4 weeks with CXR.        Thank you for involving us in the care of this patient. We will sign off. Please call with any questions.    Rosa Garcia MD  [facility], PGY 4  [facility]  Pager: [phone]

## 2017-12-29 NOTE — SUBJECTIVE & OBJECTIVE
Interval History: Patient with no events overnight, no new complaints. Remains hypoxic.  Data Review: Results recorded below were reviewed 12/28/2017.    Review of Systems   Constitutional: Negative for fever.   Respiratory: Positive for shortness of breath. Negative for chest tightness.    Cardiovascular: Negative for chest pain and leg swelling.     Objective:     Vital Signs (Most Recent):  Temp: 98 °F (36.7 °C) (12/28/17 2004)  Pulse: 95 (12/28/17 2004)  Resp: 18 (12/28/17 2004)  BP: 133/60 (12/28/17 2004)  SpO2: (!) 94 % (12/28/17 2004) Vital Signs (24h Range):  Temp:  [97.5 °F (36.4 °C)-98.5 °F (36.9 °C)] 98 °F (36.7 °C)  Pulse:  [] 95  Resp:  [16-20] 18  SpO2:  [93 %-96 %] 94 %  BP: (124-157)/(60-87) 133/60     Weight: (!) 159.8 kg (352 lb 4.7 oz)  Body mass index is 58.62 kg/m².  No intake or output data in the 24 hours ending 12/28/17 2206   Physical Exam   Constitutional: She appears well-developed.  Non-toxic appearance.   HENT:   Head: Normocephalic.   Mouth/Throat: Mucous membranes are not pale and not cyanotic.   Eyes: Conjunctivae and lids are normal. Pupils are equal.   Neck: Neck supple.   Cardiovascular: Normal rate, regular rhythm, S1 normal and S2 normal.    Pulmonary/Chest: Effort normal. She has no wheezes. She has rales in the right lower field.   Abdominal: Soft. Bowel sounds are normal. There is no tenderness.   Musculoskeletal: She exhibits no edema.   Neurological: She is alert. She is not disoriented.   Skin: Skin is warm and dry. No cyanosis. Nails show no clubbing.   Psychiatric: She has a normal mood and affect. Cognition and memory are normal.       Significant Labs:     CBC:     Recent Labs  Lab 12/27/17 0449 12/28/17  0400   WBC 10.14 11.20   HGB 9.2* 9.2*   HCT 30.0* 30.5*   * 560*     CMP:     Recent Labs  Lab 12/27/17 0449 12/28/17  0400    135*   K 3.9 4.5   CL 97 96   CO2 30* 29   * 154*   BUN 12 14   CREATININE 0.8 0.8   CALCIUM 9.1 9.1   PROT 7.9   --    ALBUMIN 2.2*  2.2* 2.3*   BILITOT 0.3  --    ALKPHOS 97  --    AST 26  --    ALT 14  --    ANIONGAP 9 10   EGFRNONAA >60.0 >60.0     Magnesium:     Recent Labs  Lab 12/27/17  0449 12/28/17  0400   MG 1.9 1.9

## 2017-12-29 NOTE — PROGRESS NOTES
[facility] Hospital Medicine  Progress Note    Patient Name: Malika Hodgson  MRN: [id number]  Patient Class: IP- Inpatient   Admission Date: 12/20/2017  Length of Stay: 8 days  Attending Physician: Stephanie Reece MD  Primary Care Provider: Sarah Robles MD    [facility] Medicine Team: [facility]OSP MED D Stephanie Reece MD    Subjective:     Principal Problem:Acute hypoxemic respiratory failure    HPI:  47 y.o. female presented to the ER with past medical history of RA (rheumatoid arthritis), immunosuppression, hypertension, morbid obesity.  She was in her usual state of stable health until the acute onset of URI / pneumonia symptoms beginning 2 - 3 weeks prior to admission with gradually improving course after treatment with Tessalon and doxycycline. Patient drank an increased amount of fluid and ate mostly high-sodium soups during this time. She developed significant dyspnea on exertion. Pertinent associated symptoms include shortness of breath on exertion; denies orthopnea and LE swelling.    Hospital Course:  No notes on file    Interval History: Patient with no events overnight, no new complaints. Remains hypoxic.  Data Review: Results recorded below were reviewed 12/28/2017.    Review of Systems   Constitutional: Negative for fever.   Respiratory: Positive for shortness of breath. Negative for chest tightness.    Cardiovascular: Negative for chest pain and leg swelling.     Objective:     Vital Signs (Most Recent):  Temp: 98 °F (36.7 °C) (12/28/17 2004)  Pulse: 95 (12/28/17 2004)  Resp: 18 (12/28/17 2004)  BP: 133/60 (12/28/17 2004)  SpO2: (!) 94 % (12/28/17 2004) Vital Signs (24h Range):  Temp:  [97.5 °F (36.4 °C)-98.5 °F (36.9 °C)] 98 °F (36.7 °C)  Pulse:  [] 95  Resp:  [16-20] 18  SpO2:  [93 %-96 %] 94 %  BP: (124-157)/(60-87) 133/60     Weight: (!) 159.8 kg (352 lb 4.7 oz)  Body mass index is 58.62 kg/m².  No intake or output data in the 24 hours ending 12/28/17 2351   Physical  Exam   Constitutional: She appears well-developed.  Non-toxic appearance.   HENT:   Head: Normocephalic.   Mouth/Throat: Mucous membranes are not pale and not cyanotic.   Eyes: Conjunctivae and lids are normal. Pupils are equal.   Neck: Neck supple.   Cardiovascular: Normal rate, regular rhythm, S1 normal and S2 normal.    Pulmonary/Chest: Effort normal. She has no wheezes. She has rales in the right lower field.   Abdominal: Soft. Bowel sounds are normal. There is no tenderness.   Musculoskeletal: She exhibits no edema.   Neurological: She is alert. She is not disoriented.   Skin: Skin is warm and dry. No cyanosis. Nails show no clubbing.   Psychiatric: She has a normal mood and affect. Cognition and memory are normal.       Significant Labs:     CBC:     Recent Labs  Lab 12/27/17 0449 12/28/17  0400   WBC 10.14 11.20   HGB 9.2* 9.2*   HCT 30.0* 30.5*   * 560*     CMP:     Recent Labs  Lab 12/27/17 0449 12/28/17  0400    135*   K 3.9 4.5   CL 97 96   CO2 30* 29   * 154*   BUN 12 14   CREATININE 0.8 0.8   CALCIUM 9.1 9.1   PROT 7.9  --    ALBUMIN 2.2*  2.2* 2.3*   BILITOT 0.3  --    ALKPHOS 97  --    AST 26  --    ALT 14  --    ANIONGAP 9 10   EGFRNONAA >60.0 >60.0     Magnesium:     Recent Labs  Lab 12/27/17 0449 12/28/17  0400   MG 1.9 1.9     Assessment/Plan:      Current Hospital Problem List:    Active Hospital Problems    Diagnosis  POA    *Acute hypoxemic respiratory failure [J96.01]  Yes     Priority: 1 - High    Pericardial effusion [I31.3]  Yes     Priority: 1 - High    Ground glass opacity present on imaging of lung [R91.8]  Yes     Priority: 1 - High    Acute congestive heart failure [I50.9]  Yes     Priority: 2     RA (rheumatoid arthritis) [M06.9]  Yes     Priority: 3     Essential hypertension [I10]  Yes     Priority: 4     Immunosuppression [D89.9]  Yes    Vitamin D deficiency disease [E55.9]  Yes      Resolved Hospital Problems    Diagnosis Date Resolved POA   No  "resolved problems to display.       Ordered Medications for management of current problems:    amLODIPine  5 mg Oral Daily    azithromycin  250 mg Oral Daily    carvedilol  3.125 mg Oral BID    enoxaparin  40 mg Subcutaneous Q12H    ergocalciferol  50,000 Units Oral Twice Weekly    ipratropium  0.5 mg Nebulization TID WAKE    methylPREDNISolone sodium succinate  16 mg Intravenous Q8H    senna-docusate 8.6-50 mg  1 tablet Oral BID    sodium chloride 0.9%  3 mL Intravenous Q8H       Risk  Patient has a condition that poses threat to life and bodily function: Respiratory Distress    Anticipated Disposition: Home-Health Care [facility]    Assessment and Plan by Problem:    * Acute hypoxemic respiratory failure    Etiology unclear. No history of hypoxia.  Cultures and viral studies are pending.  Pulmonology, Cardiology, and Rheumatology consulted.  Work up inconclusive. Doubt infectious etiology at this point. Vital panel was positive for Human Coronavirus.  Continuing supportive care..        Pericardial effusion    Concern for symptomatic effusion, tamponade. Agree with Pulmonology that effusion may be significant.   Consulted Cardiology. Multiple Echos 12/27/2017 to assess for tamponade; not found.  Rheumatology consulted. Work up in progress.        Ground glass opacity present on imaging of lung    Chest CT revealed: "Patchy irregular multifocal consolidative opacities throughout both lung fields, with associated peribronchial cuffing, endobronchial debris, and groundglass opacities. These findings in addition to a moderate volume pericardial effusion and small volume bilateral pleural effusions could represent an infectious process (likely viral), noninfectious serosal inflammatory disease (rheumatoid arthritis, lupus...), or malignancy. Multiple enlarged bilateral axillary lymph nodes, favored to be reactive in nature."   Monitor closely for tamponade from pericardial effusion.  Added Avelox 12/22/17; ordered " sputum culture and viral studies.  Added acapella. Added Atrovent nebs as patient allergic to albuterol.  Patient remains hypoxic. Maximizing antibiotic coverage (do not suspect MRSA at this point) with Avelox, Flagyl, and ceftriaxone.  No improvement. Prednisone was started as OP for allergic reaction to albuterol; discontinued Prednisone 12/25/2017.  Consulted Pulmonology. Sputum culture and respiratory pathogen panel pending.  Concern for lack of significant improvement despite diuresis, antibiotics, and supportive care.  Discontinuing Flagyl and Avelox. Continuing current management with ceftriaxone and adding azithromycin.  Patient has completed adequate courses of antibiotics; de-escalating to azithromycin to complete course.  Trial of steroids.        Acute congestive heart failure    Apparently new onset CHF. HF pathway initiated. BNP low (falsely due to obesity?).  Lasix 40 mg IV initiated. No ACEi due to allergy.  Echo: CONCLUSIONS     1 - Technically difficult study (BMI 58.6).     2 - Normal left ventricular systolic function (EF 65-70%).     3 - Normal right ventricular systolic function .     4 - The estimated PA systolic pressure is greater than 28 mmHg.     5 - There is a small to perhaps moderately sized pericardial effusion and some collapse of the RV free wall is seen on parasternal long axis imaging.  The IVC cannot be visualized but the relatively dynamic LV function would support the possibility of   the patient being volume depleted.  Respiratory variation is seen in mitral inflow, but this is of questionable reliability and better signals from LVOT outflow do not show significant variability (as would be seen in tamponade).   Appears to be euvolemic; decreased Lasix to 40 mg po daily. Monitoring.  Mild tachycardia; discontinued Lasix 12/24/17. Appears compensated (suspect pulmonary hypertension).  Trial of diuresis 12/27/2017 as per Cardiology's recommendation; no improvement in hypoxia.    Doses not appear to be in decompensated HF.        RA (rheumatoid arthritis)    Continuing home medication.  Recent increase in inflammatory markers associated with URI.  Consulted Rheumatology. Held SSZ 12/27/2017 as per recommendations..        Essential hypertension    Continuing current management with amlodipine for now.  Better controlled initially but then remaining above goal; added Coreg.  Improved.        Vitamin D deficiency disease    Continuing home medications.          VTE Risk Mitigation         Ordered     enoxaparin injection 40 mg  Every 12 hours (non-standard times)     Route:  Subcutaneous        12/20/17 1701     Medium Risk of VTE  Once      12/20/17 1701              Stephanie Reece MD  Department of Hospital Medicine   [facility]

## 2017-12-29 NOTE — PLAN OF CARE
Problem: Patient Care Overview  Goal: Plan of Care Review  Outcome: Ongoing (interventions implemented as appropriate)  Patient AAOx4. Patient's vitals remain stable. Patient remains free from falls/injury throughout shift. Continuous cardiac monitoring in place per MD orders. No complaints of pain this shift. Will continue to monitor.

## 2017-12-29 NOTE — ASSESSMENT & PLAN NOTE
Concern for symptomatic effusion, tamponade. Agree with Pulmonology that effusion may be significant.   Consulted Cardiology. Multiple Echos 12/27/2017 to assess for tamponade; not found.  Rheumatology consulted. Work up in progress.

## 2017-12-29 NOTE — ASSESSMENT & PLAN NOTE
This is a case of hypoxic respiratory failure with bilateral GGO noted on CT - this is most likely related to her rheumatoid arthritis. In the setting of normal WBC count, normal procalcitonin, and afebrile status, this is unlikely to be 2/2 to infectious etiology. For this reason, would not recommend bronchoscopy - patient is a high risk candidate for complications. Patient continues to improve while on steroids. CTA was (-) for PE. Pericardial and pleural effusions probably related to her rheumatoid arthritis likely contributing to some of her SOB as well. Additionally, can try higher Lasix dose for diuresis to see if patient feels some relief as last dose given did not seem to have any effect on her urination.

## 2017-12-29 NOTE — PROGRESS NOTES
[facility]  Pulmonology  Progress Note    Patient Name: Malika Hodgson  MRN: [id number]  Admission Date: 12/20/2017  Hospital Length of Stay: 8 days  Code Status: Full Code  Attending Provider: Stephanie Reece MD  Primary Care Provider: Sarah Robles MD   Principal Problem: Acute hypoxemic respiratory failure    Subjective:     Interval History: No acute events overnight. Patient feels like she is improving at this time. She continues to be on 2.5 L NC.    Objective:     Vital Signs (Most Recent):  Temp: 97.5 °F (36.4 °C) (12/28/17 1620)  Pulse: 100 (12/28/17 1620)  Resp: 20 (12/28/17 1620)  BP: 136/67 (12/28/17 1620)  SpO2: (!) 94 % (12/28/17 1620) Vital Signs (24h Range):  Temp:  [97.5 °F (36.4 °C)-98.8 °F (37.1 °C)] 97.5 °F (36.4 °C)  Pulse:  [] 100  Resp:  [16-94] 20  SpO2:  [92 %-96 %] 94 %  BP: (124-157)/(67-87) 136/67     Weight: (!) 159.8 kg (352 lb 4.7 oz)  Body mass index is 58.62 kg/m².     Physical Exam   Constitutional: She is oriented to person, place, and time. She appears well-developed and well-nourished. No distress.   HENT:   Head: Normocephalic and atraumatic.   Eyes: Conjunctivae are normal. Pupils are equal, round, and reactive to light.   Neck: Normal range of motion. Neck supple.   Cardiovascular: Normal rate, regular rhythm and normal heart sounds.    Pulmonary/Chest: Effort normal.   +bibasilar crackles   Abdominal: Soft. Bowel sounds are normal.   Musculoskeletal: Normal range of motion.   Neurological: She is alert and oriented to person, place, and time.   Skin: Skin is warm and dry.   Nursing note and vitals reviewed.    Lines/Drains/Airways     Peripheral Intravenous Line                 Peripheral IV - Single Lumen 12/25/17 1015 Left Forearm 3 days              Significant Labs:    CBC/Anemia Profile:    Recent Labs  Lab 12/27/17  0449 12/28/17  0400   WBC 10.14 11.20   HGB 9.2* 9.2*   HCT 30.0* 30.5*   * 560*   MCV 80* 79*   RDW 14.9* 14.7*      Chemistries:    Recent Labs  Lab 12/27/17  0449 12/28/17  0400    135*   K 3.9 4.5   CL 97 96   CO2 30* 29   BUN 12 14   CREATININE 0.8 0.8   CALCIUM 9.1 9.1   ALBUMIN 2.2*  2.2* 2.3*   PROT 7.9  --    BILITOT 0.3  --    ALKPHOS 97  --    ALT 14  --    AST 26  --    MG 1.9 1.9   PHOS 3.4 3.0     All pertinent labs within the past 24 hours have been reviewed.    Significant Imaging:  I have reviewed and interpreted all pertinent imaging results/findings within the past 24 hours.    Assessment/Plan:     * Acute hypoxemic respiratory failure    This is a case of hypoxic respiratory failure with bilateral GGO noted on CT - this is most likely related to her rheumatoid arthritis. In the setting of normal WBC count, normal procalcitonin, and afebrile status, this is unlikely to be 2/2 to infectious etiology. For this reason, would not recommend bronchoscopy - patient is a high risk candidate for complications. Patient continues to improve while on steroids. CTA was (-) for PE. Pericardial and pleural effusions probably related to her rheumatoid arthritis likely contributing to some of her SOB as well. Additionally, can try higher Lasix dose for diuresis to see if patient feels some relief as last dose given did not seem to have any effect on her urination.          Thank you for involving us in the care of this patient. We will continue to follow along. Please call with any questions.    Rosa Garcia MD  [facility], PGY 4  [facility]  Pager: [phone]

## 2017-12-29 NOTE — ASSESSMENT & PLAN NOTE
"Chest CT revealed: "Patchy irregular multifocal consolidative opacities throughout both lung fields, with associated peribronchial cuffing, endobronchial debris, and groundglass opacities. These findings in addition to a moderate volume pericardial effusion and small volume bilateral pleural effusions could represent an infectious process (likely viral), noninfectious serosal inflammatory disease (rheumatoid arthritis, lupus...), or malignancy. Multiple enlarged bilateral axillary lymph nodes, favored to be reactive in nature."   Monitor closely for tamponade from pericardial effusion.  Added Avelox 12/22/17; ordered sputum culture and viral studies.  Added acapella. Added Atrovent nebs as patient allergic to albuterol.  Patient remains hypoxic. Maximizing antibiotic coverage (do not suspect MRSA at this point) with Avelox, Flagyl, and ceftriaxone.  No improvement. Prednisone was started as OP for allergic reaction to albuterol; discontinued Prednisone 12/25/2017.  Consulted Pulmonology. Sputum culture and respiratory pathogen panel pending.  Concern for lack of significant improvement despite diuresis, antibiotics, and supportive care.  Discontinuing Flagyl and Avelox. Continuing current management with ceftriaxone and adding azithromycin.  Patient has completed adequate courses of antibiotics; de-escalating to azithromycin to complete course.  Trial of steroids.  "

## 2017-12-29 NOTE — SUBJECTIVE & OBJECTIVE
Interval History: No acute events. Patient continues to feel better today.    Objective:     Vital Signs (Most Recent):  Temp: 98.2 °F (36.8 °C) (12/29/17 0853)  Pulse: 96 (12/29/17 0853)  Resp: 19 (12/29/17 0853)  BP: (!) 150/72 (12/29/17 0853)  SpO2: (!) 92 % (12/29/17 0853) Vital Signs (24h Range):  Temp:  [97.5 °F (36.4 °C)-98.5 °F (36.9 °C)] 98.2 °F (36.8 °C)  Pulse:  [] 96  Resp:  [16-20] 19  SpO2:  [92 %-95 %] 92 %  BP: (124-150)/(60-78) 150/72     Weight: (!) 159.8 kg (352 lb 4.7 oz)  Body mass index is 58.62 kg/m².    Intake/Output Summary (Last 24 hours) at 12/29/17 0855  Last data filed at 12/29/17 0506   Gross per 24 hour   Intake              360 ml   Output                0 ml   Net              360 ml     Physical Exam   Constitutional: She is oriented to person, place, and time. She appears well-developed and well-nourished.   HENT:   Head: Normocephalic and atraumatic.   Eyes: Conjunctivae are normal. Pupils are equal, round, and reactive to light.   Neck: Normal range of motion. Neck supple.   Cardiovascular: Normal rate, regular rhythm and normal heart sounds.    Pulmonary/Chest: Effort normal.   +bibasilar crackles   Abdominal: Soft. Bowel sounds are normal.   Musculoskeletal: Normal range of motion.   Neurological: She is alert and oriented to person, place, and time.   Skin: Skin is warm and dry.   Nursing note and vitals reviewed.    Lines/Drains/Airways     Peripheral Intravenous Line                 Peripheral IV - Single Lumen 12/25/17 1015 Left Forearm 3 days              Significant Labs:    CBC/Anemia Profile:    Recent Labs  Lab 12/28/17  0400   WBC 11.20   HGB 9.2*   HCT 30.5*   *   MCV 79*   RDW 14.7*     Chemistries:    Recent Labs  Lab 12/28/17  0400 12/29/17  0437   * 138   K 4.5 4.6   CL 96 98   CO2 29 33*   BUN 14 13   CREATININE 0.8 0.8   CALCIUM 9.1 9.2   ALBUMIN 2.3* 2.2*   MG 1.9 2.3   PHOS 3.0 2.6*     All pertinent labs within the past 24 hours have been  reviewed.    Significant Imaging:  I have reviewed and interpreted all pertinent imaging results/findings within the past 24 hours.

## 2017-12-29 NOTE — SUBJECTIVE & OBJECTIVE
Past Medical History:   Diagnosis Date    Arthritis     Hypertension 11/11/2013    Insomnia 2/26/2013    Obesity 2/26/2013       History reviewed. No pertinent surgical history.    Immunization History   Administered Date(s) Administered    DTP 1968, 1968, 1968, 04/04/1972    Hepatitis A 03/03/2014    Hepatitis A, Pediatric/Adolescent, 2 Dose 03/03/2014    Hepatitis B, Adult 05/24/2010, 06/24/2010, 12/30/2010    IPV 1968, 1968, 04/04/1972, 08/08/1978    Influenza 09/19/2007, 01/16/2009, 10/15/2010    Influenza - Quadrivalent - PF 10/17/2017    MMR 04/03/1995, 03/14/2009    Measles 04/01/1995    Meningococcal Conjugate (MCV4P) 05/24/2010    Mumps 04/01/1995    Pneumococcal Conjugate - 13 Valent 12/21/2017    Rubella 04/01/1995    Td (ADULT) 08/08/1978, 04/01/1995, 04/03/1995    Tdap 03/14/2009    Typhoid 03/03/2014    Typhoid - ViCPs 03/03/2014    Yellow Fever 03/03/2014       Review of patient's allergies indicates:   Allergen Reactions    Lisinopril Swelling     Mouth swelled, had to go to ED    Ventolin [albuterol] Shortness Of Breath     Current Facility-Administered Medications   Medication Frequency    acetaminophen tablet 650 mg Q6H PRN    amLODIPine tablet 5 mg Daily    azithromycin tablet 250 mg Daily    benzonatate capsule 200 mg Q6H PRN    carvedilol tablet 3.125 mg BID    enoxaparin injection 40 mg Q12H    ergocalciferol capsule 50,000 Units Twice Weekly    influenza (FLUZONE,FLUARIX QUADRIVALENT) vaccine 0.5 mL vaccine x 1 dose    ipratropium 0.02 % nebulizer solution 0.5 mg TID WAKE    methylPREDNISolone sodium succinate injection 16 mg Q8H    ondansetron disintegrating tablet 4 mg Q8H PRN    ondansetron injection 4 mg Q8H PRN    senna-docusate 8.6-50 mg per tablet 1 tablet BID    sodium chloride 0.9% flush 3 mL Q8H     Family History     Problem Relation (Age of Onset)    Breast cancer Mother (62), Other (45)    Diabetes Father, Mother     Hypertension Father, Mother    Kidney disease Mother    Rheum arthritis Father, Paternal Grandmother        Social History Main Topics    Smoking status: Never Smoker    Smokeless tobacco: Never Used      Comment: nurse;     Alcohol use No    Drug use: No    Sexual activity: Not Currently     Partners: Male     Birth control/ protection: None     Review of Systems   Constitutional: Positive for fever.   HENT: Negative for dental problem, mouth sores and trouble swallowing.    Eyes: Negative for pain, redness and visual disturbance.   Respiratory: Positive for cough and shortness of breath.    Cardiovascular: Positive for chest pain. Negative for palpitations and leg swelling.        Chest pain with inspiration which has resolved since hospital admission   Gastrointestinal: Negative for blood in stool, diarrhea and vomiting.   Genitourinary: Negative for difficulty urinating, frequency, hematuria and urgency.   Musculoskeletal: Positive for arthralgias and joint swelling. Negative for back pain and neck pain.   Skin: Negative for color change, rash and wound.   Allergic/Immunologic: Positive for immunocompromised state.   Neurological: Negative for dizziness, tremors and numbness.   Hematological: Does not bruise/bleed easily.   Psychiatric/Behavioral: Negative for decreased concentration and sleep disturbance. The patient is not nervous/anxious.      Objective:     Vital Signs (Most Recent):  Temp: 98 °F (36.7 °C) (12/29/17 1635)  Pulse: 82 (12/29/17 1635)  Resp: 20 (12/29/17 1635)  BP: (!) 143/80 (12/29/17 1635)  SpO2: (!) 93 % (12/29/17 1635)  O2 Device (Oxygen Therapy): nasal cannula (12/29/17 1635) Vital Signs (24h Range):  Temp:  [97.5 °F (36.4 °C)-98.5 °F (36.9 °C)] 98 °F (36.7 °C)  Pulse:  [] 82  Resp:  [18-20] 20  SpO2:  [92 %-100 %] 93 %  BP: (126-150)/(60-80) 143/80     Weight: (!) 159.8 kg (352 lb 4.7 oz) (12/27/17 0557)  Body mass index is 58.62 kg/m².  Body surface area is 2.71  meters squared.      Intake/Output Summary (Last 24 hours) at 12/29/17 1756  Last data filed at 12/29/17 0506   Gross per 24 hour   Intake              360 ml   Output                0 ml   Net              360 ml       Physical Exam   Constitutional: She is oriented to person, place, and time and well-developed, well-nourished, and in no distress.   Morbidly obese   HENT:   Head: Normocephalic and atraumatic.   Eyes: EOM are normal. Pupils are equal, round, and reactive to light.   Neck: Normal range of motion. Neck supple.   Cardiovascular: Normal rate and regular rhythm.    Distant heart sounds   Pulmonary/Chest: Effort normal.   Diffuse crackles throughout all lung field   Abdominal: Soft. Bowel sounds are normal.   Lymphadenopathy:     She has no cervical adenopathy.   Neurological: She is alert and oriented to person, place, and time.   Skin: Skin is warm and dry. No rash noted.     Psychiatric: Affect normal.   Musculoskeletal: Normal range of motion. She exhibits no edema, tenderness or deformity.   Cspine FROM no tenderness  Tspine FROM no tenderness  Lspine FROM no tenderness.  TMJ: unremarkable  Shoulders: FROM; no synovitis;  Elbows: FROM; no synovitis; no tophi or nodules  Wrists: FROM; no synovitis;   MCPs: FROM; no synovitis; no metacarpalgia;  ok;  PIPs:FROM; no synovitis;   DIPs: FROM; no synovitis;   HIPS: FROM  Knees: FROM; no synovitis; no instability;  Ankles: FROM: no synovitis   Toes: ok; no metatarsalgia            Significant Labs:  Results for MARTIN BERGER (MRN [id number]) as of 12/29/2017 17:56   Ref. Range 12/29/2017 04:37   WBC Latest Ref Range: 3.90 - 12.70 K/uL 14.25 (H)   RBC Latest Ref Range: 4.00 - 5.40 M/uL 3.73 (L)   Hemoglobin Latest Ref Range: 12.0 - 16.0 g/dL 9.1 (L)   Hematocrit Latest Ref Range: 37.0 - 48.5 % 30.3 (L)   MCV Latest Ref Range: 82 - 98 fL 81 (L)   MCH Latest Ref Range: 27.0 - 31.0 pg 24.4 (L)   MCHC Latest Ref Range: 32.0 - 36.0 g/dL 30.0 (L)   RDW Latest  Ref Range: 11.5 - 14.5 % 15.0 (H)   Platelets Latest Ref Range: 150 - 350 K/uL 569 (H)   MPV Latest Ref Range: 9.2 - 12.9 fL 9.0 (L)   Gran% Latest Ref Range: 38.0 - 73.0 % 81.7 (H)   Gran # Latest Ref Range: 1.8 - 7.7 K/uL 11.6 (H)   Immature Granulocytes Latest Ref Range: 0.0 - 0.5 % 1.0 (H)   Immature Grans (Abs) Latest Ref Range: 0.00 - 0.04 K/uL 0.14 (H)   Lymph% Latest Ref Range: 18.0 - 48.0 % 9.4 (L)   Lymph # Latest Ref Range: 1.0 - 4.8 K/uL 1.3   Mono% Latest Ref Range: 4.0 - 15.0 % 7.7   Mono # Latest Ref Range: 0.3 - 1.0 K/uL 1.1 (H)   Eosinophil% Latest Ref Range: 0.0 - 8.0 % 0.0   Eos # Latest Ref Range: 0.0 - 0.5 K/uL 0.0   Basophil% Latest Ref Range: 0.0 - 1.9 % 0.2   Baso # Latest Ref Range: 0.00 - 0.20 K/uL 0.03   nRBC Latest Ref Range: 0 /100 WBC 0     Results for MARTIN BERGER (MRN [id number]) as of 12/29/2017 17:56   Ref. Range 12/29/2017 04:37   Sodium Latest Ref Range: 136 - 145 mmol/L 138   Potassium Latest Ref Range: 3.5 - 5.1 mmol/L 4.6   Chloride Latest Ref Range: 95 - 110 mmol/L 98   CO2 Latest Ref Range: 23 - 29 mmol/L 33 (H)   Anion Gap Latest Ref Range: 8 - 16 mmol/L 7 (L)   BUN, Bld Latest Ref Range: 6 - 20 mg/dL 13   Creatinine Latest Ref Range: 0.5 - 1.4 mg/dL 0.8   eGFR if non African American Latest Ref Range: >60 mL/min/1.73 m^2 >60.0   eGFR if African American Latest Ref Range: >60 mL/min/1.73 m^2 >60.0   Glucose Latest Ref Range: 70 - 110 mg/dL 150 (H)   Calcium Latest Ref Range: 8.7 - 10.5 mg/dL 9.2   Phosphorus Latest Ref Range: 2.7 - 4.5 mg/dL 2.6 (L)   Magnesium Latest Ref Range: 1.6 - 2.6 mg/dL 2.3   Albumin Latest Ref Range: 3.5 - 5.2 g/dL 2.2 (L)       Results for MARTIN BERGER (MRN [id number]) as of 12/27/2017 09:24   Ref. Range 1/28/2009 09:50   TIARA Latest Ref Range: Neg <1:160  Negative     Results for MARTIN BERGER (MRN [id number]) as of 12/27/2017 09:24   Ref. Range 2/26/2013 16:42 5/22/2013 10:41 12/12/2014 07:56   CCP Antibodies Latest Ref Range: 0 - 4.9 U/mL 15.8  (H)     Rheumatoid Factor Latest Ref Range: 0.0 - 15.0 IU/mL 60 (H)  43.0 (H)     Results for MARTIN BERGER (MRN [id number]) as of 12/27/2017 09:24   Ref. Range 12/23/2017 00:23   Specimen UA Unknown Urine, Clean Catch   Color, UA Latest Ref Range: Yellow, Straw, Mai  Mai   pH, UA Latest Ref Range: 5.0 - 8.0  5.0   Specific Gravity, UA Latest Ref Range: 1.005 - 1.030  1.025   Appearance, UA Latest Ref Range: Clear  Cloudy (A)   Protein, UA Latest Ref Range: Negative  1+ (A)   Glucose, UA Latest Ref Range: Negative  Negative   Ketones, UA Latest Ref Range: Negative  Negative   Occult Blood UA Latest Ref Range: Negative  Negative   Nitrite, UA Latest Ref Range: Negative  Negative   Urobilinogen, UA Latest Ref Range: <2.0 EU/dL Negative   Bilirubin (UA) Latest Ref Range: Negative  Negative   Leukocytes, UA Latest Ref Range: Negative  Negative   RBC, UA Latest Ref Range: 0 - 4 /hpf 2   WBC, UA Latest Ref Range: 0 - 5 /hpf 3   Bacteria, UA Latest Ref Range: None-Occ /hpf None   Squam Epithel, UA Latest Units: /hpf 13   Hyaline Casts, UA Latest Ref Range: 0-1/lpf /lpf 0   Microscopic Comment Unknown SEE COMMENT     Significant Imaging:    CTA chest  Technically limited study perhaps due to to slow delivery of intravenous contrast medium was oh in suboptimal opacification of the pulmonary arteries.  Within the limits of the study I detect no evidence of pulmonary thromboembolism or pulmonary infarct.  2.  Interval increase in RIGHT pleural fluid results in further compression of the RIGHT lung.  Abundant pericardial fluid persists.  3.  No gilda pulmonary edema or pneumonia detected.  There is no pneumothorax, pneumomediastinum or pneumoperitoneum    CT chest 12/22/17  Patchy irregular multifocal consolidative opacities throughout both lung fields, with associated peribronchial cuffing, endobronchial debris, and groundglass opacities. These findings in addition to a moderate volume pericardial effusion and small volume  bilateral pleural effusions could represent an infectious process (likely viral), noninfectious serosal inflammatory disease (rheumatoid arthritis, lupus...), or malignancy.    Multiple enlarged bilateral axillary lymph nodes, favored to be reactive in nature    2D ECHO 12/21/17  CONCLUSIONS     1 - Technically difficult study (BMI 58.6).     2 - Normal left ventricular systolic function (EF 65-70%).     3 - Normal right ventricular systolic function .     4 - The estimated PA systolic pressure is greater than 28 mmHg.     5 - There is a small to perhaps moderately sized pericardial effusion and some collapse of the RV free wall is seen on parasternal long axis imaging.  The IVC cannot be visualized but the relatively dynamic LV function would support the possibility of   the patient being volume depleted.  Respiratory variation is seen in mitral inflow, but this is of questionable reliability and better signals from LVOT outflow do not show significant variability (as would be seen in tamponade).    Xray arthritis survey 05/13  Moderate degenerative change seen in the lower cervical spine.  Atlanto-odontoid relationship is normal in flexion.  Knee show minimal degenerative changes.  Hands and wrists show minimal degenerative change with no erosions.  Both feet show mild hallux valgus deformity bilaterally.  No erosive changes

## 2017-12-29 NOTE — SUBJECTIVE & OBJECTIVE
Interval History: No acute events overnight. Patient feels like she is improving at this time. She continues to be on 2.5 L NC.    Objective:     Vital Signs (Most Recent):  Temp: 97.5 °F (36.4 °C) (12/28/17 1620)  Pulse: 100 (12/28/17 1620)  Resp: 20 (12/28/17 1620)  BP: 136/67 (12/28/17 1620)  SpO2: (!) 94 % (12/28/17 1620) Vital Signs (24h Range):  Temp:  [97.5 °F (36.4 °C)-98.8 °F (37.1 °C)] 97.5 °F (36.4 °C)  Pulse:  [] 100  Resp:  [16-94] 20  SpO2:  [92 %-96 %] 94 %  BP: (124-157)/(67-87) 136/67     Weight: (!) 159.8 kg (352 lb 4.7 oz)  Body mass index is 58.62 kg/m².     Physical Exam   Constitutional: She is oriented to person, place, and time. She appears well-developed and well-nourished. No distress.   HENT:   Head: Normocephalic and atraumatic.   Eyes: Conjunctivae are normal. Pupils are equal, round, and reactive to light.   Neck: Normal range of motion. Neck supple.   Cardiovascular: Normal rate, regular rhythm and normal heart sounds.    Pulmonary/Chest: Effort normal.   +bibasilar crackles   Abdominal: Soft. Bowel sounds are normal.   Musculoskeletal: Normal range of motion.   Neurological: She is alert and oriented to person, place, and time.   Skin: Skin is warm and dry.   Nursing note and vitals reviewed.    Lines/Drains/Airways     Peripheral Intravenous Line                 Peripheral IV - Single Lumen 12/25/17 1015 Left Forearm 3 days              Significant Labs:    CBC/Anemia Profile:    Recent Labs  Lab 12/27/17  0449 12/28/17  0400   WBC 10.14 11.20   HGB 9.2* 9.2*   HCT 30.0* 30.5*   * 560*   MCV 80* 79*   RDW 14.9* 14.7*     Chemistries:    Recent Labs  Lab 12/27/17  0449 12/28/17  0400    135*   K 3.9 4.5   CL 97 96   CO2 30* 29   BUN 12 14   CREATININE 0.8 0.8   CALCIUM 9.1 9.1   ALBUMIN 2.2*  2.2* 2.3*   PROT 7.9  --    BILITOT 0.3  --    ALKPHOS 97  --    ALT 14  --    AST 26  --    MG 1.9 1.9   PHOS 3.4 3.0     All pertinent labs within the past 24 hours have  been reviewed.    Significant Imaging:  I have reviewed and interpreted all pertinent imaging results/findings within the past 24 hours.

## 2017-12-30 LAB
1,3 BETA GLUCAN SPEC-MCNC: <31 PG/ML
ALBUMIN SERPL BCP-MCNC: 2.2 G/DL
ANION GAP SERPL CALC-SCNC: 6 MMOL/L
BASOPHILS # BLD AUTO: 0.02 K/UL
BASOPHILS NFR BLD: 0.2 %
BLD GP AB SCN CELLS X3 SERPL QL: NORMAL
BUN SERPL-MCNC: 17 MG/DL
CALCIUM SERPL-MCNC: 9.1 MG/DL
CHLORIDE SERPL-SCNC: 100 MMOL/L
CO2 SERPL-SCNC: 32 MMOL/L
CREAT SERPL-MCNC: 0.7 MG/DL
CRP SERPL-MCNC: 52.8 MG/L
DAT IGG-SP REAG RBC-IMP: NORMAL
DIFFERENTIAL METHOD: ABNORMAL
EOSINOPHIL # BLD AUTO: 0.1 K/UL
EOSINOPHIL NFR BLD: 0.4 %
ERYTHROCYTE [DISTWIDTH] IN BLOOD BY AUTOMATED COUNT: 15.1 %
ERYTHROCYTE [SEDIMENTATION RATE] IN BLOOD BY WESTERGREN METHOD: 107 MM/HR
EST. GFR  (AFRICAN AMERICAN): >60 ML/MIN/1.73 M^2
EST. GFR  (NON AFRICAN AMERICAN): >60 ML/MIN/1.73 M^2
GLUCOSE SERPL-MCNC: 163 MG/DL
HCT VFR BLD AUTO: 30.2 %
HGB BLD-MCNC: 8.9 G/DL
IMM GRANULOCYTES # BLD AUTO: 0.15 K/UL
IMM GRANULOCYTES NFR BLD AUTO: 1.1 %
LYMPHOCYTES # BLD AUTO: 2.2 K/UL
LYMPHOCYTES NFR BLD: 17 %
MAGNESIUM SERPL-MCNC: 2.1 MG/DL
MCH RBC QN AUTO: 24.3 PG
MCHC RBC AUTO-ENTMCNC: 29.5 G/DL
MCV RBC AUTO: 82 FL
MONOCYTES # BLD AUTO: 1.1 K/UL
MONOCYTES NFR BLD: 8.2 %
NEUTROPHILS # BLD AUTO: 9.6 K/UL
NEUTROPHILS NFR BLD: 73.1 %
NRBC BLD-RTO: 0 /100 WBC
PHOSPHATE SERPL-MCNC: 3 MG/DL
PLATELET # BLD AUTO: 600 K/UL
PMV BLD AUTO: 8.9 FL
POTASSIUM SERPL-SCNC: 4.2 MMOL/L
RBC # BLD AUTO: 3.67 M/UL
SODIUM SERPL-SCNC: 138 MMOL/L
WBC # BLD AUTO: 13.14 K/UL

## 2017-12-30 PROCEDURE — 80069 RENAL FUNCTION PANEL: CPT

## 2017-12-30 PROCEDURE — 83735 ASSAY OF MAGNESIUM: CPT

## 2017-12-30 PROCEDURE — 85598 HEXAGNAL PHOSPH PLTLT NEUTRL: CPT

## 2017-12-30 PROCEDURE — 86140 C-REACTIVE PROTEIN: CPT

## 2017-12-30 PROCEDURE — 99900035 HC TECH TIME PER 15 MIN (STAT)

## 2017-12-30 PROCEDURE — 86146 BETA-2 GLYCOPROTEIN ANTIBODY: CPT | Mod: 59

## 2017-12-30 PROCEDURE — 85651 RBC SED RATE NONAUTOMATED: CPT

## 2017-12-30 PROCEDURE — 25000003 PHARM REV CODE 250: Performed by: INTERNAL MEDICINE

## 2017-12-30 PROCEDURE — 86850 RBC ANTIBODY SCREEN: CPT

## 2017-12-30 PROCEDURE — 85025 COMPLETE CBC W/AUTO DIFF WBC: CPT

## 2017-12-30 PROCEDURE — 86860 RBC ANTIBODY ELUTION: CPT

## 2017-12-30 PROCEDURE — 27000221 HC OXYGEN, UP TO 24 HOURS

## 2017-12-30 PROCEDURE — 85613 RUSSELL VIPER VENOM DILUTED: CPT

## 2017-12-30 PROCEDURE — 86147 CARDIOLIPIN ANTIBODY EA IG: CPT

## 2017-12-30 PROCEDURE — 36415 COLL VENOUS BLD VENIPUNCTURE: CPT

## 2017-12-30 PROCEDURE — 94664 DEMO&/EVAL PT USE INHALER: CPT

## 2017-12-30 PROCEDURE — 86162 COMPLEMENT TOTAL (CH50): CPT

## 2017-12-30 PROCEDURE — 94640 AIRWAY INHALATION TREATMENT: CPT

## 2017-12-30 PROCEDURE — 25000242 PHARM REV CODE 250 ALT 637 W/ HCPCS: Performed by: INTERNAL MEDICINE

## 2017-12-30 PROCEDURE — 94761 N-INVAS EAR/PLS OXIMETRY MLT: CPT

## 2017-12-30 PROCEDURE — 63600175 PHARM REV CODE 636 W HCPCS: Performed by: INTERNAL MEDICINE

## 2017-12-30 PROCEDURE — A4216 STERILE WATER/SALINE, 10 ML: HCPCS | Performed by: INTERNAL MEDICINE

## 2017-12-30 PROCEDURE — 11000001 HC ACUTE MED/SURG PRIVATE ROOM

## 2017-12-30 PROCEDURE — 86880 COOMBS TEST DIRECT: CPT

## 2017-12-30 PROCEDURE — 99231 SBSQ HOSP IP/OBS SF/LOW 25: CPT | Mod: ,,, | Performed by: INTERNAL MEDICINE

## 2017-12-30 PROCEDURE — 99233 SBSQ HOSP IP/OBS HIGH 50: CPT | Mod: ,,, | Performed by: INTERNAL MEDICINE

## 2017-12-30 RX ADMIN — SODIUM CHLORIDE, PRESERVATIVE FREE 3 ML: 5 INJECTION INTRAVENOUS at 05:12

## 2017-12-30 RX ADMIN — AZITHROMYCIN 250 MG: 250 TABLET, FILM COATED ORAL at 09:12

## 2017-12-30 RX ADMIN — CARVEDILOL 3.12 MG: 3.12 TABLET, FILM COATED ORAL at 09:12

## 2017-12-30 RX ADMIN — AMLODIPINE BESYLATE 5 MG: 5 TABLET ORAL at 09:12

## 2017-12-30 RX ADMIN — SODIUM CHLORIDE, PRESERVATIVE FREE 3 ML: 5 INJECTION INTRAVENOUS at 02:12

## 2017-12-30 RX ADMIN — IPRATROPIUM BROMIDE 0.5 MG: 0.5 SOLUTION RESPIRATORY (INHALATION) at 03:12

## 2017-12-30 RX ADMIN — METHYLPREDNISOLONE SODIUM SUCCINATE 16 MG: 40 INJECTION, POWDER, FOR SOLUTION INTRAMUSCULAR; INTRAVENOUS at 05:12

## 2017-12-30 RX ADMIN — IPRATROPIUM BROMIDE 0.5 MG: 0.5 SOLUTION RESPIRATORY (INHALATION) at 07:12

## 2017-12-30 RX ADMIN — ENOXAPARIN SODIUM 40 MG: 100 INJECTION SUBCUTANEOUS at 05:12

## 2017-12-30 RX ADMIN — METHYLPREDNISOLONE SODIUM SUCCINATE 16 MG: 40 INJECTION, POWDER, FOR SOLUTION INTRAMUSCULAR; INTRAVENOUS at 02:12

## 2017-12-30 RX ADMIN — SODIUM CHLORIDE, PRESERVATIVE FREE 3 ML: 5 INJECTION INTRAVENOUS at 10:12

## 2017-12-30 RX ADMIN — PREDNISONE 30 MG: 20 TABLET ORAL at 09:12

## 2017-12-30 NOTE — ASSESSMENT & PLAN NOTE
Apparently new onset CHF. HF pathway initiated. BNP low (falsely due to obesity?).  Lasix 40 mg IV initiated. No ACEi due to allergy.  Echo: CONCLUSIONS     1 - Technically difficult study (BMI 58.6).     2 - Normal left ventricular systolic function (EF 65-70%).     3 - Normal right ventricular systolic function .     4 - The estimated PA systolic pressure is greater than 28 mmHg.     5 - There is a small to perhaps moderately sized pericardial effusion and some collapse of the RV free wall is seen on parasternal long axis imaging.  The IVC cannot be visualized but the relatively dynamic LV function would support the possibility of   the patient being volume depleted.  Respiratory variation is seen in mitral inflow, but this is of questionable reliability and better signals from LVOT outflow do not show significant variability (as would be seen in tamponade).   Appears to be euvolemic; decreased Lasix to 40 mg po daily. Monitoring.  Mild tachycardia; discontinued Lasix 12/24/17. Appears compensated (suspect pulmonary hypertension).  Trial of diuresis 12/27/2017 as per Cardiology's recommendation; no improvement in hypoxia.   Doses not appear to be in decompensated HF. Diagnosis of CHF is unclear, possible pulmonary hypertension.

## 2017-12-30 NOTE — SUBJECTIVE & OBJECTIVE
Interval History: Patient with no events overnight, no new complaints. Remains hypoxic but overall, clinically improved since admission.  Data Review: Results recorded below were reviewed 12/29/2017.    Review of Systems   Constitutional: Negative for fever.   Respiratory: Positive for shortness of breath. Negative for chest tightness.    Cardiovascular: Negative for chest pain and leg swelling.     Objective:     Vital Signs (Most Recent):  Temp: 97.2 °F (36.2 °C) (12/29/17 1951)  Pulse: 93 (12/29/17 2300)  Resp: 20 (12/29/17 2102)  BP: (!) 141/83 (12/29/17 1951)  SpO2: 98 % (12/29/17 2300) Vital Signs (24h Range):  Temp:  [97.2 °F (36.2 °C)-98.5 °F (36.9 °C)] 97.2 °F (36.2 °C)  Pulse:  [] 93  Resp:  [18-20] 20  SpO2:  [92 %-100 %] 98 %  BP: (126-150)/(60-83) 141/83     Weight: (!) 159.8 kg (352 lb 4.7 oz)  Body mass index is 58.62 kg/m².    Intake/Output Summary (Last 24 hours) at 12/29/17 2316  Last data filed at 12/29/17 0506   Gross per 24 hour   Intake              360 ml   Output                0 ml   Net              360 ml      Physical Exam   Constitutional: She appears well-developed.  Non-toxic appearance.   HENT:   Head: Normocephalic.   Mouth/Throat: Mucous membranes are not pale and not cyanotic.   Eyes: Conjunctivae and lids are normal. Pupils are equal.   Neck: Neck supple.   Cardiovascular: Normal rate, regular rhythm, S1 normal and S2 normal.    Pulmonary/Chest: Effort normal. She has decreased breath sounds in the right lower field. She has no wheezes. She has rales in the right lower field.   Abdominal: Soft. Bowel sounds are normal. There is no tenderness.   Musculoskeletal: She exhibits no edema.   Neurological: She is alert. She is not disoriented.   Skin: Skin is warm and dry. No cyanosis. Nails show no clubbing.   Psychiatric: She has a normal mood and affect. Cognition and memory are normal.       Significant Labs:     CBC:     Recent Labs  Lab 12/28/17  0400 12/29/17  0437   WBC  11.20 14.25*   HGB 9.2* 9.1*   HCT 30.5* 30.3*   * 569*     CMP:     Recent Labs  Lab 12/28/17  0400 12/29/17 0437   * 138   K 4.5 4.6   CL 96 98   CO2 29 33*   * 150*   BUN 14 13   CREATININE 0.8 0.8   CALCIUM 9.1 9.2   ALBUMIN 2.3* 2.2*   ANIONGAP 10 7*   EGFRNONAA >60.0 >60.0     Magnesium:     Recent Labs  Lab 12/28/17 0400 12/29/17 0437   MG 1.9 2.3

## 2017-12-30 NOTE — PROGRESS NOTES
"[facility]  Rheumatology  Progress Note    Patient Name: Malika Hodgson  MRN: [id number]  Admission Date: 12/20/2017  Hospital Length of Stay: 9 days  Code Status: Full Code   Attending Provider: Stephanie Reece MD  Primary Care Physician: Sarah Robles MD  Principal Problem: Acute hypoxemic respiratory failure    Subjective:     HPI: 47YF with RA ( +RF, +CCP), Hypovitaminosis D,Morbid Obesity presented with 3 week history of chest tightness, dyspnea, cough. Initially treated with doxycycline 12/12 at Urgent care and prednisone 10mg X 5days was added by PCP 12/14 with no improvement in her symptoms. Pt was then admitted for further workup. Pt was started on Rocephin and Flagyly 12/24,and moxifloxacin 12/22 for possible infection and was also diuresed for possible volume overload. Pt also received prednisone 10mg daily during hospital admission 12/21-12/25. CT chest 12/22 showed patchy irregular multifocal consolidative opacities throughout both lung fields, with associated peribronchial cuffing, endobronchial debris, and groundglass opacities in addition to a moderate volume pericardial effusion and small volume bilateral pleural effusions. 2D ECHO 12/21 showed EF 65-70% with small to perhaps moderately sized pericardial effusion and some collapse of the RV free wall is seen on parasternal long axis imaging.    Pulmonary was consulted and recommended Cardiology for evaluation for tamponade with pericardial effusion, trial of steriods and stated that bronch would not be tolerated at this time. As per Cardiology "No tamponade physiology evident on bedside echo or exam, give mild diuresis and may consider RHC .        RA history  Initially seen and dx by Dr Dillon ( Rheumatologist) in 02/2009 however pt opted not to start DMARD therapy as joint scans were normal and took NSAIDs instead prn in 2013. Pt was started on Sulfasalazine 07/15 due to elevated inflammatory makers (ESR 90,CRP 14.5) and L " "elbow contracture which initially improved after medication was started. Pt was then lost to follow up from 05/16 until 12/17 at which she was taking SSZ 1000mg BID which being managed by her PCP. On last correspondence 12/15/17, due to elevated inflammatory makers and joint pains plan was to increase SSZ to 1500mg BID but pt reported diagnosis of bronchitis/pneumonia.    Prior to admission, pt reported daily joint pains since Sept 2017 and was more complaint with her medication. Previously missing 1-2X weekly.     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 Adopted children.       Rheumatology was consulted for " RA,GGO on chest CT and pleural effusion".        Past Medical History:   Diagnosis Date    Arthritis     Hypertension 11/11/2013    Insomnia 2/26/2013    Obesity 2/26/2013       History reviewed. No pertinent surgical history.    Immunization History   Administered Date(s) Administered    DTP 1968, 1968, 1968, 04/04/1972    Hepatitis A 03/03/2014    Hepatitis A, Pediatric/Adolescent, 2 Dose 03/03/2014    Hepatitis B, Adult 05/24/2010, 06/24/2010, 12/30/2010    IPV 1968, 1968, 04/04/1972, 08/08/1978    Influenza 09/19/2007, 01/16/2009, 10/15/2010    Influenza - Quadrivalent - PF 10/17/2017    MMR 04/03/1995, 03/14/2009    Measles 04/01/1995    Meningococcal Conjugate (MCV4P) 05/24/2010    Mumps 04/01/1995    Pneumococcal Conjugate - 13 Valent 12/21/2017    Rubella 04/01/1995    Td (ADULT) 08/08/1978, 04/01/1995, " 04/03/1995    Tdap 03/14/2009    Typhoid 03/03/2014    Typhoid - ViCPs 03/03/2014    Yellow Fever 03/03/2014       Review of patient's allergies indicates:   Allergen Reactions    Lisinopril Swelling     Mouth swelled, had to go to ED    Ventolin [albuterol] Shortness Of Breath     Current Facility-Administered Medications   Medication Frequency    acetaminophen tablet 650 mg Q6H PRN    amLODIPine tablet 5 mg Daily    azithromycin tablet 250 mg Daily    benzonatate capsule 200 mg Q6H PRN    carvedilol tablet 3.125 mg BID    enoxaparin injection 40 mg Q12H    ergocalciferol capsule 50,000 Units Twice Weekly    influenza (FLUZONE,FLUARIX QUADRIVALENT) vaccine 0.5 mL vaccine x 1 dose    ipratropium 0.02 % nebulizer solution 0.5 mg TID WAKE    methylPREDNISolone sodium succinate injection 16 mg Q8H    ondansetron disintegrating tablet 4 mg Q8H PRN    ondansetron injection 4 mg Q8H PRN    senna-docusate 8.6-50 mg per tablet 1 tablet BID    sodium chloride 0.9% flush 3 mL Q8H     Family History     Problem Relation (Age of Onset)    Breast cancer Mother (62), Other (45)    Diabetes Father, Mother    Hypertension Father, Mother    Kidney disease Mother    Rheum arthritis Father, Paternal Grandmother        Social History Main Topics    Smoking status: Never Smoker    Smokeless tobacco: Never Used      Comment: nurse;     Alcohol use No    Drug use: No    Sexual activity: Not Currently     Partners: Male     Birth control/ protection: None     Review of Systems   Constitutional: Positive for fever.   HENT: Negative for dental problem, mouth sores and trouble swallowing.    Eyes: Negative for pain, redness and visual disturbance.   Respiratory: Positive for cough and shortness of breath.    Cardiovascular: Positive for chest pain. Negative for palpitations and leg swelling.        Chest pain with inspiration which has resolved since hospital admission   Gastrointestinal: Negative for blood in  stool, diarrhea and vomiting.   Genitourinary: Negative for difficulty urinating, frequency, hematuria and urgency.   Musculoskeletal: Positive for arthralgias and joint swelling. Negative for back pain and neck pain.   Skin: Negative for color change, rash and wound.   Allergic/Immunologic: Positive for immunocompromised state.   Neurological: Negative for dizziness, tremors and numbness.   Hematological: Does not bruise/bleed easily.   Psychiatric/Behavioral: Negative for decreased concentration and sleep disturbance. The patient is not nervous/anxious.      Objective:     Vital Signs (Most Recent):  Temp: 98 °F (36.7 °C) (12/29/17 1635)  Pulse: 82 (12/29/17 1635)  Resp: 20 (12/29/17 1635)  BP: (!) 143/80 (12/29/17 1635)  SpO2: (!) 93 % (12/29/17 1635)  O2 Device (Oxygen Therapy): nasal cannula (12/29/17 1635) Vital Signs (24h Range):  Temp:  [97.5 °F (36.4 °C)-98.5 °F (36.9 °C)] 98 °F (36.7 °C)  Pulse:  [] 82  Resp:  [18-20] 20  SpO2:  [92 %-100 %] 93 %  BP: (126-150)/(60-80) 143/80     Weight: (!) 159.8 kg (352 lb 4.7 oz) (12/27/17 0557)  Body mass index is 58.62 kg/m².  Body surface area is 2.71 meters squared.      Intake/Output Summary (Last 24 hours) at 12/29/17 1756  Last data filed at 12/29/17 0506   Gross per 24 hour   Intake              360 ml   Output                0 ml   Net              360 ml       Physical Exam   Constitutional: She is oriented to person, place, and time and well-developed, well-nourished, and in no distress.   Morbidly obese   HENT:   Head: Normocephalic and atraumatic.   Eyes: EOM are normal. Pupils are equal, round, and reactive to light.   Neck: Normal range of motion. Neck supple.   Cardiovascular: Normal rate and regular rhythm.    Distant heart sounds   Pulmonary/Chest: Effort normal.   Diffuse crackles throughout all lung field   Abdominal: Soft. Bowel sounds are normal.   Lymphadenopathy:     She has no cervical adenopathy.   Neurological: She is alert and oriented  to person, place, and time.   Skin: Skin is warm and dry. No rash noted.     Psychiatric: Affect normal.   Musculoskeletal: Normal range of motion. She exhibits no edema, tenderness or deformity.   Cspine FROM no tenderness  Tspine FROM no tenderness  Lspine FROM no tenderness.  TMJ: unremarkable  Shoulders: FROM; no synovitis;  Elbows: FROM; no synovitis; no tophi or nodules  Wrists: FROM; no synovitis;   MCPs: FROM; no synovitis; no metacarpalgia;  ok;  PIPs:FROM; no synovitis;   DIPs: FROM; no synovitis;   HIPS: FROM  Knees: FROM; no synovitis; no instability;  Ankles: FROM: no synovitis   Toes: ok; no metatarsalgia            Significant Labs:  Results for MARTIN BERGER (MRN [id number]) as of 12/29/2017 17:56   Ref. Range 12/29/2017 04:37   WBC Latest Ref Range: 3.90 - 12.70 K/uL 14.25 (H)   RBC Latest Ref Range: 4.00 - 5.40 M/uL 3.73 (L)   Hemoglobin Latest Ref Range: 12.0 - 16.0 g/dL 9.1 (L)   Hematocrit Latest Ref Range: 37.0 - 48.5 % 30.3 (L)   MCV Latest Ref Range: 82 - 98 fL 81 (L)   MCH Latest Ref Range: 27.0 - 31.0 pg 24.4 (L)   MCHC Latest Ref Range: 32.0 - 36.0 g/dL 30.0 (L)   RDW Latest Ref Range: 11.5 - 14.5 % 15.0 (H)   Platelets Latest Ref Range: 150 - 350 K/uL 569 (H)   MPV Latest Ref Range: 9.2 - 12.9 fL 9.0 (L)   Gran% Latest Ref Range: 38.0 - 73.0 % 81.7 (H)   Gran # Latest Ref Range: 1.8 - 7.7 K/uL 11.6 (H)   Immature Granulocytes Latest Ref Range: 0.0 - 0.5 % 1.0 (H)   Immature Grans (Abs) Latest Ref Range: 0.00 - 0.04 K/uL 0.14 (H)   Lymph% Latest Ref Range: 18.0 - 48.0 % 9.4 (L)   Lymph # Latest Ref Range: 1.0 - 4.8 K/uL 1.3   Mono% Latest Ref Range: 4.0 - 15.0 % 7.7   Mono # Latest Ref Range: 0.3 - 1.0 K/uL 1.1 (H)   Eosinophil% Latest Ref Range: 0.0 - 8.0 % 0.0   Eos # Latest Ref Range: 0.0 - 0.5 K/uL 0.0   Basophil% Latest Ref Range: 0.0 - 1.9 % 0.2   Baso # Latest Ref Range: 0.00 - 0.20 K/uL 0.03   nRBC Latest Ref Range: 0 /100 WBC 0     Results for MARTIN BERGER (MRN [id number]) as  of 12/29/2017 17:56   Ref. Range 12/29/2017 04:37   Sodium Latest Ref Range: 136 - 145 mmol/L 138   Potassium Latest Ref Range: 3.5 - 5.1 mmol/L 4.6   Chloride Latest Ref Range: 95 - 110 mmol/L 98   CO2 Latest Ref Range: 23 - 29 mmol/L 33 (H)   Anion Gap Latest Ref Range: 8 - 16 mmol/L 7 (L)   BUN, Bld Latest Ref Range: 6 - 20 mg/dL 13   Creatinine Latest Ref Range: 0.5 - 1.4 mg/dL 0.8   eGFR if non African American Latest Ref Range: >60 mL/min/1.73 m^2 >60.0   eGFR if African American Latest Ref Range: >60 mL/min/1.73 m^2 >60.0   Glucose Latest Ref Range: 70 - 110 mg/dL 150 (H)   Calcium Latest Ref Range: 8.7 - 10.5 mg/dL 9.2   Phosphorus Latest Ref Range: 2.7 - 4.5 mg/dL 2.6 (L)   Magnesium Latest Ref Range: 1.6 - 2.6 mg/dL 2.3   Albumin Latest Ref Range: 3.5 - 5.2 g/dL 2.2 (L)       Results for MARTIN BERGER (MRN [id number]) as of 12/27/2017 09:24   Ref. Range 1/28/2009 09:50   TIARA Latest Ref Range: Neg <1:160  Negative     Results for MARTIN BERGER (MRN [id number]) as of 12/27/2017 09:24   Ref. Range 2/26/2013 16:42 5/22/2013 10:41 12/12/2014 07:56   CCP Antibodies Latest Ref Range: 0 - 4.9 U/mL 15.8 (H)     Rheumatoid Factor Latest Ref Range: 0.0 - 15.0 IU/mL 60 (H)  43.0 (H)     Results for MARTIN BERGER (MRN [id number]) as of 12/27/2017 09:24   Ref. Range 12/23/2017 00:23   Specimen UA Unknown Urine, Clean Catch   Color, UA Latest Ref Range: Yellow, Straw, Mai  Mai   pH, UA Latest Ref Range: 5.0 - 8.0  5.0   Specific Gravity, UA Latest Ref Range: 1.005 - 1.030  1.025   Appearance, UA Latest Ref Range: Clear  Cloudy (A)   Protein, UA Latest Ref Range: Negative  1+ (A)   Glucose, UA Latest Ref Range: Negative  Negative   Ketones, UA Latest Ref Range: Negative  Negative   Occult Blood UA Latest Ref Range: Negative  Negative   Nitrite, UA Latest Ref Range: Negative  Negative   Urobilinogen, UA Latest Ref Range: <2.0 EU/dL Negative   Bilirubin (UA) Latest Ref Range: Negative  Negative   Leukocytes, UA Latest  Ref Range: Negative  Negative   RBC, UA Latest Ref Range: 0 - 4 /hpf 2   WBC, UA Latest Ref Range: 0 - 5 /hpf 3   Bacteria, UA Latest Ref Range: None-Occ /hpf None   Squam Epithel, UA Latest Units: /hpf 13   Hyaline Casts, UA Latest Ref Range: 0-1/lpf /lpf 0   Microscopic Comment Unknown SEE COMMENT     Significant Imaging:    CTA chest  Technically limited study perhaps due to to slow delivery of intravenous contrast medium was oh in suboptimal opacification of the pulmonary arteries.  Within the limits of the study I detect no evidence of pulmonary thromboembolism or pulmonary infarct.  2.  Interval increase in RIGHT pleural fluid results in further compression of the RIGHT lung.  Abundant pericardial fluid persists.  3.  No gilda pulmonary edema or pneumonia detected.  There is no pneumothorax, pneumomediastinum or pneumoperitoneum    CT chest 12/22/17  Patchy irregular multifocal consolidative opacities throughout both lung fields, with associated peribronchial cuffing, endobronchial debris, and groundglass opacities. These findings in addition to a moderate volume pericardial effusion and small volume bilateral pleural effusions could represent an infectious process (likely viral), noninfectious serosal inflammatory disease (rheumatoid arthritis, lupus...), or malignancy.    Multiple enlarged bilateral axillary lymph nodes, favored to be reactive in nature    2D ECHO 12/21/17  CONCLUSIONS     1 - Technically difficult study (BMI 58.6).     2 - Normal left ventricular systolic function (EF 65-70%).     3 - Normal right ventricular systolic function .     4 - The estimated PA systolic pressure is greater than 28 mmHg.     5 - There is a small to perhaps moderately sized pericardial effusion and some collapse of the RV free wall is seen on parasternal long axis imaging.  The IVC cannot be visualized but the relatively dynamic LV function would support the possibility of   the patient being volume depleted.   Respiratory variation is seen in mitral inflow, but this is of questionable reliability and better signals from LVOT outflow do not show significant variability (as would be seen in tamponade).    Xray arthritis survey 05/13  Moderate degenerative change seen in the lower cervical spine.  Atlanto-odontoid relationship is normal in flexion.  Knee show minimal degenerative changes.  Hands and wrists show minimal degenerative change with no erosions.  Both feet show mild hallux valgus deformity bilaterally.  No erosive changes    Assessment/Plan:     RA (rheumatoid arthritis)    47YF with RA ( +RF, +CCP), Hypovitaminosis D,Morbid Obesity presented with 3 week history of chest tightness, dyspnea, cough.    Initially seen and dx by Dr Dillon ( Rheumatologist) in 02/2009 however pt opted not to start DMARD therapy as joint scans were normal and took NSAIDs instead prn in 2013. Pt was started on Sulfasalazine 07/15 due to elevated inflammatory makers (ESR 90,CRP 14.5) and L elbow contracture which initially improved after medication was started. Pt was then lost to follow up from 05/16 until 12/17 at which she was taking SSZ 1000mg BID which being managed by her PCP. On last correspondence 12/15/17, due to elevated inflammatory makers and joint pains plan was to increase SSZ to 1500mg BID but pt reported diagnosis of bronchitis/pneumonia and held off.     Pt with poor improvement in symptoms despite abx and low dose prednisone. CT chest with patchy consolidative irregular groundglass opacities. CBC shows no leucocytosis, microcytic anemia and elevated plts(likley related to inflammatory process). Metabolic profile shows normal renal function, elevated alkphos, low albumin. pro calcitonin wnl. Blood cx NGTD. Resp cx: normal syeda. Physical exam with no signs of active synovitis.     DDx Drug induced ILD (SSZ) vs RA related lung disease vs infection vs malignancy vs other autoimmune disease    Resp viral panel +ve corona  virus and TEM - Staphylococcus aureus,TEM klebsiella could have also contributed to pt's symptoms. Labs indeterminate TB, neg MPO,ANCA neg, scl neg, normal complements. Hep B sab +ve related to vaccine, SPEP showed normal total protein, IBIS with no monoclonal peaks, elevated inflammatory markers. TIARA that was initially negative 2009 now positive 1: 2560 speckled, +SSA,SSB, rest of profile still pending. SLE now in differential, although pt does not meet SLICC criteria for now, pending TIARA profile, APS workup and NILS.      Pt still not significantly improved now with increase in pleural effusion on CTA chest done 12/28/17.     Plan  - continue methylprednisolone 16mg q8 started on 12/27 D3 then switch to prednisone 60mg daily  - please order a repeat CT chest on 12/31/17 to evaluate pleural effusion  - F/u pending labs ( APS,NILS, CH50, PR3, scleroderma panel)  - continue to hold SSZ for now until clinic followup when it will be addressed  - appreciate pulmonary reccs  - if pt symptoms improves and repeat CT chest shows some improvement/stable and not worsening of pleural effusion, will consider discharge and will schedule a follow up appt with Dr Dillon ( Rheumatology) in clinic                Rene Mon MD  Rheumatology  [facility]

## 2017-12-30 NOTE — ASSESSMENT & PLAN NOTE
Concern for symptomatic effusion, tamponade. Agree with Pulmonology that effusion may be significant.   Consulted Cardiology. Multiple Echos 12/27/2017 to assess for tamponade; not found.  Rheumatology consulted. Work up in progress. Risk of tamponade appears minimal now; avoiding volume depletion as patient becomes tachycardic with Lasix.

## 2017-12-30 NOTE — ASSESSMENT & PLAN NOTE
Etiology unclear. No history of hypoxia.  Cultures and viral studies are pending.  Pulmonology, Cardiology, and Rheumatology consulted.  Work up inconclusive. Doubt infectious etiology from bacterial source at this point. Vital panel was positive for Human Coronavirus.  Continuing supportive care..

## 2017-12-30 NOTE — PROGRESS NOTES
[facility] Hospital Medicine  Progress Note    Patient Name: Malika Hodgson  MRN: [id number]  Patient Class: IP- Inpatient   Admission Date: 12/20/2017  Length of Stay: 9 days  Attending Physician: Stephanie Reece MD  Primary Care Provider: Sarah Robles MD    [facility] Medicine Team: [facility]OSP MED D Stephanie Reece MD    Subjective:     Principal Problem:Acute hypoxemic respiratory failure    HPI:  47 y.o. female presented to the ER with past medical history of RA (rheumatoid arthritis), immunosuppression, hypertension, morbid obesity.  She was in her usual state of stable health until the acute onset of URI / pneumonia symptoms beginning 2 - 3 weeks prior to admission with gradually improving course after treatment with Tessalon and doxycycline. Patient drank an increased amount of fluid and ate mostly high-sodium soups during this time. She developed significant dyspnea on exertion. Pertinent associated symptoms include shortness of breath on exertion; denies orthopnea and LE swelling.    Hospital Course:  No notes on file    Interval History: Patient with no events overnight, no new complaints. Remains hypoxic but overall, clinically improved since admission.  Data Review: Results recorded below were reviewed 12/29/2017.    Review of Systems   Constitutional: Negative for fever.   Respiratory: Positive for shortness of breath. Negative for chest tightness.    Cardiovascular: Negative for chest pain and leg swelling.     Objective:     Vital Signs (Most Recent):  Temp: 97.2 °F (36.2 °C) (12/29/17 1951)  Pulse: 93 (12/29/17 2300)  Resp: 20 (12/29/17 2102)  BP: (!) 141/83 (12/29/17 1951)  SpO2: 98 % (12/29/17 2300) Vital Signs (24h Range):  Temp:  [97.2 °F (36.2 °C)-98.5 °F (36.9 °C)] 97.2 °F (36.2 °C)  Pulse:  [] 93  Resp:  [18-20] 20  SpO2:  [92 %-100 %] 98 %  BP: (126-150)/(60-83) 141/83     Weight: (!) 159.8 kg (352 lb 4.7 oz)  Body mass index is 58.62 kg/m².    Intake/Output  Summary (Last 24 hours) at 12/29/17 2316  Last data filed at 12/29/17 0506   Gross per 24 hour   Intake              360 ml   Output                0 ml   Net              360 ml      Physical Exam   Constitutional: She appears well-developed.  Non-toxic appearance.   HENT:   Head: Normocephalic.   Mouth/Throat: Mucous membranes are not pale and not cyanotic.   Eyes: Conjunctivae and lids are normal. Pupils are equal.   Neck: Neck supple.   Cardiovascular: Normal rate, regular rhythm, S1 normal and S2 normal.    Pulmonary/Chest: Effort normal. She has decreased breath sounds in the right lower field. She has no wheezes. She has rales in the right lower field.   Abdominal: Soft. Bowel sounds are normal. There is no tenderness.   Musculoskeletal: She exhibits no edema.   Neurological: She is alert. She is not disoriented.   Skin: Skin is warm and dry. No cyanosis. Nails show no clubbing.   Psychiatric: She has a normal mood and affect. Cognition and memory are normal.       Significant Labs:     CBC:     Recent Labs  Lab 12/28/17  0400 12/29/17  0437   WBC 11.20 14.25*   HGB 9.2* 9.1*   HCT 30.5* 30.3*   * 569*     CMP:     Recent Labs  Lab 12/28/17  0400 12/29/17  0437   * 138   K 4.5 4.6   CL 96 98   CO2 29 33*   * 150*   BUN 14 13   CREATININE 0.8 0.8   CALCIUM 9.1 9.2   ALBUMIN 2.3* 2.2*   ANIONGAP 10 7*   EGFRNONAA >60.0 >60.0     Magnesium:     Recent Labs  Lab 12/28/17  0400 12/29/17  0437   MG 1.9 2.3     Assessment/Plan:      Current Hospital Problem List:    Active Hospital Problems    Diagnosis  POA    *Acute hypoxemic respiratory failure [J96.01]  Yes     Priority: 1 - High    Pericardial effusion [I31.3]  Yes     Priority: 1 - High    Ground glass opacity present on imaging of lung [R91.8]  Yes     Priority: 1 - High    Acute congestive heart failure [I50.9]  Yes     Priority: 2     RA (rheumatoid arthritis) [M06.9]  Yes     Priority: 3     Essential hypertension [I10]  Yes      "Priority: 4     Immunosuppression [D89.9]  Yes    Vitamin D deficiency disease [E55.9]  Yes      Resolved Hospital Problems    Diagnosis Date Resolved POA   No resolved problems to display.       Ordered Medications for management of current problems:    amLODIPine  5 mg Oral Daily    azithromycin  250 mg Oral Daily    carvedilol  3.125 mg Oral BID    enoxaparin  40 mg Subcutaneous Q12H    ergocalciferol  50,000 Units Oral Twice Weekly    ipratropium  0.5 mg Nebulization TID WAKE    methylPREDNISolone sodium succinate  16 mg Intravenous Q8H    senna-docusate 8.6-50 mg  1 tablet Oral BID    sodium chloride 0.9%  3 mL Intravenous Q8H       Risk  Patient has a condition that poses threat to life and bodily function: Respiratory Distress    Anticipated Disposition: Home or Self Care    Assessment and Plan by Problem:    * Acute hypoxemic respiratory failure    Etiology unclear. No history of hypoxia.  Cultures and viral studies are pending.  Pulmonology, Cardiology, and Rheumatology consulted.  Work up inconclusive. Doubt infectious etiology from bacterial source at this point. Vital panel was positive for Human Coronavirus.  Continuing supportive care..        Pericardial effusion    Concern for symptomatic effusion, tamponade. Agree with Pulmonology that effusion may be significant.   Consulted Cardiology. Multiple Echos 12/27/2017 to assess for tamponade; not found.  Rheumatology consulted. Work up in progress. Risk of tamponade appears minimal now; avoiding volume depletion as patient becomes tachycardic with Lasix.        Ground glass opacity present on imaging of lung    Chest CT revealed: "Patchy irregular multifocal consolidative opacities throughout both lung fields, with associated peribronchial cuffing, endobronchial debris, and groundglass opacities. These findings in addition to a moderate volume pericardial effusion and small volume bilateral pleural effusions could represent an infectious " "process (likely viral), noninfectious serosal inflammatory disease (rheumatoid arthritis, lupus...), or malignancy. Multiple enlarged bilateral axillary lymph nodes, favored to be reactive in nature."   Monitor closely for tamponade from pericardial effusion.  Added Avelox 12/22/17; ordered sputum culture and viral studies.  Added acapella. Added Atrovent nebs as patient allergic to albuterol.  Patient remains hypoxic. Maximizing antibiotic coverage (do not suspect MRSA at this point) with Avelox, Flagyl, and ceftriaxone.  No improvement. Prednisone was started as OP for allergic reaction to albuterol; discontinued Prednisone 12/25/2017.  Consulted Pulmonology. Sputum culture and respiratory pathogen panel pending.  Concern for lack of significant improvement despite diuresis, antibiotics, and supportive care.  Discontinuing Flagyl and Avelox. Continuing current management with ceftriaxone and adding azithromycin.  Patient has completed adequate courses of antibiotics; de-escalating to azithromycin to complete course.  Trial of steroids.  Despite findings on chest CTA, patient is clinically improving.        Acute congestive heart failure    Apparently new onset CHF. HF pathway initiated. BNP low (falsely due to obesity?).  Lasix 40 mg IV initiated. No ACEi due to allergy.  Echo: CONCLUSIONS     1 - Technically difficult study (BMI 58.6).     2 - Normal left ventricular systolic function (EF 65-70%).     3 - Normal right ventricular systolic function .     4 - The estimated PA systolic pressure is greater than 28 mmHg.     5 - There is a small to perhaps moderately sized pericardial effusion and some collapse of the RV free wall is seen on parasternal long axis imaging.  The IVC cannot be visualized but the relatively dynamic LV function would support the possibility of   the patient being volume depleted.  Respiratory variation is seen in mitral inflow, but this is of questionable reliability and better signals " from LVOT outflow do not show significant variability (as would be seen in tamponade).   Appears to be euvolemic; decreased Lasix to 40 mg po daily. Monitoring.  Mild tachycardia; discontinued Lasix 12/24/17. Appears compensated (suspect pulmonary hypertension).  Trial of diuresis 12/27/2017 as per Cardiology's recommendation; no improvement in hypoxia.   Doses not appear to be in decompensated HF. Diagnosis of CHF is unclear, possible pulmonary hypertension.        RA (rheumatoid arthritis)    Continuing home medication.  Recent increase in inflammatory markers associated with URI.  Consulted Rheumatology. Held SSZ 12/27/2017 as per recommendations.  Multiple inflammatory markers are elevated. Cannot rule out flare at this point.   Rheumatology plans to monitor for several days as steroid course is changed to oral then repeat CT.        Essential hypertension    Continuing current management with amlodipine for now.  Better controlled initially but then remaining above goal; added Coreg.  Improved.        Vitamin D deficiency disease    Continuing home medications.          VTE Risk Mitigation         Ordered     enoxaparin injection 40 mg  Every 12 hours (non-standard times)     Route:  Subcutaneous        12/20/17 1701     Medium Risk of VTE  Once      12/20/17 1701              Stephanie Reece MD  Department of Hospital Medicine   [facility]

## 2017-12-30 NOTE — ASSESSMENT & PLAN NOTE
"Chest CT revealed: "Patchy irregular multifocal consolidative opacities throughout both lung fields, with associated peribronchial cuffing, endobronchial debris, and groundglass opacities. These findings in addition to a moderate volume pericardial effusion and small volume bilateral pleural effusions could represent an infectious process (likely viral), noninfectious serosal inflammatory disease (rheumatoid arthritis, lupus...), or malignancy. Multiple enlarged bilateral axillary lymph nodes, favored to be reactive in nature."   Monitor closely for tamponade from pericardial effusion.  Added Avelox 12/22/17; ordered sputum culture and viral studies.  Added acapella. Added Atrovent nebs as patient allergic to albuterol.  Patient remains hypoxic. Maximizing antibiotic coverage (do not suspect MRSA at this point) with Avelox, Flagyl, and ceftriaxone.  No improvement. Prednisone was started as OP for allergic reaction to albuterol; discontinued Prednisone 12/25/2017.  Consulted Pulmonology. Sputum culture and respiratory pathogen panel pending.  Concern for lack of significant improvement despite diuresis, antibiotics, and supportive care.  Discontinuing Flagyl and Avelox. Continuing current management with ceftriaxone and adding azithromycin.  Patient has completed adequate courses of antibiotics; de-escalating to azithromycin to complete course.  Trial of steroids.  Despite findings on chest CTA, patient is clinically improving.  "

## 2017-12-30 NOTE — PLAN OF CARE
Problem: Patient Care Overview  Goal: Plan of Care Review  Outcome: Ongoing (interventions implemented as appropriate)  VSS and afebrile. Free from injury and falls. AOx4.    MARITZA this shift. O2 sats remained 92% c/ supplemental O2 delivery.    Will monitor.

## 2017-12-30 NOTE — ASSESSMENT & PLAN NOTE
Continuing home medication.  Recent increase in inflammatory markers associated with URI.  Consulted Rheumatology. Held SSZ 12/27/2017 as per recommendations.  Multiple inflammatory markers are elevated. Cannot rule out flare at this point.   Rheumatology plans to monitor for several days as steroid course is changed to oral then repeat CT.

## 2017-12-31 LAB
ALBUMIN SERPL BCP-MCNC: 2.2 G/DL
ANION GAP SERPL CALC-SCNC: 8 MMOL/L
BASOPHILS # BLD AUTO: 0.02 K/UL
BASOPHILS NFR BLD: 0.2 %
BUN SERPL-MCNC: 15 MG/DL
CALCIUM SERPL-MCNC: 9.2 MG/DL
CHLORIDE SERPL-SCNC: 101 MMOL/L
CO2 SERPL-SCNC: 29 MMOL/L
CREAT SERPL-MCNC: 0.8 MG/DL
DIFFERENTIAL METHOD: ABNORMAL
EOSINOPHIL # BLD AUTO: 0 K/UL
EOSINOPHIL NFR BLD: 0.3 %
ERYTHROCYTE [DISTWIDTH] IN BLOOD BY AUTOMATED COUNT: 15.1 %
EST. GFR  (AFRICAN AMERICAN): >60 ML/MIN/1.73 M^2
EST. GFR  (NON AFRICAN AMERICAN): >60 ML/MIN/1.73 M^2
GLUCOSE SERPL-MCNC: 255 MG/DL
HAPTOGLOB SERPL-MCNC: 408 MG/DL
HCT VFR BLD AUTO: 30.3 %
HGB BLD-MCNC: 9.2 G/DL
IMM GRANULOCYTES # BLD AUTO: 0.08 K/UL
IMM GRANULOCYTES NFR BLD AUTO: 0.8 %
LDH SERPL L TO P-CCNC: 263 U/L
LYMPHOCYTES # BLD AUTO: 1.2 K/UL
LYMPHOCYTES NFR BLD: 11.8 %
MAGNESIUM SERPL-MCNC: 1.9 MG/DL
MCH RBC QN AUTO: 24.3 PG
MCHC RBC AUTO-ENTMCNC: 30.4 G/DL
MCV RBC AUTO: 80 FL
MONOCYTES # BLD AUTO: 0.6 K/UL
MONOCYTES NFR BLD: 6.1 %
NEUTROPHILS # BLD AUTO: 8.3 K/UL
NEUTROPHILS NFR BLD: 80.8 %
NRBC BLD-RTO: 0 /100 WBC
PHOSPHATE SERPL-MCNC: 2.8 MG/DL
PLATELET # BLD AUTO: 532 K/UL
PMV BLD AUTO: 9 FL
POTASSIUM SERPL-SCNC: 4.9 MMOL/L
RBC # BLD AUTO: 3.78 M/UL
SODIUM SERPL-SCNC: 138 MMOL/L
WBC # BLD AUTO: 10.24 K/UL

## 2017-12-31 PROCEDURE — 36415 COLL VENOUS BLD VENIPUNCTURE: CPT

## 2017-12-31 PROCEDURE — 94664 DEMO&/EVAL PT USE INHALER: CPT

## 2017-12-31 PROCEDURE — 63600175 PHARM REV CODE 636 W HCPCS: Performed by: INTERNAL MEDICINE

## 2017-12-31 PROCEDURE — 99232 SBSQ HOSP IP/OBS MODERATE 35: CPT | Mod: ,,, | Performed by: INTERNAL MEDICINE

## 2017-12-31 PROCEDURE — 83735 ASSAY OF MAGNESIUM: CPT

## 2017-12-31 PROCEDURE — 80069 RENAL FUNCTION PANEL: CPT

## 2017-12-31 PROCEDURE — 94640 AIRWAY INHALATION TREATMENT: CPT

## 2017-12-31 PROCEDURE — 25000242 PHARM REV CODE 250 ALT 637 W/ HCPCS: Performed by: INTERNAL MEDICINE

## 2017-12-31 PROCEDURE — 99900035 HC TECH TIME PER 15 MIN (STAT)

## 2017-12-31 PROCEDURE — 94761 N-INVAS EAR/PLS OXIMETRY MLT: CPT

## 2017-12-31 PROCEDURE — 25000003 PHARM REV CODE 250: Performed by: INTERNAL MEDICINE

## 2017-12-31 PROCEDURE — 27000221 HC OXYGEN, UP TO 24 HOURS

## 2017-12-31 PROCEDURE — A4216 STERILE WATER/SALINE, 10 ML: HCPCS | Performed by: INTERNAL MEDICINE

## 2017-12-31 PROCEDURE — 11000001 HC ACUTE MED/SURG PRIVATE ROOM

## 2017-12-31 PROCEDURE — 83615 LACTATE (LD) (LDH) ENZYME: CPT

## 2017-12-31 PROCEDURE — 85025 COMPLETE CBC W/AUTO DIFF WBC: CPT

## 2017-12-31 PROCEDURE — 83010 ASSAY OF HAPTOGLOBIN QUANT: CPT

## 2017-12-31 RX ORDER — HYDROXYCHLOROQUINE SULFATE 200 MG/1
200 TABLET, FILM COATED ORAL 2 TIMES DAILY
Status: DISCONTINUED | OUTPATIENT
Start: 2018-01-01 | End: 2018-01-01 | Stop reason: HOSPADM

## 2017-12-31 RX ADMIN — CARVEDILOL 3.12 MG: 3.12 TABLET, FILM COATED ORAL at 10:12

## 2017-12-31 RX ADMIN — IPRATROPIUM BROMIDE 0.5 MG: 0.5 SOLUTION RESPIRATORY (INHALATION) at 07:12

## 2017-12-31 RX ADMIN — ENOXAPARIN SODIUM 40 MG: 100 INJECTION SUBCUTANEOUS at 05:12

## 2017-12-31 RX ADMIN — CARVEDILOL 3.12 MG: 3.12 TABLET, FILM COATED ORAL at 09:12

## 2017-12-31 RX ADMIN — AMLODIPINE BESYLATE 5 MG: 5 TABLET ORAL at 09:12

## 2017-12-31 RX ADMIN — AZITHROMYCIN 250 MG: 250 TABLET, FILM COATED ORAL at 09:12

## 2017-12-31 RX ADMIN — IPRATROPIUM BROMIDE 0.5 MG: 0.5 SOLUTION RESPIRATORY (INHALATION) at 03:12

## 2017-12-31 RX ADMIN — STANDARDIZED SENNA CONCENTRATE AND DOCUSATE SODIUM 1 TABLET: 8.6; 5 TABLET, FILM COATED ORAL at 10:12

## 2017-12-31 RX ADMIN — SODIUM CHLORIDE, PRESERVATIVE FREE 3 ML: 5 INJECTION INTRAVENOUS at 05:12

## 2017-12-31 RX ADMIN — SODIUM CHLORIDE, PRESERVATIVE FREE 3 ML: 5 INJECTION INTRAVENOUS at 10:12

## 2017-12-31 RX ADMIN — PREDNISONE 30 MG: 20 TABLET ORAL at 09:12

## 2017-12-31 RX ADMIN — PREDNISONE 30 MG: 20 TABLET ORAL at 05:12

## 2017-12-31 RX ADMIN — SODIUM CHLORIDE, PRESERVATIVE FREE 3 ML: 5 INJECTION INTRAVENOUS at 02:12

## 2017-12-31 NOTE — ASSESSMENT & PLAN NOTE
"Chest CT revealed: "Patchy irregular multifocal consolidative opacities throughout both lung fields, with associated peribronchial cuffing, endobronchial debris, and groundglass opacities. These findings in addition to a moderate volume pericardial effusion and small volume bilateral pleural effusions could represent an infectious process (likely viral), noninfectious serosal inflammatory disease (rheumatoid arthritis, lupus...), or malignancy. Multiple enlarged bilateral axillary lymph nodes, favored to be reactive in nature."   Monitor closely for tamponade from pericardial effusion.  Added Avelox 12/22/17; ordered sputum culture and viral studies.  Added acapella. Added Atrovent nebs as patient allergic to albuterol.  Patient remains hypoxic. Maximizing antibiotic coverage (do not suspect MRSA at this point) with Avelox, Flagyl, and ceftriaxone.  No improvement. Prednisone was started as OP for allergic reaction to albuterol; discontinued Prednisone 12/25/2017.  Consulted Pulmonology. Sputum culture and respiratory pathogen panel pending.  Concern for lack of significant improvement despite diuresis, antibiotics, and supportive care.  Discontinuing Flagyl and Avelox. Continuing current management with ceftriaxone and adding azithromycin.  Patient has completed adequate courses of antibiotics; de-escalating to azithromycin to complete course.  Trial of steroids. IV changed to oral 12/30/2017  Despite findings on chest CTA, patient is clinically improving.  Rheumatology recommends repeat Chest CT.  "

## 2017-12-31 NOTE — PROGRESS NOTES
[facility] Hospital Medicine  Progress Note    Patient Name: Malika Hodgson  MRN: [id number]  Patient Class: IP- Inpatient   Admission Date: 12/20/2017  Length of Stay: 10 days  Attending Physician: Stephanie Reece MD  Primary Care Provider: Sarah Robles MD    [facility] Medicine Team: [facility]OSP MED D Stephanie Reece MD    Subjective:     Principal Problem:Acute hypoxemic respiratory failure    HPI:  47 y.o. female presented to the ER with past medical history of RA (rheumatoid arthritis), immunosuppression, hypertension, morbid obesity.  She was in her usual state of stable health until the acute onset of URI / pneumonia symptoms beginning 2 - 3 weeks prior to admission with gradually improving course after treatment with Tessalon and doxycycline. Patient drank an increased amount of fluid and ate mostly high-sodium soups during this time. She developed significant dyspnea on exertion. Pertinent associated symptoms include shortness of breath on exertion; denies orthopnea and LE swelling.    Hospital Course:  No notes on file    Interval History: Patient with no events overnight, no new complaints. Clinically improved since admission.  Data Review: Results recorded below were reviewed 12/30/2017.    Review of Systems   Constitutional: Negative for fever.   Respiratory: Positive for shortness of breath. Negative for chest tightness.    Cardiovascular: Negative for chest pain and leg swelling.     Objective:     Vital Signs (Most Recent):  Temp: 98.1 °F (36.7 °C) (12/30/17 1700)  Pulse: 80 (12/30/17 1943)  Resp: 16 (12/30/17 1943)  BP: 136/84 (12/30/17 1700)  SpO2: 96 % (12/30/17 2123) Vital Signs (24h Range):  Temp:  [97.4 °F (36.3 °C)-99 °F (37.2 °C)] 98.1 °F (36.7 °C)  Pulse:  [] 80  Resp:  [16-20] 16  SpO2:  [86 %-98 %] 96 %  BP: (114-136)/(59-84) 136/84     Weight: (!) 159.8 kg (352 lb 4.7 oz)  Body mass index is 58.62 kg/m².    Intake/Output Summary (Last 24 hours) at 12/30/17  2158  Last data filed at 12/30/17 0559   Gross per 24 hour   Intake              120 ml   Output                0 ml   Net              120 ml      Physical Exam   Constitutional: She appears well-developed.  Non-toxic appearance.   HENT:   Head: Normocephalic.   Mouth/Throat: Mucous membranes are not pale and not cyanotic.   Eyes: Conjunctivae and lids are normal. Pupils are equal.   Neck: Neck supple.   Cardiovascular: Normal rate, regular rhythm, S1 normal and S2 normal.    Pulmonary/Chest: Effort normal. She has decreased breath sounds in the right lower field. She has no wheezes. She has rales in the right lower field.   Abdominal: Soft. Bowel sounds are normal. There is no tenderness.   Musculoskeletal: She exhibits no edema.   Neurological: She is alert. She is not disoriented.   Skin: Skin is warm and dry. No cyanosis. Nails show no clubbing.   Psychiatric: She has a normal mood and affect. Cognition and memory are normal.       Significant Labs:     CBC:     Recent Labs  Lab 12/29/17 0437 12/30/17  0530   WBC 14.25* 13.14*   HGB 9.1* 8.9*   HCT 30.3* 30.2*   * 600*     CMP:     Recent Labs  Lab 12/29/17 0437 12/30/17  0529    138   K 4.6 4.2   CL 98 100   CO2 33* 32*   * 163*   BUN 13 17   CREATININE 0.8 0.7   CALCIUM 9.2 9.1   ALBUMIN 2.2* 2.2*   ANIONGAP 7* 6*   EGFRNONAA >60.0 >60.0     Magnesium:     Recent Labs  Lab 12/29/17 0437 12/30/17  0529   MG 2.3 2.1     Assessment/Plan:      Current Hospital Problem List:    Active Hospital Problems    Diagnosis  POA    *Acute hypoxemic respiratory failure [J96.01]  Yes     Priority: 1 - High    Pericardial effusion [I31.3]  Yes     Priority: 1 - High    Ground glass opacity present on imaging of lung [R91.8]  Yes     Priority: 1 - High    Acute congestive heart failure [I50.9]  Yes     Priority: 2     RA (rheumatoid arthritis) [M06.9]  Yes     Priority: 3     Essential hypertension [I10]  Yes     Priority: 4     Immunosuppression  "[D89.9]  Yes    Vitamin D deficiency disease [E55.9]  Yes      Resolved Hospital Problems    Diagnosis Date Resolved POA   No resolved problems to display.       Ordered Medications for management of current problems:    amLODIPine  5 mg Oral Daily    azithromycin  250 mg Oral Daily    carvedilol  3.125 mg Oral BID    enoxaparin  40 mg Subcutaneous Q12H    ergocalciferol  50,000 Units Oral Twice Weekly    ipratropium  0.5 mg Nebulization TID WAKE    predniSONE  30 mg Oral BID    senna-docusate 8.6-50 mg  1 tablet Oral BID    sodium chloride 0.9%  3 mL Intravenous Q8H       Risk  Patient has a condition that poses threat to life and bodily function: Respiratory Distress    Anticipated Disposition: Home-Health Care Svc    Assessment and Plan by Problem:    * Acute hypoxemic respiratory failure    Etiology unclear. No history of hypoxia.  Cultures and viral studies are pending.  Pulmonology, Cardiology, and Rheumatology consulted.  Work up initially inconclusive. Doubt infectious etiology from bacterial source at this point. Viral panel was positive for Human Coronavirus.  Continuing supportive care..        Pericardial effusion    Concern for symptomatic effusion, tamponade. Agree with Pulmonology that effusion may be significant.   Consulted Cardiology. Multiple Echos 12/27/2017 to assess for tamponade; not found.  Rheumatology consulted. Work up in progress.   Risk of tamponade appears minimal now; avoiding volume depletion as patient becomes tachycardic with Lasix.  Trial of steroids.        Ground glass opacity present on imaging of lung    Chest CT revealed: "Patchy irregular multifocal consolidative opacities throughout both lung fields, with associated peribronchial cuffing, endobronchial debris, and groundglass opacities. These findings in addition to a moderate volume pericardial effusion and small volume bilateral pleural effusions could represent an infectious process (likely viral), noninfectious " "serosal inflammatory disease (rheumatoid arthritis, lupus...), or malignancy. Multiple enlarged bilateral axillary lymph nodes, favored to be reactive in nature."   Monitor closely for tamponade from pericardial effusion.  Added Avelox 12/22/17; ordered sputum culture and viral studies.  Added acapella. Added Atrovent nebs as patient allergic to albuterol.  Patient remains hypoxic. Maximizing antibiotic coverage (do not suspect MRSA at this point) with Avelox, Flagyl, and ceftriaxone.  No improvement. Prednisone was started as OP for allergic reaction to albuterol; discontinued Prednisone 12/25/2017.  Consulted Pulmonology. Sputum culture and respiratory pathogen panel pending.  Concern for lack of significant improvement despite diuresis, antibiotics, and supportive care.  Discontinuing Flagyl and Avelox. Continuing current management with ceftriaxone and adding azithromycin.  Patient has completed adequate courses of antibiotics; de-escalating to azithromycin to complete course.  Trial of steroids. IV changed to oral 12/30/2017  Despite findings on chest CTA, patient is clinically improving.  Rheumatology recommends repeat Chest CT.        Acute congestive heart failure    Apparently new onset CHF. HF pathway initiated. BNP low (falsely due to obesity?).  Lasix 40 mg IV initiated. No ACEi due to allergy.  Echo: CONCLUSIONS     1 - Technically difficult study (BMI 58.6).     2 - Normal left ventricular systolic function (EF 65-70%).     3 - Normal right ventricular systolic function .     4 - The estimated PA systolic pressure is greater than 28 mmHg.     5 - There is a small to perhaps moderately sized pericardial effusion and some collapse of the RV free wall is seen on parasternal long axis imaging.  The IVC cannot be visualized but the relatively dynamic LV function would support the possibility of   the patient being volume depleted.  Respiratory variation is seen in mitral inflow, but this is of questionable " reliability and better signals from LVOT outflow do not show significant variability (as would be seen in tamponade).   Appears to be euvolemic; decreased Lasix to 40 mg po daily. Monitoring.  Mild tachycardia; discontinued Lasix 12/24/17. Appears compensated (suspect pulmonary hypertension).  Trial of diuresis 12/27/2017 as per Cardiology's recommendation; no improvement in hypoxia.   Doses not appear to be in decompensated HF. Diagnosis of CHF is unclear, possible pulmonary hypertension.        RA (rheumatoid arthritis)    Continuing home medication.  Recent increase in inflammatory markers associated with URI.  Consulted Rheumatology. Held SSZ 12/27/2017 as per recommendations.  Multiple inflammatory markers are elevated. Cannot rule out flare at this point.   Rheumatology plans to monitor for several days as steroid course is changed to oral then repeat CT.        Essential hypertension    Continuing current management with amlodipine for now.  Better controlled initially but then remaining above goal; added Coreg.  Improved.        Vitamin D deficiency disease    Continuing home medications.          VTE Risk Mitigation         Ordered     enoxaparin injection 40 mg  Every 12 hours (non-standard times)     Route:  Subcutaneous        12/20/17 1701     Medium Risk of VTE  Once      12/20/17 1701              Stephanie Reece MD  Department of Hospital Medicine   [facility]

## 2017-12-31 NOTE — PROGRESS NOTES
"[facility]  Rheumatology  Progress Note    Patient Name: Malika Hodgson  MRN: [id number]  Admission Date: 12/20/2017  Hospital Length of Stay: 10 days  Code Status: Full Code   Attending Provider: Stephanie Reece MD  Primary Care Physician: Sarah Robles MD  Principal Problem: Acute hypoxemic respiratory failure    Subjective:     HPI: 47YF with RA ( +RF, +CCP), Hypovitaminosis D,Morbid Obesity presented with 3 week history of chest tightness, dyspnea, cough. Initially treated with doxycycline 12/12 at Urgent care and prednisone 10mg X 5days was added by PCP 12/14 with no improvement in her symptoms. Pt was then admitted for further workup. Pt was started on Rocephin and Flagyly 12/24,and moxifloxacin 12/22 for possible infection and was also diuresed for possible volume overload. Pt also received prednisone 10mg daily during hospital admission 12/21-12/25. CT chest 12/22 showed patchy irregular multifocal consolidative opacities throughout both lung fields, with associated peribronchial cuffing, endobronchial debris, and groundglass opacities in addition to a moderate volume pericardial effusion and small volume bilateral pleural effusions. 2D ECHO 12/21 showed EF 65-70% with small to perhaps moderately sized pericardial effusion and some collapse of the RV free wall is seen on parasternal long axis imaging.    Pulmonary was consulted and recommended Cardiology for evaluation for tamponade with pericardial effusion, trial of steriods and stated that bronch would not be tolerated at this time. As per Cardiology "No tamponade physiology evident on bedside echo or exam, give mild diuresis and may consider RHC .        RA history  Initially seen and dx by Dr Dillon ( Rheumatologist) in 02/2009 however pt opted not to start DMARD therapy as joint scans were normal and took NSAIDs instead prn in 2013. Pt was started on Sulfasalazine 07/15 due to elevated inflammatory makers (ESR 90,CRP 14.5) and L " "elbow contracture which initially improved after medication was started. Pt was then lost to follow up from 05/16 until 12/17 at which she was taking SSZ 1000mg BID which being managed by her PCP. On last correspondence 12/15/17, due to elevated inflammatory makers and joint pains plan was to increase SSZ to 1500mg BID but pt reported diagnosis of bronchitis/pneumonia.    Prior to admission, pt reported daily joint pains since Sept 2017 and was more complaint with her medication. Previously missing 1-2X weekly.     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 Adopted children.       Rheumatology was consulted for " RA,GGO on chest CT and pleural effusion".        Interval History: Pt reports some improvement in her SOB.  Afebrile. No acute events overnight.    Current Facility-Administered Medications   Medication Frequency    acetaminophen tablet 650 mg Q6H PRN    amLODIPine tablet 5 mg Daily    azithromycin tablet 250 mg Daily    benzonatate capsule 200 mg Q6H PRN    carvedilol tablet 3.125 mg BID    enoxaparin injection 40 mg Q12H    ergocalciferol capsule 50,000 Units Twice Weekly    influenza (FLUZONE,FLUARIX QUADRIVALENT) vaccine 0.5 mL vaccine x 1 dose    ipratropium 0.02 % nebulizer solution 0.5 mg TID WAKE    ondansetron disintegrating tablet 4 mg Q8H PRN    ondansetron injection 4 mg Q8H PRN    predniSONE tablet 30 mg BID    senna-docusate 8.6-50 mg per tablet 1 tablet BID    sodium chloride 0.9% flush 3 mL Q8H     Objective:     Vital Signs (Most " Recent):  Temp: 98.1 °F (36.7 °C) (12/30/17 1700)  Pulse: 80 (12/30/17 1943)  Resp: 16 (12/30/17 1943)  BP: 136/84 (12/30/17 1700)  SpO2: 96 % (12/30/17 2123)  O2 Device (Oxygen Therapy): nasal cannula (12/30/17 2123) Vital Signs (24h Range):  Temp:  [97.4 °F (36.3 °C)-99 °F (37.2 °C)] 98.1 °F (36.7 °C)  Pulse:  [] 80  Resp:  [16-20] 16  SpO2:  [86 %-96 %] 96 %  BP: (114-136)/(59-84) 136/84     Weight: (!) 159.8 kg (352 lb 4.7 oz) (12/27/17 0557)  Body mass index is 58.62 kg/m².  Body surface area is 2.71 meters squared.      Intake/Output Summary (Last 24 hours) at 12/30/17 2304  Last data filed at 12/30/17 0559   Gross per 24 hour   Intake              120 ml   Output                0 ml   Net              120 ml       Physical Exam   Constitutional: She is oriented to person, place, and time and well-developed, well-nourished, and in no distress.   HENT:   Head: Normocephalic and atraumatic.   Eyes: EOM are normal. Pupils are equal, round, and reactive to light.   Neck: Normal range of motion. Neck supple.   Cardiovascular: Normal rate and regular rhythm.    Distant heart sounds due to body habitus   Pulmonary/Chest: Effort normal.   Diffuse crackles throughout all lung field    Abdominal: Soft. Bowel sounds are normal.   Lymphadenopathy:     She has no cervical adenopathy.   Neurological: She is alert and oriented to person, place, and time.   Skin: Skin is warm and dry. No rash noted.     Psychiatric: Affect normal.   Musculoskeletal: Normal range of motion. She exhibits no edema, tenderness or deformity.   No synovitis         Significant Labs:  CBC:   Recent Labs  Lab 12/30/17  0530   WBC 13.14*   HGB 8.9*   HCT 30.2*   *     CMP:   Recent Labs  Lab 12/30/17  0529   *   CALCIUM 9.1   ALBUMIN 2.2*      K 4.2   CO2 32*      BUN 17   CREATININE 0.7     CRP:   Recent Labs  Lab 12/30/17 0529   CRP 52.8*     ESR:   Recent Labs  Lab 12/30/17 0529   SEDRATE 107*     Liver Function  Test:   Recent Labs  Lab 12/30/17  0529   ALBUMIN 2.2*     Results for MARTIN BERGER (MRN [id number]) as of 12/30/2017 22:52   Ref. Range 12/27/2017 10:31 12/27/2017 10:31 12/27/2017 13:45   TIARA HEP-2 Titer Unknown Positive >=1:2560...     Anti-SSA Antibody Latest Ref Range: 0.00 - 19.99 .16 (H) 210.16 (H)    Anti-SSA Interpretation Latest Ref Range: Negative  Positive (A) Positive (A)    Anti-SSB Antibody Latest Ref Range: 0.00 - 19.99 .44 (H) 178.44 (H)    Anti-SSB Interpretation Latest Ref Range: Negative  Positive (A) Positive (A)    Anti Sm Antibody Latest Ref Range: 0.00 - 19.99 EU  2.16    Anti-Sm Interpretation Latest Ref Range: Negative   Negative    Anti Sm/RNP Antibody Latest Ref Range: 0.00 - 19.99 EU  3.51    Anti-Sm/RNP Interpretation Latest Ref Range: Negative   Negative    Cytoplasmic Neutrophilic Ab Latest Ref Range: <1:20 Titer   <1:20   Perinuclear (P-ANCA) Latest Ref Range: <1:20 Titer   <1:20   Scleroderma SCL- Latest Ref Range: <20 UNITS   6   MPO Latest Ref Range: <=20 UNITS   3     Results for MARTIN BERGER (MRN [id number]) as of 12/30/2017 22:52   Ref. Range 12/27/2017 10:32   Complement (C-3) Latest Ref Range: 50 - 180 mg/dL 137   Complement (C-4) Latest Ref Range: 11 - 44 mg/dL 15     Results for MARTIN BERGER (MRN [id number]) as of 12/30/2017 22:52   Ref. Range 2/26/2013 16:42 12/12/2014 07:56   CCP Antibodies Latest Ref Range: 0 - 4.9 U/mL 15.8 (H)    Rheumatoid Factor Latest Ref Range: 0.0 - 15.0 IU/mL 60 (H) 43.0 (H)   Results for MARTIN BERGER (MRN [id number]) as of 12/30/2017 22:52   Ref. Range 12/27/2017 10:32   Hep B Core Total Ab Unknown Negative   Hep B S Ab Unknown Positive (A)   Hepatitis B Surface Ag Unknown Negative   Hepatitis C Ab Unknown Negative     Results for MARTIN BERGER (MRN [id number]) as of 12/30/2017 22:52   Ref. Range 12/24/2017 14:13   Enterovirus Latest Ref Range: Not Detected  Not Detected   Human Bocavirus Latest Ref Range: Not Detected  Not  Detected   Human Coronavirus Latest Ref Range: Not Detected  Positive (A)   Influenza A - X2Y7-50 Latest Ref Range: Not Detected  Not Detected   Parainfluenza Latest Ref Range: Not Detected  Not Detected   TEM - Acinetobacter baumannii Latest Ref Range: Not Detected  Not Detected   TEM - Bordetella pertussis Latest Ref Range: Not Detected  Not Detected   TEM - Chlamydophila pneumoniae Latest Ref Range: Not Detected  Not Detected   TEM- Haemophilus influenzae Latest Ref Range: Not Detected  Not Detected   TEM- Haemophilus influenzae B Latest Ref Range: Not Detected  Not Detected   TEM - Klebsiella pneumoniae Latest Ref Range: Not Detected  Positive (A)   TEM - Legionella pneumophila Latest Ref Range: Not Detected  Not Detected   TEM - Staphylococcus aureus Latest Ref Range: Not Detected  Positive (A)   TEM - MRSA Latest Ref Range: Not Detected  Not Detected   TEM - Pawan -Amaya Latest Ref Range: Not Detected  Not Detected   TEM - Mycoplasma pneumoniae Latest Ref Range: Not Detected  Not Detected   TEM - Neisseria meningiditis Latest Ref Range: Not Detected  Not Detected   TEM - Pseudomonas aeruginosa Latest Ref Range: Not Detected  Not Detected   TEM - Streptococcus pneumoniae Latest Ref Range: Not Detected  Not Detected   TEM - Streptococcus pyogenes A Latest Ref Range: Not Detected  Not Detected   Respiratory Syncytial VirusVirus (RSV) A Latest Ref Range: Not Detected  Not Detected   TEM - Moraxella catarrhalis Latest Ref Range: Not Detected  Not Detected     Results for MARTIN BERGER (MRN [id number]) as of 12/30/2017 22:52   Ref. Range 12/27/2017 14:00   Specimen UA Unknown Urine, Clean Catch   Color, UA Latest Ref Range: Yellow, Straw, Mai  Straw   pH, UA Latest Ref Range: 5.0 - 8.0  6.0   Specific Gravity, UA Latest Ref Range: 1.005 - 1.030  1.005   Appearance, UA Latest Ref Range: Clear  Clear   Protein, UA Latest Ref Range: Negative  Negative   Glucose, UA Latest Ref Range: Negative  Negative   Ketones,  UA Latest Ref Range: Negative  Negative   Occult Blood UA Latest Ref Range: Negative  Negative   Nitrite, UA Latest Ref Range: Negative  Negative   Urobilinogen, UA Latest Ref Range: <2.0 EU/dL Negative   Bilirubin (UA) Latest Ref Range: Negative  Negative   Leukocytes, UA Latest Ref Range: Negative  Negative   Prot/Creat Ratio, Ur Latest Ref Range: 0.00 - 0.20  Unable to calculate   Protein, Urine Random Latest Ref Range: 0 - 15 mg/dL <7       Significant Imaging:  Imaging results within the past 24 hours have been reviewed.    Assessment/Plan:     RA (rheumatoid arthritis)    47YF with RA ( +RF, +CCP), Hypovitaminosis D,Morbid Obesity presented with 3 week history of chest tightness, dyspnea, cough.    Initially seen and dx by Dr Dillon ( Rheumatologist) in 02/2009 however pt opted not to start DMARD therapy as joint scans were normal and took NSAIDs instead prn in 2013. Pt was started on Sulfasalazine 07/15 due to elevated inflammatory makers (ESR 90,CRP 14.5) and L elbow contracture which initially improved after medication was started. Pt was then lost to follow up from 05/16 until 12/17 at which she was taking SSZ 1000mg BID which being managed by her PCP. On last correspondence 12/15/17, due to elevated inflammatory makers and joint pains plan was to increase SSZ to 1500mg BID but pt reported diagnosis of bronchitis/pneumonia and held off.     Pt with poor improvement in symptoms despite abx and low dose prednisone. CT chest with patchy consolidative irregular groundglass opacities. CBC shows no leucocytosis, microcytic anemia and elevated plts(likley related to inflammatory process). Metabolic profile shows normal renal function, elevated alkphos, low albumin. pro calcitonin wnl. Blood cx NGTD. Resp cx: normal syeda. Physical exam with no signs of active synovitis.     DDx Drug induced ILD (SSZ) vs RA related lung disease vs infection vs malignancy vs other autoimmune disease    Resp viral panel +ve corona  virus and TEM - Staphylococcus aureus,TEM klebsiella could have also contributed to pt's symptoms. Labs indeterminate TB, neg MPO,ANCA neg, scl neg, normal complements. Hep B sab +ve related to vaccine, SPEP showed normal total protein, IBIS with no monoclonal peaks, elevated inflammatory markers down trending.     TIARA that was initially negative 2009 now positive 1: 2560 speckled, +SSA,SSB, rest of profile still pending. SLE now higher on differential,( pericardial /pleural effusions)  pt does not meet SLICC criteria for now( serositis +TIARA, positive hallie) , pending complete TIARA profile, APS workup.     Pt was not significantly improved with increase in pleural effusion on CTA chest done 12/28/17.     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   - F/u pending labs ( APS,LDH,haptoglobin, CH50, PR3, scleroderma panel)  - continue to hold SSZ for now until clinic followup when it will be readdressed  - appreciate pulmonary reccs  - if pt symptoms improves and repeat CT chest shows some improvement/stable and not worsening of pleural effusion, will consider discharge and will schedule a follow up appt with Dr Dillon ( Rheumatology) in clinic in 1-2 weeks                Rene Mon MD  Rheumatology  [facility]

## 2017-12-31 NOTE — ASSESSMENT & PLAN NOTE
Concern for symptomatic effusion, tamponade. Agree with Pulmonology that effusion may be significant.   Consulted Cardiology. Multiple Echos 12/27/2017 to assess for tamponade; not found.  Rheumatology consulted. Work up in progress.   Risk of tamponade appears minimal now; avoiding volume depletion as patient becomes tachycardic with Lasix.  Trial of steroids.

## 2017-12-31 NOTE — ASSESSMENT & PLAN NOTE
47YF with RA ( +RF, +CCP), Hypovitaminosis D,Morbid Obesity presented with 3 week history of chest tightness, dyspnea, cough.    Initially seen and dx by Dr Dillon ( Rheumatologist) in 02/2009 however pt opted not to start DMARD therapy as joint scans were normal and took NSAIDs instead prn in 2013. Pt was started on Sulfasalazine 07/15 due to elevated inflammatory makers (ESR 90,CRP 14.5) and L elbow contracture which initially improved after medication was started. Pt was then lost to follow up from 05/16 until 12/17 at which she was taking SSZ 1000mg BID which being managed by her PCP. On last correspondence 12/15/17, due to elevated inflammatory makers and joint pains plan was to increase SSZ to 1500mg BID but pt reported diagnosis of bronchitis/pneumonia and held off.     Pt with poor improvement in symptoms despite abx and low dose prednisone. CT chest with patchy consolidative irregular groundglass opacities. CBC shows no leucocytosis, microcytic anemia and elevated plts(likley related to inflammatory process). Metabolic profile shows normal renal function, elevated alkphos, low albumin. pro calcitonin wnl. Blood cx NGTD. Resp cx: normal syeda. Physical exam with no signs of active synovitis.     DDx Drug induced ILD (SSZ) vs RA related lung disease vs infection vs malignancy vs other autoimmune disease    Resp viral panel +ve corona virus and TEM - Staphylococcus aureus,TEM klebsiella could have also contributed to pt's symptoms. Labs indeterminate TB, neg MPO,ANCA neg, scl neg, normal complements. Hep B sab +ve related to vaccine, SPEP showed normal total protein, IBIS with no monoclonal peaks, elevated inflammatory markers down trending.     TIARA that was initially negative 2009 now positive 1: 2560 speckled, +SSA,SSB, rest of profile still pending. SLE now higher on differential,( pericardial /pleural effusions)  pt does not meet SLICC criteria for now( serositis +TIARA, positive hallie) , pending complete TIARA  profile, APS workup.     Pt was not significantly improved with increase in pleural effusion on CTA chest done 12/28/17.     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   - F/u pending labs ( APS,LDH,haptoglobin, CH50, PR3, scleroderma panel)  - continue to hold SSZ for now until clinic followup when it will be readdressed  - appreciate pulmonary reccs  - if pt symptoms improves and repeat CT chest shows some improvement/stable and not worsening of pleural effusion, will consider discharge and will schedule a follow up appt with Dr Dillon ( Rheumatology) in clinic in 1-2 weeks

## 2017-12-31 NOTE — SUBJECTIVE & OBJECTIVE
Interval History: Patient with no events overnight, no new complaints. Clinically improved since admission.  Data Review: Results recorded below were reviewed 12/30/2017.    Review of Systems   Constitutional: Negative for fever.   Respiratory: Positive for shortness of breath. Negative for chest tightness.    Cardiovascular: Negative for chest pain and leg swelling.     Objective:     Vital Signs (Most Recent):  Temp: 98.1 °F (36.7 °C) (12/30/17 1700)  Pulse: 80 (12/30/17 1943)  Resp: 16 (12/30/17 1943)  BP: 136/84 (12/30/17 1700)  SpO2: 96 % (12/30/17 2123) Vital Signs (24h Range):  Temp:  [97.4 °F (36.3 °C)-99 °F (37.2 °C)] 98.1 °F (36.7 °C)  Pulse:  [] 80  Resp:  [16-20] 16  SpO2:  [86 %-98 %] 96 %  BP: (114-136)/(59-84) 136/84     Weight: (!) 159.8 kg (352 lb 4.7 oz)  Body mass index is 58.62 kg/m².    Intake/Output Summary (Last 24 hours) at 12/30/17 2158  Last data filed at 12/30/17 0559   Gross per 24 hour   Intake              120 ml   Output                0 ml   Net              120 ml      Physical Exam   Constitutional: She appears well-developed.  Non-toxic appearance.   HENT:   Head: Normocephalic.   Mouth/Throat: Mucous membranes are not pale and not cyanotic.   Eyes: Conjunctivae and lids are normal. Pupils are equal.   Neck: Neck supple.   Cardiovascular: Normal rate, regular rhythm, S1 normal and S2 normal.    Pulmonary/Chest: Effort normal. She has decreased breath sounds in the right lower field. She has no wheezes. She has rales in the right lower field.   Abdominal: Soft. Bowel sounds are normal. There is no tenderness.   Musculoskeletal: She exhibits no edema.   Neurological: She is alert. She is not disoriented.   Skin: Skin is warm and dry. No cyanosis. Nails show no clubbing.   Psychiatric: She has a normal mood and affect. Cognition and memory are normal.       Significant Labs:     CBC:     Recent Labs  Lab 12/29/17  0437 12/30/17  0530   WBC 14.25* 13.14*   HGB 9.1* 8.9*   HCT  30.3* 30.2*   * 600*     CMP:     Recent Labs  Lab 12/29/17  0437 12/30/17  0529    138   K 4.6 4.2   CL 98 100   CO2 33* 32*   * 163*   BUN 13 17   CREATININE 0.8 0.7   CALCIUM 9.2 9.1   ALBUMIN 2.2* 2.2*   ANIONGAP 7* 6*   EGFRNONAA >60.0 >60.0     Magnesium:     Recent Labs  Lab 12/29/17 0437 12/30/17  0529   MG 2.3 2.1

## 2017-12-31 NOTE — NURSING
Pt free of falls/injuries entire shift. Did O2 walk test with pt. While sitting up in recliner without oxygen pt sustained O2 sat of 91%. While walking without oxygen, pt sustained 86% O2 sat. SOB noted on exertion and tachypneic. While resting with oxygen pt sats between 94% and 96%. While walking with oxygen pt sustains between 91% and 94%. VSS. No s/s distress noted. No acute events over night. No c/o pain. Pt sleeps in recliner all shift. All scheduled meds given as ordered. Call light within reach. Will continue to monitor pt

## 2017-12-31 NOTE — ASSESSMENT & PLAN NOTE
Etiology unclear. No history of hypoxia.  Cultures and viral studies are pending.  Pulmonology, Cardiology, and Rheumatology consulted.  Work up initially inconclusive. Doubt infectious etiology from bacterial source at this point. Viral panel was positive for Human Coronavirus.  Continuing supportive care..

## 2018-01-01 VITALS
TEMPERATURE: 98 F | DIASTOLIC BLOOD PRESSURE: 81 MMHG | OXYGEN SATURATION: 98 % | SYSTOLIC BLOOD PRESSURE: 144 MMHG | HEIGHT: 65 IN | WEIGHT: 293 LBS | RESPIRATION RATE: 18 BRPM | HEART RATE: 81 BPM | BODY MASS INDEX: 48.82 KG/M2

## 2018-01-01 PROBLEM — M32.8: Status: ACTIVE | Noted: 2018-01-01

## 2018-01-01 LAB
ALBUMIN SERPL BCP-MCNC: 2.1 G/DL
ANION GAP SERPL CALC-SCNC: 6 MMOL/L
BASOPHILS # BLD AUTO: 0.02 K/UL
BASOPHILS NFR BLD: 0.2 %
BUN SERPL-MCNC: 17 MG/DL
CALCIUM SERPL-MCNC: 9.4 MG/DL
CH50 SERPL-ACNC: 52 U/ML
CHLORIDE SERPL-SCNC: 102 MMOL/L
CO2 SERPL-SCNC: 31 MMOL/L
CREAT SERPL-MCNC: 0.8 MG/DL
DIFFERENTIAL METHOD: ABNORMAL
EOSINOPHIL # BLD AUTO: 0 K/UL
EOSINOPHIL NFR BLD: 0.3 %
ERYTHROCYTE [DISTWIDTH] IN BLOOD BY AUTOMATED COUNT: 15 %
EST. GFR  (AFRICAN AMERICAN): >60 ML/MIN/1.73 M^2
EST. GFR  (NON AFRICAN AMERICAN): >60 ML/MIN/1.73 M^2
ESTIMATED AVG GLUCOSE: 128 MG/DL
GLUCOSE SERPL-MCNC: 186 MG/DL
HBA1C MFR BLD HPLC: 6.1 %
HCT VFR BLD AUTO: 30.7 %
HGB BLD-MCNC: 9.2 G/DL
IMM GRANULOCYTES # BLD AUTO: 0.11 K/UL
IMM GRANULOCYTES NFR BLD AUTO: 1 %
LYMPHOCYTES # BLD AUTO: 2.2 K/UL
LYMPHOCYTES NFR BLD: 19.7 %
MAGNESIUM SERPL-MCNC: 1.8 MG/DL
MCH RBC QN AUTO: 24.3 PG
MCHC RBC AUTO-ENTMCNC: 30 G/DL
MCV RBC AUTO: 81 FL
MONOCYTES # BLD AUTO: 0.8 K/UL
MONOCYTES NFR BLD: 7.5 %
NEUTROPHILS # BLD AUTO: 8 K/UL
NEUTROPHILS NFR BLD: 71.3 %
NRBC BLD-RTO: 0 /100 WBC
PHOSPHATE SERPL-MCNC: 3.3 MG/DL
PLATELET # BLD AUTO: 508 K/UL
PMV BLD AUTO: 8.8 FL
POTASSIUM SERPL-SCNC: 4.5 MMOL/L
RBC # BLD AUTO: 3.78 M/UL
SODIUM SERPL-SCNC: 139 MMOL/L
WBC # BLD AUTO: 11.21 K/UL

## 2018-01-01 PROCEDURE — 25000242 PHARM REV CODE 250 ALT 637 W/ HCPCS: Performed by: INTERNAL MEDICINE

## 2018-01-01 PROCEDURE — 94640 AIRWAY INHALATION TREATMENT: CPT

## 2018-01-01 PROCEDURE — 36415 COLL VENOUS BLD VENIPUNCTURE: CPT

## 2018-01-01 PROCEDURE — 83735 ASSAY OF MAGNESIUM: CPT

## 2018-01-01 PROCEDURE — 99239 HOSP IP/OBS DSCHRG MGMT >30: CPT | Mod: ,,, | Performed by: INTERNAL MEDICINE

## 2018-01-01 PROCEDURE — 27000221 HC OXYGEN, UP TO 24 HOURS

## 2018-01-01 PROCEDURE — 63600175 PHARM REV CODE 636 W HCPCS: Performed by: INTERNAL MEDICINE

## 2018-01-01 PROCEDURE — 85025 COMPLETE CBC W/AUTO DIFF WBC: CPT

## 2018-01-01 PROCEDURE — 80069 RENAL FUNCTION PANEL: CPT

## 2018-01-01 PROCEDURE — 25000003 PHARM REV CODE 250: Performed by: INTERNAL MEDICINE

## 2018-01-01 PROCEDURE — A4216 STERILE WATER/SALINE, 10 ML: HCPCS | Performed by: INTERNAL MEDICINE

## 2018-01-01 PROCEDURE — 83036 HEMOGLOBIN GLYCOSYLATED A1C: CPT

## 2018-01-01 PROCEDURE — 94761 N-INVAS EAR/PLS OXIMETRY MLT: CPT

## 2018-01-01 PROCEDURE — 94664 DEMO&/EVAL PT USE INHALER: CPT

## 2018-01-01 RX ORDER — CARVEDILOL 3.12 MG/1
3.12 TABLET ORAL 2 TIMES DAILY WITH MEALS
Qty: 60 TABLET | Refills: 1 | Status: SHIPPED | OUTPATIENT
Start: 2018-01-01 | End: 2018-03-06 | Stop reason: SDUPTHER

## 2018-01-01 RX ORDER — PREDNISONE 20 MG/1
60 TABLET ORAL DAILY
Qty: 3 TABLET | Refills: 1 | Status: SHIPPED | OUTPATIENT
Start: 2018-01-01 | End: 2018-01-02 | Stop reason: SDUPTHER

## 2018-01-01 RX ORDER — HYDROXYCHLOROQUINE SULFATE 200 MG/1
200 TABLET, FILM COATED ORAL 2 TIMES DAILY
Qty: 60 TABLET | Refills: 1 | Status: SHIPPED | OUTPATIENT
Start: 2018-01-01 | End: 2018-03-06 | Stop reason: SDUPTHER

## 2018-01-01 RX ADMIN — CARVEDILOL 3.12 MG: 3.12 TABLET, FILM COATED ORAL at 09:01

## 2018-01-01 RX ADMIN — PREDNISONE 30 MG: 20 TABLET ORAL at 09:01

## 2018-01-01 RX ADMIN — AZITHROMYCIN 250 MG: 250 TABLET, FILM COATED ORAL at 09:01

## 2018-01-01 RX ADMIN — SODIUM CHLORIDE, PRESERVATIVE FREE 3 ML: 5 INJECTION INTRAVENOUS at 06:01

## 2018-01-01 RX ADMIN — IPRATROPIUM BROMIDE 0.5 MG: 0.5 SOLUTION RESPIRATORY (INHALATION) at 07:01

## 2018-01-01 RX ADMIN — AMLODIPINE BESYLATE 5 MG: 5 TABLET ORAL at 09:01

## 2018-01-01 RX ADMIN — SODIUM CHLORIDE, PRESERVATIVE FREE 3 ML: 5 INJECTION INTRAVENOUS at 02:01

## 2018-01-01 RX ADMIN — ENOXAPARIN SODIUM 40 MG: 100 INJECTION SUBCUTANEOUS at 06:01

## 2018-01-01 RX ADMIN — PREDNISONE 30 MG: 20 TABLET ORAL at 05:01

## 2018-01-01 RX ADMIN — HYDROXYCHLOROQUINE SULFATE 200 MG: 200 TABLET, FILM COATED ORAL at 09:01

## 2018-01-01 RX ADMIN — IPRATROPIUM BROMIDE 0.5 MG: 0.5 SOLUTION RESPIRATORY (INHALATION) at 01:01

## 2018-01-01 RX ADMIN — ERGOCALCIFEROL 50000 UNITS: 1.25 CAPSULE ORAL at 09:01

## 2018-01-01 NOTE — PROGRESS NOTES
Home Oxygen Evaluation    Date Performed: 2018    1) Patient's Home O2 Sat on room air, while at rest: 92 %        If O2 sats on room air at rest are 88% or below, patient qualifies. No additional testing needed. Document N/A in steps 2 and 3. If 89% or above, complete steps 2.      2) Patient's O2 Sat on room air while exercisin %        If O2 sats on room air while exercising remain 89% or above patient does not qualify, no further testing needed Document N/A in step 3. If O2 sats on room air while exercising are 88% or below, continue to step 3.      3) Patient's O2 Sat while exercising on 2L per NC, O2: 95 %         (Must show improvement from #2 for patients to qualify)    If O2 sats improve on oxygen, patient qualifies for portable oxygen. If not, the patient does not qualify.

## 2018-01-01 NOTE — ASSESSMENT & PLAN NOTE
Continuing home medication.  Recent increase in inflammatory markers associated with URI.  Consulted Rheumatology. Held SSZ 12/27/2017 as per recommendations.  Multiple inflammatory markers are elevated. Cannot rule out flare at this point.   Monitored for several days as steroid course changed to oral then repeated CT.  Holding SSZ for now; continuing prednisone.

## 2018-01-01 NOTE — PROGRESS NOTES
[facility] Hospital Medicine  Progress Note    Patient Name: Malika Hodgson  MRN: [id number]  Patient Class: IP- Inpatient   Admission Date: 12/20/2017  Length of Stay: 11 days  Attending Physician: Stephanie Reece MD  Primary Care Provider: Sarah Robles MD    [facility] Medicine Team: [facility]OSP MED D Stephanie Reece MD    Subjective:     Principal Problem:Acute hypoxemic respiratory failure    HPI:  47 y.o. female presented to the ER with past medical history of RA (rheumatoid arthritis), immunosuppression, hypertension, morbid obesity.  She was in her usual state of stable health until the acute onset of URI / pneumonia symptoms beginning 2 - 3 weeks prior to admission with gradually improving course after treatment with Tessalon and doxycycline. Patient drank an increased amount of fluid and ate mostly high-sodium soups during this time. She developed significant dyspnea on exertion. Pertinent associated symptoms include shortness of breath on exertion; denies orthopnea and LE swelling.    Hospital Course:  No notes on file    Interval History: Patient with no events overnight, no new complaints. Clinically improved.  Data Review: Results recorded below were reviewed 12/31/2017.    Review of Systems   Constitutional: Negative for fever.   Respiratory: Positive for shortness of breath. Negative for chest tightness.    Cardiovascular: Negative for chest pain and leg swelling.     Objective:     Vital Signs (Most Recent):  Temp: 98.7 °F (37.1 °C) (12/31/17 2059)  Pulse: 88 (12/31/17 2059)  Resp: 16 (12/31/17 2059)  BP: (!) 142/88 (12/31/17 2059)  SpO2: 95 % (12/31/17 2059) Vital Signs (24h Range):  Temp:  [97.9 °F (36.6 °C)-98.7 °F (37.1 °C)] 98.7 °F (37.1 °C)  Pulse:  [] 88  Resp:  [16-20] 16  SpO2:  [84 %-98 %] 95 %  BP: (142-148)/(72-93) 142/88     Weight: (!) 159.8 kg (352 lb 4.7 oz)  Body mass index is 58.62 kg/m².    Intake/Output Summary (Last 24 hours) at 12/31/17 2244  Last  data filed at 12/31/17 1800   Gross per 24 hour   Intake             1290 ml   Output                0 ml   Net             1290 ml      Physical Exam   Constitutional: She appears well-developed.  Non-toxic appearance.   HENT:   Head: Normocephalic.   Mouth/Throat: Mucous membranes are not pale and not cyanotic.   Eyes: Conjunctivae and lids are normal. Pupils are equal.   Neck: Neck supple.   Cardiovascular: Normal rate, regular rhythm, S1 normal and S2 normal.    Pulmonary/Chest: Effort normal. She has decreased breath sounds in the right lower field. She has no wheezes. She has rales in the right lower field.   Abdominal: Soft. Bowel sounds are normal. There is no tenderness.   Musculoskeletal: She exhibits no edema.   Neurological: She is alert. She is not disoriented.   Skin: Skin is warm and dry. No cyanosis. Nails show no clubbing.   Psychiatric: She has a normal mood and affect. Cognition and memory are normal.       Significant Labs:     CBC:     Recent Labs  Lab 12/30/17  0530 12/31/17  0419   WBC 13.14* 10.24   HGB 8.9* 9.2*   HCT 30.2* 30.3*   * 532*     CMP:     Recent Labs  Lab 12/30/17  0529 12/31/17  0419    138   K 4.2 4.9    101   CO2 32* 29   * 255*   BUN 17 15   CREATININE 0.7 0.8   CALCIUM 9.1 9.2   ALBUMIN 2.2* 2.2*   ANIONGAP 6* 8   EGFRNONAA >60.0 >60.0     Magnesium:     Recent Labs  Lab 12/30/17  0529 12/31/17  0419   MG 2.1 1.9     Assessment/Plan:      Current Hospital Problem List:    Active Hospital Problems    Diagnosis  POA    *Acute hypoxemic respiratory failure [J96.01]  Yes     Priority: 1 - High    Pericardial effusion [I31.3]  Yes     Priority: 1 - High    Ground glass opacity present on imaging of lung [R91.8]  Yes     Priority: 1 - High    Acute congestive heart failure [I50.9]  Yes     Priority: 2     RA (rheumatoid arthritis) [M06.9]  Yes     Priority: 3     Essential hypertension [I10]  Yes     Priority: 4     Immunosuppression [D89.9]  Yes  "   Vitamin D deficiency disease [E55.9]  Yes      Resolved Hospital Problems    Diagnosis Date Resolved POA   No resolved problems to display.       Ordered Medications for management of current problems:    amLODIPine  5 mg Oral Daily    azithromycin  250 mg Oral Daily    carvedilol  3.125 mg Oral BID    enoxaparin  40 mg Subcutaneous Q12H    ergocalciferol  50,000 Units Oral Twice Weekly    [START ON 1/1/2018] hydroxychloroquine  200 mg Oral BID    ipratropium  0.5 mg Nebulization TID WAKE    predniSONE  30 mg Oral BID    senna-docusate 8.6-50 mg  1 tablet Oral BID    sodium chloride 0.9%  3 mL Intravenous Q8H       Anticipated Disposition: Home or Self Care +/- O2    Assessment and Plan by Problem:    * Acute hypoxemic respiratory failure    Etiology unclear. No history of hypoxia.  Cultures and viral studies are pending.  Pulmonology, Cardiology, and Rheumatology consulted.  Work up initially inconclusive. Doubt infectious etiology from bacterial source at this point.   Viral panel was positive for Human Coronavirus.  Continuing supportive care. Continues to require oxygen with exertion.        Pericardial effusion    Concern for symptomatic effusion, tamponade. Agree with Pulmonology that effusion may be significant.   Consulted Cardiology. Multiple Echos 12/27/2017 to assess for tamponade; not found.  Rheumatology consulted. Work up in progress.   Risk of tamponade appears minimal now; avoiding volume depletion as patient becomes tachycardic with Lasix.  Trial of steroids effective. TIARA positive. Rheumatology starting Plaquenil.        Ground glass opacity present on imaging of lung    Chest CT revealed: "Patchy irregular multifocal consolidative opacities throughout both lung fields, with associated peribronchial cuffing, endobronchial debris, and groundglass opacities. These findings in addition to a moderate volume pericardial effusion and small volume bilateral pleural effusions could represent " "an infectious process (likely viral), noninfectious serosal inflammatory disease (rheumatoid arthritis, lupus...), or malignancy. Multiple enlarged bilateral axillary lymph nodes, favored to be reactive in nature."   Monitor closely for tamponade from pericardial effusion.  Added Avelox 12/22/17; ordered sputum culture and viral studies.  Added acapella. Added Atrovent nebs as patient allergic to albuterol.  Patient remains hypoxic. Maximizing antibiotic coverage (do not suspect MRSA at this point) with Avelox, Flagyl, and ceftriaxone.  No improvement. Prednisone was started as OP for allergic reaction to albuterol; discontinued Prednisone 12/25/2017.  Consulted Pulmonology. Sputum culture and respiratory pathogen panel pending.  Concern for lack of significant improvement despite diuresis, antibiotics, and supportive care.  Discontinuing Flagyl and Avelox. Continuing current management with ceftriaxone and adding azithromycin.  Patient has completed adequate courses of antibiotics; de-escalating to azithromycin to complete course.  Trial of steroids. IV changed to oral 12/30/2017  Despite findings on chest CTA, patient is clinically improving.  Rheumatology recommended repeat Chest CT.  Effusions have improved with steroids. Consolidation and atelectasis unchanged as expected for viral pneumonia.        Acute congestive heart failure    Apparently new onset CHF. HF pathway initiated. BNP low (falsely due to obesity?).  Lasix 40 mg IV initiated. No ACEi due to allergy.  Echo: CONCLUSIONS     1 - Technically difficult study (BMI 58.6).     2 - Normal left ventricular systolic function (EF 65-70%).     3 - Normal right ventricular systolic function .     4 - The estimated PA systolic pressure is greater than 28 mmHg.     5 - There is a small to perhaps moderately sized pericardial effusion and some collapse of the RV free wall is seen on parasternal long axis imaging.  The IVC cannot be visualized but the relatively " dynamic LV function would support the possibility of   the patient being volume depleted.  Respiratory variation is seen in mitral inflow, but this is of questionable reliability and better signals from LVOT outflow do not show significant variability (as would be seen in tamponade).   Appears to be euvolemic; decreased Lasix to 40 mg po daily. Monitoring.  Mild tachycardia; discontinued Lasix 12/24/17. Appears compensated (suspect pulmonary hypertension).  Trial of diuresis 12/27/2017 as per Cardiology's recommendation; no improvement in hypoxia.   Doses not appear to be in decompensated HF. Diagnosis of CHF is unclear, possible pulmonary hypertension.        RA (rheumatoid arthritis)    Continuing home medication.  Recent increase in inflammatory markers associated with URI.  Consulted Rheumatology. Held SSZ 12/27/2017 as per recommendations.  Multiple inflammatory markers are elevated. Cannot rule out flare at this point.   Monitored for several days as steroid course changed to oral then repeated CT.  Holding SSZ for now; continuing prednisone.        Essential hypertension    Continuing current management with amlodipine for now.  Better controlled initially but then remaining above goal; added Coreg.  Improved.        Vitamin D deficiency disease    Continuing home medications.          VTE Risk Mitigation         Ordered     enoxaparin injection 40 mg  Every 12 hours (non-standard times)     Route:  Subcutaneous        12/20/17 1701     Medium Risk of VTE  Once      12/20/17 1701              Stephanie Reece MD  Department of Hospital Medicine   [facility]

## 2018-01-01 NOTE — ASSESSMENT & PLAN NOTE
Etiology unclear. No history of hypoxia.  Cultures and viral studies are pending.  Pulmonology, Cardiology, and Rheumatology consulted.  Work up initially inconclusive. Doubt infectious etiology from bacterial source at this point.   Viral panel was positive for Human Coronavirus.  Continuing supportive care. Continues to require oxygen with exertion.

## 2018-01-01 NOTE — DISCHARGE SUMMARY
[facility] Hospital Medicine  Discharge Summary      Patient Name: Malika Hodgson  MRN: [id number]  Admission Date: 12/20/2017  Hospital Length of Stay: 12 days  Discharge Date and Time: 1/1/2018  6:37 PM  Attending Physician: Stephanie Reece MD   Discharging Provider: Stephanie Reece MD  Primary Care Provider: Sarah Robles MD  [facility] Medicine Team: [facility] D Stephanie Reece MD    HPI:   47 y.o. female with past medical history of RA (rheumatoid arthritis), immunosuppression, hypertension, and morbid obesity presented with acute onset of URI / pneumonia symptoms beginning 2 - 3 weeks prior to admission with gradually improving course after treatment with Tessalon and doxycycline. Patient drank an increased amount of fluid and ate mostly high-sodium soups during this time. She developed significant dyspnea on exertion. Pertinent associated symptoms include shortness of breath on exertion; denies orthopnea and LE swelling.    * No surgery found *      Hospital Course:      * Acute hypoxemic respiratory failure    Etiology initially unclear. No history of hypoxia.  Cultures and viral studies were pending.  Pulmonology, Cardiology, and Rheumatology consulted.  Work up initially inconclusive. Doubted infectious etiology from bacterial source at this point.   Viral panel was positive for Human Coronavirus. Sputum culture with normal syeda.  Continuing supportive care. Continues to require oxygen with exertion.  Home O2 ordered.        Pericardial effusion    Concern for symptomatic effusion, tamponade. Agreed with Pulmonology that effusion may be significant.   Consulted Cardiology. Multiple Echos 12/27/2017 to assess for tamponade; not found, effusion clinically insignificant.  Rheumatology consulted. Work up revealed serositis due to lupus/RA overlap.   Risk of tamponade appears minimal now; avoiding volume depletion as patient becomes tachycardic with Lasix.  Trial of steroids  "effective. TIARA positive. Rheumatology starting Plaquenil.        Ground glass opacity present on imaging of lung    Chest CT revealed: "Patchy irregular multifocal consolidative opacities throughout both lung fields, with associated peribronchial cuffing, endobronchial debris, and groundglass opacities. These findings in addition to a moderate volume pericardial effusion and small volume bilateral pleural effusions could represent an infectious process (likely viral), noninfectious serosal inflammatory disease (rheumatoid arthritis, lupus...), or malignancy. Multiple enlarged bilateral axillary lymph nodes, favored to be reactive in nature."   Monitored closely for tamponade from pericardial effusion.  Added Avelox 12/22/17; ordered sputum culture and viral studies.  Added acapella. Added Atrovent nebs as patient allergic to albuterol.  Patient remained hypoxic. Maximized antibiotic coverage (do not suspect MRSA at this point) with Avelox, Flagyl, and ceftriaxone.  No improvement. Prednisone was started as OP for allergic reaction to albuterol; discontinued Prednisone 12/25/2017.  Consulted Pulmonology. Sputum culture and respiratory pathogen panel pending.  Concern for lack of significant improvement despite diuresis, antibiotics, and supportive care.  Discontinued Flagyl and Avelox. Continuing management with ceftriaxone and adding azithromycin.  Patient has completed adequate courses of antibiotics; de-escalating to azithromycin to complete course.  Trial of steroids. IV methylprednisolone changed to oral prednisone 12/30/2017  Despite findings on chest CTA, patient is clinically improving.  Rheumatology recommended repeat Chest CT.  Effusions have improved with steroids. Consolidation and atelectasis unchanged as expected for viral pneumonia.        Acute congestive heart failure    Concern for new onset CHF on admission. HF pathway initiated. BNP low (falsely due to obesity?).  Lasix 40 mg IV initiated. No ACEi " due to allergy.  Echo: CONCLUSIONS     1 - Technically difficult study (BMI 58.6).     2 - Normal left ventricular systolic function (EF 65-70%).     3 - Normal right ventricular systolic function .     4 - The estimated PA systolic pressure is greater than 28 mmHg.     5 - There is a small to perhaps moderately sized pericardial effusion and some collapse of the RV free wall is seen on parasternal long axis imaging.  The IVC cannot be visualized but the relatively dynamic LV function would support the possibility of   the patient being volume depleted.  Respiratory variation is seen in mitral inflow, but this is of questionable reliability and better signals from LVOT outflow do not show significant variability (as would be seen in tamponade).   Appears to be euvolemic; decreased Lasix to 40 mg po daily. Monitored.  Mild tachycardia; discontinued Lasix 12/24/17. Appears compensated (suspect pulmonary hypertension).  Trial of diuresis 12/27/2017 as per Cardiology's recommendation; no improvement in hypoxia.   Does not appear to be in decompensated HF. Diagnosis of CHF is unclear/unlikely, possible pulmonary hypertension.        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 SSZ discontinued over concern for DMARDs could causing lung injury. She was treated with methylprednisolone 16 mg q8hr then 60 mg/day prednisone.  Repeat CT chest showed interval decrease in the size of the effusions: pericardial and pleural effusions  Plaquenil 200 mg BID was started.  Per Rheumatology, she has lupus/RA overlap : joint disease + serositis   Discharging on prednisone 60 mg (40 mg Q AM and 20 mg Q PM) and Plaquenil 200 mg BID  She should follow up  in 2 weeks for further medical management as per .        RA (rheumatoid arthritis)    Continued home medication, SSZ  Recent increase in inflammatory markers associated with URI.  Consulted Rheumatology. Held SSZ  12/27/2017 as per recommendations.  Multiple inflammatory markers are elevated. Could not rule out flare at this point.   Monitored for several days as steroid course changed to oral then repeated CT.  Holding SSZ; continuing prednisone.        Essential hypertension    Continued current management with amlodipine on admission.  Better controlled initially but then remaining above goal; added Coreg.  Improved.        Vitamin D deficiency disease    Continuing home medications.          Consults:   Consults         Status Ordering Provider     Inpatient consult to Cardiology  Once     Provider:  (Not yet assigned)    Completed AYSE PEDRO.     Inpatient consult to PICC team (\[facility]\"")  Once     Provider:  (Not yet assigned)    Completed AYSE PEDRO.     Inpatient consult to Pulmonology  Once     Provider:  (Not yet assigned)    Completed AYSE PEDRO.     Inpatient consult to Rheumatology  Once     Provider:  (Not yet assigned)    Completed AYSE PEDRO.     Inpatient consult to Social Work/Case Management  Once     Provider:  (Not yet assigned)    Acknowledged AYSE PEDRO.     IP consult to dietary  Once     Provider:  (Not yet assigned)    Completed AYSE PEDRO.        Final Active Diagnoses:    Diagnosis Date Noted POA    PRINCIPAL PROBLEM:  Acute hypoxemic respiratory failure [J96.01] 12/24/2017 Yes    Pericardial effusion [I31.3] 12/26/2017 Yes    Ground glass opacity present on imaging of lung [R91.8] 12/22/2017 Yes    Acute congestive heart failure [I50.9] 12/20/2017 Yes    Lupus erythematosus overlap syndrome [M32.9] 01/01/2018 Yes    RA (rheumatoid arthritis) [M06.9] 05/21/2013 Yes    Essential hypertension [I10] 11/11/2013 Yes    Immunosuppression [D89.9] 10/09/2015 Yes    Vitamin D deficiency disease [E55.9] 12/17/2014 Yes      Problems Resolved During this Admission:    Diagnosis Date Noted Date Resolved POA       Discharged Condition: stable    Disposition: Home or Self  "Care    Follow Up:  Follow-up Information     Sarah Robles MD On 1/2/2018.    Specialty:  Internal Medicine  Why:  appointment 10:15  Contact information:  [address]  [facility] [address]  [phone]             Roxie Paz MD. Schedule an appointment as soon as possible for a visit in 2 weeks.    Specialty:  Rheumatology  Why:  For discharge from hospital follow up, As previously planned  Contact information:  [address] [facility] [address]  [phone]                 Patient Instructions:     OXYGEN FOR HOME USE   Order Specific Question Answer Comments   Liter Flow 2    Duration With activity    Qualifying SpO2: 83%    Testing done at: Rest    Route nasal cannula    Portable mode: pulse dose acceptable    Device home concentrator with portable unit    Length of need (in months): 3 mos    Patient condition with qualifying saturation CHF    Height: 5' 5" (1.651 m)    Weight: 159.8 kg (352 lb 4.7 oz)    Does patient have medical equipment at home? none    Alternative treatment measures have been tried or considered and deemed clinically ineffective. Yes      Diet Cardiac (2gm sodium and 60gm fat)     Activity as tolerated     Notify your health care provider if you experience any of the following:  temperature >100.4     Notify your health care provider if you experience any of the following:  persistent nausea and vomiting or diarrhea     Notify your health care provider if you experience any of the following:  difficulty breathing or increased cough     Notify your health care provider if you experience any of the following:  persistent dizziness, light-headedness, or visual disturbances     Notify your health care provider if you experience any of the following:  increased confusion or weakness       Significant Diagnostic Studies:  EF   Date Value Ref Range Status   12/28/2017 65 55 - 65    12/27/2017 60 55 - 65      Echocardiogram: 2D echo with color flow doppler:   Results " for orders placed or performed during the hospital encounter of 12/20/17   2D echo with color flow doppler   Result Value Ref Range    EF 60 55 - 65    Est. PA Systolic Pressure 43.96 (A)     Pericardial Effusion MODERATE (A)     Tricuspid Valve Regurgitation TRIVIAL      Hemoglobin A1C   Date Value Ref Range Status   01/01/2018 6.1 (H) 4.0 - 5.6 % Final   08/10/2007 5.9 4.5 - 6.2 % Final     CBC:   Recent Labs  Lab 01/01/18  0443   WBC 11.21   RBC 3.78*   HGB 9.2*   HCT 30.7*   *   MCV 81*   MCH 24.3*   MCHC 30.0*     CMP:   Recent Labs  Lab 12/27/17  0449  01/01/18  0443   *  < > 186*   CALCIUM 9.1  < > 9.4   ALBUMIN 2.2*  2.2*  < > 2.1*   PROT 7.9  --   --      < > 139   K 3.9  < > 4.5   CO2 30*  < > 31*   CL 97  < > 102   BUN 12  < > 17   CREATININE 0.8  < > 0.8   ALKPHOS 97  --   --    ALT 14  --   --    AST 26  --   --    BILITOT 0.3  --   --    < > = values in this interval not displayed.    Cardiac markers:   Recent Labs  Lab 12/27/17  1032   CPKMB 0.7     Microbiology Results (last 7 days)     Procedure Component Value Units Date/Time    Respiratory Viral Panel by PCR [facility]; Sputum [[id number]]  (Abnormal) Collected:  12/24/17 1413    Order Status:  Completed Specimen:  Respiratory Updated:  12/28/17 1302     Respiratory Virus Panel, source SPT     RVP - Adenovirus Not Detected     Comment: Respiratory Viral Panel is a product of [employer].  It has been approved or cleared by the U.S. Food and Drug  Administration for in vitro diagnostic use.  Results should be  used in conjunction with clinical findings, and should not form  the sole basis for a diagnosis or treatment decision.  Negative results do not preclude respiratory virus infection  and should not be used as the sole basis for diagnosis,   treatment, or other management decisions.  Positive results do not rule out bacterial infection, or  co-infection with other viruses.  The agent detected may not  be the  definitive cause of the disease. The use of additional   laboratory testing (e.g. bacterial culture, immunofluorescence,  radiography) and clinical presentation must be taken into  consideration in order to obtain the final diagnosis of   respiratory viral infection.  The RVP assay cannot adequately detect Adenovirus species C,  or serotypes 7a and 41.  The RVP primers for detection of   rhinovirus have been shown to cross-react with enterovirus.  A rhinovirus reactive result should be confirmed by an   alternative method (e.g. cell culture).  The  of the Respiratory Viral Panel has   recommmended that specimens found to be negative for  Adenovirus be confirmed by an alternative method.  The  of the Respiratory Viral Panel has  recommended that specimens found to be negative for   Influenza be confirmed by an alternative method.          Enterovirus Not Detected     Comment: Cross-reactivity has been observed between certain Rhinovirus  strains and the Enterovirus assay.          Human Bocavirus Not Detected     Human Coronavirus Positive (A)     Comment: The Human Coronavirus assay detects Human coronavirus types  229E, OC43,NL63 and HKO1.          RVP - Human Metapneumovirus (hMPV) Not Detected     RVP - Influenza A Not Detected     Influenza A - O1R9-11 Not Detected     RVP - Influenza B Not Detected     Parainfluenza Not Detected     Respiratory Syncytial VirusVirus (RSV) A Not Detected     Comment: The Respiratory Syncytial Viral assay detects types A and B,  however it does not distinguish between the two.          RVP - Rhinovirus Not Detected     Comment: Target Enriched Mulitplex Polymerase Chain Reaction (TEM-PCR)  allows for the detection of multiple pathogens out of a single  reaction.  This test was developed and its performance   characteristics determined by [employer].  It has not   been cleared or approved by the U.S.Food and Drug Administration.  Results should be used  in conjunction with clinical findings,   and should not form the sole basis for a diagnosis or treatment  decision.  TEM-PCR is a licensed technology of [employer].         Narrative:       Receiving Lab:->[facility]  Respiratory Viral Panel by PCR was cancelled on 12/24/2017 at 14:13   by [facility]; absorbed by other test RPTEM    Blood culture [[id number]] Collected:  12/22/17 2218    Order Status:  Completed Specimen:  Blood Updated:  12/27/17 2322     Blood Culture, Routine No growth after 5 days.    Narrative:       Collection has been rescheduled by PDL at 12/22/2017 21:40 Reason:   Collection is for 2136  Collection has been rescheduled by PDL at 12/22/2017 21:40 Reason:   Collection is for 2136    Blood culture [[id number]] Collected:  12/22/17 2218    Order Status:  Completed Specimen:  Blood Updated:  12/27/17 2322     Blood Culture, Routine No growth after 5 days.    Narrative:       Collection has been rescheduled by PDL at 12/22/2017 21:40 Reason:   Collection is for 2136  Collection has been rescheduled by PDL at 12/22/2017 21:40 Reason:   Collection is for 2136    Culture, Respiratory with Gram Stain [[id number]] Collected:  12/24/17 1412    Order Status:  Completed Specimen:  Respiratory from Sputum, Induced Updated:  12/27/17 0931     Respiratory Culture Normal respiratory syeda     Gram Stain (Respiratory) >10 epithelial cells per low power field     Gram Stain (Respiratory) Many WBC's     Gram Stain (Respiratory) Few Gram positive cocci     Gram Stain (Respiratory) Few Gram negative rods     Gram Stain (Respiratory) Rare budding yeast          Recent Labs  Lab 12/27/17  1400   COLORU Straw   SPECGRAV 1.005   PHUR 6.0   PROTEINUA Negative   NITRITE Negative   LEUKOCYTESUR Negative   UROBILINOGEN Negative     Imaging Results          CT Chest Without Contrast (Final result)  Result time 12/31/17 12:25:30    Final result by Dionicio Ibarra DO (12/31/17 12:25:30)                 Impression:     "    1.  Interval decrease in volume of pericardial fluid and left pleural fluid with stable small volume of right pleural fluid.    2.  Near-complete opacification of the right lower lobe suggestive for continued compressive atelectasis. There is partial opacification of the right middle lobe and continued scattered bandlike opacities throughout both lungs, which may all represent atelectasis or scarring and relatively similar to the prior examination.  No new pulmonary opacity.    3.  Stable bilateral axillary lymph node enlargement.  ______________________________________     Electronically signed by resident: BAY JENSEN MD  Date:     12/31/17  Time:    10:59            As the supervising and teaching physician, I personally reviewed the images and resident's interpretation and I agree with the findings.          Electronically signed by: ОЛЬГА MATOS DO  Date:     12/31/17  Time:    12:25              Narrative:    Time of Procedure: 12/31/17 08:32:37  Accession # [id number]  Comparison: Chest CT 12/22/17.    Indication: Followup of effusions.    Technique:   The chest was surveyed from the apices through the costophrenic angles.  Data was reconstructed at 5-mm increments for contiguous 5-mm images in the axial, sagittal and coronal planes and post processed for extraction of contiguous 1.25-mm "high resolution" images and 8 mm maximal-intensity (MIP) images at 2 mm increments confined to the axial plane.  No additional radiation was employed.      Findings:  Stable calcified nodule right thyroid gland measuring 8 mm.  There is a left-sided aortic arch with 3 branch vessels.  The aorta maintains normal caliber and course. No significant aortic or coronary atherosclerosis.      Trace left pleural fluid, improved from prior examination.  Small volume of dependent right pleural fluid, grossly stable from prior examination.      Small pericardial effusion, improved from prior examination.  Bilateral prominent " axillary lymph nodes, unchanged.  No mediastinal lymph node enlargement.  The hilar contours are unremarkable.     The partially visualized structures of the upper abdomen are unremarkable. Extrathoracic soft tissues are unremarkable.  No displaced fracture or aggressive osseous lesion.    Tracheobronchial tree reveals no significant abnormality.  Previously identified endobronchial debris and peritoneal cuffing are not visualized.    Near complete opacification of the right lower lobe and partial opacification of the right middle lobe, similar to prior examination.  Stable scattered bandlike opacities within both upper lobes and in the left lower lobe also unchanged, suggesting subsegmental atelectasis or scarring.  No new pulmonary disease. Clinical correlation and followup advised                             CTA Chest Non Coronary (Final result)  Result time 12/28/17 12:01:05    Final result by Shyanne Parsons MD (12/28/17 12:01:05)                 Impression:      Technically limited study perhaps due to to slow delivery of intravenous contrast medium was oh in suboptimal opacification of the pulmonary arteries.  Within the limits of the study I detect no evidence of pulmonary thromboembolism or pulmonary infarct.    2.  Interval increase in RIGHT pleural fluid results in further compression of the RIGHT lung.  Abundant pericardial fluid persists.    3.  No gilda pulmonary edema or pneumonia detected.  There is no pneumothorax, pneumomediastinum or pneumoperitoneum.    4.  Additional findings above.       Electronically signed by: Shyanne Parsons MD  Date:     12/28/17  Time:    12:01              Narrative:    Time of Procedure: 12/28/17 10:07:23  Accession # [id number]    Comparison: 12/22/2017.    Technique:   During intravenous bolus injection of 100 mL of Omnipaque 350 contrast medium via the LEFT upper extremity, the chest was surveyed from the apices to the costophrenic angles.  Data was reconstructed for  thin multiplanar images as well as maximum intensity projection images in the axial, sagittal and coronal planes for vascular assessment without the use of additional radiation.    Xray dose: DLP = 795.32 mGy-cm.      Findings:  I detect no convincing evidence of abnormality at the base of the neck.  There is left-sided arch with 3 branch vessels.  Aorta maintains normal caliber, contour and course.    Pulmonary arteries distribute normally.  Pulmonary arterial enhancement is suboptimal perhaps due to slow delivery of contrast medium.  Within the limits of the study I detect no pulmonary thromboembolism.  I doubt pulmonary infarct.  I do not identify enlargement of RIGHT heart chambers or displacement of the interventricular septum.    Systemic and pulmonary veno atrial connections are concordant.     As observed on 12/22/2017 there is abundant pericardial fluid.  There is RIGHT pleural fluid; the quantity has increased since 2/22/2017.  There is a very small amount of dependent LEFT pleural fluid, not significantly changed.  I detect no pleural or pericardial calcification.    Axillary lymph nodes are enlarged as observed on the earlier study.  Mildly enlarged lymph nodes are present in the anterior aspect of the superior mediastinum (axial series 2 image 61) and in the prevascular space (axial series 2 image 67).  Such intrathoracic jose enlargement can be seen with infection, noninfectious inflammation, neoplasm, but also in the setting of elevated cardiac pressures.    Esophagus maintains normal caliber and course.  I detect no bowel distension, free air, biliary dilatation or other significant abnormality involving structures in the upper abdomen.  I detect no significant osseous abnormality and no significant abnormality of the extrathoracic soft tissues.     Trachea and central airways are patent.  There is malacia of the bronchus intermedius to a sagittal dimension of 0.4 cm (axial series 2 image 96)  airways supplying the lingula and segments of the LEFT lower lobe also appear small caliber (axial series 2 images 89 through 98).  I do not identify endoluminal filling defect in the intrapulmonary airways.    The large pericardial effusion and a large quantity of RIGHT pleural fluid result in significant compressive atelectasis in adjacent lung.  Although the upper lobes are fairly well aerated, bandlike opacities in the lungs suggest subsegmental atelectasis.  Subsegmental atelectasis in the RIGHT upper lobe is worse than on 12/22/2017.  LEFT upper lobe appearance is similar to that recent study.  The interval increase in pulmonary parenchymal consolidation due largely to compressive effects of RIGHT pleural fluid could account for or contribute to worsening oxygenation.                             CT Chest Without Contrast (Final result)  Result time 12/22/17 11:39:17    Final result by Shyanne Parsons MD (12/22/17 11:39:17)                 Impression:      Patchy irregular multifocal consolidative opacities throughout both lung fields, with associated peribronchial cuffing, endobronchial debris, and groundglass opacities. These findings in addition to a moderate volume pericardial effusion and small volume bilateral pleural effusions could represent an infectious process (likely viral), noninfectious serosal inflammatory disease (rheumatoid arthritis, lupus...), or malignancy.    Multiple enlarged bilateral axillary lymph nodes, favored to be reactive in nature.        ______________________________________     Electronically signed by resident: CANDIE KAUFMAN MD  Date:     12/22/17  Time:    11:35            As the supervising and teaching physician, I personally reviewed the images and resident's interpretation and I agree with the findings.            Electronically signed by: Shyanne Parsons MD  Date:     12/22/17  Time:    11:39              Narrative:    CT CHEST    TIME OF PROCEDURE: 12/22/17  09:40:00  ACCESSION #: [id number]    TECHNIQUE: The chest was surveyed from the lung apices through the costophrenic angles without the use of intravenous contrast material.  Data was reformatted for contiguous 5 mm images in the axial, sagittal, and coronal planes.  Data was post processed for extraction of 1.25 mm images for high-resolution CT imaging and 8-mm maximum intensity projection (MIP) images at 2-mm increments confined to the axial plane without additional radiation to the patient.     TOTAL EXAM DLP: 714.95 mGy-cm.    COMPARISON: Shortness of breath, edema, pericardial effusion.          FINDINGS:  Examination quality is significantly degraded by the patient's body habitus.    The structures at the base of the neck reveal no convincing evidence of disease.    There is a left-sided aortic arch with three branch vessels.  The thoracic aorta maintains normal caliber, contour, and course without significant atherosclerotic calcification.    The heart is not enlarged. There is a moderate volume pericardial effusion.    The pulmonary arteries distribute normally.      There are multiple enlarged bilateral axillary lymph nodes. The largest on the right measures 1.5 cm. The largest on the left measures 2.1 cm. No definite mediastinal lymphadenopathy.    The esophagus maintains a normal course and caliber.    Degenerative changes of the spine are noted.    Limited views of the abdomen demonstrate nothing unusual.    The trachea and proximal airways are patent. There is a mild degree of tracheomalacia.    The lungs are symmetrically expanded. There are small volume bilateral pleural effusions, right greater than left, with minimal associated compressive atelectasis of adjacent lung parenchyma. There are patchy irregular multifocal consolidative opacities throughout both lung fields along with scattered peribronchial cuffing, endobronchial debris, and groundglass opacities. Findings are nonspecific and could  represent infectious etiology, noninfectious serosal inflammatory process, or malignancy.                             X-Ray Chest PA And Lateral (Final result)  Result time 12/20/17 14:23:09    Final result by Maurizio Garcia III, MD (12/20/17 14:23:09)                 Narrative:    2 views: There is cardiomegaly, moderate edema, DJD, and worsening.      Electronically signed by: MAURIZIO GARCIA MD  Date:     12/20/17  Time:    14:23                             Pending Diagnostic Studies:     Procedure Component Value Units Date/Time    Beta-2 glycoprotein antibodies [[id number]] Collected:  12/30/17 0529    Order Status:  Sent Lab Status:  In process Updated:  12/30/17 0643    Specimen:  Blood from Blood     Cardiolipin antibody [[id number]] Collected:  12/30/17 0529    Order Status:  Sent Lab Status:  In process Updated:  12/30/17 0643    Specimen:  Blood from Blood     Complement, total [[id number]] Collected:  12/30/17 0529    Order Status:  Sent Lab Status:  In process Updated:  12/30/17 0643    Specimen:  Blood from Blood     DRVVT [[id number]] Collected:  12/30/17 0529    Order Status:  Sent Lab Status:  In process Updated:  12/30/17 0643    Specimen:  Blood from Blood     MyoMarker Panel 3 [[id number]] Collected:  12/27/17 1345    Order Status:  Sent Lab Status:  In process Updated:  12/27/17 1349    Specimen:  Blood     Proteinase 3 Autoantibodies [[id number]] Collected:  12/27/17 1345    Order Status:  Sent Lab Status:  In process Updated:  12/27/17 1349    Specimen:  Blood from Blood     RNA polymerase III Ab, IgG [[id number]] Collected:  12/27/17 1345    Order Status:  Sent Lab Status:  In process Updated:  12/27/17 1349    Specimen:  Blood          Medications:  Reconciled Home Medications:   Current Discharge Medication List      START taking these medications    Details   carvedilol (COREG) 3.125 MG tablet Take 1 tablet (3.125 mg total) by mouth 2 (two) times daily with meals.  Qty: 60 tablet, Refills: 1     "  hydroxychloroquine (PLAQUENIL) 200 mg tablet Take 1 tablet (200 mg total) by mouth 2 (two) times daily.  Qty: 60 tablet, Refills: 1         CONTINUE these medications which have CHANGED    Details   predniSONE (DELTASONE) 20 MG tablet Take 3 tablets (60 mg total) by mouth once daily. Divided doses: 40 mg Q AM, 20 mg Q PM  Qty: 3 tablet, Refills: 1         CONTINUE these medications which have NOT CHANGED    Details   amlodipine (NORVASC) 5 MG tablet Take 1 tablet (5 mg total) by mouth once daily.  Qty: 90 tablet, Refills: 3    Associated Diagnoses: Angioedema, subsequent encounter      benzonatate (TESSALON PERLES) 100 MG capsule Take 2 capsules (200 mg total) by mouth every 6 (six) hours as needed for Cough.  Qty: 30 capsule, Refills: 0      ergocalciferol (ERGOCALCIFEROL) 50,000 unit Cap Take 1 capsule (50,000 Units total) by mouth twice a week.  Qty: 24 capsule, Refills: 4      multivitamin with iron Tab Take 1 tablet by mouth once daily.  Refills: 0         STOP taking these medications       sulfaSALAzine (AZULFIDINE) 500 mg Tab Comments:   Reason for Stopping:         albuterol 90 mcg/actuation inhaler Comments:   Reason for Stopping:         doxycycline (VIBRAMYCIN) 100 MG Cap Comments:   Reason for Stopping:               Indwelling Lines/Drains at time of discharge:   Lines/Drains/Airways          No matching active lines, drains, or airways          Time spent on the discharge of patient: 55 minutes. Included reviewing hospital course with patient/family, reviewing discharge medications, and arranging follow-up care.  Face to face services were provided on  1/1/2018    Physical Exam on 1/1/2018:  height is 5' 5" (1.651 m) and weight is 159.8 kg (352 lb 4.7 oz) (abnormal). Her oral temperature is 97.6 °F (36.4 °C). Her blood pressure is 144/81 (abnormal) and her pulse is 81. Her respiration is 18 and oxygen saturation is 98%.  Patient was seen and examined on the date of discharge and determined to be " suitable for discharge.         Stephanie Reece MD  Department of Hospital Medicine  [facility]

## 2018-01-01 NOTE — NURSING
Discharge instructions given to patient on medications, diet, activity restrictions, follow-up visits with understanding. IV discontinued. Patient without complaints. Patient discharged via wheelchair with home portable oxygen 2 liters via nasal cannula and transported by DINORAH Hedrick.

## 2018-01-01 NOTE — SUBJECTIVE & OBJECTIVE
Interval History: Patient with no events overnight, no new complaints. Clinically improved.  Data Review: Results recorded below were reviewed 12/31/2017.    Review of Systems   Constitutional: Negative for fever.   Respiratory: Positive for shortness of breath. Negative for chest tightness.    Cardiovascular: Negative for chest pain and leg swelling.     Objective:     Vital Signs (Most Recent):  Temp: 98.7 °F (37.1 °C) (12/31/17 2059)  Pulse: 88 (12/31/17 2059)  Resp: 16 (12/31/17 2059)  BP: (!) 142/88 (12/31/17 2059)  SpO2: 95 % (12/31/17 2059) Vital Signs (24h Range):  Temp:  [97.9 °F (36.6 °C)-98.7 °F (37.1 °C)] 98.7 °F (37.1 °C)  Pulse:  [] 88  Resp:  [16-20] 16  SpO2:  [84 %-98 %] 95 %  BP: (142-148)/(72-93) 142/88     Weight: (!) 159.8 kg (352 lb 4.7 oz)  Body mass index is 58.62 kg/m².    Intake/Output Summary (Last 24 hours) at 12/31/17 2249  Last data filed at 12/31/17 1800   Gross per 24 hour   Intake             1290 ml   Output                0 ml   Net             1290 ml      Physical Exam   Constitutional: She appears well-developed.  Non-toxic appearance.   HENT:   Head: Normocephalic.   Mouth/Throat: Mucous membranes are not pale and not cyanotic.   Eyes: Conjunctivae and lids are normal. Pupils are equal.   Neck: Neck supple.   Cardiovascular: Normal rate, regular rhythm, S1 normal and S2 normal.    Pulmonary/Chest: Effort normal. She has decreased breath sounds in the right lower field. She has no wheezes. She has rales in the right lower field.   Abdominal: Soft. Bowel sounds are normal. There is no tenderness.   Musculoskeletal: She exhibits no edema.   Neurological: She is alert. She is not disoriented.   Skin: Skin is warm and dry. No cyanosis. Nails show no clubbing.   Psychiatric: She has a normal mood and affect. Cognition and memory are normal.       Significant Labs:     CBC:     Recent Labs  Lab 12/30/17  0530 12/31/17  0419   WBC 13.14* 10.24   HGB 8.9* 9.2*   HCT 30.2* 30.3*    * 532*     CMP:     Recent Labs  Lab 12/30/17  0529 12/31/17  0419    138   K 4.2 4.9    101   CO2 32* 29   * 255*   BUN 17 15   CREATININE 0.7 0.8   CALCIUM 9.1 9.2   ALBUMIN 2.2* 2.2*   ANIONGAP 6* 8   EGFRNONAA >60.0 >60.0     Magnesium:     Recent Labs  Lab 12/30/17  0529 12/31/17  0419   MG 2.1 1.9

## 2018-01-01 NOTE — ASSESSMENT & PLAN NOTE
"Chest CT revealed: "Patchy irregular multifocal consolidative opacities throughout both lung fields, with associated peribronchial cuffing, endobronchial debris, and groundglass opacities. These findings in addition to a moderate volume pericardial effusion and small volume bilateral pleural effusions could represent an infectious process (likely viral), noninfectious serosal inflammatory disease (rheumatoid arthritis, lupus...), or malignancy. Multiple enlarged bilateral axillary lymph nodes, favored to be reactive in nature."   Monitor closely for tamponade from pericardial effusion.  Added Avelox 12/22/17; ordered sputum culture and viral studies.  Added acapella. Added Atrovent nebs as patient allergic to albuterol.  Patient remains hypoxic. Maximizing antibiotic coverage (do not suspect MRSA at this point) with Avelox, Flagyl, and ceftriaxone.  No improvement. Prednisone was started as OP for allergic reaction to albuterol; discontinued Prednisone 12/25/2017.  Consulted Pulmonology. Sputum culture and respiratory pathogen panel pending.  Concern for lack of significant improvement despite diuresis, antibiotics, and supportive care.  Discontinuing Flagyl and Avelox. Continuing current management with ceftriaxone and adding azithromycin.  Patient has completed adequate courses of antibiotics; de-escalating to azithromycin to complete course.  Trial of steroids. IV changed to oral 12/30/2017  Despite findings on chest CTA, patient is clinically improving.  Rheumatology recommended repeat Chest CT.  Effusions have improved with steroids. Consolidation and atelectasis unchanged as expected for viral pneumonia.  "

## 2018-01-01 NOTE — ASSESSMENT & PLAN NOTE
Concern for symptomatic effusion, tamponade. Agree with Pulmonology that effusion may be significant.   Consulted Cardiology. Multiple Echos 12/27/2017 to assess for tamponade; not found.  Rheumatology consulted. Work up in progress.   Risk of tamponade appears minimal now; avoiding volume depletion as patient becomes tachycardic with Lasix.  Trial of steroids effective. TIARA positive. Rheumatology starting Plaquenil.

## 2018-01-01 NOTE — PROGRESS NOTES
47 year old female with seropositive RF,CCP positive rheumatoid arthritis,hypovitaminosis,morbidly obese,came in with 3 weeks of chest tightness,LYMAN,cough,was given antibiotics like doxycycline,rocephin,flagyl and moxifloxacin with no improvement,diuresis offered with no improvement.She was given 10 mg prednisone and didn't show improvement    She is a patient of Dr.Webb zayas 2/2009 : no symptoms with Ra,so didn't go on dmards,In 2015 she had elevated inflammatory markers with ESR 90,CRP 14.5,She had left elbow effusion,SSZ 1000 mg bid was started.  12/2017 on 15th her labs again showed high inflammatory markers,hence she was asked to increase SSZ to 1500 mg bid since she also had joint symptoms,she didn't comply to the recommendations since infection was an ongoing concern in the lungs.    Finally 12/22 : CT chest : patchy irregular multifocal consolidative opacities throughout both lung fields, with associated peribronchial cuffing, endobronchial debris, and groundglass opacities in addition to a moderate volume pericardial effusion and small volume bilateral pleural effusions. 2D ECHO 12/21 showed EF 65-70% with small to perhaps moderately sized pericardial effusion and some collapse of the RV free wall is seen on parasternal long axis imaging.  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  We stopped SSZ since we thought DMARDs could cause lung injury    We gave her iv prednisone methylprednisolone 16 mg q8hrly and she is on 60 mg/day prednisone  We started her on plaquenil 200 mg bid     She has been on antibiotics  Virus doesn't need treatment     ID W/U  Corona virus+  resiratory pathogens : TEM : staph aureus,klebsiella  TB indetereminate,TB spot pending   Hepatitis b sab positive,negative antigen,negative hepatitis c  fungitell assay negative   procalcitonin normal    She now has  TIARA 1: 2560 speckled pattern   positive  .44  positive   Negative comer/comer RNP  NILS Delon positive  APLAS panel pending     Negative ANCA  MPO negative  Proteinase 3 pending  scl-70 negative  RNA polymerase III ab pending  myomarker pending    SPEP no M spike    She has anemia 9.2/30.3,normal white count,plts 532 elevated : LDH grossly normal,elevated hapto  Normal CMP    No proteinuria   UA normal    Ck normal     trended down to 107  .9 trended down to 52.8  Normal complements    CT chest rpted today showed interval decrease in the size of the effusions: pleural and pleural effusions    We told her that she has lupus/RA overlap : joint disease + serositis     She can go home on prednisone 60 mg and plaquenil 200 mg bid  She can see  in 2 weeks and further medical management as per     We will sign off

## 2018-01-02 ENCOUNTER — TELEPHONE (OUTPATIENT)
Dept: INTERNAL MEDICINE | Facility: CLINIC | Age: 48
End: 2018-01-02

## 2018-01-02 LAB
ANTI SM ANTIBODY: 2.16 EU
ANTI SM/RNP ANTIBODY: 3.51 EU
ANTI-SM INTERPRETATION: NEGATIVE
ANTI-SM/RNP INTERPRETATION: NEGATIVE
ANTI-SSA ANTIBODY: 210.16 EU
ANTI-SSA INTERPRETATION: POSITIVE
ANTI-SSB ANTIBODY: 178.44 EU
ANTI-SSB INTERPRETATION: POSITIVE
B2 GLYCOPROT1 IGA SER QL: 11 SAU
B2 GLYCOPROT1 IGG SER QL: <9 SGU
B2 GLYCOPROT1 IGM SER QL: <9 SMU
CARDIOLIPIN IGG SER IA-ACNC: <9.4 GPL
CARDIOLIPIN IGM SER IA-ACNC: <9.4 MPL
DSDNA AB SER-ACNC: ABNORMAL [IU]/ML
PATHOLOGIST INTERPRETATION AB/XM: NORMAL

## 2018-01-02 PROCEDURE — 86077 PHYS BLOOD BANK SERV XMATCH: CPT | Mod: ,,, | Performed by: PATHOLOGY

## 2018-01-02 RX ORDER — PREDNISONE 20 MG/1
TABLET ORAL
Qty: 60 TABLET | Refills: 0 | Status: SHIPPED | OUTPATIENT
Start: 2018-01-02 | End: 2018-02-08 | Stop reason: SDUPTHER

## 2018-01-02 NOTE — ASSESSMENT & PLAN NOTE
"Chest CT revealed: "Patchy irregular multifocal consolidative opacities throughout both lung fields, with associated peribronchial cuffing, endobronchial debris, and groundglass opacities. These findings in addition to a moderate volume pericardial effusion and small volume bilateral pleural effusions could represent an infectious process (likely viral), noninfectious serosal inflammatory disease (rheumatoid arthritis, lupus...), or malignancy. Multiple enlarged bilateral axillary lymph nodes, favored to be reactive in nature."   Monitored closely for tamponade from pericardial effusion.  Added Avelox 12/22/17; ordered sputum culture and viral studies.  Added acapella. Added Atrovent nebs as patient allergic to albuterol.  Patient remained hypoxic. Maximized antibiotic coverage (do not suspect MRSA at this point) with Avelox, Flagyl, and ceftriaxone.  No improvement. Prednisone was started as OP for allergic reaction to albuterol; discontinued Prednisone 12/25/2017.  Consulted Pulmonology. Sputum culture and respiratory pathogen panel pending.  Concern for lack of significant improvement despite diuresis, antibiotics, and supportive care.  Discontinued Flagyl and Avelox. Continuing management with ceftriaxone and adding azithromycin.  Patient has completed adequate courses of antibiotics; de-escalating to azithromycin to complete course.  Trial of steroids. IV methylprednisolone changed to oral prednisone 12/30/2017  Despite findings on chest CTA, patient is clinically improving.  Rheumatology recommended repeat Chest CT.  Effusions have improved with steroids. Consolidation and atelectasis unchanged as expected for viral pneumonia.  "

## 2018-01-02 NOTE — ASSESSMENT & PLAN NOTE
Etiology initially unclear. No history of hypoxia.  Cultures and viral studies were pending.  Pulmonology, Cardiology, and Rheumatology consulted.  Work up initially inconclusive. Doubted infectious etiology from bacterial source at this point.   Viral panel was positive for Human Coronavirus. Sputum culture with normal syeda.  Continuing supportive care. Continues to require oxygen with exertion.  Home O2 ordered.

## 2018-01-02 NOTE — ASSESSMENT & PLAN NOTE
Rheumatology was consulted and diagnosed her to have RA-ILD. SSZ discontinued over concern for DMARDs could causing lung injury. She was treated with methylprednisolone 16 mg q8hr then 60 mg/day prednisone.  Repeat CT chest showed interval decrease in the size of the effusions: pericardial and pleural effusions  Plaquenil 200 mg BID was started.  Per Rheumatology, she has lupus/RA overlap : joint disease + serositis   Discharging on prednisone 60 mg (40 mg Q AM and 20 mg Q PM) and Plaquenil 200 mg BID  She should follow up  in 2 weeks for further medical management as per .

## 2018-01-02 NOTE — TELEPHONE ENCOUNTER
I have refilled the prednisone 20 mg 2 in am and 1 in evening. #60    Dr Dillon and Dr Reece- how do you want her prednisone tapered?

## 2018-01-02 NOTE — ASSESSMENT & PLAN NOTE
Continued home medication, SSZ  Recent increase in inflammatory markers associated with URI.  Consulted Rheumatology. Held SSZ 12/27/2017 as per recommendations.  Multiple inflammatory markers are elevated. Could not rule out flare at this point.   Monitored for several days as steroid course changed to oral then repeated CT.  Holding SSZ; continuing prednisone.

## 2018-01-02 NOTE — ASSESSMENT & PLAN NOTE
Concern for new onset CHF on admission. HF pathway initiated. BNP low (falsely due to obesity?).  Lasix 40 mg IV initiated. No ACEi due to allergy.  Echo: CONCLUSIONS     1 - Technically difficult study (BMI 58.6).     2 - Normal left ventricular systolic function (EF 65-70%).     3 - Normal right ventricular systolic function .     4 - The estimated PA systolic pressure is greater than 28 mmHg.     5 - There is a small to perhaps moderately sized pericardial effusion and some collapse of the RV free wall is seen on parasternal long axis imaging.  The IVC cannot be visualized but the relatively dynamic LV function would support the possibility of   the patient being volume depleted.  Respiratory variation is seen in mitral inflow, but this is of questionable reliability and better signals from LVOT outflow do not show significant variability (as would be seen in tamponade).   Appears to be euvolemic; decreased Lasix to 40 mg po daily. Monitored.  Mild tachycardia; discontinued Lasix 12/24/17. Appears compensated (suspect pulmonary hypertension).  Trial of diuresis 12/27/2017 as per Cardiology's recommendation; no improvement in hypoxia.   Does not appear to be in decompensated HF. Diagnosis of CHF is unclear/unlikely, possible pulmonary hypertension.

## 2018-01-02 NOTE — TELEPHONE ENCOUNTER
----- Message from Catina Shultz sent at 1/2/2018  2:45 PM CST -----  Contact: Patient [phone]  Would like to speak to you regarding Rx predniSONE (DELTASONE) 20 MG tablet.    Please call and advise.    Thank You

## 2018-01-02 NOTE — ASSESSMENT & PLAN NOTE
Concern for symptomatic effusion, tamponade. Agreed with Pulmonology that effusion may be significant.   Consulted Cardiology. Multiple Echos 12/27/2017 to assess for tamponade; not found, effusion clinically insignificant.  Rheumatology consulted. Work up revealed serositis due to lupus/RA overlap.   Risk of tamponade appears minimal now; avoiding volume depletion as patient becomes tachycardic with Lasix.  Trial of steroids effective. TIARA positive. Rheumatology starting Plaquenil.

## 2018-01-02 NOTE — ASSESSMENT & PLAN NOTE
Continued current management with amlodipine on admission.  Better controlled initially but then remaining above goal; added Coreg.  Improved.

## 2018-01-02 NOTE — TELEPHONE ENCOUNTER
Pt calling because she is supposed to be on prednisone daily however she was only given 3 pills for today only. Pt wants to know what she should do about this. Contact rheumatology or Dr. Reece?

## 2018-01-03 LAB — RNA POLYMERASE III ANTIBODIES, IGG, SERUM: 31.8 U

## 2018-01-04 ENCOUNTER — PATIENT OUTREACH (OUTPATIENT)
Dept: ADMINISTRATIVE | Facility: CLINIC | Age: 48
End: 2018-01-04

## 2018-01-04 ENCOUNTER — TELEPHONE (OUTPATIENT)
Dept: RHEUMATOLOGY | Facility: CLINIC | Age: 48
End: 2018-01-04

## 2018-01-04 LAB
APTT HEX PL PPP: NEGATIVE S
PROTEINASE3 IGG SER-ACNC: <0.2 U

## 2018-01-04 NOTE — PATIENT INSTRUCTIONS

## 2018-01-04 NOTE — TELEPHONE ENCOUNTER
----- Message from Rene Mon MD sent at 1/3/2018  7:35 PM CST -----  Helevlie Dillon,    Just wanted to let you know that her RNA polymerase III came back weakly positive. Scl ab negative. ANCA, MPO negative.  TIARA+ SSA,SSB. SPEP showed increased gamma globulin polyclonal with no monoclonal peaks identified. We had started on prednisone 60mg daily for ILD as well as plaquenil 200mg BID.  Some other labs are still pending. PR3, myomarker panel, DRSANDRAVT.     Quite an interesting case.   Jadiel

## 2018-01-08 ENCOUNTER — HOSPITAL ENCOUNTER (OUTPATIENT)
Dept: CARDIOLOGY | Facility: CLINIC | Age: 48
Discharge: HOME | End: 2018-01-08
Attending: INTERNAL MEDICINE
Payer: OTHER GOVERNMENT

## 2018-01-08 ENCOUNTER — HOSPITAL ENCOUNTER (OUTPATIENT)
Dept: RADIOLOGY | Facility: HOSPITAL | Age: 48
Discharge: HOME | End: 2018-01-08
Attending: INTERNAL MEDICINE
Payer: OTHER GOVERNMENT

## 2018-01-08 ENCOUNTER — OFFICE VISIT (OUTPATIENT)
Dept: RHEUMATOLOGY | Facility: CLINIC | Age: 48
End: 2018-01-08
Payer: OTHER GOVERNMENT

## 2018-01-08 VITALS
DIASTOLIC BLOOD PRESSURE: 97 MMHG | SYSTOLIC BLOOD PRESSURE: 151 MMHG | WEIGHT: 293 LBS | TEMPERATURE: 98 F | HEART RATE: 73 BPM | BODY MASS INDEX: 48.82 KG/M2 | HEIGHT: 65 IN

## 2018-01-08 DIAGNOSIS — R91.8 GROUND GLASS OPACITY PRESENT ON IMAGING OF LUNG: ICD-10-CM

## 2018-01-08 DIAGNOSIS — M05.79 RHEUMATOID ARTHRITIS INVOLVING MULTIPLE SITES WITH POSITIVE RHEUMATOID FACTOR: ICD-10-CM

## 2018-01-08 DIAGNOSIS — E66.01 MORBID OBESITY WITH BMI OF 50.0-59.9, ADULT: ICD-10-CM

## 2018-01-08 DIAGNOSIS — I36.0 NONRHEUMATIC TRICUSPID (VALVE) STENOSIS: ICD-10-CM

## 2018-01-08 DIAGNOSIS — M32.8 LUPUS ERYTHEMATOSUS OVERLAP SYNDROME: ICD-10-CM

## 2018-01-08 DIAGNOSIS — M32.8 LUPUS ERYTHEMATOSUS OVERLAP SYNDROME: Primary | ICD-10-CM

## 2018-01-08 DIAGNOSIS — D84.9 IMMUNOSUPPRESSION: ICD-10-CM

## 2018-01-08 DIAGNOSIS — R05.9 COUGH: ICD-10-CM

## 2018-01-08 LAB
DIASTOLIC DYSFUNCTION: NO
ESTIMATED PA SYSTOLIC PRESSURE: 30.67
MITRAL VALVE MOBILITY: NORMAL
RETIRED EF AND QEF - SEE NOTES: 60 (ref 55–65)
TRICUSPID VALVE REGURGITATION: NORMAL

## 2018-01-08 PROCEDURE — 93306 TTE W/DOPPLER COMPLETE: CPT | Mod: PBBFAC | Performed by: INTERNAL MEDICINE

## 2018-01-08 PROCEDURE — 99214 OFFICE O/P EST MOD 30 MIN: CPT | Mod: PBBFAC,25 | Performed by: INTERNAL MEDICINE

## 2018-01-08 PROCEDURE — 71046 X-RAY EXAM CHEST 2 VIEWS: CPT | Mod: TC

## 2018-01-08 PROCEDURE — 99215 OFFICE O/P EST HI 40 MIN: CPT | Mod: S$PBB,,, | Performed by: INTERNAL MEDICINE

## 2018-01-08 PROCEDURE — 71046 X-RAY EXAM CHEST 2 VIEWS: CPT | Mod: 26,,, | Performed by: RADIOLOGY

## 2018-01-08 PROCEDURE — 99999 PR PBB SHADOW E&M-EST. PATIENT-LVL IV: CPT | Mod: PBBFAC,,, | Performed by: INTERNAL MEDICINE

## 2018-01-08 ASSESSMENT — ROUTINE ASSESSMENT OF PATIENT INDEX DATA (RAPID3)
AM STIFFNESS SCORE: 0, NO
PSYCHOLOGICAL DISTRESS SCORE: 1.1
PATIENT GLOBAL ASSESSMENT SCORE: 2
TOTAL RAPID3 SCORE: 1.22
PAIN SCORE: 0
MDHAQ FUNCTION SCORE: .5
FATIGUE SCORE: 3

## 2018-01-08 NOTE — PROGRESS NOTES
"Subjective:       Patient ID: Malika Hodgson is a 47 y.o. female.    Chief Complaint: Rheumatoid Arthritis      HPI:  Malika Hodgson is a 47 y.o. female with history of RA.  Hospitalized for 12 days after being admitted for acute hypoxemic respiratory failure, pericardial/pleural effusion, ground glass opacities on chest CT.     Rheumatology in hospital was concerned for drug induced ILD (SSZ) vs RA related lung disease vs infection vs other autoimmune meds. Resp viral panel +ve corona virus and TEM - Staphylococcus aureus,TEM klebsiella could have also contributed to pt's symptoms.   In hospital held off her Sulfasalazine. TIARA that was initially negative 2009 now positive 1: 2560 speckled, +NILS, +SSA,SSB, dsDNA neg, +RNA polymerase III. She was started on solumedrol 16mg q8 and switched to prednisone 60mg daily.  Discharged with home O2.  Pulmonary decided not to bronch.  Concerned for SLE overlap and we started her on HCQ 200mg BID.     Her repeat CT chest showed a decrease in the pleural and pericardial effusions.     She is currently on 60 mg prednisone daily.     Since hospitalization she is using O2 on activity.  Sats drop to 80s.  Will see pulmonary in February.      Review of Systems   Constitutional: Positive for fatigue.   HENT: Negative.    Eyes: Negative.    Respiratory: Positive for shortness of breath.    Cardiovascular: Negative.    Gastrointestinal: Negative.    Endocrine: Negative.    Genitourinary: Negative.    Musculoskeletal: Negative.    Skin: Negative.    Allergic/Immunologic: Negative.    Neurological: Negative.    Hematological: Negative.    Psychiatric/Behavioral: Positive for sleep disturbance.         Objective:   BP (!) 151/97 (BP Location: Left arm, Patient Position: Sitting, BP Method: Large (Automatic))   Pulse 73   Temp 98.2 °F (36.8 °C) (Oral)   Ht 5' 5" (1.651 m)   Wt (!) 152.5 kg (336 lb 1.6 oz)   BMI 55.93 kg/m²      Physical Exam   Constitutional: She is oriented to " person, place, and time and well-developed, well-nourished, and in no distress.   Oxygen in room  Personal pulse ox 96% at rest on room air but decrease to 88% after walking around in room for 2-3 minutes   HENT:   Head: Normocephalic and atraumatic.   Eyes: Conjunctivae and EOM are normal.   Neck: Neck supple.   Pulmonary/Chest: Effort normal.   Lungs CTA b/l but decrease BS at bases   Abdominal: Soft. Bowel sounds are normal.   Neurological: She is alert and oriented to person, place, and time. Gait normal.   Skin: Skin is warm and dry.     Psychiatric: Mood and affect normal.   Musculoskeletal: She exhibits no edema, tenderness or deformity.           LABS    Component      Latest Ref Rng & Units 1/2/2018 1/1/2018 12/31/2017   WBC      3.90 - 12.70 K/uL  11.21 10.24   RBC      4.00 - 5.40 M/uL  3.78 (L) 3.78 (L)   Hemoglobin      12.0 - 16.0 g/dL  9.2 (L) 9.2 (L)   Hematocrit      37.0 - 48.5 %  30.7 (L) 30.3 (L)   MCV      82 - 98 fL  81 (L) 80 (L)   MCH      27.0 - 31.0 pg  24.3 (L) 24.3 (L)   MCHC      32.0 - 36.0 g/dL  30.0 (L) 30.4 (L)   RDW      11.5 - 14.5 %  15.0 (H) 15.1 (H)   Platelets      150 - 350 K/uL  508 (H) 532 (H)   MPV      9.2 - 12.9 fL  8.8 (L) 9.0 (L)   Immature Granulocytes      0.0 - 0.5 %  1.0 (H) 0.8 (H)   Gran #      1.8 - 7.7 K/uL  8.0 (H) 8.3 (H)   Immature Grans (Abs)      0.00 - 0.04 K/uL  0.11 (H) 0.08 (H)   Lymph #      1.0 - 4.8 K/uL  2.2 1.2   Mono #      0.3 - 1.0 K/uL  0.8 0.6   Eos #      0.0 - 0.5 K/uL  0.0 0.0   Baso #      0.00 - 0.20 K/uL  0.02 0.02   nRBC      0 /100 WBC  0 0   Gran%      38.0 - 73.0 %  71.3 80.8 (H)   Lymph%      18.0 - 48.0 %  19.7 11.8 (L)   Mono%      4.0 - 15.0 %  7.5 6.1   Eosinophil%      0.0 - 8.0 %  0.3 0.3   Basophil%      0.0 - 1.9 %  0.2 0.2   Differential Method        Automated Automated   Glucose      70 - 110 mg/dL  186 (H) 255 (H)   Sodium      136 - 145 mmol/L  139 138   Potassium      3.5 - 5.1 mmol/L  4.5 4.9   Chloride      95 - 110  mmol/L  102 101   CO2      23 - 29 mmol/L  31 (H) 29   BUN, Bld      6 - 20 mg/dL  17 15   Calcium      8.7 - 10.5 mg/dL  9.4 9.2   Creatinine      0.5 - 1.4 mg/dL  0.8 0.8   Albumin      3.5 - 5.2 g/dL  2.1 (L) 2.2 (L)   Phosphorus      2.7 - 4.5 mg/dL  3.3 2.8   eGFR if African American      >60 mL/min/1.73 m:2  >60.0 >60.0   eGFR if non African American      >60 mL/min/1.73 m:2  >60.0 >60.0   Anion Gap      8 - 16 mmol/L  6 (L) 8   Hemoglobin A1C      4.0 - 5.6 %  6.1 (H)    Estimated Avg Glucose      68 - 131 mg/dL  128    Haptoglobin      30 - 250 mg/dL   408 (H)   LD      110 - 260 U/L   263 (H)   Magnesium      1.6 - 2.6 mg/dL  1.8 1.9   Pathologist Interp Difficult  AB/XM       REVIEWED     Hex Phosph Neut Test      Negative        Component      Latest Ref Rng & Units 12/30/2017   WBC      3.90 - 12.70 K/uL 13.14 (H)   RBC      4.00 - 5.40 M/uL 3.67 (L)   Hemoglobin      12.0 - 16.0 g/dL 8.9 (L)   Hematocrit      37.0 - 48.5 % 30.2 (L)   MCV      82 - 98 fL 82   MCH      27.0 - 31.0 pg 24.3 (L)   MCHC      32.0 - 36.0 g/dL 29.5 (L)   RDW      11.5 - 14.5 % 15.1 (H)   Platelets      150 - 350 K/uL 600 (H)   MPV      9.2 - 12.9 fL 8.9 (L)   Immature Granulocytes      0.0 - 0.5 % 1.1 (H)   Gran #      1.8 - 7.7 K/uL 9.6 (H)   Immature Grans (Abs)      0.00 - 0.04 K/uL 0.15 (H)   Lymph #      1.0 - 4.8 K/uL 2.2   Mono #      0.3 - 1.0 K/uL 1.1 (H)   Eos #      0.0 - 0.5 K/uL 0.1   Baso #      0.00 - 0.20 K/uL 0.02   nRBC      0 /100 WBC 0   Gran%      38.0 - 73.0 % 73.1 (H)   Lymph%      18.0 - 48.0 % 17.0 (L)   Mono%      4.0 - 15.0 % 8.2   Eosinophil%      0.0 - 8.0 % 0.4   Basophil%      0.0 - 1.9 % 0.2   Differential Method       Automated   Beta-2 Glyco 1 IgG      <=20 SGU <9   Beta-2 Glyco 1 IgM      <=20 SMU <9   Beta-2 Glyco 1 IgA      <=20 ÁNGEL 11   APA Isotype IgG      0.00 - 14.99 GPL <9.40   APA Isotype IgM      0.00 - 12.49 MPL <9.40   Sed Rate      0 - 20 mm/Hr 107 (H)   CRP      0.0 - 8.2 mg/L  52.8 (H)   Direct Saleem (NILS)       POS   Complement,Total, Serum      42 - 95 U/mL 52   INDIRECT SALEEM       NEG   Hex Phosph Neut Test      Negative Negative     Component      Latest Ref Rng & Units 12/27/2017   Anti Sm Antibody      0.00 - 19.99 EU 2.16   Anti-Sm Interpretation      Negative Negative   Anti-SSA Antibody      0.00 - 19.99 .16 (H)   Anti-SSA Interpretation      Negative Positive (A)   Anti-SSB Antibody      0.00 - 19.99 .44 (H)   Anti-SSB Interpretation      Negative Positive (A)   ds DNA Ab      Negative 1:10 Negative 1:10   Anti Sm/RNP Antibody      0.00 - 19.99 EU 3.51   Anti-Sm/RNP Interpretation      Negative Negative   Protein, Urine Random      0 - 15 mg/dL <7   Creatinine, Random Ur      15.0 - 325.0 mg/dL 9.0 (L)   Prot/Creat Ratio, Ur      0.00 - 0.20 Unable to calculate   Cytoplasmic Neutrophilic Ab      <1:20 Titer <1:20   Perinuclear (P-ANCA)      <1:20 Titer <1:20   Sed Rate      0 - 20 mm/Hr 113 (H)   CRP      0.0 - 8.2 mg/L 169.9 (H)   TIARA Screen      Negative <1:160 Positive (A)   Complement (C-3)      50 - 180 mg/dL 137   Complement (C-4)      11 - 44 mg/dL 15   Hepatitis C Ab       Negative   Hep B S Ab       Positive (A)   Hepatitis B Surface Ag       Negative   RNA Polymerase III Antibodies, IgG, Serum      <20.0 (Negative) U 31.8 (H)   Scleroderma SCL-      <20 UNITS 6   ANCA Proteinase 3      <0.4 (Negative) U <0.2   MPO      <=20 UNITS 3   TIARA HEP-2 Titer       Positive >=1:2560 Homogeneous Speckled     Assessment:       1. Hypoxemic respiratory failure.  CT with interstial changes, pericardial effusion and pleural effusion.   TIARA that was initially negative 2009 now positive 1: 2560 speckled, +NILS, +SSA,SSB, dsDNA neg, +RNA polymerase III.   2. Rheumatoid arthritis. Manifested by previous small joint involvement of the hands, +RF/CCP and elevated inflammatory markers.  Now without activity  3. Morbid obesity. Patient has not been exercising at [place].  4.  Abnormal SPEP without monoclonal gammopathy    Plan:       1. Check 2 D Echo  2. Labs.   3. Continue to hold sulfasalazine 1500 mg bid   4. Continue Plaquenil  5. Taper prednisone to 40 mg daily (then by 10 mg every week).  Since she is feeling better and concern for scleroderma renal crisis with positive RNA polymerase II.    RTO 1 month/prn

## 2018-01-09 ENCOUNTER — PATIENT MESSAGE (OUTPATIENT)
Dept: RHEUMATOLOGY | Facility: CLINIC | Age: 48
End: 2018-01-09

## 2018-01-09 LAB — LA PPP-IMP: NEGATIVE

## 2018-01-12 ENCOUNTER — OFFICE VISIT (OUTPATIENT)
Dept: INTERNAL MEDICINE | Facility: CLINIC | Age: 48
End: 2018-01-12
Payer: OTHER GOVERNMENT

## 2018-01-12 VITALS
OXYGEN SATURATION: 98 % | HEART RATE: 76 BPM | WEIGHT: 293 LBS | SYSTOLIC BLOOD PRESSURE: 110 MMHG | DIASTOLIC BLOOD PRESSURE: 60 MMHG | BODY MASS INDEX: 48.82 KG/M2 | HEIGHT: 65 IN

## 2018-01-12 DIAGNOSIS — E55.9 VITAMIN D DEFICIENCY DISEASE: ICD-10-CM

## 2018-01-12 DIAGNOSIS — T38.0X5A STEROID-INDUCED DIABETES: ICD-10-CM

## 2018-01-12 DIAGNOSIS — M05.79 RHEUMATOID ARTHRITIS INVOLVING MULTIPLE SITES WITH POSITIVE RHEUMATOID FACTOR: ICD-10-CM

## 2018-01-12 DIAGNOSIS — E66.01 MORBID OBESITY WITH BMI OF 50.0-59.9, ADULT: ICD-10-CM

## 2018-01-12 DIAGNOSIS — J96.01 ACUTE HYPOXEMIC RESPIRATORY FAILURE: Primary | ICD-10-CM

## 2018-01-12 DIAGNOSIS — I31.39 PERICARDIAL EFFUSION: ICD-10-CM

## 2018-01-12 DIAGNOSIS — E09.9 STEROID-INDUCED DIABETES: ICD-10-CM

## 2018-01-12 DIAGNOSIS — M32.8 LUPUS ERYTHEMATOSUS OVERLAP SYNDROME: ICD-10-CM

## 2018-01-12 DIAGNOSIS — I10 ESSENTIAL HYPERTENSION: ICD-10-CM

## 2018-01-12 DIAGNOSIS — R91.8 GROUND GLASS OPACITY PRESENT ON IMAGING OF LUNG: ICD-10-CM

## 2018-01-12 PROCEDURE — 99213 OFFICE O/P EST LOW 20 MIN: CPT | Mod: PBBFAC | Performed by: INTERNAL MEDICINE

## 2018-01-12 PROCEDURE — 99496 TRANSJ CARE MGMT HIGH F2F 7D: CPT | Mod: S$PBB,,, | Performed by: INTERNAL MEDICINE

## 2018-01-12 PROCEDURE — 99999 PR PBB SHADOW E&M-EST. PATIENT-LVL III: CPT | Mod: PBBFAC,,, | Performed by: INTERNAL MEDICINE

## 2018-01-12 PROCEDURE — 99496 TRANSJ CARE MGMT HIGH F2F 7D: CPT | Mod: PBBFAC | Performed by: INTERNAL MEDICINE

## 2018-01-12 RX ORDER — METFORMIN HYDROCHLORIDE 500 MG/1
TABLET ORAL
Qty: 163 TABLET | Refills: 3 | Status: SHIPPED | OUTPATIENT
Start: 2018-01-12 | End: 2018-03-01

## 2018-01-12 RX ORDER — METFORMIN HYDROCHLORIDE 500 MG/1
TABLET ORAL
Qty: 60 TABLET | Refills: 3 | Status: SHIPPED | OUTPATIENT
Start: 2018-01-12 | End: 2018-01-12 | Stop reason: SDUPTHER

## 2018-01-12 RX ORDER — LANCETS
EACH MISCELLANEOUS
Qty: 100 EACH | Refills: 3 | Status: SHIPPED | OUTPATIENT
Start: 2018-01-12 | End: 2018-08-16

## 2018-01-12 RX ORDER — PANTOPRAZOLE SODIUM 40 MG/1
TABLET, DELAYED RELEASE ORAL
Qty: 90 TABLET | Refills: 11 | Status: SHIPPED | OUTPATIENT
Start: 2018-01-12 | End: 2018-05-28 | Stop reason: SDUPTHER

## 2018-01-12 RX ORDER — PANTOPRAZOLE SODIUM 40 MG/1
40 TABLET, DELAYED RELEASE ORAL DAILY
Qty: 30 TABLET | Refills: 11 | Status: SHIPPED | OUTPATIENT
Start: 2018-01-12 | End: 2018-01-12 | Stop reason: SDUPTHER

## 2018-01-12 RX ORDER — BLOOD-GLUCOSE METER
KIT MISCELLANEOUS
Qty: 150 STRIP | Refills: 11 | Status: SHIPPED | OUTPATIENT
Start: 2018-01-12 | End: 2018-05-28 | Stop reason: SDUPTHER

## 2018-01-12 RX ORDER — INSULIN PUMP SYRINGE, 3 ML
EACH MISCELLANEOUS
Qty: 1 EACH | Refills: 0 | Status: SHIPPED | OUTPATIENT
Start: 2018-01-12 | End: 2023-07-07

## 2018-01-14 ENCOUNTER — PATIENT MESSAGE (OUTPATIENT)
Dept: RHEUMATOLOGY | Facility: CLINIC | Age: 48
End: 2018-01-14

## 2018-01-14 ENCOUNTER — PATIENT MESSAGE (OUTPATIENT)
Dept: INTERNAL MEDICINE | Facility: CLINIC | Age: 48
End: 2018-01-14

## 2018-01-14 NOTE — PROGRESS NOTES
CHIEF COMPLAINT:HOspital follow up    HISTORY OF PRESENT ILLNESS: This is a 47 year old woman who presents for hospital follow up.    She was hospitalized from 17 to 18 due to acute hypoxemic respiratory failure. She had pericardial/pleural effusion, ground glass opacities on chest CT.     Rheumatology in hospital was concerned for drug induced ILD (SSZ) vs RA related lung disease vs infection vs other autoimmune meds. Resp viral panel +ve corona virus and TEM - Staphylococcus aureus,TEM klebsiella could have also contributed to pt's symptoms.   In hospital held off her Sulfasalazine. TIARA that was initially negative 2009 now positive 1: 2560 speckled, +NILS, +SSA,SSB, dsDNA neg, +RNA polymerase III. She was started on solumedrol 16mg q8 and switched to prednisone 60mg daily.  Discharged with home O2.  Pulmonary decided not to bronch.  Concerned for SLE overlap and rheumatology started her on HCQ 200mg BID.    She saw Dr Dillon on 18.  She has responded to prednisone. She is now taking 40 mg daily with taper of 10 mg weekly.  Breathing is much improved. She now uses oxygen as needed. She has not been out of the house much.  She will see pulmonary in Feb. Blood sugars have been higher on prednisone    She is taking Norvasc 5 mg daily and now coreg 3.125 mg twice daily for hypertension.  No chest pain, headache, palpitations cough, fever, chills, nausea, vomiting, constipation, diarrhea,   .  She is taking vitamin D 50,000 units twice weekly           PAST MEDICAL HISTORY:   1. Early Rheumatoid arthritis  2. History of vitamin D deficiency.   3. History of amenorrhea   4. Morbid obesity.     PAST SURGICAL HISTORY: Negative.     ALLERGIES, MEDICATIONS: Updated on [facility]     SOCIAL HISTORY: No tobacco, alcohol use. She is , has no children.   She is a RN    FAMILY HISTORY: Mother  at age 69, ESRD on dialysis, aortic stenosis, diabetes, history hypertension, breast cancer age 64 (in remission), benign  "colon cancer. Father is living diabetes, hypertension and rheumatoid arthritis. Brother with diverticulosis. Brother healthy. Brother healthy.      PHYSICAL EXAMINATION:    /60 (BP Location: Left arm, Patient Position: Sitting, BP Method: Medium (Manual))   Pulse 76   Ht 5' 5" (1.651 m)   Wt (!) 152.2 kg (335 lb 9.6 oz)   SpO2 98%   BMI 55.85 kg/m²     General: Is alert, oriented, in no apparent distress. Affect is within   normal limits.   HEENT: Conjunctivae are anicteric. PERRL. TM's are clear. Oropharynx is   clear.   Neck: Supple. No cervical lymphadenopathy. No thyromegaly. Respiratory   effort is normal.   Lungs: Clear to auscultation bilaterally.   Heart: Regular rate and rhythm, no murmur, rub or gallop. No lower   extremity edema.        ASSESSMENT AND PLAN:     1. Hypoxemic respiratory failure -CT with interstial changes, pericardial effusion and pleural effusion.   TIARA that was initially negative 2009 now positive 1: 2560 speckled, +NILS, +SSA,SSB, dsDNA neg, +RNA polymerase III. Responding to prednisone. Taper per rheum. See pulmonary   2. Early Rheumatoid arthritis. -  Follow up with Dr Dillon  3. Steroid induced diabetes -metformin 500 mg daily for 7 days then twice daily. Check blood sugars   4. Anemia - improving with treating underlying disorder  5. Hypertension - stable. Monitor BP  6. Vitamin D deficiency - vitamin D 50,000 units twice weekly  Screening - [facility] 10/17. GYN exam 5/16  Will see her back in 2 months, sooner if problems arise    "

## 2018-01-14 NOTE — PROGRESS NOTES
Transitional Care Note  Subjective:       Patient ID: Malika Hodgson is a 47 y.o. female.  Chief Complaint: Follow-up    Family and/or Caretaker present at visit?  No.  Diagnostic tests reviewed/disposition: No diagnosic tests pending after this hospitalization.  Disease/illness education:   Home health/community services discussion/referrals: Patient does not have home health established from hospital visit.  They do not need home health.  If needed, we will set up home health for the patient.   Establishment or re-establishment of referral orders for community resources: No other necessary community resources.   Discussion with other health care providers: No discussion with other health care providers necessary.   HPI  Review of Systems    Objective:      Physical Exam    Assessment:           Plan:

## 2018-01-21 ENCOUNTER — PATIENT MESSAGE (OUTPATIENT)
Dept: INTERNAL MEDICINE | Facility: CLINIC | Age: 48
End: 2018-01-21

## 2018-01-21 ENCOUNTER — PATIENT MESSAGE (OUTPATIENT)
Dept: RHEUMATOLOGY | Facility: CLINIC | Age: 48
End: 2018-01-21

## 2018-01-22 ENCOUNTER — PATIENT MESSAGE (OUTPATIENT)
Dept: INTERNAL MEDICINE | Facility: CLINIC | Age: 48
End: 2018-01-22

## 2018-01-22 DIAGNOSIS — Z79.899 LONG-TERM USE OF PLAQUENIL: Primary | ICD-10-CM

## 2018-01-22 LAB
ANTI-PM/SCL AB: <20 UNITS
ANTI-SS-A 52 KD AB, IGG: >150 UNITS
EJ AB SER QL: NEGATIVE
ENA JO1 AB SER IA-ACNC: <20 UNITS
ENA SM+RNP AB SER IA-ACNC: <20 UNITS
FIBRILLARIN (U3 RNP): NEGATIVE
KU AB SER QL: NEGATIVE
MDA-5 (P140): <20 UNITS
MI2 AB SER QL: NEGATIVE
NXP-2 (P140): <20 UNITS
OJ AB SER QL: NEGATIVE
PL12 AB SER QL: NEGATIVE
PL7 AB SER QL: NEGATIVE
SRP AB SERPL QL: NEGATIVE
TIF1 GAMMA (P155/140): <20 UNITS
U2 SNRNP: NEGATIVE

## 2018-01-29 ENCOUNTER — PATIENT MESSAGE (OUTPATIENT)
Dept: RHEUMATOLOGY | Facility: CLINIC | Age: 48
End: 2018-01-29

## 2018-01-30 ENCOUNTER — PATIENT MESSAGE (OUTPATIENT)
Dept: INTERNAL MEDICINE | Facility: CLINIC | Age: 48
End: 2018-01-30

## 2018-01-30 ENCOUNTER — PATIENT MESSAGE (OUTPATIENT)
Dept: RHEUMATOLOGY | Facility: CLINIC | Age: 48
End: 2018-01-30

## 2018-01-31 NOTE — TELEPHONE ENCOUNTER
Patient notes some desaturation to 90-91% but has 96-98% at rest.  She will continue 20 mg prednisone daily and see Pulmonary 2/7/18

## 2018-02-07 ENCOUNTER — OFFICE VISIT (OUTPATIENT)
Dept: PULMONOLOGY | Facility: CLINIC | Age: 48
End: 2018-02-07
Payer: OTHER GOVERNMENT

## 2018-02-07 ENCOUNTER — HOSPITAL ENCOUNTER (OUTPATIENT)
Dept: PULMONOLOGY | Facility: CLINIC | Age: 48
Discharge: HOME | End: 2018-02-07
Payer: OTHER GOVERNMENT

## 2018-02-07 ENCOUNTER — HOSPITAL ENCOUNTER (OUTPATIENT)
Dept: RADIOLOGY | Facility: HOSPITAL | Age: 48
Discharge: HOME | End: 2018-02-07
Attending: INTERNAL MEDICINE
Payer: OTHER GOVERNMENT

## 2018-02-07 VITALS
OXYGEN SATURATION: 98 % | DIASTOLIC BLOOD PRESSURE: 72 MMHG | SYSTOLIC BLOOD PRESSURE: 142 MMHG | HEART RATE: 62 BPM | WEIGHT: 293 LBS | BODY MASS INDEX: 48.82 KG/M2 | BODY MASS INDEX: 48.82 KG/M2 | HEIGHT: 65 IN | HEIGHT: 65 IN | WEIGHT: 293 LBS

## 2018-02-07 DIAGNOSIS — J12.89 PNEUMONIA DUE TO HUMAN CORONAVIRUS: ICD-10-CM

## 2018-02-07 DIAGNOSIS — M32.19 SYSTEMIC LUPUS ERYTHEMATOSUS WITH OTHER ORGAN INVOLVEMENT, UNSPECIFIED SLE TYPE: ICD-10-CM

## 2018-02-07 DIAGNOSIS — J96.21 ACUTE ON CHRONIC RESPIRATORY FAILURE WITH HYPOXIA: ICD-10-CM

## 2018-02-07 DIAGNOSIS — B97.29 PNEUMONIA DUE TO HUMAN CORONAVIRUS: ICD-10-CM

## 2018-02-07 DIAGNOSIS — J84.9 ILD (INTERSTITIAL LUNG DISEASE): Primary | ICD-10-CM

## 2018-02-07 DIAGNOSIS — M05.10 RHEUMATOID LUNG DISEASE WITH RHEUMATOID ARTHRITIS OF UNSPECIFIED SITE: ICD-10-CM

## 2018-02-07 PROBLEM — J96.01 ACUTE HYPOXEMIC RESPIRATORY FAILURE: Status: RESOLVED | Noted: 2017-12-24 | Resolved: 2018-02-07

## 2018-02-07 PROCEDURE — 3008F BODY MASS INDEX DOCD: CPT | Mod: ,,, | Performed by: INTERNAL MEDICINE

## 2018-02-07 PROCEDURE — 94618 PULMONARY STRESS TESTING: CPT | Mod: PBBFAC | Performed by: INTERNAL MEDICINE

## 2018-02-07 PROCEDURE — 99999 PR PBB SHADOW E&M-EST. PATIENT-LVL III: CPT | Mod: PBBFAC,,, | Performed by: INTERNAL MEDICINE

## 2018-02-07 PROCEDURE — 94618 PULMONARY STRESS TESTING: CPT | Mod: 26,S$PBB,, | Performed by: INTERNAL MEDICINE

## 2018-02-07 PROCEDURE — 99214 OFFICE O/P EST MOD 30 MIN: CPT | Mod: 25,S$PBB,, | Performed by: INTERNAL MEDICINE

## 2018-02-07 PROCEDURE — 71046 X-RAY EXAM CHEST 2 VIEWS: CPT | Mod: TC

## 2018-02-07 PROCEDURE — 99213 OFFICE O/P EST LOW 20 MIN: CPT | Mod: PBBFAC | Performed by: INTERNAL MEDICINE

## 2018-02-07 PROCEDURE — 71046 X-RAY EXAM CHEST 2 VIEWS: CPT | Mod: 26,,, | Performed by: RADIOLOGY

## 2018-02-07 RX ORDER — LANCETS 28 GAUGE
EACH MISCELLANEOUS
COMMUNITY
Start: 2018-01-12 | End: 2018-05-28 | Stop reason: SDUPTHER

## 2018-02-07 NOTE — Clinical Note
Without a doubt needs to be aggressively treated.  SHe was so sick in the hospital and responded well to steroids.  Maria Luisa

## 2018-02-07 NOTE — PROGRESS NOTES
"Subjective:       Patient ID: Malika Hodgson is a 47 y.o. female.    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 Sputum was positive for corona virus and had a pneumonia type picture.  In addition, she has CTD history as follows:    1. Hypoxemic respiratory failure.  CT with interstial changes, pericardial effusion and pleural effusion.   TIARA that was initially negative 2009 now positive 1: 2560 speckled, +NILS, +SSA,SSB, dsDNA neg, +RNA polymerase III.   2. Rheumatoid arthritis. Manifested by previous small joint involvement of the hands, +RF/CCP and elevated inflammatory markers.  Now without activity    She is here today for hospital follow up for acute hypoxic respiratory failure and pneumonia.  I also felt that there was underlying CTD associated ILD with super imposed infection.  Patient has been on a steroid taper and when she last decreased to 20 mg prednisone, she felt that she became more dyspneic.  She feels like she has stabilized.  "Feels better now that she is on 20 mg for one week".    While hospitalized was on High flow oxygen with SpO2 no better 88% on FiO2 100% which is much improved.  Diagnosed with lupus with serositis while in hospital.            Pneumonia   There is no cough. Pertinent negatives include no chest pain, fever or trouble swallowing.     Review of Systems   Constitutional: Negative for fever.   HENT: Negative for trouble swallowing.    Respiratory: Negative for cough and asthma nighttime symptoms.    Cardiovascular: Negative for chest pain and leg swelling.   Gastrointestinal: Negative for acid reflux.       Also had a large pericardial effusion that resolved with immunosuppression (last echo 1/8/2018)  Objective:       Vitals:    02/07/18 0925   BP: (!) 142/72   Pulse: 62   SpO2: 98%   Weight: (!) 147.7 kg (325 lb 9.9 oz)   Height: 5' 5" (1.651 m) "     Physical Exam   Constitutional: She is oriented to person, place, and time. She appears well-developed and well-nourished.   Cardiovascular: Normal rate and regular rhythm.    Pulmonary/Chest: She has no wheezes. She has no rales.   Musculoskeletal: Normal range of motion. She exhibits no edema.   Lymphadenopathy: No supraclavicular adenopathy is present.     She has no cervical adenopathy.   Neurological: She is alert and oriented to person, place, and time.   Psychiatric: She has a normal mood and affect.     Personal Diagnostic Review  CXR today with resolution of pleural effusions. Still with platelike atelectasis.    Pulmonary function tests:   Pulmonary Studies Review 2/7/2018   SpO2 -   Ordering Provider MD Noa.   Performing nurse/tech/RT Lucio CRT   Diagnosis Qualify for Oxygen   Height 65   Weight 5200   BMI (Calculated) 54.2   Predicted Distance 254.12   Patient Race    6MWT Status completed without stopping   Patient Reported Dyspnea   Was O2 used? No   6MW Distance walked (feet) 800   Distance walked (meters) 243.84   Did patient stop? No   Type of assistive device(s) used? no assistive devices   Is extra documentation required for this patient? No   Oxygen Saturation 98   Supplemental Oxygen Room Air   Heart Rate 64   Blood Pressure 158/87   Epifanio Dyspnea Rating  nothing at all   Oxygen Saturation 87   Supplemental Oxygen Room Air   Heart Rate 111   Blood Pressure 181/87   Epifanio Dyspnea Rating  very light   Recovery Time (seconds) 69   Oxygen Saturation 96   Supplemental Oxygen Room Air   Heart Rate 70   Blood Pressure 142/72   Is procedure ready for interpretation? Yes   Predicted Distance Meters (Calculated) 394.55   Oxygen Qualification? Yes   Oxygen Saturation 99   Supplemental Oxygen 2 L/M   Heart Rate 66   Blood Pressure 142/72   Epifanio Dyspnea Rating  nothing at all   Oxygen Saturation 98   Supplemental Oxygen 2 L/M   Heart Rate 102   Blood Pressure 156/90   Epifanio Dyspnea Rating   nothing at all   Recovery Time (seconds) 36   Oxygen Saturation 99   Supplemental Oxygen 2 L/M   Heart Rate 61     No flowsheet data found.      Assessment:       1. ILD (interstitial lung disease)    2. 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 Systemic lupus erythematosus with other organ involvement, unspecified SLE type    4. Acute on chronic respiratory failure with hypoxia    5. Pneumonia due to human coronavirus        Outpatient Encounter Prescriptions as of 2/7/2018   Medication Sig Dispense Refill    amlodipine (NORVASC) 5 MG tablet Take 1 tablet (5 mg total) by mouth once daily. 90 tablet 3    blood-glucose meter kit Use as instructed 1 each 0    carvedilol (COREG) 3.125 MG tablet Take 1 tablet (3.125 mg total) by mouth 2 (two) times daily with meals. 60 tablet 1    ergocalciferol (ERGOCALCIFEROL) 50,000 unit Cap Take 1 capsule (50,000 Units total) by mouth twice a week. 24 capsule 4    FREESTYLE LANCETS 28 gauge lancets       FREESTYLE LITE STRIPS Strp TEST BLOOD SUGAR EVERY  strip 11    hydroxychloroquine (PLAQUENIL) 200 mg tablet Take 1 tablet (200 mg total) by mouth 2 (two) times daily. 60 tablet 1    lancets Misc Check blood sugar once daily 100 each 3    metFORMIN (GLUCOPHAGE) 500 MG tablet TAKE 1 TABLET BY MOUTH EVERY DAY AT LUNCH FOR 7 DAYS THEN 1 TABLET TWICE DAILY AT LUNCH AND DINNER 163 tablet 3    multivitamin with iron Tab Take 1 tablet by mouth once daily.  0    pantoprazole (PROTONIX) 40 MG tablet TAKE 1 TABLET BY MOUTH EVERY DAY 90 tablet 11    predniSONE (DELTASONE) 20 MG tablet Divided doses: 40 mg Q AM, 20 mg Q PM (Patient taking differently: Take 20 mg by mouth once daily. Divided doses: 40 mg Q AM, 20 mg Q PM) 60 tablet 0    SITagliptin (JANUVIA) 50 MG Tab Take 1 tablet (50 mg total) by mouth once daily. 30 tablet 3    benzonatate (TESSALON PERLES) 100 MG capsule Take 2 capsules (200 mg total) by mouth every 6 (six) hours as  "needed for Cough. 30 capsule 0     No facility-administered encounter medications on file as of 2/7/2018.      Orders Placed This Encounter   Procedures    OXYGEN FOR HOME USE     Order Specific Question:   Liter Flow     Answer:   2     Order Specific Question:   Duration     Answer:   With activity     Order Specific Question:   Qualifying SpO2:     Answer:   87     Order Specific Question:   Testing done at:     Answer:   Exercise/Activity     Order Specific Question:   Route     Answer:   nasal cannula     Order Specific Question:   Portable mode:     Answer:   pulse dose acceptable     Order Specific Question:   Device     Answer:   home concentrator with portable unit     Comments:   POC of patient's choice     Order Specific Question:   Length of need (in months):     Answer:   99 mos     Order Specific Question:   Height:     Answer:   5' 5" (1.651 m)     Order Specific Question:   Weight:     Answer:   147.7 kg (325 lb 9.9 oz)     Order Specific Question:   Alternative treatment measures have been tried or considered and deemed clinically ineffective.     Answer:   Yes    CT Chest Without Contrast     Standing Status:   Future     Standing Expiration Date:   2/7/2019    Spirometry without Bronchodilator     Standing Status:   Future     Standing Expiration Date:   2/7/2019    LUNG VOLUMES     Standing Status:   Future     Standing Expiration Date:   2/7/2019    DLCO-Carbon Monoxide Diffusing Capacity     Standing Status:   Future     Standing Expiration Date:   2/7/2019     Plan:       Unclear if patient has a more chronic CTD associated lung disease or if this is post ARDS due to viral pneumonia.  However, I was always concerned that this was most likely related to ILD.  I am concerned that there is some underlying ILD associated with RA/SLE.  Will get baseline PFT today.  Patient would benefit from portable oxygen concentrator.  CT of chest in two weeks (two months from illness) this will be new " baseline.  Patient would benefit from steroid sparing agent for CTD as I saw her in hospital and strongly felt that most of her symptoms/illness (pericardial/pleural effusion) was related.    Defer to Dr. Dillon as she continues the prednisone taper to add additional therapy.

## 2018-02-08 ENCOUNTER — PATIENT MESSAGE (OUTPATIENT)
Dept: RHEUMATOLOGY | Facility: CLINIC | Age: 48
End: 2018-02-08

## 2018-02-08 DIAGNOSIS — M32.8 LUPUS ERYTHEMATOSUS OVERLAP SYNDROME: Primary | ICD-10-CM

## 2018-02-08 RX ORDER — PREDNISONE 5 MG/1
TABLET ORAL
Qty: 120 TABLET | Refills: 1 | Status: SHIPPED | OUTPATIENT
Start: 2018-02-08 | End: 2018-05-28 | Stop reason: SDUPTHER

## 2018-02-08 NOTE — TELEPHONE ENCOUNTER
Patient reports Dr. Latham does not feel she has interstitial lung disease but she did desat to 87% with walking.  Dr. Latham scheduled PFTs and repeat CT scan in two weeks.  Reviewed Dr. Latham's note.      Patient would like try taper prednisone.  Will do prednisone 15 mg Mon/Wed/Fri and 20 mg on other days.   Check TPMT 9 am tomorrow [facility] in anticipation of starting Imuran.

## 2018-02-08 NOTE — PROCEDURES
Malika Hodgson is a 47 y.o.  female patient, who presents for a 6 minute walk test ordered by Maria Luisa Latham MD.  The diagnosis is Qualify for Oxygen; Interstitial Lung Disease.  The patient's BMI is 54.2 kg/m2. Predicted distance (lower limit of normal) is 254.12 meters.    Test Results:    The test was completed without stopping.  The total time walked was 360 seconds.  During walking, the patient reported:  Dyspnea.  The patient used supplemental oxygen during repeat testing.     02/07/2018---------Distance: 243.84 meters (800 feet)     O2 Sat % Supplemental Oxygen Heart Rate Blood Pressure Epifanio Scale   Pre-exercise  (Resting) 98 % Room Air 64 bpm 158/87 mmHg 0   During Exercise 87 % Room Air 111 bpm 181/87 mmHg 1   Post-exercise   96 % Room Air  70 bpm 142/72 mmHg      Recovery Time: 69 seconds    Oxygen Qualification:     O2 Sat % Supplemental Oxygen Heart Rate Blood Pressure Epifanio Scale   Pre-exercise  (Resting) 99 % 2 L/M  66 bpm  142/72 mmHg 0    During Exercise 98 %  2 L/M  102 bpm  156/90 mmHg 0    Post-exercise   99 %  2 L/M  61 bpm        Performing nurse/tech:  Lucio CRT    PREVIOUS STUDY:   The patient has not had a previous study.      CLINICAL INTERPRETATION:  Six minute walk distance is 243.84 meters (800 feet) with very light dyspnea.  During exercise, there was significant desaturation while breathing room air.  Both blood pressure and heart rate increased significantly with walking.  Hypertension was present prior to exercise.  The patient did not report non-pulmonary symptoms during exercise.  Significant exercise impairment is likely due to desaturation, cardiovascular causes and subjective symptoms.  The patient did complete the study, walking 360 seconds of the 360 second test.  The patient may benefit from using supplemental oxygen during exertion.  No previous study performed.  Based upon age and body mass index, exercise capacity is less than predicted.   Oxygen saturation  did improve while breathing supplemental oxygen.

## 2018-02-09 ENCOUNTER — LAB VISIT (OUTPATIENT)
Dept: LAB | Facility: HOSPITAL | Age: 48
End: 2018-02-09
Attending: INTERNAL MEDICINE
Payer: OTHER GOVERNMENT

## 2018-02-09 DIAGNOSIS — M32.8 LUPUS ERYTHEMATOSUS OVERLAP SYNDROME: ICD-10-CM

## 2018-02-09 PROCEDURE — 36415 COLL VENOUS BLD VENIPUNCTURE: CPT

## 2018-02-09 PROCEDURE — 81479 UNLISTED MOLECULAR PATHOLOGY: CPT

## 2018-02-12 ENCOUNTER — PATIENT MESSAGE (OUTPATIENT)
Dept: PULMONOLOGY | Facility: CLINIC | Age: 48
End: 2018-02-12

## 2018-02-12 LAB
NUDT15 GENOTYPE: NORMAL
NUDT15 PHENOTYPE: NORMAL
TPMT ADDITIONAL INFORMATION: NORMAL
TPMT DISCLAIMER: NORMAL
TPMT GENOTYPE RESULT: NORMAL
TPMT INTERPRETATION: NORMAL
TPMT METHOD: NORMAL
TPMT PHENOTYPE: NORMAL
TPMT REVIEWED BY: NORMAL

## 2018-02-14 ENCOUNTER — TELEPHONE (OUTPATIENT)
Dept: PULMONOLOGY | Facility: CLINIC | Age: 48
End: 2018-02-14

## 2018-02-14 NOTE — TELEPHONE ENCOUNTER
I called [facility] to check on oxygen order for pt to receive an POC, spoke to Bridgette and she stated she will look for the order and contact the patient.   I called the pt to let her know, she should be contacted within the next day or so. Pt verbalized understanding.

## 2018-02-19 ENCOUNTER — PATIENT MESSAGE (OUTPATIENT)
Dept: RHEUMATOLOGY | Facility: CLINIC | Age: 48
End: 2018-02-19

## 2018-02-19 ENCOUNTER — PATIENT MESSAGE (OUTPATIENT)
Dept: PULMONOLOGY | Facility: CLINIC | Age: 48
End: 2018-02-19

## 2018-02-19 ENCOUNTER — PATIENT MESSAGE (OUTPATIENT)
Dept: INTERNAL MEDICINE | Facility: CLINIC | Age: 48
End: 2018-02-19

## 2018-02-19 DIAGNOSIS — M32.8 LUPUS ERYTHEMATOSUS OVERLAP SYNDROME: Primary | ICD-10-CM

## 2018-02-19 DIAGNOSIS — M32.8 LUPUS ERYTHEMATOSUS OVERLAP SYNDROME: ICD-10-CM

## 2018-02-19 RX ORDER — AZATHIOPRINE 50 MG/1
TABLET ORAL
Qty: 90 TABLET | Refills: 0 | Status: SHIPPED | OUTPATIENT
Start: 2018-02-19 | End: 2018-05-09 | Stop reason: SDUPTHER

## 2018-02-19 RX ORDER — AZATHIOPRINE 50 MG/1
50 TABLET ORAL DAILY
Qty: 30 TABLET | Refills: 1 | Status: SHIPPED | OUTPATIENT
Start: 2018-02-19 | End: 2018-02-19 | Stop reason: SDUPTHER

## 2018-02-19 NOTE — TELEPHONE ENCOUNTER
TPMT result normal.  Will start with azathioprine 50 mg daily and repeat labs in 1 month.  Will continue prednisone 15 mg daily for the month.

## 2018-02-21 ENCOUNTER — HOSPITAL ENCOUNTER (OUTPATIENT)
Dept: PULMONOLOGY | Facility: CLINIC | Age: 48
Discharge: HOME | End: 2018-02-21
Payer: OTHER GOVERNMENT

## 2018-02-21 ENCOUNTER — PATIENT MESSAGE (OUTPATIENT)
Dept: PULMONOLOGY | Facility: HOSPITAL | Age: 48
End: 2018-02-21

## 2018-02-21 ENCOUNTER — HOSPITAL ENCOUNTER (OUTPATIENT)
Dept: RADIOLOGY | Facility: HOSPITAL | Age: 48
Discharge: HOME | End: 2018-02-21
Attending: INTERNAL MEDICINE
Payer: OTHER GOVERNMENT

## 2018-02-21 DIAGNOSIS — M32.19 SYSTEMIC LUPUS ERYTHEMATOSUS WITH OTHER ORGAN INVOLVEMENT, UNSPECIFIED SLE TYPE: ICD-10-CM

## 2018-02-21 DIAGNOSIS — J84.9 ILD (INTERSTITIAL LUNG DISEASE): ICD-10-CM

## 2018-02-21 DIAGNOSIS — B97.29 PNEUMONIA DUE TO HUMAN CORONAVIRUS: ICD-10-CM

## 2018-02-21 DIAGNOSIS — J96.21 ACUTE ON CHRONIC RESPIRATORY FAILURE WITH HYPOXIA: ICD-10-CM

## 2018-02-21 DIAGNOSIS — M05.10 RHEUMATOID LUNG DISEASE WITH RHEUMATOID ARTHRITIS OF UNSPECIFIED SITE: ICD-10-CM

## 2018-02-21 DIAGNOSIS — J12.89 PNEUMONIA DUE TO HUMAN CORONAVIRUS: ICD-10-CM

## 2018-02-21 DIAGNOSIS — E04.1 THYROID NODULE: Primary | ICD-10-CM

## 2018-02-21 LAB
PRE FEV1 FVC: 82
PRE FEV1: 1.44
PRE FVC: 1.75
PREDICTED FEV1 FVC: 82
PREDICTED FEV1: 2.72
PREDICTED FVC: 3.32

## 2018-02-21 PROCEDURE — 71250 CT THORAX DX C-: CPT | Mod: 26,,, | Performed by: RADIOLOGY

## 2018-02-21 PROCEDURE — 94727 GAS DIL/WSHOT DETER LNG VOL: CPT | Mod: PBBFAC | Performed by: INTERNAL MEDICINE

## 2018-02-21 PROCEDURE — 94010 BREATHING CAPACITY TEST: CPT | Mod: PBBFAC | Performed by: INTERNAL MEDICINE

## 2018-02-21 PROCEDURE — 94727 GAS DIL/WSHOT DETER LNG VOL: CPT | Mod: 26,S$PBB,, | Performed by: INTERNAL MEDICINE

## 2018-02-21 PROCEDURE — 71250 CT THORAX DX C-: CPT | Mod: TC

## 2018-02-21 PROCEDURE — 94729 DIFFUSING CAPACITY: CPT | Mod: 26,S$PBB,, | Performed by: INTERNAL MEDICINE

## 2018-02-21 PROCEDURE — 94010 BREATHING CAPACITY TEST: CPT | Mod: 26,S$PBB,, | Performed by: INTERNAL MEDICINE

## 2018-02-21 PROCEDURE — 94729 DIFFUSING CAPACITY: CPT | Mod: PBBFAC | Performed by: INTERNAL MEDICINE

## 2018-03-01 ENCOUNTER — LAB VISIT (OUTPATIENT)
Dept: LAB | Facility: HOSPITAL | Age: 48
End: 2018-03-01
Attending: INTERNAL MEDICINE
Payer: OTHER GOVERNMENT

## 2018-03-01 ENCOUNTER — OFFICE VISIT (OUTPATIENT)
Dept: ENDOCRINOLOGY | Facility: CLINIC | Age: 48
End: 2018-03-01
Payer: OTHER GOVERNMENT

## 2018-03-01 VITALS
HEIGHT: 65 IN | HEART RATE: 74 BPM | WEIGHT: 293 LBS | BODY MASS INDEX: 48.82 KG/M2 | DIASTOLIC BLOOD PRESSURE: 80 MMHG | SYSTOLIC BLOOD PRESSURE: 136 MMHG | RESPIRATION RATE: 16 BRPM

## 2018-03-01 DIAGNOSIS — E04.1 THYROID NODULE: Primary | ICD-10-CM

## 2018-03-01 DIAGNOSIS — E66.01 MORBID OBESITY WITH BMI OF 50.0-59.9, ADULT: ICD-10-CM

## 2018-03-01 DIAGNOSIS — E04.1 THYROID NODULE: ICD-10-CM

## 2018-03-01 DIAGNOSIS — E66.01 CLASS 3 SEVERE OBESITY DUE TO EXCESS CALORIES WITHOUT SERIOUS COMORBIDITY WITH BODY MASS INDEX (BMI) OF 50.0 TO 59.9 IN ADULT: ICD-10-CM

## 2018-03-01 DIAGNOSIS — R73.9 STEROID-INDUCED HYPERGLYCEMIA: ICD-10-CM

## 2018-03-01 DIAGNOSIS — T38.0X5A STEROID-INDUCED HYPERGLYCEMIA: ICD-10-CM

## 2018-03-01 LAB — TSH SERPL DL<=0.005 MIU/L-ACNC: 1.19 UIU/ML

## 2018-03-01 PROCEDURE — 99204 OFFICE O/P NEW MOD 45 MIN: CPT | Mod: S$PBB,,, | Performed by: INTERNAL MEDICINE

## 2018-03-01 PROCEDURE — 36415 COLL VENOUS BLD VENIPUNCTURE: CPT

## 2018-03-01 PROCEDURE — 99213 OFFICE O/P EST LOW 20 MIN: CPT | Mod: PBBFAC | Performed by: INTERNAL MEDICINE

## 2018-03-01 PROCEDURE — 84443 ASSAY THYROID STIM HORMONE: CPT

## 2018-03-01 PROCEDURE — 99999 PR PBB SHADOW E&M-EST. PATIENT-LVL III: CPT | Mod: PBBFAC,,, | Performed by: INTERNAL MEDICINE

## 2018-03-01 RX ORDER — PEN NEEDLE, DIABETIC 30 GX3/16"
NEEDLE, DISPOSABLE MISCELLANEOUS
Qty: 200 EACH | Refills: 3 | Status: SHIPPED | OUTPATIENT
Start: 2018-03-01 | End: 2018-05-28 | Stop reason: SDUPTHER

## 2018-03-01 NOTE — PROGRESS NOTES
Subjective:      Patient ID: Malika Hodgson is a 47 y.o. female.    Chief Complaint:  Thyroid Nodule      History of Present Illness    Ms.Rena FREDY Hodgson is seen as a new patient     She was found to have thyroid nodules when she had CT scan of the chest.    No US thyroid done       Denies change in size of neck, difficulty swallowing, shortness of breath in the recumbent position, pain or pressure around the neck. Denies history of hypo or hyperthyroidism.     Patient denies history of radiation to the head or neck. Denies history of colon or breast cancer. Denies family history of thyroid cancer or thyroid disease.     Never smoked     She is also on high doses of prednisone which is being tapered     Found to have hyperglycemias      Review of Systems   Constitutional: Negative for unexpected weight change (+ intensional weight loss).   HENT: Negative for trouble swallowing and voice change.    Respiratory: Positive for shortness of breath.    Gastrointestinal: Negative for diarrhea and nausea.   Endocrine: Positive for polydipsia and polyuria.   Neurological: Negative for dizziness and headaches.   Psychiatric/Behavioral: Negative for sleep disturbance.       Objective:   Physical Exam   Constitutional: She appears well-developed.   HENT:   Right Ear: External ear normal.   Left Ear: External ear normal.   Nose: Nose normal.   Hearing normal  Dentition good    Neck: No tracheal deviation present. No thyromegaly present.   Cardiovascular: Normal rate.    No murmur heard.  Pulmonary/Chest: Effort normal and breath sounds normal.   Abdominal: Soft. There is no tenderness. No hernia.   Musculoskeletal: She exhibits no edema.        Right foot: There is no deformity.        Left foot: There is no deformity.   Gait normal  No clubbing or cyanosis noted   Feet:   Right Foot:   Skin Integrity: Negative for ulcer.   Left Foot:   Skin Integrity: Negative for ulcer.   Neurological: She displays normal reflexes. No  cranial nerve deficit.   Feet -sensation intact to vibration and monofilament     Skin: No rash noted.   Acanthosis nigricans present          Psychiatric: She has a normal mood and affect. Judgment normal.   Vitals reviewed.      Lab Review:   CT chest :    There is partial calcification of the right thyroid lobe.  There is a nodule identified in the left lobe thyroid measuring approximately 2.5 cm.  Recommend an ultrasound for further evaluation.    Assessment:     1. Thyroid nodule  CULTURE, URINE    TSH    US Soft Tissue Head Neck Thyroid    Urinalysis    CANCELED: US Soft Tissue Head Neck Thyroid    CANCELED: Urinalysis   2. Class 3 severe obesity due to excess calories without serious comorbidity with body mass index (BMI) of 50.0 to 59.9 in adult  CULTURE, URINE    TSH    US Soft Tissue Head Neck Thyroid    Urinalysis    CANCELED: US Soft Tissue Head Neck Thyroid    CANCELED: Urinalysis   3. Morbid obesity with BMI of 50.0-59.9, adult  CULTURE, URINE    TSH    US Soft Tissue Head Neck Thyroid    Urinalysis    CANCELED: US Soft Tissue Head Neck Thyroid    CANCELED: Urinalysis   4. Steroid-induced hyperglycemia  Urinalysis        # MNG  I have reviewed management options including observation, FNA or surgery.  All of the patients questions were answered     Discussed indications for a FNA      Will proceed with US thyroid if needed FNA    Discussed indications for repeat FNA as well as surgical indications (abnormal FNA, compressive symptoms or interval change)       If FNA is negative then plan follow up in 1 year with TSH and thyroid u/s prior      # Steroid induced hyperglycemia uncontrolled with morbid obesity     - stop januvia and start victoza   - start up if 0.6 mg daily for 1 week if tolerated we ll increase to 1.2 mg daily for 1 week if tolerated well increase to 1.8 mg daily       # Symptoms of UTI   - get UA     Plan:

## 2018-03-01 NOTE — LETTER
March 1, 2018      Maria Luisa Latham MD  [address]  [facility] [address]           [facility] - Endo/Diab/Metab  [address]  [facility] [address]  Phone: [phone]  Fax: [phone]          Patient: Malika Hodgson   MR Number: [id number]   YOB: 1970   Date of Visit: 3/1/2018       Dear Dr. Maria Luisa Latham:    Thank you for referring Malika Hodgson to me for evaluation. Attached you will find relevant portions of my assessment and plan of care.    If you have questions, please do not hesitate to call me. I look forward to following Malika Hodgson along with you.    Sincerely,    Lourdes Stuart MD    Enclosure  CC:  No Recipients    If you would like to receive this communication electronically, please contact [email or web address] or [phone] to request more information on [employer] access.    For providers and/or their staff who would like to refer a patient to [facility], please contact us through our one-stop-shop provider referral line, [facility], at [phone].    If you feel you have received this communication in error or would no longer like to receive these types of communications, please e-mail [email or web address]

## 2018-03-01 NOTE — PATIENT INSTRUCTIONS
Liraglutide injection  What is this medicine?  LIRAGLUTIDE (LIR a GLOO tide) is used to improve blood sugar control in adults with type 2 diabetes. This medicine may be used with other oral diabetes medicines.  How should I use this medicine?  This medicine is for injection under the skin of your upper leg, stomach area, or upper arm. You will be taught how to prepare and give this medicine. Use exactly as directed. Take your medicine at regular intervals. Do not take it more often than directed.  It is important that you put your used needles and syringes in a special sharps container. Do not put them in a trash can. If you do not have a sharps container, call your pharmacist or healthcare provider to get one.  A special MedGuide will be given to you by the pharmacist with each prescription and refill. Be sure to read this information carefully each time.  Talk to your pediatrician regarding the use of this medicine in children. Special care may be needed.  What side effects may I notice from receiving this medicine?  Side effects that you should report to your doctor or health care professional as soon as possible:  · allergic reactions like skin rash, itching or hives, swelling of the face, lips, or tongue  · breathing problems  · fever, chills  · loss of appetite  · signs and symptoms of low blood sugar such as feeling anxious, confusion, dizziness, increased hunger, unusually weak or tired, sweating, shakiness, cold, irritable, headache, blurred vision, fast heartbeat, loss of consciousness  · trouble passing urine or change in the amount of urine  · unusual stomach pain or upset  · vomiting  Side effects that usually do not require medical attention (Report these to your doctor or health care professional if they continue or are bothersome.):  · constipation  · diarrhea  · fatigue  · headache  · nausea  What may interact with this medicine?  · acetaminophen  · atorvastatin  · birth control  pills  · digoxin  · griseofulvin  · lisinoprilMany medications may cause changes in blood sugar, these include:  · alcohol containing beverages  · aspirin and aspirin-like drugs  · chloramphenicol  · chromium  · diuretics  · female hormones, such as estrogens or progestins, birth control pills  · heart medicines  · isoniazid  · male hormones or anabolic steroids  · medications for weight loss  · medicines for allergies, asthma, cold, or cough  · medicines for mental problems  · medicines called MAO inhibitors - Nardil, Parnate, Marplan, Eldepryl  · niacin  · NSAIDS, such as ibuprofen  · pentamidine  · phenytoin  · probenecid  · quinolone antibiotics such as ciprofloxacin, levofloxacin, ofloxacin  · some herbal dietary supplements  · steroid medicines such as prednisone or cortisone  · thyroid hormonesSome medications can hide the warning symptoms of low blood sugar (hypoglycemia). You may need to monitor your blood sugar more closely if you are taking one of these medications. These include:  · beta-blockers, often used for high blood pressure or heart problems (examples include atenolol, metoprolol, propranolol)  · clonidine  · guanethidine  · reserpine  What if I miss a dose?  If you miss a dose, take it as soon as you can. If it is almost time for your next dose, take only that dose. Do not take double or extra doses.  Where should I keep my medicine?  Keep out of the reach of children.  Store unopened pen in a refrigerator between 2 and 8 degrees C (36 and 46 degrees F). Do not freeze or use if the medicine has been frozen. Protect from light and excessive heat. After you first use the pen, it can be stored at room temperature between 15 and 30 degrees C (59 and 86 degrees F) or in a refrigerator. Throw away your used pen after 30 days or after the expiration date, whichever comes first.  Do not store your pen with the needle attached. If the needle is left on, medicine may leak from the pen.  What should I tell  my health care provider before I take this medicine?  They need to know if you have any of these conditions:  · endocrine tumors (MEN 2) or if someone in your family had these tumors  · gallstones  · high cholesterol  · history of alcohol abuse problem  · history of pancreatitis  · kidney disease or if you are on dialysis  · liver disease  · previous swelling of the tongue, face, or lips with difficulty breathing, difficulty swallowing, hoarseness, or tightening of the throat  · stomach problems  · suicidal thoughts, plans, or attempt; a previous suicide attempt by you or a family member  · thyroid cancer or if someone in your family had thyroid cancer  · an unusual or allergic reaction to liraglutide, medicines, foods, dyes, or preservatives  · pregnant or trying to get pregnant  · breast-feeding  What should I watch for while using this medicine?  Visit your doctor or health care professional for regular checks on your progress.  A test called the HbA1C (A1C) will be monitored. This is a simple blood test. It measures your blood sugar control over the last 2 to 3 months. You will receive this test every 3 to 6 months.  Learn how to check your blood sugar. Learn the symptoms of low and high blood sugar and how to manage them.  Always carry a quick-source of sugar with you in case you have symptoms of low blood sugar. Examples include hard sugar candy or glucose tablets. Make sure others know that you can choke if you eat or drink when you develop serious symptoms of low blood sugar, such as seizures or unconsciousness. They must get medical help at once.  Tell your doctor or health care professional if you have high blood sugar. You might need to change the dose of your medicine. If you are sick or exercising more than usual, you might need to change the dose of your medicine.  Do not skip meals. Ask your doctor or health care professional if you should avoid alcohol. Many nonprescription cough and cold products  contain sugar or alcohol. These can affect blood sugar.  Liraglutide pens and cartridges should never be shared. Even if the needle is changed, sharing may result in passing of viruses like hepatitis or HIV.  Wear a medical ID bracelet or chain, and carry a card that describes your disease and details of your medicine and dosage times.  Patients and their families should watch out for worsening depression or thoughts of suicide. Also watch out for sudden changes in feelings such as feeling anxious, agitated, panicky, irritable, hostile, aggressive, impulsive, severely restless, overly excited and hyperactive, or not being able to sleep. If this happens, especially at the beginning of treatment or after a change in dose, call your health care professional.  NOTE:This sheet is a summary. It may not cover all possible information. If you have questions about this medicine, talk to your doctor, pharmacist, or health care provider. Copyright© 2017 [employer]

## 2018-03-02 ENCOUNTER — PATIENT MESSAGE (OUTPATIENT)
Dept: INTERNAL MEDICINE | Facility: CLINIC | Age: 48
End: 2018-03-02

## 2018-03-06 ENCOUNTER — HOSPITAL ENCOUNTER (OUTPATIENT)
Dept: RADIOLOGY | Facility: HOSPITAL | Age: 48
Discharge: HOME | End: 2018-03-06
Attending: INTERNAL MEDICINE
Payer: OTHER GOVERNMENT

## 2018-03-06 ENCOUNTER — OFFICE VISIT (OUTPATIENT)
Dept: INTERNAL MEDICINE | Facility: CLINIC | Age: 48
End: 2018-03-06
Payer: OTHER GOVERNMENT

## 2018-03-06 ENCOUNTER — OFFICE VISIT (OUTPATIENT)
Dept: RHEUMATOLOGY | Facility: CLINIC | Age: 48
End: 2018-03-06
Payer: OTHER GOVERNMENT

## 2018-03-06 ENCOUNTER — TELEPHONE (OUTPATIENT)
Dept: PHARMACY | Facility: CLINIC | Age: 48
End: 2018-03-06

## 2018-03-06 VITALS
DIASTOLIC BLOOD PRESSURE: 97 MMHG | BODY MASS INDEX: 48.82 KG/M2 | HEART RATE: 60 BPM | SYSTOLIC BLOOD PRESSURE: 137 MMHG | WEIGHT: 293 LBS | HEIGHT: 65 IN | TEMPERATURE: 98 F

## 2018-03-06 VITALS
HEART RATE: 64 BPM | WEIGHT: 293 LBS | SYSTOLIC BLOOD PRESSURE: 110 MMHG | HEIGHT: 65 IN | DIASTOLIC BLOOD PRESSURE: 70 MMHG | BODY MASS INDEX: 48.82 KG/M2

## 2018-03-06 DIAGNOSIS — E66.01 MORBID OBESITY WITH BMI OF 50.0-59.9, ADULT: ICD-10-CM

## 2018-03-06 DIAGNOSIS — R91.8 GROUND GLASS OPACITY PRESENT ON IMAGING OF LUNG: ICD-10-CM

## 2018-03-06 DIAGNOSIS — I10 ESSENTIAL HYPERTENSION: ICD-10-CM

## 2018-03-06 DIAGNOSIS — T38.0X5A STEROID-INDUCED HYPERGLYCEMIA: ICD-10-CM

## 2018-03-06 DIAGNOSIS — J96.21 ACUTE ON CHRONIC RESPIRATORY FAILURE WITH HYPOXIA: ICD-10-CM

## 2018-03-06 DIAGNOSIS — E04.1 THYROID NODULE: ICD-10-CM

## 2018-03-06 DIAGNOSIS — E66.01 CLASS 3 SEVERE OBESITY DUE TO EXCESS CALORIES WITHOUT SERIOUS COMORBIDITY WITH BODY MASS INDEX (BMI) OF 50.0 TO 59.9 IN ADULT: ICD-10-CM

## 2018-03-06 DIAGNOSIS — D84.9 IMMUNOSUPPRESSION: ICD-10-CM

## 2018-03-06 DIAGNOSIS — M32.8 LUPUS ERYTHEMATOSUS OVERLAP SYNDROME: Primary | ICD-10-CM

## 2018-03-06 DIAGNOSIS — M05.79 RHEUMATOID ARTHRITIS INVOLVING MULTIPLE SITES WITH POSITIVE RHEUMATOID FACTOR: ICD-10-CM

## 2018-03-06 DIAGNOSIS — E13.9 DIABETES MELLITUS DUE TO ABNORMAL INSULIN: Primary | ICD-10-CM

## 2018-03-06 DIAGNOSIS — M32.8 LUPUS ERYTHEMATOSUS OVERLAP SYNDROME: ICD-10-CM

## 2018-03-06 DIAGNOSIS — R73.9 STEROID-INDUCED HYPERGLYCEMIA: ICD-10-CM

## 2018-03-06 DIAGNOSIS — E55.9 VITAMIN D DEFICIENCY DISEASE: ICD-10-CM

## 2018-03-06 PROCEDURE — 99214 OFFICE O/P EST MOD 30 MIN: CPT | Mod: S$PBB,,, | Performed by: INTERNAL MEDICINE

## 2018-03-06 PROCEDURE — 76536 US EXAM OF HEAD AND NECK: CPT | Mod: TC

## 2018-03-06 PROCEDURE — 99213 OFFICE O/P EST LOW 20 MIN: CPT | Mod: PBBFAC,25,27 | Performed by: INTERNAL MEDICINE

## 2018-03-06 PROCEDURE — 99999 PR PBB SHADOW E&M-EST. PATIENT-LVL II: CPT | Mod: PBBFAC,,, | Performed by: INTERNAL MEDICINE

## 2018-03-06 PROCEDURE — 76536 US EXAM OF HEAD AND NECK: CPT | Mod: 26,,, | Performed by: RADIOLOGY

## 2018-03-06 PROCEDURE — 99999 PR PBB SHADOW E&M-EST. PATIENT-LVL III: CPT | Mod: PBBFAC,,, | Performed by: INTERNAL MEDICINE

## 2018-03-06 PROCEDURE — 99212 OFFICE O/P EST SF 10 MIN: CPT | Mod: PBBFAC,25 | Performed by: INTERNAL MEDICINE

## 2018-03-06 RX ORDER — HYDROXYCHLOROQUINE SULFATE 200 MG/1
200 TABLET, FILM COATED ORAL 2 TIMES DAILY
Qty: 180 TABLET | Refills: 1 | Status: SHIPPED | OUTPATIENT
Start: 2018-03-06 | End: 2018-05-28 | Stop reason: SDUPTHER

## 2018-03-06 RX ORDER — CARVEDILOL 3.12 MG/1
3.12 TABLET ORAL 2 TIMES DAILY WITH MEALS
Qty: 180 TABLET | Refills: 4 | Status: SHIPPED | OUTPATIENT
Start: 2018-03-06 | End: 2018-05-28 | Stop reason: SDUPTHER

## 2018-03-06 ASSESSMENT — ROUTINE ASSESSMENT OF PATIENT INDEX DATA (RAPID3)
FATIGUE SCORE: 0
MDHAQ FUNCTION SCORE: .1
PATIENT GLOBAL ASSESSMENT SCORE: 1
AM STIFFNESS SCORE: 0, NO
PAIN SCORE: 1
PSYCHOLOGICAL DISTRESS SCORE: 0
TOTAL RAPID3 SCORE: .78

## 2018-03-06 NOTE — PROGRESS NOTES
"Subjective:       Patient ID: Malika Hodgson is a 47 y.o. female.    Chief Complaint: Lupus and Rheumatoid Arthritis      HPI:  Malika Hodgson is a 47 y.o. female  with history of RA.  Hospitalized for 12 days after being admitted for acute hypoxemic respiratory failure, pericardial/pleural effusion, ground glass opacities on chest CT.     Rheumatology in hospital was concerned for drug induced ILD (SSZ) vs RA related lung disease vs infection vs other autoimmune meds. Resp viral panel +ve corona virus and TEM - Staphylococcus aureus,TEM klebsiella could have also contributed to pt's symptoms.   In hospital held off her Sulfasalazine. TIARA that was initially negative 2009 now positive 1: 2560 speckled, +NILS, +SSA,SSB, dsDNA neg, +RNA polymerase III. She was started on solumedrol 16mg q8 and switched to prednisone 60mg daily.  Discharged with home O2.  Pulmonary decided not to bronch.  Concerned for SLE overlap and we started her on HCQ 200mg BID.     Her repeat CT chest showed a decrease in the pleural and pericardial effusions.      She is currently on 15 mg prednisone daily and azathioprine 50 mg daily and Plaquenil 200 mg bid.   She feels better and has returned to work on light duty.      Review of Systems   Constitutional: Positive for fatigue.   HENT: Negative.    Eyes: Negative.    Respiratory: Negative.    Cardiovascular: Negative.    Gastrointestinal: Negative.    Endocrine: Negative.    Genitourinary: Negative.    Musculoskeletal: Positive for arthralgias.   Skin: Negative.    Allergic/Immunologic: Negative.    Neurological: Negative.    Hematological: Negative.    Psychiatric/Behavioral: Negative.          Objective:   BP (!) 137/97 (BP Location: Left arm, Patient Position: Sitting, BP Method: Large (Automatic))   Pulse 60   Temp 98.4 °F (36.9 °C) (Oral)   Ht 5' 5" (1.651 m)   Wt (!) 145.4 kg (320 lb 8 oz)   BMI 53.33 kg/m²      Physical Exam   Constitutional: She is oriented to person, place, and " time and well-developed, well-nourished, and in no distress.   HENT:   Head: Normocephalic and atraumatic.   Eyes: Conjunctivae and EOM are normal.   Neck: Neck supple.   Cardiovascular: Normal rate, regular rhythm and normal heart sounds.    Pulmonary/Chest: Effort normal and breath sounds normal.   Abdominal: Soft. Bowel sounds are normal.   Neurological: She is alert and oriented to person, place, and time. Gait normal.   Skin: Skin is warm and dry.     Psychiatric: Mood and affect normal.   Musculoskeletal:   28 joint count: 0 swollen and 0 tender            LABS    Component      Latest Ref Rng & Units 3/1/2018 2/9/2018   Specimen UA       Urine, Unspecified    Color, UA      Yellow, Straw, Mai Yellow    Appearance, UA      Clear Hazy (A)    pH, UA      5.0 - 8.0 5.0    Specific Gravity, UA      1.005 - 1.030 1.030    Protein, UA      Negative Negative    Glucose, UA      Negative 3+ (A)    Ketones, UA      Negative Trace (A)    Bilirubin (UA)      Negative Negative    Occult Blood UA      Negative Negative    Nitrite, UA      Negative Negative    Urobilinogen, UA      <2.0 EU/dL Negative    Leukocytes, UA      Negative 3+ (A)    TPMT Genotype Result        1/1   TPMT Phenotype        Normal metabolizer   NUDT15 Genotype        1/1   NUDT15 Phenotype        Normal metabolizer   TPMT Interpretation        SEE BELOW   TPMT Additional Information        Test Not Performed   TPMT Method        SEE BELOW   TPMT Disclaimer        SEE BELOW   TPMT Reviewed by        Jakub Wang MD   RBC, UA      0 - 4 /hpf 2    WBC, UA      0 - 5 /hpf 30 (H)    Bacteria, UA      None-Occ /hpf Occasional    Squam Epithel, UA      /hpf 10    Hyaline Casts, UA      0-1/lpf /lpf     Ca Oxalate Alena, UA      None-Moderate     Microscopic Comment       SEE COMMENT    Yeast, UA      None None    Protein, Urine Random      0 - 15 mg/dL     TSH      0.400 - 4.000 uIU/mL 1.188      Component      Latest Ref Rng & Units 1/8/2018 1/2/2018  1/1/2018   WBC      3.90 - 12.70 K/uL 16.13 (H)  11.21   RBC      4.00 - 5.40 M/uL 4.32  3.78 (L)   Hemoglobin      12.0 - 16.0 g/dL 10.5 (L)  9.2 (L)   Hematocrit      37.0 - 48.5 % 35.0 (L)  30.7 (L)   MCV      82 - 98 fL 81 (L)  81 (L)   MCH      27.0 - 31.0 pg 24.3 (L)  24.3 (L)   MCHC      32.0 - 36.0 g/dL 30.0 (L)  30.0 (L)   RDW      11.5 - 14.5 % 16.2 (H)  15.0 (H)   Platelets      150 - 350 K/uL 563 (H)  508 (H)   MPV      9.2 - 12.9 fL 9.5  8.8 (L)   Immature Granulocytes      0.0 - 0.5 % 0.7 (H)  1.0 (H)   Gran # (ANC)      1.8 - 7.7 K/uL 12.9 (H)  8.0 (H)   Immature Grans (Abs)      0.00 - 0.04 K/uL 0.11 (H)  0.11 (H)   Lymph #      1.0 - 4.8 K/uL 2.4  2.2   Mono #      0.3 - 1.0 K/uL 0.6  0.8   Eos #      0.0 - 0.5 K/uL 0.1  0.0   Baso #      0.00 - 0.20 K/uL 0.02  0.02   nRBC      0 /100 WBC 0  0   Gran%      38.0 - 73.0 % 79.9 (H)  71.3   Lymph%      18.0 - 48.0 % 14.9 (L)  19.7   Mono%      4.0 - 15.0 % 3.8 (L)  7.5   Eosinophil%      0.0 - 8.0 % 0.6  0.3   Basophil%      0.0 - 1.9 % 0.1  0.2   Differential Method       Automated  Automated   Sodium      136 - 145 mmol/L 137  139   Potassium      3.5 - 5.1 mmol/L 4.2  4.5   Chloride      95 - 110 mmol/L 100  102   CO2      23 - 29 mmol/L 27  31 (H)   Glucose      70 - 110 mg/dL 220 (H)  186 (H)   BUN, Bld      6 - 20 mg/dL 16  17   Creatinine      0.5 - 1.4 mg/dL 0.9  0.8   Calcium      8.7 - 10.5 mg/dL 9.7  9.4   Total Protein      6.0 - 8.4 g/dL 8.4     Albumin      3.5 - 5.2 g/dL 3.0 (L)  2.1 (L)   Total Bilirubin      0.1 - 1.0 mg/dL 0.3     Alkaline Phosphatase      55 - 135 U/L 85     AST      10 - 40 U/L 11     ALT      10 - 44 U/L 11     Anion Gap      8 - 16 mmol/L 10  6 (L)   eGFR if African American      >60 mL/min/1.73 m:2 >60.0  >60.0   eGFR if non African American      >60 mL/min/1.73 m:2 >60.0  >60.0   Phosphorus      2.7 - 4.5 mg/dL   3.3   Specimen UA       Urine, Unspecified     Color, UA      Yellow, Straw, Mai Yellow      Appearance, UA      Clear Hazy (A)     pH, UA      5.0 - 8.0 5.0     Specific Gravity, UA      1.005 - 1.030 1.025     Protein, UA      Negative 1+ (A)     Glucose, UA      Negative Negative     Ketones, UA      Negative Negative     Bilirubin (UA)      Negative Negative     Occult Blood UA      Negative Negative     Nitrite, UA      Negative Negative     Urobilinogen, UA      <2.0 EU/dL Negative     Leukocytes, UA      Negative Negative     RBC, UA      0 - 4 /hpf 2     WBC, UA      0 - 5 /hpf 4     Bacteria, UA      None-Occ /hpf Rare     Squam Epithel, UA      /hpf 1     Hyaline Casts, UA      0-1/lpf /lpf 0     Ca Oxalate Alena, UA      None-Moderate Many (A)     Microscopic Comment       SEE COMMENT     Yeast, UA      None      Protein, Urine Random      0 - 15 mg/dL 42 (H)     Creatinine, Random Ur      15.0 - 325.0 mg/dL 174.0     Prot/Creat Ratio, Ur      0.00 - 0.20 0.24 (H)     Hemoglobin A1C      4.0 - 5.6 %   6.1 (H)   Estimated Avg Glucose      68 - 131 mg/dL   128   Magnesium      1.6 - 2.6 mg/dL   1.8   Pathologist Interp Difficult  AB/XM        REVIEWED    Complement (C-3)      50 - 180 mg/dL 151     Complement (C-4)      11 - 44 mg/dL 20     CRP      0.0 - 8.2 mg/L 2.2     Sed Rate      0 - 20 mm/Hr 53 (H)     ds DNA Ab      Negative 1:10 Negative 1:10     CPK      20 - 180 U/L 29     TSH      0.400 - 4.000 uIU/mL          Assessment:       1. SLE manifested by hypoxemic respiratory failure withCT with interstial changes, pericardial effusion and pleural effusion, TIARA that was initially negative 2009 now positive 1: 2560 speckled, +NILS, +SSA,SSB, dsDNA neg, +RNA polymerase III.   2. Rheumatoid arthritis. Manifested by previous small joint involvement of the hands, +RF/CCP and elevated inflammatory markers.  Now without activity  3. Morbid obesity. Patient has not been exercising at [place].  4. Abnormal SPEP without monoclonal gammopathy  Plan:       1.  Alternate 15 mg and 10 mg prednisone for 1  week and if no issue decrease to 10 mg daily  2.  Continue azathioprine 50 mg daily and increase if labs allow   3.  Continue Plaquenil.  RTO in 2-3 months.

## 2018-03-06 NOTE — TELEPHONE ENCOUNTER
Jeancarlos Parra.     The prior authorization for Mrs. Hodgson' Victoza prescription has been approved without an expiration date, as long as the patient is covered under her current insurance plan.    If there are any additional questions about the approval please contact the pharmacy at, [phone].    Thank You.    Janie Leonardo CpT.  Patient Care Advocate   [facility]  [email or web address]

## 2018-03-06 NOTE — PROGRESS NOTES
CHIEF COMPLAINT:HOspital follow up    HISTORY OF PRESENT ILLNESS: This is a 47 year old woman who presents for hospital follow up.    She is currently taking prednisone 15 mg daily. Tomorrow she will start 10 mg on Monday, wed and Friday with 15 mg other days.  Breathing has been good.  She is off routine oxygen. Her Oxygen saturations will drop with activity. She is taking oxygen prn with activity.  SHe feels that her stamina is improving. Dr Dillon started azathoprine 50 mg daily on 2/19/18. She continues to take plaquenil 200 mg twice daily.     She has been taking Januvia 100 mg daily for the last 2 weeks (increased from 50 mg to 100 mg). Blood sugars have been running mid 200's.  She could not tolerate the metformin due to severe diarrhea. She saw endocrinology on 3/1/18 who wants her to start on victoza for her steroid induced diabetes.      She was hospitalized from 12/20/17 to 1/1/18 due to acute hypoxemic respiratory failure. She had pericardial/pleural effusion, ground glass opacities on chest CT. Rheumatology in hospital was concerned for drug induced ILD (SSZ) vs RA related lung disease vs infection vs other autoimmune meds. Resp viral panel +ve corona virus and TEM - Staphylococcus aureus,TEM klebsiella could have also contributed to pt's symptoms. In hospital held off her Sulfasalazine. TIARA that was initially negative 2009 now positive 1: 2560 speckled +NILS, +SSA,SSB, dsDNA neg, +RNA polymerase III. She was started on solumedrol 16mg q8 and switched to prednisone 60mg daily.  Discharged with home O2.  Pulmonary decided not to bronch.  Concerned for SLE overlap and rheumatology started her on HCQ 200mg BID.     She is taking Norvasc 5 mg daily and now coreg 3.125 mg twice daily for hypertension.  No chest pain, headache, palpitations cough, fever, chills, nausea, vomiting, constipation, diarrhea,   .  She is taking vitamin D 50,000 units twice weekly.    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 She is going to try to do office work.     She is following weight watchers diet. Her weight 17 prior hospitalization was 356. Weight after discharge on 18 was 336. Weight today is 319.  She feels better with weight loss.   (losing weight on prednisone).            PAST MEDICAL HISTORY:   1. Early Rheumatoid arthritis  2. History of vitamin D deficiency.   3. History of amenorrhea   4. Morbid obesity.     PAST SURGICAL HISTORY: Negative.     ALLERGIES, MEDICATIONS: Updated on [facility]     SOCIAL HISTORY: No tobacco, alcohol use. 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 Father is living diabetes, hypertension and rheumatoid arthritis. Brother with diverticulosis. Brother healthy. Brother healthy.      PHYSICAL EXAMINATION:    /70 (BP Location: Right arm, Patient Position: Sitting, BP Method: Medium (Manual))   Pulse 64   Ht 5' 5" (1.651 m)   Wt (!) 145 kg (319 lb 10.7 oz)   BMI 53.20 kg/m²        General: Is alert, oriented, in no apparent distress. Affect is within   normal limits.   HEENT: Conjunctivae are anicteric. PERRL. TM's are clear. Oropharynx is   clear.   Neck: Supple. No cervical lymphadenopathy. No thyromegaly. Respiratory   effort is normal.   Lungs: Clear to auscultation bilaterally.   Heart: Regular rate and rhythm, no murmur, rub or gallop. No lower   extremity edema.        ASSESSMENT AND PLAN:     1. Hypoxemic respiratory failure -CT with interstial changes, pericardial effusion and pleural effusion.   TIARA that was initially negative  now positive 1: 2560 speckled, +NILS, +SSA,SSB, dsDNA neg, +RNA polymerase III. Responding to prednisone. Taper per rheum. Saw pulmonary   2. Early Rheumatoid arthritis. -  Follow up with Dr Dillon  3. Steroid induced diabetes -get on " victoza  4. Anemia - improving with treating underlying disorder  5. Hypertension - stable. Monitor BP  6. Vitamin D deficiency - vitamin D 50,000 units twice weekly  Screening - [facility] 10/17. GYN exam 5/16  Will see her back in 3 months, sooner if problems arise

## 2018-03-07 ENCOUNTER — PATIENT MESSAGE (OUTPATIENT)
Dept: ENDOCRINOLOGY | Facility: CLINIC | Age: 48
End: 2018-03-07

## 2018-03-07 ENCOUNTER — TELEPHONE (OUTPATIENT)
Dept: ENDOCRINOLOGY | Facility: CLINIC | Age: 48
End: 2018-03-07

## 2018-03-07 DIAGNOSIS — E04.9 GOITER: Primary | ICD-10-CM

## 2018-03-07 NOTE — TELEPHONE ENCOUNTER
Called patient and informed patient of the FNA appointment on 4/5/17 mailed out appointment letter

## 2018-03-16 ENCOUNTER — PATIENT MESSAGE (OUTPATIENT)
Dept: RHEUMATOLOGY | Facility: CLINIC | Age: 48
End: 2018-03-16

## 2018-03-24 ENCOUNTER — LAB VISIT (OUTPATIENT)
Dept: LAB | Facility: HOSPITAL | Age: 48
End: 2018-03-24
Attending: INTERNAL MEDICINE
Payer: OTHER GOVERNMENT

## 2018-03-24 DIAGNOSIS — R70.0 ELEVATED SEDIMENTATION RATE: ICD-10-CM

## 2018-03-24 DIAGNOSIS — M06.9 RA (RHEUMATOID ARTHRITIS): ICD-10-CM

## 2018-03-24 LAB
ALBUMIN SERPL BCP-MCNC: 3.7 G/DL
ALP SERPL-CCNC: 63 U/L
ALT SERPL W/O P-5'-P-CCNC: 10 U/L
ANION GAP SERPL CALC-SCNC: 11 MMOL/L
AST SERPL-CCNC: 12 U/L
BASOPHILS # BLD AUTO: 0.04 K/UL
BASOPHILS NFR BLD: 0.6 %
BILIRUB SERPL-MCNC: 0.5 MG/DL
BUN SERPL-MCNC: 10 MG/DL
CALCIUM SERPL-MCNC: 10.4 MG/DL
CHLORIDE SERPL-SCNC: 99 MMOL/L
CO2 SERPL-SCNC: 26 MMOL/L
CREAT SERPL-MCNC: 1.2 MG/DL
CRP SERPL-MCNC: 7.2 MG/L
DIFFERENTIAL METHOD: ABNORMAL
EOSINOPHIL # BLD AUTO: 0.3 K/UL
EOSINOPHIL NFR BLD: 4.4 %
ERYTHROCYTE [DISTWIDTH] IN BLOOD BY AUTOMATED COUNT: 15.9 %
EST. GFR  (AFRICAN AMERICAN): >60 ML/MIN/1.73 M^2
EST. GFR  (NON AFRICAN AMERICAN): 53.2 ML/MIN/1.73 M^2
GLUCOSE SERPL-MCNC: 414 MG/DL
HCT VFR BLD AUTO: 39.8 %
HGB BLD-MCNC: 12.5 G/DL
LYMPHOCYTES # BLD AUTO: 2.9 K/UL
LYMPHOCYTES NFR BLD: 44.7 %
MCH RBC QN AUTO: 25.5 PG
MCHC RBC AUTO-ENTMCNC: 31.4 G/DL
MCV RBC AUTO: 81 FL
MONOCYTES # BLD AUTO: 0.4 K/UL
MONOCYTES NFR BLD: 6.7 %
NEUTROPHILS # BLD AUTO: 2.9 K/UL
NEUTROPHILS NFR BLD: 43.4 %
NRBC BLD-RTO: 0 /100 WBC
PLATELET # BLD AUTO: 326 K/UL
PMV BLD AUTO: 10.3 FL
POTASSIUM SERPL-SCNC: 4.1 MMOL/L
PROT SERPL-MCNC: 7.7 G/DL
RBC # BLD AUTO: 4.91 M/UL
SODIUM SERPL-SCNC: 136 MMOL/L
WBC # BLD AUTO: 6.58 K/UL

## 2018-03-24 PROCEDURE — 85025 COMPLETE CBC W/AUTO DIFF WBC: CPT

## 2018-03-24 PROCEDURE — 36415 COLL VENOUS BLD VENIPUNCTURE: CPT

## 2018-03-24 PROCEDURE — 86140 C-REACTIVE PROTEIN: CPT

## 2018-03-24 PROCEDURE — 80053 COMPREHEN METABOLIC PANEL: CPT

## 2018-03-24 PROCEDURE — 85651 RBC SED RATE NONAUTOMATED: CPT

## 2018-03-25 DIAGNOSIS — T78.3XXD ANGIOEDEMA, SUBSEQUENT ENCOUNTER: ICD-10-CM

## 2018-03-26 LAB — ERYTHROCYTE [SEDIMENTATION RATE] IN BLOOD BY WESTERGREN METHOD: 35 MM/HR

## 2018-03-26 RX ORDER — AMLODIPINE BESYLATE 5 MG/1
TABLET ORAL
Qty: 90 TABLET | Refills: 4 | Status: SHIPPED | OUTPATIENT
Start: 2018-03-26 | End: 2018-05-28 | Stop reason: SDUPTHER

## 2018-03-27 ENCOUNTER — PATIENT MESSAGE (OUTPATIENT)
Dept: RHEUMATOLOGY | Facility: CLINIC | Age: 48
End: 2018-03-27

## 2018-04-02 ENCOUNTER — PATIENT MESSAGE (OUTPATIENT)
Dept: RHEUMATOLOGY | Facility: CLINIC | Age: 48
End: 2018-04-02

## 2018-04-05 ENCOUNTER — TELEPHONE (OUTPATIENT)
Dept: ENDOCRINOLOGY | Facility: CLINIC | Age: 48
End: 2018-04-05

## 2018-04-05 ENCOUNTER — HOSPITAL ENCOUNTER (OUTPATIENT)
Dept: ENDOCRINOLOGY | Facility: CLINIC | Age: 48
Discharge: HOME | End: 2018-04-05
Attending: INTERNAL MEDICINE
Payer: OTHER GOVERNMENT

## 2018-04-05 DIAGNOSIS — E04.9 GOITER: ICD-10-CM

## 2018-04-05 DIAGNOSIS — E04.1 THYROID NODULE: Primary | ICD-10-CM

## 2018-04-05 DIAGNOSIS — E04.9 GOITER: Primary | ICD-10-CM

## 2018-04-05 PROCEDURE — 76942 ECHO GUIDE FOR BIOPSY: CPT | Mod: ,,, | Performed by: INTERNAL MEDICINE

## 2018-04-05 PROCEDURE — 88173 CYTOPATH EVAL FNA REPORT: CPT | Performed by: PATHOLOGY

## 2018-04-05 PROCEDURE — 10022 US FINE NEEDLE ASPIRATION THYROID MULTI SITE (XPD): CPT | Mod: 59,,, | Performed by: INTERNAL MEDICINE

## 2018-04-05 PROCEDURE — 88173 CYTOPATH EVAL FNA REPORT: CPT | Mod: 26,,, | Performed by: PATHOLOGY

## 2018-04-09 ENCOUNTER — TELEPHONE (OUTPATIENT)
Dept: ENDOCRINOLOGY | Facility: CLINIC | Age: 48
End: 2018-04-09

## 2018-04-09 ENCOUNTER — PATIENT MESSAGE (OUTPATIENT)
Dept: ENDOCRINOLOGY | Facility: CLINIC | Age: 48
End: 2018-04-09

## 2018-04-09 DIAGNOSIS — E04.2 MULTINODULAR GOITER: Primary | ICD-10-CM

## 2018-04-09 NOTE — TELEPHONE ENCOUNTER
Please elt MsAyahMalika DANIEL Gokul know her both FNA were benign     Repeat US thyroid in 1 year   F/u in 1 year

## 2018-04-11 ENCOUNTER — PATIENT MESSAGE (OUTPATIENT)
Dept: RHEUMATOLOGY | Facility: CLINIC | Age: 48
End: 2018-04-11

## 2018-05-08 ENCOUNTER — OFFICE VISIT (OUTPATIENT)
Dept: RHEUMATOLOGY | Facility: CLINIC | Age: 48
End: 2018-05-08
Payer: OTHER GOVERNMENT

## 2018-05-08 ENCOUNTER — LAB VISIT (OUTPATIENT)
Dept: LAB | Facility: HOSPITAL | Age: 48
End: 2018-05-08
Attending: INTERNAL MEDICINE
Payer: OTHER GOVERNMENT

## 2018-05-08 VITALS
HEART RATE: 64 BPM | HEIGHT: 65 IN | DIASTOLIC BLOOD PRESSURE: 92 MMHG | WEIGHT: 293 LBS | BODY MASS INDEX: 48.82 KG/M2 | SYSTOLIC BLOOD PRESSURE: 145 MMHG | TEMPERATURE: 99 F

## 2018-05-08 DIAGNOSIS — M32.8 LUPUS ERYTHEMATOSUS OVERLAP SYNDROME: Primary | ICD-10-CM

## 2018-05-08 DIAGNOSIS — E66.01 MORBID OBESITY WITH BMI OF 50.0-59.9, ADULT: ICD-10-CM

## 2018-05-08 DIAGNOSIS — D84.9 IMMUNOSUPPRESSION: ICD-10-CM

## 2018-05-08 DIAGNOSIS — M05.79 RHEUMATOID ARTHRITIS INVOLVING MULTIPLE SITES WITH POSITIVE RHEUMATOID FACTOR: ICD-10-CM

## 2018-05-08 DIAGNOSIS — J84.9 ILD (INTERSTITIAL LUNG DISEASE): ICD-10-CM

## 2018-05-08 DIAGNOSIS — M32.8 LUPUS ERYTHEMATOSUS OVERLAP SYNDROME: ICD-10-CM

## 2018-05-08 LAB
ALBUMIN SERPL BCP-MCNC: 3.3 G/DL
ALP SERPL-CCNC: 61 U/L
ALT SERPL W/O P-5'-P-CCNC: 9 U/L
ANION GAP SERPL CALC-SCNC: 8 MMOL/L
AST SERPL-CCNC: 17 U/L
BASOPHILS # BLD AUTO: 0.03 K/UL
BASOPHILS NFR BLD: 0.5 %
BILIRUB SERPL-MCNC: 0.2 MG/DL
BUN SERPL-MCNC: 10 MG/DL
C3 SERPL-MCNC: 156 MG/DL
C4 SERPL-MCNC: 27 MG/DL
CALCIUM SERPL-MCNC: 9.5 MG/DL
CHLORIDE SERPL-SCNC: 102 MMOL/L
CO2 SERPL-SCNC: 28 MMOL/L
CREAT SERPL-MCNC: 1 MG/DL
CRP SERPL-MCNC: 4.5 MG/L
DIFFERENTIAL METHOD: ABNORMAL
EOSINOPHIL # BLD AUTO: 0.2 K/UL
EOSINOPHIL NFR BLD: 2.8 %
ERYTHROCYTE [DISTWIDTH] IN BLOOD BY AUTOMATED COUNT: 14 %
ERYTHROCYTE [SEDIMENTATION RATE] IN BLOOD BY WESTERGREN METHOD: 36 MM/HR
EST. GFR  (AFRICAN AMERICAN): >60 ML/MIN/1.73 M^2
EST. GFR  (NON AFRICAN AMERICAN): >60 ML/MIN/1.73 M^2
GLUCOSE SERPL-MCNC: 298 MG/DL
HCT VFR BLD AUTO: 40.3 %
HGB BLD-MCNC: 12 G/DL
IMM GRANULOCYTES # BLD AUTO: 0.01 K/UL
IMM GRANULOCYTES NFR BLD AUTO: 0.2 %
LYMPHOCYTES # BLD AUTO: 1.7 K/UL
LYMPHOCYTES NFR BLD: 27.6 %
MCH RBC QN AUTO: 26.5 PG
MCHC RBC AUTO-ENTMCNC: 29.8 G/DL
MCV RBC AUTO: 89 FL
MONOCYTES # BLD AUTO: 0.4 K/UL
MONOCYTES NFR BLD: 7 %
NEUTROPHILS # BLD AUTO: 3.7 K/UL
NEUTROPHILS NFR BLD: 61.9 %
NRBC BLD-RTO: 0 /100 WBC
PLATELET # BLD AUTO: 256 K/UL
PMV BLD AUTO: 10.3 FL
POTASSIUM SERPL-SCNC: 4.2 MMOL/L
PROT SERPL-MCNC: 7.1 G/DL
RBC # BLD AUTO: 4.52 M/UL
SODIUM SERPL-SCNC: 138 MMOL/L
WBC # BLD AUTO: 6.04 K/UL

## 2018-05-08 PROCEDURE — 99213 OFFICE O/P EST LOW 20 MIN: CPT | Mod: PBBFAC | Performed by: INTERNAL MEDICINE

## 2018-05-08 PROCEDURE — 86160 COMPLEMENT ANTIGEN: CPT

## 2018-05-08 PROCEDURE — 99215 OFFICE O/P EST HI 40 MIN: CPT | Mod: S$PBB,,, | Performed by: INTERNAL MEDICINE

## 2018-05-08 PROCEDURE — 36415 COLL VENOUS BLD VENIPUNCTURE: CPT

## 2018-05-08 PROCEDURE — 85025 COMPLETE CBC W/AUTO DIFF WBC: CPT

## 2018-05-08 PROCEDURE — 86160 COMPLEMENT ANTIGEN: CPT | Mod: 59

## 2018-05-08 PROCEDURE — 99999 PR PBB SHADOW E&M-EST. PATIENT-LVL III: CPT | Mod: PBBFAC,,, | Performed by: INTERNAL MEDICINE

## 2018-05-08 PROCEDURE — 80053 COMPREHEN METABOLIC PANEL: CPT

## 2018-05-08 PROCEDURE — 86225 DNA ANTIBODY NATIVE: CPT

## 2018-05-08 PROCEDURE — 85651 RBC SED RATE NONAUTOMATED: CPT

## 2018-05-08 PROCEDURE — 86140 C-REACTIVE PROTEIN: CPT

## 2018-05-08 RX ORDER — SITAGLIPTIN 50 MG/1
TABLET, FILM COATED ORAL
COMMUNITY
Start: 2018-03-11 | End: 2018-06-25

## 2018-05-08 ASSESSMENT — ROUTINE ASSESSMENT OF PATIENT INDEX DATA (RAPID3)
PATIENT GLOBAL ASSESSMENT SCORE: 1
FATIGUE SCORE: 3
PAIN SCORE: 3
MDHAQ FUNCTION SCORE: .1
AM STIFFNESS SCORE: 1, YES
TOTAL RAPID3 SCORE: 1.44
WHEN YOU AWAKENED IN THE MORNING OVER THE LAST WEEK, PLEASE INDICATE THE AMOUNT OF TIME IT TAKES UNTIL YOU ARE AS LIMBER AS YOU WILL BE FOR THE DAY: 20 MINUTES
PSYCHOLOGICAL DISTRESS SCORE: 0

## 2018-05-08 NOTE — PROGRESS NOTES
"Subjective:       Patient ID: Malika Hodgson is a 47 y.o. female.    Chief Complaint: Lupus      HPI:  Malika Hodgson is a 47 y.o. female  with history of RA and lupus overlap.  Lupus diagnosed 12/2017 based on TIARA that was initially negative 2009 now positive 1: 2560 speckled, +NILS, +SSA,SSB, dsDNA neg, +RNA polymerase III as well as  acute hypoxemic respiratory failure, pericardial/pleural effusion, ground glass opacities on chest CT.     In hospital 12/2017 started on solumedrol 16mg q8 and switched to prednisone 60 mg daily. Discharged with home O2.  Pulmonary decided not to bronch.  Concerned for SLE overlap and we started her on HCQ 200mg BID.     Her repeat CT chest showed a decrease in the pleural and pericardial effusions.       Interval History:  She is currently on azathioprine 50 mg daily, Plaquenil 200 mg bid and 7.5 mg prednisone for 3 weeks and feels well.  No longer using oxygen.  Pulse ox runs 98-99% at rest.   No longer feels SOB with activity.  Right hip painful one episode for 20 minutes.    Review of Systems   Constitutional: Positive for fatigue.   HENT:        Dry mouth   Eyes: Negative.    Respiratory: Negative.    Cardiovascular: Negative.    Gastrointestinal: Positive for diarrhea.   Endocrine: Negative.    Genitourinary: Negative.    Musculoskeletal: Negative.    Allergic/Immunologic: Negative.    Neurological: Negative.    Hematological: Negative.    Psychiatric/Behavioral: Negative.          Objective:   BP (!) 145/92 (BP Location: Right arm, Patient Position: Sitting, BP Method: Large (Automatic))   Pulse 64   Temp 98.6 °F (37 °C) (Oral)   Ht 5' 5" (1.651 m)   Wt (!) 140.4 kg (309 lb 9.6 oz)   BMI 51.52 kg/m²      Physical Exam   Constitutional: She is oriented to person, place, and time and well-developed, well-nourished, and in no distress.   HENT:   Head: Normocephalic and atraumatic.   Eyes: Conjunctivae and EOM are normal.   Neck: Neck supple.   Cardiovascular: Normal rate, " regular rhythm and normal heart sounds.    Pulmonary/Chest: Effort normal and breath sounds normal.   Abdominal: Soft. Bowel sounds are normal.   Neurological: She is alert and oriented to person, place, and time. Gait normal.   Skin: Skin is warm and dry.     Psychiatric: Mood and affect normal.   Musculoskeletal:   28 joint count: 0 swollen and 0 tender     LABS    Component      Latest Ref Rng & Units 3/24/2018   WBC      3.90 - 12.70 K/uL 6.58   RBC      4.00 - 5.40 M/uL 4.91   Hemoglobin      12.0 - 16.0 g/dL 12.5   Hematocrit      37.0 - 48.5 % 39.8   MCV      82 - 98 fL 81 (L)   MCH      27.0 - 31.0 pg 25.5 (L)   MCHC      32.0 - 36.0 g/dL 31.4 (L)   RDW      11.5 - 14.5 % 15.9 (H)   Platelets      150 - 350 K/uL 326   MPV      9.2 - 12.9 fL 10.3   Gran # (ANC)      1.8 - 7.7 K/uL 2.9   Lymph #      1.0 - 4.8 K/uL 2.9   Mono #      0.3 - 1.0 K/uL 0.4   Eos #      0.0 - 0.5 K/uL 0.3   Baso #      0.00 - 0.20 K/uL 0.04   nRBC      0 /100 WBC 0   Gran%      38.0 - 73.0 % 43.4   Lymph%      18.0 - 48.0 % 44.7   Mono%      4.0 - 15.0 % 6.7   Eosinophil%      0.0 - 8.0 % 4.4   Basophil%      0.0 - 1.9 % 0.6   Differential Method       Automated   Sodium      136 - 145 mmol/L 136   Potassium      3.5 - 5.1 mmol/L 4.1   Chloride      95 - 110 mmol/L 99   CO2      23 - 29 mmol/L 26   Glucose      70 - 110 mg/dL 414 (H)   BUN, Bld      6 - 20 mg/dL 10   Creatinine      0.5 - 1.4 mg/dL 1.2   Calcium      8.7 - 10.5 mg/dL 10.4   Total Protein      6.0 - 8.4 g/dL 7.7   Albumin      3.5 - 5.2 g/dL 3.7   Total Bilirubin      0.1 - 1.0 mg/dL 0.5   Alkaline Phosphatase      55 - 135 U/L 63   AST      10 - 40 U/L 12   ALT      10 - 44 U/L 10   Anion Gap      8 - 16 mmol/L 11   eGFR if African American      >60 mL/min/1.73 m:2 >60.0   eGFR if non African American      >60 mL/min/1.73 m:2 53.2 (A)   CRP      0.0 - 8.2 mg/L 7.2   Sed Rate      0 - 20 mm/Hr 35 (H)          Assessment:       1. SLE manifested by hypoxemic  respiratory failure withCT with interstial changes, pericardial effusion and pleural effusion, TIARA that was initially negative 2009 now positive 1: 2560 speckled, +NISL, +SSA,SSB, dsDNA neg, +RNA polymerase III.   Much improved on Imuran, Plaquenil and prednisone  2. Rheumatoid arthritis. Manifested by previous small joint involvement of the hands, +RF/CCP and elevated inflammatory markers.  Now without activity  3. Morbid obesity. Patient has not been exercising at [place].  She is walking and doing weight watchers (50 pounds since hospitalization and Weight Watchers).  4. Abnormal SPEP without monoclonal gammopathy  Plan:       1.  Decrease prednisone to 7.5 mg alternating 5 mg for one week then 5 mg daily if labs allow.  2.  Consider increase azathioprine 50 mg 2x daily and increase if labs allow   3.  Continue Plaquenil.    RTO in 3 months.

## 2018-05-09 ENCOUNTER — PATIENT MESSAGE (OUTPATIENT)
Dept: RHEUMATOLOGY | Facility: CLINIC | Age: 48
End: 2018-05-09

## 2018-05-09 DIAGNOSIS — M32.8 LUPUS ERYTHEMATOSUS OVERLAP SYNDROME: ICD-10-CM

## 2018-05-09 LAB — DSDNA AB SER-ACNC: NORMAL [IU]/ML

## 2018-05-09 RX ORDER — AZATHIOPRINE 50 MG/1
50 TABLET ORAL 2 TIMES DAILY
Qty: 180 TABLET | Refills: 0 | Status: SHIPPED | OUTPATIENT
Start: 2018-05-09 | End: 2018-05-28 | Stop reason: SDUPTHER

## 2018-05-10 ENCOUNTER — PATIENT MESSAGE (OUTPATIENT)
Dept: RHEUMATOLOGY | Facility: CLINIC | Age: 48
End: 2018-05-10

## 2018-05-24 RX ORDER — ERGOCALCIFEROL 1.25 MG/1
CAPSULE ORAL
Qty: 24 CAPSULE | Refills: 0 | Status: SHIPPED | OUTPATIENT
Start: 2018-05-24 | End: 2018-08-15 | Stop reason: SDUPTHER

## 2018-05-28 ENCOUNTER — PATIENT MESSAGE (OUTPATIENT)
Dept: RHEUMATOLOGY | Facility: CLINIC | Age: 48
End: 2018-05-28

## 2018-05-28 DIAGNOSIS — M32.8 LUPUS ERYTHEMATOSUS OVERLAP SYNDROME: ICD-10-CM

## 2018-05-28 DIAGNOSIS — T78.3XXD ANGIOEDEMA, SUBSEQUENT ENCOUNTER: ICD-10-CM

## 2018-05-28 DIAGNOSIS — E13.9 DIABETES MELLITUS DUE TO ABNORMAL INSULIN: ICD-10-CM

## 2018-05-28 RX ORDER — PANTOPRAZOLE SODIUM 40 MG/1
40 TABLET, DELAYED RELEASE ORAL DAILY
Qty: 90 TABLET | Refills: 0 | Status: SHIPPED | OUTPATIENT
Start: 2018-05-28 | End: 2018-06-25 | Stop reason: SDUPTHER

## 2018-05-28 RX ORDER — AMLODIPINE BESYLATE 5 MG/1
5 TABLET ORAL DAILY
Qty: 90 TABLET | Refills: 0 | Status: SHIPPED | OUTPATIENT
Start: 2018-05-28 | End: 2018-06-25 | Stop reason: SDUPTHER

## 2018-05-28 RX ORDER — AZATHIOPRINE 50 MG/1
50 TABLET ORAL 2 TIMES DAILY
Qty: 180 TABLET | Refills: 0 | Status: SHIPPED | OUTPATIENT
Start: 2018-05-28 | End: 2018-11-11 | Stop reason: SDUPTHER

## 2018-05-28 RX ORDER — LANCETS 28 GAUGE
1 EACH MISCELLANEOUS DAILY
Qty: 100 EACH | Refills: 0 | Status: SHIPPED | OUTPATIENT
Start: 2018-05-28

## 2018-05-28 RX ORDER — CARVEDILOL 3.12 MG/1
3.12 TABLET ORAL 2 TIMES DAILY WITH MEALS
Qty: 180 TABLET | Refills: 0 | Status: SHIPPED | OUTPATIENT
Start: 2018-05-28 | End: 2018-08-16

## 2018-05-28 RX ORDER — PREDNISONE 5 MG/1
TABLET ORAL
Qty: 120 TABLET | Refills: 1 | Status: SHIPPED | OUTPATIENT
Start: 2018-05-28 | End: 2018-12-12 | Stop reason: SDUPTHER

## 2018-05-28 RX ORDER — PEN NEEDLE, DIABETIC 30 GX3/16"
NEEDLE, DISPOSABLE MISCELLANEOUS
Qty: 200 EACH | Refills: 0 | Status: SHIPPED | OUTPATIENT
Start: 2018-05-28

## 2018-05-28 RX ORDER — HYDROXYCHLOROQUINE SULFATE 200 MG/1
200 TABLET, FILM COATED ORAL 2 TIMES DAILY
Qty: 180 TABLET | Refills: 1 | Status: SHIPPED | OUTPATIENT
Start: 2018-05-28 | End: 2018-11-19 | Stop reason: SDUPTHER

## 2018-05-28 NOTE — TELEPHONE ENCOUNTER
----- Message from Usama Beck sent at 5/28/2018  1:01 PM CDT -----  Contact: Patient [phone]  Patient is checking on status to make sure all 7 Rx are sent to [facility], stating through the  Insurance.    Please call and advise  Thank you

## 2018-06-05 ENCOUNTER — PATIENT MESSAGE (OUTPATIENT)
Dept: INTERNAL MEDICINE | Facility: CLINIC | Age: 48
End: 2018-06-05

## 2018-06-05 DIAGNOSIS — E13.9 DIABETES MELLITUS DUE TO ABNORMAL INSULIN: ICD-10-CM

## 2018-06-16 ENCOUNTER — LAB VISIT (OUTPATIENT)
Dept: LAB | Facility: HOSPITAL | Age: 48
End: 2018-06-16
Attending: INTERNAL MEDICINE
Payer: OTHER GOVERNMENT

## 2018-06-16 DIAGNOSIS — R70.0 ELEVATED SEDIMENTATION RATE: ICD-10-CM

## 2018-06-16 DIAGNOSIS — M06.9 RA (RHEUMATOID ARTHRITIS): ICD-10-CM

## 2018-06-16 DIAGNOSIS — E13.9 DIABETES MELLITUS DUE TO ABNORMAL INSULIN: ICD-10-CM

## 2018-06-16 DIAGNOSIS — E55.9 VITAMIN D DEFICIENCY DISEASE: ICD-10-CM

## 2018-06-16 LAB
25(OH)D3+25(OH)D2 SERPL-MCNC: 40 NG/ML
ALBUMIN SERPL BCP-MCNC: 3.4 G/DL
ALP SERPL-CCNC: 61 U/L
ALT SERPL W/O P-5'-P-CCNC: 8 U/L
ANION GAP SERPL CALC-SCNC: 7 MMOL/L
AST SERPL-CCNC: 13 U/L
BASOPHILS # BLD AUTO: 0.02 K/UL
BASOPHILS NFR BLD: 0.3 %
BILIRUB SERPL-MCNC: 0.4 MG/DL
BUN SERPL-MCNC: 14 MG/DL
CALCIUM SERPL-MCNC: 10 MG/DL
CHLORIDE SERPL-SCNC: 107 MMOL/L
CO2 SERPL-SCNC: 29 MMOL/L
CREAT SERPL-MCNC: 0.9 MG/DL
CRP SERPL-MCNC: 4.9 MG/L
DIFFERENTIAL METHOD: ABNORMAL
EOSINOPHIL # BLD AUTO: 0.3 K/UL
EOSINOPHIL NFR BLD: 4.1 %
ERYTHROCYTE [DISTWIDTH] IN BLOOD BY AUTOMATED COUNT: 13.9 %
ERYTHROCYTE [SEDIMENTATION RATE] IN BLOOD BY WESTERGREN METHOD: 45 MM/HR
EST. GFR  (AFRICAN AMERICAN): >60 ML/MIN/1.73 M^2
EST. GFR  (NON AFRICAN AMERICAN): >60 ML/MIN/1.73 M^2
ESTIMATED AVG GLUCOSE: 249 MG/DL
GLUCOSE SERPL-MCNC: 100 MG/DL
HBA1C MFR BLD HPLC: 10.3 %
HCT VFR BLD AUTO: 36.9 %
HGB BLD-MCNC: 11.4 G/DL
LYMPHOCYTES # BLD AUTO: 2.5 K/UL
LYMPHOCYTES NFR BLD: 34.8 %
MCH RBC QN AUTO: 27 PG
MCHC RBC AUTO-ENTMCNC: 30.9 G/DL
MCV RBC AUTO: 87 FL
MONOCYTES # BLD AUTO: 0.4 K/UL
MONOCYTES NFR BLD: 5.6 %
NEUTROPHILS # BLD AUTO: 3.9 K/UL
NEUTROPHILS NFR BLD: 54.9 %
PLATELET # BLD AUTO: 333 K/UL
PMV BLD AUTO: 9.5 FL
POTASSIUM SERPL-SCNC: 4.1 MMOL/L
PROT SERPL-MCNC: 7.4 G/DL
RBC # BLD AUTO: 4.23 M/UL
SODIUM SERPL-SCNC: 143 MMOL/L
WBC # BLD AUTO: 7.16 K/UL

## 2018-06-16 PROCEDURE — 36415 COLL VENOUS BLD VENIPUNCTURE: CPT

## 2018-06-16 PROCEDURE — 83036 HEMOGLOBIN GLYCOSYLATED A1C: CPT

## 2018-06-16 PROCEDURE — 82306 VITAMIN D 25 HYDROXY: CPT

## 2018-06-16 PROCEDURE — 80053 COMPREHEN METABOLIC PANEL: CPT

## 2018-06-16 PROCEDURE — 85025 COMPLETE CBC W/AUTO DIFF WBC: CPT

## 2018-06-16 PROCEDURE — 85651 RBC SED RATE NONAUTOMATED: CPT

## 2018-06-16 PROCEDURE — 86140 C-REACTIVE PROTEIN: CPT

## 2018-06-18 ENCOUNTER — PATIENT MESSAGE (OUTPATIENT)
Dept: RHEUMATOLOGY | Facility: CLINIC | Age: 48
End: 2018-06-18

## 2018-06-25 ENCOUNTER — OFFICE VISIT (OUTPATIENT)
Dept: INTERNAL MEDICINE | Facility: CLINIC | Age: 48
End: 2018-06-25
Payer: OTHER GOVERNMENT

## 2018-06-25 VITALS
HEART RATE: 64 BPM | OXYGEN SATURATION: 99 % | DIASTOLIC BLOOD PRESSURE: 78 MMHG | SYSTOLIC BLOOD PRESSURE: 110 MMHG | WEIGHT: 293 LBS | HEIGHT: 66 IN | BODY MASS INDEX: 47.09 KG/M2

## 2018-06-25 DIAGNOSIS — T38.0X5A STEROID-INDUCED HYPERGLYCEMIA: ICD-10-CM

## 2018-06-25 DIAGNOSIS — Z12.31 SCREENING MAMMOGRAM, ENCOUNTER FOR: ICD-10-CM

## 2018-06-25 DIAGNOSIS — E13.9 DIABETES MELLITUS DUE TO ABNORMAL INSULIN: Primary | ICD-10-CM

## 2018-06-25 DIAGNOSIS — I10 ESSENTIAL HYPERTENSION: ICD-10-CM

## 2018-06-25 DIAGNOSIS — M32.8 LUPUS ERYTHEMATOSUS OVERLAP SYNDROME: ICD-10-CM

## 2018-06-25 DIAGNOSIS — R73.9 STEROID-INDUCED HYPERGLYCEMIA: ICD-10-CM

## 2018-06-25 DIAGNOSIS — M05.79 RHEUMATOID ARTHRITIS INVOLVING MULTIPLE SITES WITH POSITIVE RHEUMATOID FACTOR: ICD-10-CM

## 2018-06-25 DIAGNOSIS — T78.3XXD ANGIOEDEMA, SUBSEQUENT ENCOUNTER: ICD-10-CM

## 2018-06-25 PROCEDURE — 99999 PR PBB SHADOW E&M-EST. PATIENT-LVL III: CPT | Mod: PBBFAC,,, | Performed by: INTERNAL MEDICINE

## 2018-06-25 PROCEDURE — 99214 OFFICE O/P EST MOD 30 MIN: CPT | Mod: S$PBB,,, | Performed by: INTERNAL MEDICINE

## 2018-06-25 PROCEDURE — 99213 OFFICE O/P EST LOW 20 MIN: CPT | Mod: PBBFAC | Performed by: INTERNAL MEDICINE

## 2018-06-25 RX ORDER — PANTOPRAZOLE SODIUM 40 MG/1
40 TABLET, DELAYED RELEASE ORAL DAILY
Qty: 90 TABLET | Refills: 4 | Status: SHIPPED | OUTPATIENT
Start: 2018-06-25 | End: 2018-11-01 | Stop reason: SDUPTHER

## 2018-06-25 RX ORDER — AMLODIPINE BESYLATE 5 MG/1
5 TABLET ORAL DAILY
Qty: 90 TABLET | Refills: 4 | Status: SHIPPED | OUTPATIENT
Start: 2018-06-25 | End: 2018-10-16 | Stop reason: SDUPTHER

## 2018-06-25 NOTE — PROGRESS NOTES
CHIEF COMPLAINT: follow up of RA/sle, hypertension, steroid induced diabetes.     HISTORY OF PRESENT ILLNESS: This is a 47 year old woman who presents for hospital follow up.     She currently alternates prednisone 7.5 mg with 5 mg every other day.   She is now taking azathoprine 50 mg twice daily and plaquenil 200 mg twice daily for her RA and lupus overlap with hypoxemic respiratory failure 2017 withCT with interstial changes, pericardial effusion and pleural effusion, TIARA that was initially negative  now positive 1: 2560 speckled, +NILS, +SSA,SSB, dsDNA neg, +RNA polymerase III.  Joints are doing well. Breathing is good.  NO fever, chills, oral ulcers, chest pain, shortness of breath, nausea, vomiting, constipation.  She has a soft stool 2-3 times a week since starting Victoza    She is now taking Victoza one injection daily for her diabetes. BLood sugars have been running in the low 100's     She is taking Norvasc 5 mg daily and now coreg 3.125 mg twice daily for hypertension.  No chest pain, headache, palpitations cough, fever, chills, nausea, vomiting, constipation, diarrhea,   .  She is taking vitamin D 50,000 units twice weekly.     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     HEr weight was 335 after hospitalization 2018 and is now 314 with vitoza    Mood is good. Sleep is good. Children are 4 and 6.               PAST MEDICAL HISTORY:   1. Early Rheumatoid arthritis  2. History of vitamin D deficiency.   3. History of amenorrhea   4. Morbid obesity.     PAST SURGICAL HISTORY: Negative.     ALLERGIES, MEDICATIONS: Updated on [facility]     SOCIAL HISTORY: No tobacco, alcohol use. 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 Father is living  "diabetes, hypertension and rheumatoid arthritis. Brother with diverticulosis. Brother healthy. Brother healthy.      PHYSICAL EXAMINATION:       /78 (BP Location: Left arm, Patient Position: Sitting, BP Method: Medium (Manual))   Pulse 64   Ht 5' 6" (1.676 m)   Wt (!) 142.4 kg (314 lb)   SpO2 99%   BMI 50.68 kg/m²     General: Is alert, oriented, in no apparent distress. Affect is within   normal limits.   HEENT: Conjunctivae are anicteric. PERRL. TM's are clear. Oropharynx is   clear.   Neck: Supple. No cervical lymphadenopathy. No thyromegaly. Respiratory   effort is normal.   Lungs: Clear to auscultation bilaterally.   Heart: Regular rate and rhythm, no murmur, rub or gallop. No lower   extremity edema.        ASSESSMENT AND PLAN:     1. RA with lupus overlab with hypoxemic respiratory failure 12/2017 withCT with interstial changes, pericardial effusion and pleural effusion, TIARA that was initially negative 2009 now positive 1: 2560 speckled, +NILS, +SSA,SSB, dsDNA neg, +RNA polymerase III. Hypoxemic respiratory failure -CT with interstial changes, pericardial effusion and pleural effusion. Followed by rheumatology    3. 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 Anemia - improving with treating underlying disorder  5. Hypertension - stable. Decrease coreg to once daily at night. Monitor BP.   6. Vitamin D deficiency - vitamin D 50,000 units twice weekly  Screening - [facility] 10/17. GYN exam 5/16  Will see her back in 3 months, sooner if problems arise    "

## 2018-06-29 DIAGNOSIS — Z12.11 COLON CANCER SCREENING: ICD-10-CM

## 2018-07-10 ENCOUNTER — PATIENT MESSAGE (OUTPATIENT)
Dept: RHEUMATOLOGY | Facility: CLINIC | Age: 48
End: 2018-07-10

## 2018-07-18 ENCOUNTER — PATIENT MESSAGE (OUTPATIENT)
Dept: RHEUMATOLOGY | Facility: CLINIC | Age: 48
End: 2018-07-18

## 2018-08-06 ENCOUNTER — PATIENT MESSAGE (OUTPATIENT)
Dept: INTERNAL MEDICINE | Facility: CLINIC | Age: 48
End: 2018-08-06

## 2018-08-11 ENCOUNTER — LAB VISIT (OUTPATIENT)
Dept: LAB | Facility: HOSPITAL | Age: 48
End: 2018-08-11
Attending: INTERNAL MEDICINE
Payer: OTHER GOVERNMENT

## 2018-08-11 DIAGNOSIS — R70.0 ELEVATED SEDIMENTATION RATE: ICD-10-CM

## 2018-08-11 DIAGNOSIS — M06.9 RA (RHEUMATOID ARTHRITIS): ICD-10-CM

## 2018-08-11 DIAGNOSIS — I10 ESSENTIAL HYPERTENSION: ICD-10-CM

## 2018-08-11 DIAGNOSIS — E13.9 DIABETES MELLITUS DUE TO ABNORMAL INSULIN: ICD-10-CM

## 2018-08-11 LAB
ALBUMIN SERPL BCP-MCNC: 3.6 G/DL
ALP SERPL-CCNC: 75 U/L
ALT SERPL W/O P-5'-P-CCNC: 12 U/L
ANION GAP SERPL CALC-SCNC: 9 MMOL/L
AST SERPL-CCNC: 13 U/L
BASOPHILS # BLD AUTO: 0.03 K/UL
BASOPHILS NFR BLD: 0.6 %
BILIRUB SERPL-MCNC: 0.3 MG/DL
BUN SERPL-MCNC: 12 MG/DL
CALCIUM SERPL-MCNC: 10 MG/DL
CHLORIDE SERPL-SCNC: 105 MMOL/L
CO2 SERPL-SCNC: 29 MMOL/L
CREAT SERPL-MCNC: 0.9 MG/DL
CRP SERPL-MCNC: 6.4 MG/L
DIFFERENTIAL METHOD: ABNORMAL
EOSINOPHIL # BLD AUTO: 0.3 K/UL
EOSINOPHIL NFR BLD: 5.4 %
ERYTHROCYTE [DISTWIDTH] IN BLOOD BY AUTOMATED COUNT: 13.5 %
ERYTHROCYTE [SEDIMENTATION RATE] IN BLOOD BY WESTERGREN METHOD: 93 MM/HR
EST. GFR  (AFRICAN AMERICAN): >60 ML/MIN/1.73 M^2
EST. GFR  (NON AFRICAN AMERICAN): >60 ML/MIN/1.73 M^2
ESTIMATED AVG GLUCOSE: 131 MG/DL
GLUCOSE SERPL-MCNC: 97 MG/DL
HBA1C MFR BLD HPLC: 6.2 %
HCT VFR BLD AUTO: 38.3 %
HGB BLD-MCNC: 11.7 G/DL
LYMPHOCYTES # BLD AUTO: 1.7 K/UL
LYMPHOCYTES NFR BLD: 32.9 %
MCH RBC QN AUTO: 26.1 PG
MCHC RBC AUTO-ENTMCNC: 30.5 G/DL
MCV RBC AUTO: 86 FL
MONOCYTES # BLD AUTO: 0.3 K/UL
MONOCYTES NFR BLD: 6.5 %
NEUTROPHILS # BLD AUTO: 2.7 K/UL
NEUTROPHILS NFR BLD: 54.4 %
PLATELET # BLD AUTO: 330 K/UL
PMV BLD AUTO: 9.9 FL
POTASSIUM SERPL-SCNC: 4.1 MMOL/L
PROT SERPL-MCNC: 7.9 G/DL
RBC # BLD AUTO: 4.48 M/UL
SODIUM SERPL-SCNC: 143 MMOL/L
TSH SERPL DL<=0.005 MIU/L-ACNC: 1.91 UIU/ML
WBC # BLD AUTO: 5.04 K/UL

## 2018-08-11 PROCEDURE — 80053 COMPREHEN METABOLIC PANEL: CPT

## 2018-08-11 PROCEDURE — 85025 COMPLETE CBC W/AUTO DIFF WBC: CPT

## 2018-08-11 PROCEDURE — 85652 RBC SED RATE AUTOMATED: CPT

## 2018-08-11 PROCEDURE — 83036 HEMOGLOBIN GLYCOSYLATED A1C: CPT

## 2018-08-11 PROCEDURE — 86140 C-REACTIVE PROTEIN: CPT

## 2018-08-11 PROCEDURE — 84443 ASSAY THYROID STIM HORMONE: CPT

## 2018-08-11 PROCEDURE — 36415 COLL VENOUS BLD VENIPUNCTURE: CPT

## 2018-08-15 RX ORDER — ERGOCALCIFEROL 1.25 MG/1
CAPSULE ORAL
Qty: 24 CAPSULE | Refills: 0 | Status: SHIPPED | OUTPATIENT
Start: 2018-08-15 | End: 2018-10-15 | Stop reason: SDUPTHER

## 2018-08-16 ENCOUNTER — TELEPHONE (OUTPATIENT)
Dept: CARDIOLOGY | Facility: CLINIC | Age: 48
End: 2018-08-16

## 2018-08-16 ENCOUNTER — OFFICE VISIT (OUTPATIENT)
Dept: RHEUMATOLOGY | Facility: CLINIC | Age: 48
End: 2018-08-16
Payer: OTHER GOVERNMENT

## 2018-08-16 ENCOUNTER — HOSPITAL ENCOUNTER (OUTPATIENT)
Dept: CARDIOLOGY | Facility: CLINIC | Age: 48
Discharge: HOME | End: 2018-08-16
Payer: OTHER GOVERNMENT

## 2018-08-16 ENCOUNTER — HOSPITAL ENCOUNTER (OUTPATIENT)
Dept: RADIOLOGY | Facility: HOSPITAL | Age: 48
Discharge: HOME | End: 2018-08-16
Attending: INTERNAL MEDICINE
Payer: OTHER GOVERNMENT

## 2018-08-16 ENCOUNTER — OFFICE VISIT (OUTPATIENT)
Dept: CARDIOLOGY | Facility: CLINIC | Age: 48
End: 2018-08-16
Payer: OTHER GOVERNMENT

## 2018-08-16 VITALS
HEIGHT: 65 IN | WEIGHT: 293 LBS | DIASTOLIC BLOOD PRESSURE: 98 MMHG | BODY MASS INDEX: 48.82 KG/M2 | SYSTOLIC BLOOD PRESSURE: 160 MMHG | HEART RATE: 67 BPM

## 2018-08-16 VITALS
HEART RATE: 48 BPM | WEIGHT: 293 LBS | HEIGHT: 65 IN | BODY MASS INDEX: 48.82 KG/M2 | DIASTOLIC BLOOD PRESSURE: 100 MMHG | SYSTOLIC BLOOD PRESSURE: 166 MMHG

## 2018-08-16 DIAGNOSIS — D84.9 IMMUNOSUPPRESSION: ICD-10-CM

## 2018-08-16 DIAGNOSIS — M05.79 RHEUMATOID ARTHRITIS INVOLVING MULTIPLE SITES WITH POSITIVE RHEUMATOID FACTOR: ICD-10-CM

## 2018-08-16 DIAGNOSIS — R09.89 JUGULAR VENOUS DISTENSION: ICD-10-CM

## 2018-08-16 DIAGNOSIS — R00.1 BRADYCARDIA: ICD-10-CM

## 2018-08-16 DIAGNOSIS — R53.83 OTHER FATIGUE: ICD-10-CM

## 2018-08-16 DIAGNOSIS — M32.8 LUPUS ERYTHEMATOSUS OVERLAP SYNDROME: Primary | ICD-10-CM

## 2018-08-16 DIAGNOSIS — I42.5 RESTRICTIVE CARDIOMYOPATHY: ICD-10-CM

## 2018-08-16 DIAGNOSIS — E66.01 MORBID OBESITY WITH BMI OF 50.0-59.9, ADULT: ICD-10-CM

## 2018-08-16 DIAGNOSIS — M32.8 LUPUS ERYTHEMATOSUS OVERLAP SYNDROME: ICD-10-CM

## 2018-08-16 DIAGNOSIS — M05.10 RHEUMATOID LUNG DISEASE WITH RHEUMATOID ARTHRITIS OF UNSPECIFIED SITE: ICD-10-CM

## 2018-08-16 DIAGNOSIS — R00.2 PALPITATIONS: Primary | ICD-10-CM

## 2018-08-16 DIAGNOSIS — R00.2 PALPITATION: Primary | ICD-10-CM

## 2018-08-16 DIAGNOSIS — R09.89 ELEVATED JVP (JUGULAR VENOUS PRESSURE): ICD-10-CM

## 2018-08-16 PROCEDURE — 99244 OFF/OP CNSLTJ NEW/EST MOD 40: CPT | Mod: S$PBB,,, | Performed by: INTERNAL MEDICINE

## 2018-08-16 PROCEDURE — 71046 X-RAY EXAM CHEST 2 VIEWS: CPT | Mod: 26,,, | Performed by: RADIOLOGY

## 2018-08-16 PROCEDURE — 99999 PR PBB SHADOW E&M-EST. PATIENT-LVL III: CPT | Mod: PBBFAC,,, | Performed by: INTERNAL MEDICINE

## 2018-08-16 PROCEDURE — 99999 PR PBB SHADOW E&M-EST. PATIENT-LVL IV: CPT | Mod: PBBFAC,,, | Performed by: INTERNAL MEDICINE

## 2018-08-16 PROCEDURE — 93005 ELECTROCARDIOGRAM TRACING: CPT | Mod: PBBFAC | Performed by: INTERNAL MEDICINE

## 2018-08-16 PROCEDURE — 99213 OFFICE O/P EST LOW 20 MIN: CPT | Mod: PBBFAC,27 | Performed by: INTERNAL MEDICINE

## 2018-08-16 PROCEDURE — 99214 OFFICE O/P EST MOD 30 MIN: CPT | Mod: PBBFAC,25 | Performed by: INTERNAL MEDICINE

## 2018-08-16 PROCEDURE — 71046 X-RAY EXAM CHEST 2 VIEWS: CPT | Mod: TC

## 2018-08-16 PROCEDURE — 99215 OFFICE O/P EST HI 40 MIN: CPT | Mod: S$PBB,,, | Performed by: INTERNAL MEDICINE

## 2018-08-16 PROCEDURE — 93010 ELECTROCARDIOGRAM REPORT: CPT | Mod: S$PBB,,, | Performed by: INTERNAL MEDICINE

## 2018-08-16 ASSESSMENT — ROUTINE ASSESSMENT OF PATIENT INDEX DATA (RAPID3)
PAIN SCORE: 0
TOTAL RAPID3 SCORE: 1.89
MDHAQ FUNCTION SCORE: .2
FATIGUE SCORE: 4
PSYCHOLOGICAL DISTRESS SCORE: 0
AM STIFFNESS SCORE: 0, NO
PATIENT GLOBAL ASSESSMENT SCORE: 5

## 2018-08-16 NOTE — PROGRESS NOTES
Rapid3 Question Responses and Scores 8/15/2018   MDHAQ Score 0.2   Psychologic Score 0   Pain Score 0   When you awakened in the morning OVER THE LAST WEEK, did you feel stiff? No   Fatigue Score 4   Global Health Score 5   RAPID3 Score 1.88       Answers for HPI/ROS submitted by the patient on 8/15/2018   fever: No  eye redness: No  headaches: No  shortness of breath: No  chest pain: No  trouble swallowing: No  diarrhea: Yes  constipation: No  unexpected weight change: No  genital sore: No  dysuria: No  During the last 3 days, have you had a skin rash?: No  Bruises or bleeds easily: No  cough: No

## 2018-08-16 NOTE — PROGRESS NOTES
"Subjective:       Patient ID: Malika Hodgson is a 48 y.o. female.    Chief Complaint: RA/SLE    HPI:  Malika Hodgson is a 48 y.o. female with history of RA and lupus overlap.  Lupus diagnosed 12/2017 based on TIARA that was initially negative 2009 now positive 1: 2560 speckled, +NILS, +SSA,SSB, dsDNA neg, +RNA polymerase III as well as  acute hypoxemic respiratory failure, pericardial/pleural effusion, ground glass opacities on chest CT.     In hospital 12/2017 started on solumedrol 16mg q8 and switched to prednisone 60 mg daily. Discharged with home O2.  Pulmonary decided not to bronch.  Concerned for SLE overlap and we started her on HCQ 200mg BID.     Her repeat CT chest showed a decrease in the pleural and pericardial effusions.         Interval History:  She is currently on azathioprine 50 mg daily, Plaquenil 200 mg bid and 7.5 mg/5 mg prednisone.    Interval History:    Reports pulse ox runs 98-99% off oxygen for 3 months.  Concerned about bradycardia to 40s for 2 months.  Decreased carvedilol to 1 tab and 2 week ago completely.  Continues to have HR in 40s.  Runs 44-70.  History of bradycardia prior to developing lung disease.      Review of Systems   Constitutional: Positive for fatigue. Negative for fever and unexpected weight change.   HENT: Negative for trouble swallowing.    Eyes: Negative for redness.   Respiratory: Negative for cough and shortness of breath.    Cardiovascular: Negative for chest pain.        Bradycardia  Feels odd sensation when heart rate drops "flutter"   Gastrointestinal: Positive for diarrhea. Negative for constipation.   Genitourinary: Negative for dysuria and genital sores.   Skin: Negative for rash.   Neurological: Negative for headaches.   Hematological: Does not bruise/bleed easily.         Objective:   BP (!) 166/100   Pulse (!) 48   Ht 5' 5" (1.651 m)   Wt (!) 146.2 kg (322 lb 6.4 oz)   BMI 53.65 kg/m²      Physical Exam   Constitutional: She is oriented to person, place, " and time and well-developed, well-nourished, and in no distress.   HENT:   Head: Normocephalic and atraumatic.   Eyes: Conjunctivae and EOM are normal.   Neck: Neck supple.   Cardiovascular: Normal rate, regular rhythm and normal heart sounds.    Bradycardia  JVD 9 cm with increase on compression of liver  No leg edema   Pulmonary/Chest: Effort normal and breath sounds normal.   Abdominal: Soft. Bowel sounds are normal.   Neurological: She is alert and oriented to person, place, and time. Gait normal.   Skin: Skin is warm and dry.     Psychiatric: Mood and affect normal.   Musculoskeletal: Normal range of motion.           LABS    Component      Latest Ref Rng & Units 8/11/2018 6/16/2018 5/8/2018   WBC      3.90 - 12.70 K/uL 5.04 7.16 6.04   RBC      4.00 - 5.40 M/uL 4.48 4.23 4.52   Hemoglobin      12.0 - 16.0 g/dL 11.7 (L) 11.4 (L) 12.0   Hematocrit      37.0 - 48.5 % 38.3 36.9 (L) 40.3   MCV      82 - 98 fL 86 87 89   MCH      27.0 - 31.0 pg 26.1 (L) 27.0 26.5 (L)   MCHC      32.0 - 36.0 g/dL 30.5 (L) 30.9 (L) 29.8 (L)   RDW      11.5 - 14.5 % 13.5 13.9 14.0   Platelets      150 - 350 K/uL 330 333 256   MPV      9.2 - 12.9 fL 9.9 9.5 10.3   Immature Granulocytes      0.0 - 0.5 %   0.2   Gran # (ANC)      1.8 - 7.7 K/uL 2.7 3.9 3.7   Immature Grans (Abs)      0.00 - 0.04 K/uL   0.01   Lymph #      1.0 - 4.8 K/uL 1.7 2.5 1.7   Mono #      0.3 - 1.0 K/uL 0.3 0.4 0.4   Eos #      0.0 - 0.5 K/uL 0.3 0.3 0.2   Baso #      0.00 - 0.20 K/uL 0.03 0.02 0.03   nRBC      0 /100 WBC   0   Gran%      38.0 - 73.0 % 54.4 54.9 61.9   Lymph%      18.0 - 48.0 % 32.9 34.8 27.6   Mono%      4.0 - 15.0 % 6.5 5.6 7.0   Eosinophil%      0.0 - 8.0 % 5.4 4.1 2.8   Basophil%      0.0 - 1.9 % 0.6 0.3 0.5   Differential Method       Automated Automated Automated   Sodium      136 - 145 mmol/L 143 143 138   Potassium      3.5 - 5.1 mmol/L 4.1 4.1 4.2   Chloride      95 - 110 mmol/L 105 107 102   CO2      23 - 29 mmol/L 29 29 28   Glucose       70 - 110 mg/dL 97 100 298 (H)   BUN, Bld      6 - 20 mg/dL 12 14 10   Creatinine      0.5 - 1.4 mg/dL 0.9 0.9 1.0   Calcium      8.7 - 10.5 mg/dL 10.0 10.0 9.5   Total Protein      6.0 - 8.4 g/dL 7.9 7.4 7.1   Albumin      3.5 - 5.2 g/dL 3.6 3.4 (L) 3.3 (L)   Total Bilirubin      0.1 - 1.0 mg/dL 0.3 0.4 0.2   Alkaline Phosphatase      55 - 135 U/L 75 61 61   AST      10 - 40 U/L 13 13 17   ALT      10 - 44 U/L 12 8 (L) 9 (L)   Anion Gap      8 - 16 mmol/L 9 7 (L) 8   eGFR if African American      >60 mL/min/1.73 m:2 >60 >60 >60.0   eGFR if non African American      >60 mL/min/1.73 m:2 >60 >60 >60.0   Specimen UA         Urine, Unspecified   Color, UA      Yellow, Straw, Mai   Yellow   Appearance, UA      Clear   Hazy (A)   pH, UA      5.0 - 8.0   5.0   Specific Gravity, UA      1.005 - 1.030   1.025   Protein, UA      Negative   Negative   Glucose, UA      Negative   3+ (A)   Ketones, UA      Negative   Negative   Bilirubin (UA)      Negative   Negative   Occult Blood UA      Negative   Negative   Nitrite, UA      Negative   Negative   Urobilinogen, UA      <2.0 EU/dL   Negative   Leukocytes, UA      Negative   2+ (A)   RBC, UA      0 - 4 /hpf   0   WBC, UA      0 - 5 /hpf   6 (H)   Bacteria, UA      None-Occ /hpf   Occasional   Yeast, UA      None   Rare (A)   Squam Epithel, UA      /hpf   4   Hyaline Casts, UA      0-1/lpf /lpf   2 (A)   Microscopic Comment         SEE COMMENT   Protein, Urine Random      0 - 15 mg/dL   15   Creatinine, Random Ur      15.0 - 325.0 mg/dL   119.0   Prot/Creat Ratio, Ur      0.00 - 0.20   0.13   Hemoglobin A1C      4.0 - 5.6 % 6.2 (H) 10.3 (H)    Estimated Avg Glucose      68 - 131 mg/dL 131 249 (H)    Complement (C-3)      50 - 180 mg/dL   156   Complement (C-4)      11 - 44 mg/dL   27   ds DNA Ab      Negative 1:10   Negative 1:10   CRP      0.0 - 8.2 mg/L 6.4 4.9 4.5   Sed Rate      0 - 36 mm/Hr 93 (H) 45 (H) 36 (H)   Vit D, 25-Hydroxy      30 - 96 ng/mL  40    TSH      0.400 -  4.000 uIU/mL 1.913          Assessment:       1. SLE manifested by hypoxemic respiratory failure withCT with interstial changes, pericardial effusion and pleural effusion, TIARA that was initially negative 2009 now positive 1: 2560 speckled, +NILS, +SSA,SSB, dsDNA neg, +RNA polymerase III.   Much improved on Imuran, Plaquenil and prednisone however ESR elevated but could be related to recent URI  2. Rheumatoid arthritis. Manifested by previous small joint involvement of the hands, +RF/CCP and elevated inflammatory markers.  Now without activity  3. Morbid obesity. Patient has not been exercising at [place].  She is walking and doing weight watchers (50 pounds since hospitalization and Weight Watchers).  4. Abnormal SPEP without monoclonal gammopathy  5. Bradycardia.  Persistent despite beta blocker.  48 today; fatigue.  Similar to when admitted fpr pericardial effusion  6. Jugular vernous distension.  JVD to 9 cm with increase on compression of liver.  History of pericardial effusion in 12/2017.  Gained 8 pounds since May but no leg edema today.  6. Elevated ESR  7. Recent URI  Plan:       1.  Repeat labs.  Decrease prednisone to 5 mg daily if labs allow and no evidence of recurrent pericardial effusion.  If pericardial effusions then will need higher dose of steroif. .  2.  Consider increase azathioprine 100 mg in am and 50 mg in pm  3.  Continue Plaquenil.  4.  Consult cardiology for symptomatic bradycardia and JVD.  Patient may need echo to evaluate for recurrent pericardial effusion.      RTO in 3 months/prn.

## 2018-08-16 NOTE — PROGRESS NOTES
"Subjective:    Patient ID:  Malika Hodgson is a 48 y.o. female who presents for evaluation of Bradycardia and Other fatigue      HPI   The patient is a 48 year old female referred by Dr Dillon for symptomatic bradyacardia and fatigue. She was hospitalized 12/2017 with rheumatoid arthritis with respiratory failure related to interstitial lung disease and was noted to have pleural and pericardial effusion which on later CT scan were improved.On Dr Dillon's exam today she noted increased JVD. The patient denies LYMAN or edema. The patient reports a slow pulse and some irregularity on her pulse ox. She states that when that happens she feel "drained"        CONCLUSIONS     1 - Normal left ventricular systolic function (EF 60-65%).     2 - No wall motion abnormalities.     3 - Biatrial enlargement.     4 - Right ventricle is upper limit of normal in size with normal systolic function.     5 - Mild tricuspid regurgitation.     6 - The estimated PA systolic pressure is 31 mmHg.             This document has been electronically    SIGNED BY: Asia Matos MD On: 01/08/2018 16:11      CONCLUSIONS     1 - Normal left ventricular systolic function (EF 60-65%).     2 - Indeterminate LV diastolic function.     3 - Normal right ventricular systolic function .     4 - Pulmonary hypertension. The estimated PA systolic pressure is 44 mmHg.     5 - Trivial tricuspid regurgitation.     6 - Moderate pericardial effusion. Overall appears unchanged. There is limited fluid around the LV apex.  There is significant respiratory variation across the mitral and tricupsid valve (seen on the prior study as well), but IVC is collapsing and   there is no evidence of RV diastolic collapse.     7 - Concentric remodeling.     8 - No wall motion abnormalities.             This document has been electronically    SIGNED BY: Rosangela Mahan MD On: 12/27/2017 17:20  Lab Results   Component Value Date     08/11/2018    K 4.1 08/11/2018     " 08/11/2018    CO2 29 08/11/2018    BUN 12 08/11/2018    CREATININE 0.9 08/11/2018    GLU 97 08/11/2018    HGBA1C 6.2 (H) 08/11/2018    MG 1.8 01/01/2018    AST 13 08/11/2018    ALT 12 08/11/2018    ALBUMIN 3.6 08/11/2018    PROT 7.9 08/11/2018    BILITOT 0.3 08/11/2018    WBC 5.04 08/11/2018    HGB 11.7 (L) 08/11/2018    HCT 38.3 08/11/2018    HCT 36 12/20/2017    MCV 86 08/11/2018     08/11/2018    TSH 1.913 08/11/2018         Lab Results   Component Value Date    CHOL 163 03/11/2017    HDL 52 03/11/2017    TRIG 51 03/11/2017       Lab Results   Component Value Date    LDLCALC 100.8 03/11/2017       Past Medical History:   Diagnosis Date    Arthritis     Hypertension 11/11/2013    Insomnia 2/26/2013    Obesity 2/26/2013    Steroid-induced hyperglycemia 3/1/2018    Thyroid nodule 3/1/2018       Current Outpatient Medications:     amLODIPine (NORVASC) 5 MG tablet, Take 1 tablet (5 mg total) by mouth once daily., Disp: 90 tablet, Rfl: 4    azaTHIOprine (IMURAN) 50 mg Tab, Take 1 tablet (50 mg total) by mouth 2 (two) times daily., Disp: 180 tablet, Rfl: 0    blood sugar diagnostic (FREESTYLE LITE STRIPS) Strp, TEST BLOOD SUGAR EVERY DAY, Disp: 150 strip, Rfl: 11    blood-glucose meter kit, Use as instructed, Disp: 1 each, Rfl: 0    ergocalciferol (ERGOCALCIFEROL) 50,000 unit Cap, TAKE 1 CAPSULE BY MOUTH TWICE A WEEK, Disp: 24 capsule, Rfl: 0    FREESTYLE LANCETS 28 gauge lancets, Inject 1 lancet into the skin once daily., Disp: 100 each, Rfl: 0    hydroxychloroquine (PLAQUENIL) 200 mg tablet, Take 1 tablet (200 mg total) by mouth 2 (two) times daily., Disp: 180 tablet, Rfl: 1    liraglutide 0.6 mg/0.1 mL, 18 mg/3 mL, subq PNIJ (VICTOZA 3-LENA) 0.6 mg/0.1 mL (18 mg/3 mL) PnIj, Inject 1.8 mg into the skin once daily., Disp: 9 mL, Rfl: 0    multivitamin with iron Tab, Take 1 tablet by mouth once daily., Disp: , Rfl: 0    pantoprazole (PROTONIX) 40 MG tablet, Take 1 tablet (40 mg total) by mouth once  "daily., Disp: 90 tablet, Rfl: 4    pen needle, diabetic (BD ULTRA-FINE SOLOMON PEN NEEDLE) 32 gauge x 5/32" Ndle, Takes once daily, Disp: 200 each, Rfl: 0    predniSONE (DELTASONE) 5 MG tablet, 4 tabs po daily as instructed (Patient taking differently: Alternating with 7.5 mg tablet), Disp: 120 tablet, Rfl: 1        Review of Systems   Cardiovascular: Positive for irregular heartbeat.        Objective:BP (!) 160/98 (BP Location: Left arm, Patient Position: Sitting, BP Method: X-Large (Automatic))   Pulse 67   Ht 5' 5" (1.651 m)   Wt (!) 146.2 kg (322 lb 5 oz)   BMI 53.64 kg/m²             Physical Exam   Constitutional: She is oriented to person, place, and time. She appears well-developed and well-nourished.   Morbid obese   HENT:   Head: Normocephalic.   Right Ear: External ear normal.   Left Ear: External ear normal.   Nose: Nose normal.   Inspection of lips, teeth and gums normal   Eyes: Conjunctivae and EOM are normal. Pupils are equal, round, and reactive to light. No scleral icterus.   Neck: Normal range of motion. No JVD present. No tracheal deviation present. No thyromegaly present.   Cardiovascular: Normal rate, regular rhythm, normal heart sounds and intact distal pulses. Exam reveals no gallop and no friction rub.   No murmur heard.  Pulses:       Dorsalis pedis pulses are 2+ on the right side, and 2+ on the left side.        Posterior tibial pulses are 2+ on the right side, and 2+ on the left side.   JVP increased   Pulmonary/Chest: Effort normal and breath sounds normal. No respiratory distress. She has no wheezes. She has no rales. She exhibits no tenderness.   Abdominal: Soft. Bowel sounds are normal. She exhibits no distension. There is no hepatosplenomegaly. There is no tenderness. There is no guarding.   Musculoskeletal: Normal range of motion. She exhibits no edema or tenderness.   Lymphadenopathy:   Palpation of lymph nodes of neck and groin normal   Neurological: She is oriented to person, " place, and time. No cranial nerve deficit. She exhibits normal muscle tone. Coordination normal.   Skin: Skin is warm and dry. No rash noted. No erythema. No pallor.   Palpation of skin normal   Psychiatric: She has a normal mood and affect. Her behavior is normal. Judgment and thought content normal.         Assessment:       1. Palpitation    2. Elevated JVP (jugular venous pressure)    3. Rheumatoid arthritis involving multiple sites with positive rheumatoid factor    4. Morbid obesity with BMI of 50.0-59.9, adult    5. Restrictive cardiomyopathy         Plan:       Malika was seen today for bradycardia and other fatigue.    Diagnoses and all orders for this visit:    Palpitation  -     Holter monitor - 48 hour    Elevated JVP (jugular venous pressure)  -     2D echo with color flow doppler; Future    Rheumatoid arthritis involving multiple sites with positive rheumatoid factor    Morbid obesity with BMI of 50.0-59.9, adult    Restrictive cardiomyopathy  -     2D echo with color flow doppler; Future

## 2018-08-16 NOTE — TELEPHONE ENCOUNTER
"Spoke with Jane, nurse from Rheumatology. Says she is calling from Dr. Dillon's office and the MD is requesting the pt be seen today due to BP of 166/10 HR 48 and venous distention of her neck.  Says the pt has an "uneasy feeling", says pt is not in any distress.  Spoke with Dr. Valdes - says ok to add pt to his scheduled. Jane informed - they will schedule an ekg prior to the appt.  "

## 2018-08-16 NOTE — TELEPHONE ENCOUNTER
----- Message from Ramona Longoria sent at 8/16/2018  9:12 AM CDT -----  Contact: Nurse from Dr. Dillon in Rheum office  Dr. Dillon would like a new pt to be seen today for hypertension 166/100 pulse 48. Pt also has venous distention in her neck. Call transferred to triage nurse.    Thanks

## 2018-08-17 ENCOUNTER — PATIENT MESSAGE (OUTPATIENT)
Dept: RHEUMATOLOGY | Facility: CLINIC | Age: 48
End: 2018-08-17

## 2018-08-22 ENCOUNTER — CLINICAL SUPPORT (OUTPATIENT)
Dept: CARDIOLOGY | Facility: CLINIC | Age: 48
End: 2018-08-22
Attending: INTERNAL MEDICINE
Payer: OTHER GOVERNMENT

## 2018-08-22 DIAGNOSIS — R09.89 ELEVATED JVP (JUGULAR VENOUS PRESSURE): ICD-10-CM

## 2018-08-22 DIAGNOSIS — I42.5 RESTRICTIVE CARDIOMYOPATHY: ICD-10-CM

## 2018-08-22 LAB
DIASTOLIC DYSFUNCTION: YES
ESTIMATED PA SYSTOLIC PRESSURE: 38.28
MITRAL VALVE REGURGITATION: ABNORMAL
RETIRED EF AND QEF - SEE NOTES: 60 (ref 55–65)
TRICUSPID VALVE REGURGITATION: ABNORMAL

## 2018-08-22 PROCEDURE — 93226 XTRNL ECG REC<48 HR SCAN A/R: CPT | Mod: PBBFAC,PO | Performed by: INTERNAL MEDICINE

## 2018-08-22 PROCEDURE — 93227 XTRNL ECG REC<48 HR R&I: CPT | Mod: S$PBB,,, | Performed by: INTERNAL MEDICINE

## 2018-08-22 PROCEDURE — 93306 TTE W/DOPPLER COMPLETE: CPT | Mod: PBBFAC,PO | Performed by: INTERNAL MEDICINE

## 2018-10-12 ENCOUNTER — LAB VISIT (OUTPATIENT)
Dept: LAB | Facility: HOSPITAL | Age: 48
End: 2018-10-12
Attending: INTERNAL MEDICINE
Payer: OTHER GOVERNMENT

## 2018-10-12 ENCOUNTER — PATIENT OUTREACH (OUTPATIENT)
Dept: ADMINISTRATIVE | Facility: HOSPITAL | Age: 48
End: 2018-10-12

## 2018-10-12 DIAGNOSIS — I10 ESSENTIAL HYPERTENSION: ICD-10-CM

## 2018-10-12 LAB
CHOLEST SERPL-MCNC: 159 MG/DL
CHOLEST/HDLC SERPL: 2.6 {RATIO}
HDLC SERPL-MCNC: 61 MG/DL
HDLC SERPL: 38.4 %
LDLC SERPL CALC-MCNC: 84 MG/DL
NONHDLC SERPL-MCNC: 98 MG/DL
TRIGL SERPL-MCNC: 70 MG/DL

## 2018-10-12 PROCEDURE — 80061 LIPID PANEL: CPT

## 2018-10-12 PROCEDURE — 36415 COLL VENOUS BLD VENIPUNCTURE: CPT

## 2018-10-12 NOTE — PROGRESS NOTES
Pre-visit audit complete. Pt due for lipid panel lab and colon cancer screening.  Contacted pt and able to schedule lab visit on today at 0930.  Pt agreeable to [employer], will  kit at PCP appointment on 10/15/2018.

## 2018-10-15 ENCOUNTER — OFFICE VISIT (OUTPATIENT)
Dept: INTERNAL MEDICINE | Facility: CLINIC | Age: 48
End: 2018-10-15
Payer: OTHER GOVERNMENT

## 2018-10-15 ENCOUNTER — HOSPITAL ENCOUNTER (OUTPATIENT)
Dept: RADIOLOGY | Facility: HOSPITAL | Age: 48
Discharge: HOME | End: 2018-10-15
Attending: INTERNAL MEDICINE
Payer: OTHER GOVERNMENT

## 2018-10-15 VITALS
WEIGHT: 293 LBS | HEIGHT: 65 IN | BODY MASS INDEX: 48.82 KG/M2 | OXYGEN SATURATION: 99 % | SYSTOLIC BLOOD PRESSURE: 130 MMHG | DIASTOLIC BLOOD PRESSURE: 84 MMHG | HEART RATE: 62 BPM

## 2018-10-15 DIAGNOSIS — Z12.11 SCREENING FOR MALIGNANT NEOPLASM OF COLON: ICD-10-CM

## 2018-10-15 DIAGNOSIS — E66.01 MORBID OBESITY WITH BMI OF 50.0-59.9, ADULT: ICD-10-CM

## 2018-10-15 DIAGNOSIS — Z12.31 SCREENING MAMMOGRAM, ENCOUNTER FOR: ICD-10-CM

## 2018-10-15 DIAGNOSIS — Z00.00 ROUTINE GENERAL MEDICAL EXAMINATION AT A HEALTH CARE FACILITY: Primary | ICD-10-CM

## 2018-10-15 DIAGNOSIS — I10 ESSENTIAL HYPERTENSION: ICD-10-CM

## 2018-10-15 DIAGNOSIS — M32.8 LUPUS ERYTHEMATOSUS OVERLAP SYNDROME: ICD-10-CM

## 2018-10-15 DIAGNOSIS — E55.9 VITAMIN D DEFICIENCY DISEASE: ICD-10-CM

## 2018-10-15 DIAGNOSIS — M05.10 RHEUMATOID LUNG DISEASE WITH RHEUMATOID ARTHRITIS OF UNSPECIFIED SITE: ICD-10-CM

## 2018-10-15 PROCEDURE — 77067 SCR MAMMO BI INCL CAD: CPT | Mod: 26,,, | Performed by: RADIOLOGY

## 2018-10-15 PROCEDURE — 99212 OFFICE O/P EST SF 10 MIN: CPT | Mod: PBBFAC | Performed by: INTERNAL MEDICINE

## 2018-10-15 PROCEDURE — 77067 SCR MAMMO BI INCL CAD: CPT | Mod: TC

## 2018-10-15 PROCEDURE — 99999 PR PBB SHADOW E&M-EST. PATIENT-LVL II: CPT | Mod: PBBFAC,,, | Performed by: INTERNAL MEDICINE

## 2018-10-15 PROCEDURE — 99396 PREV VISIT EST AGE 40-64: CPT | Mod: S$PBB,,, | Performed by: INTERNAL MEDICINE

## 2018-10-15 RX ORDER — ERGOCALCIFEROL 1.25 MG/1
CAPSULE ORAL
Qty: 24 CAPSULE | Refills: 4 | Status: SHIPPED | OUTPATIENT
Start: 2018-10-15 | End: 2019-11-24 | Stop reason: SDUPTHER

## 2018-10-15 NOTE — PROGRESS NOTES
CHIEF COMPLAINT: Annual exam and  follow up of RA/sle, hypertension, steroid induced diabetes.     HISTORY OF PRESENT ILLNESS: This is a 48 year old woman who presents for follow up.     Joints are doing ok.  She had some neck stiffness this past week that was not bad. She continues to alternates prednisone 7.5 mg with 5 mg every other day.   She is taking azathoprine 50 mg twice daily and plaquenil 200 mg twice daily for her RA and lupus overlap with hypoxemic respiratory failure 2017 withCT with interstial changes, pericardial effusion and pleural effusion, TIARA that was initially negative  now positive 1: 2560 speckled, +NILS, +SSA,SSB, dsDNA neg, +RNA polymerase III.  Joints are doing well. Breathing is good.  NO fever, chills, oral ulcers, chest pain, shortness of breath, nausea, vomiting, constipation, diarrhea.      Blood sugars are better overall and she has not needed Victoza. BLood sugars are between  on diet alone.  WEight is getting a little higher.       She is taking Norvasc 5 mg daily  for hypertension.  She is off coreg. It was dropping her heart rate too much. Palpitations are better.  Holter revealed PVC. She saw cardiology.  ECHO was normal.   No chest pain, headache, palpitations cough, fever, chills, nausea, vomiting, constipation, diarrhea,   .  She is taking vitamin D 50,000 units twice weekly.     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 She is going to try to do office work.      Mood is good. Sleep is good. Children are 4 and 6.         PAST MEDICAL HISTORY:   1. Early Rheumatoid arthritis  2. History of vitamin D deficiency.   3. History of amenorrhea   4. Morbid obesity.     PAST SURGICAL HISTORY: Negative.     ALLERGIES, MEDICATIONS: Updated on [facility]     SOCIAL HISTORY: No tobacco, alcohol use. She is , has no children.   She is a RN    FAMILY HISTORY: Mother  at age 69, ESRD on dialysis, aortic stenosis, diabetes,  "history hypertension, breast cancer age 64 (in remission), benign colon cancer. Father is living diabetes, hypertension and rheumatoid arthritis. Brother with diverticulosis. Brother healthy. Brother healthy.      PHYSICAL EXAMINATION:    /84   Pulse 62   Ht 5' 5" (1.651 m)   Wt (!) 149.2 kg (329 lb)   SpO2 99%   BMI 54.75 kg/m²     General: Is alert, oriented, in no apparent distress. Affect is within   normal limits.   HEENT: Conjunctivae are anicteric. PERRL. TM's are clear. Oropharynx is   clear.   Neck: Supple. No cervical lymphadenopathy. No thyromegaly. Respiratory   effort is normal.   Lungs: Clear to auscultation bilaterally.   Heart: Regular rate and rhythm, no murmur, rub or gallop. No lower   extremity edema.        ASSESSMENT AND PLAN:       Annual exam - discussed diet, exercise and safety issues.    1. RA with lupus overlab with hypoxemic respiratory failure 12/2017 withCT with interstial changes, pericardial effusion and pleural effusion, TIARA that was initially negative 2009 now positive 1: 2560 speckled, +NILS, +SSA,SSB, dsDNA neg, +RNA polymerase III. Hypoxemic respiratory failure -CT with interstial changes, pericardial effusion and pleural effusion. Followed by rheumatology     3. 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 Anemia - improving with treating underlying disorder  5. Hypertension - stable.   6. Vitamin D deficiency - vitamin D 50,000 units twice weekly  Screening - [facility] 10/17. GYN exam 5/16  Will see her back in 3 months, sooner if problems arise    "

## 2018-10-16 DIAGNOSIS — T78.3XXD ANGIOEDEMA, SUBSEQUENT ENCOUNTER: ICD-10-CM

## 2018-10-16 RX ORDER — AMLODIPINE BESYLATE 5 MG/1
TABLET ORAL
Qty: 90 TABLET | Refills: 4 | Status: SHIPPED | OUTPATIENT
Start: 2018-10-16 | End: 2019-10-18 | Stop reason: SDUPTHER

## 2018-11-01 RX ORDER — PANTOPRAZOLE SODIUM 40 MG/1
TABLET, DELAYED RELEASE ORAL
Qty: 90 TABLET | Refills: 4 | Status: SHIPPED | OUTPATIENT
Start: 2018-11-01 | End: 2020-01-10

## 2018-11-11 ENCOUNTER — PATIENT MESSAGE (OUTPATIENT)
Dept: RHEUMATOLOGY | Facility: CLINIC | Age: 48
End: 2018-11-11

## 2018-11-11 DIAGNOSIS — M32.8 LUPUS ERYTHEMATOSUS OVERLAP SYNDROME: ICD-10-CM

## 2018-11-12 RX ORDER — AZATHIOPRINE 50 MG/1
TABLET ORAL
Qty: 180 TABLET | Refills: 0 | Status: SHIPPED | OUTPATIENT
Start: 2018-11-12 | End: 2019-01-10 | Stop reason: SDUPTHER

## 2018-11-14 ENCOUNTER — PATIENT MESSAGE (OUTPATIENT)
Dept: RHEUMATOLOGY | Facility: CLINIC | Age: 48
End: 2018-11-14

## 2018-11-14 DIAGNOSIS — D84.9 IMMUNOSUPPRESSION: ICD-10-CM

## 2018-11-14 DIAGNOSIS — R53.83 OTHER FATIGUE: ICD-10-CM

## 2018-11-14 DIAGNOSIS — M32.8 LUPUS ERYTHEMATOSUS OVERLAP SYNDROME: Primary | ICD-10-CM

## 2018-11-14 DIAGNOSIS — J84.9 ILD (INTERSTITIAL LUNG DISEASE): ICD-10-CM

## 2018-11-14 DIAGNOSIS — M05.79 RHEUMATOID ARTHRITIS INVOLVING MULTIPLE SITES WITH POSITIVE RHEUMATOID FACTOR: ICD-10-CM

## 2018-11-16 ENCOUNTER — PATIENT MESSAGE (OUTPATIENT)
Dept: RHEUMATOLOGY | Facility: CLINIC | Age: 48
End: 2018-11-16

## 2018-11-16 ENCOUNTER — IMMUNIZATION (OUTPATIENT)
Dept: PHARMACY | Facility: CLINIC | Age: 48
End: 2018-11-16
Payer: OTHER GOVERNMENT

## 2018-11-16 ENCOUNTER — LAB VISIT (OUTPATIENT)
Dept: LAB | Facility: HOSPITAL | Age: 48
End: 2018-11-16
Attending: INTERNAL MEDICINE
Payer: OTHER GOVERNMENT

## 2018-11-16 DIAGNOSIS — M06.9 RA (RHEUMATOID ARTHRITIS): ICD-10-CM

## 2018-11-16 DIAGNOSIS — R70.0 ELEVATED SEDIMENTATION RATE: ICD-10-CM

## 2018-11-16 LAB
ALBUMIN SERPL BCP-MCNC: 3.5 G/DL
ALP SERPL-CCNC: 87 U/L
ALT SERPL W/O P-5'-P-CCNC: 9 U/L
ANION GAP SERPL CALC-SCNC: 6 MMOL/L
AST SERPL-CCNC: 12 U/L
BASOPHILS # BLD AUTO: 0.02 K/UL
BASOPHILS NFR BLD: 0.3 %
BILIRUB SERPL-MCNC: 0.2 MG/DL
BUN SERPL-MCNC: 20 MG/DL
CALCIUM SERPL-MCNC: 9.8 MG/DL
CHLORIDE SERPL-SCNC: 106 MMOL/L
CO2 SERPL-SCNC: 28 MMOL/L
CREAT SERPL-MCNC: 0.9 MG/DL
CRP SERPL-MCNC: 5.2 MG/L
DIFFERENTIAL METHOD: ABNORMAL
EOSINOPHIL # BLD AUTO: 0.2 K/UL
EOSINOPHIL NFR BLD: 2.6 %
ERYTHROCYTE [DISTWIDTH] IN BLOOD BY AUTOMATED COUNT: 14.6 %
ERYTHROCYTE [SEDIMENTATION RATE] IN BLOOD BY WESTERGREN METHOD: 82 MM/HR
EST. GFR  (AFRICAN AMERICAN): >60 ML/MIN/1.73 M^2
EST. GFR  (NON AFRICAN AMERICAN): >60 ML/MIN/1.73 M^2
GLUCOSE SERPL-MCNC: 100 MG/DL
HCT VFR BLD AUTO: 39.9 %
HGB BLD-MCNC: 11.9 G/DL
LYMPHOCYTES # BLD AUTO: 2.2 K/UL
LYMPHOCYTES NFR BLD: 36.4 %
MCH RBC QN AUTO: 25.3 PG
MCHC RBC AUTO-ENTMCNC: 29.8 G/DL
MCV RBC AUTO: 85 FL
MONOCYTES # BLD AUTO: 0.4 K/UL
MONOCYTES NFR BLD: 6.5 %
NEUTROPHILS # BLD AUTO: 3.3 K/UL
NEUTROPHILS NFR BLD: 54 %
PLATELET # BLD AUTO: 319 K/UL
PMV BLD AUTO: 9.8 FL
POTASSIUM SERPL-SCNC: 4.2 MMOL/L
PROT SERPL-MCNC: 8 G/DL
RBC # BLD AUTO: 4.71 M/UL
SODIUM SERPL-SCNC: 140 MMOL/L
WBC # BLD AUTO: 6.04 K/UL

## 2018-11-16 PROCEDURE — 85652 RBC SED RATE AUTOMATED: CPT

## 2018-11-16 PROCEDURE — 86140 C-REACTIVE PROTEIN: CPT

## 2018-11-16 PROCEDURE — 80053 COMPREHEN METABOLIC PANEL: CPT

## 2018-11-16 PROCEDURE — 85025 COMPLETE CBC W/AUTO DIFF WBC: CPT

## 2018-11-16 PROCEDURE — 36415 COLL VENOUS BLD VENIPUNCTURE: CPT

## 2018-11-19 ENCOUNTER — LAB VISIT (OUTPATIENT)
Dept: LAB | Facility: HOSPITAL | Age: 48
End: 2018-11-19
Attending: INTERNAL MEDICINE
Payer: OTHER GOVERNMENT

## 2018-11-19 DIAGNOSIS — J84.9 ILD (INTERSTITIAL LUNG DISEASE): ICD-10-CM

## 2018-11-19 DIAGNOSIS — M05.79 RHEUMATOID ARTHRITIS INVOLVING MULTIPLE SITES WITH POSITIVE RHEUMATOID FACTOR: ICD-10-CM

## 2018-11-19 DIAGNOSIS — M32.8 LUPUS ERYTHEMATOSUS OVERLAP SYNDROME: ICD-10-CM

## 2018-11-19 DIAGNOSIS — D84.9 IMMUNOSUPPRESSION: ICD-10-CM

## 2018-11-19 DIAGNOSIS — R53.83 OTHER FATIGUE: ICD-10-CM

## 2018-11-19 LAB
C3 SERPL-MCNC: 169 MG/DL
C4 SERPL-MCNC: 31 MG/DL
CK SERPL-CCNC: 128 U/L

## 2018-11-19 PROCEDURE — 36415 COLL VENOUS BLD VENIPUNCTURE: CPT

## 2018-11-19 PROCEDURE — 86160 COMPLEMENT ANTIGEN: CPT | Mod: 59

## 2018-11-19 PROCEDURE — 82550 ASSAY OF CK (CPK): CPT

## 2018-11-19 PROCEDURE — 86160 COMPLEMENT ANTIGEN: CPT

## 2018-11-19 PROCEDURE — 86225 DNA ANTIBODY NATIVE: CPT

## 2018-11-19 RX ORDER — HYDROXYCHLOROQUINE SULFATE 200 MG/1
TABLET, FILM COATED ORAL
Qty: 180 TABLET | Refills: 1 | Status: SHIPPED | OUTPATIENT
Start: 2018-11-19 | End: 2019-06-03 | Stop reason: SDUPTHER

## 2018-11-20 LAB — DSDNA AB SER-ACNC: NORMAL [IU]/ML

## 2018-11-21 ENCOUNTER — PATIENT MESSAGE (OUTPATIENT)
Dept: RHEUMATOLOGY | Facility: CLINIC | Age: 48
End: 2018-11-21

## 2018-11-21 NOTE — TELEPHONE ENCOUNTER
Labs show elevated sed rate but normal CRP.  Negative dsDNA.  Normal complements. Normal urine studies.   Will increase Imuran to 100 mg in AM and 50 mg in PM. Taper prednisone to 5 mg daily.  Repeat labs in one month.

## 2018-12-11 ENCOUNTER — HOSPITAL ENCOUNTER (EMERGENCY)
Facility: HOSPITAL | Age: 48
Discharge: HOME | End: 2018-12-11
Attending: EMERGENCY MEDICINE
Payer: OTHER GOVERNMENT

## 2018-12-11 VITALS
RESPIRATION RATE: 18 BRPM | HEART RATE: 65 BPM | SYSTOLIC BLOOD PRESSURE: 188 MMHG | BODY MASS INDEX: 48.82 KG/M2 | DIASTOLIC BLOOD PRESSURE: 86 MMHG | HEIGHT: 65 IN | WEIGHT: 293 LBS | TEMPERATURE: 98 F | OXYGEN SATURATION: 97 %

## 2018-12-11 DIAGNOSIS — R53.83 FATIGUE: ICD-10-CM

## 2018-12-11 DIAGNOSIS — R07.9 CHEST PAIN, UNSPECIFIED TYPE: Primary | ICD-10-CM

## 2018-12-11 LAB
ANION GAP SERPL CALC-SCNC: 9 MMOL/L
BASOPHILS # BLD AUTO: 0.03 K/UL
BASOPHILS NFR BLD: 0.5 %
BUN SERPL-MCNC: 13 MG/DL
CALCIUM SERPL-MCNC: 10.4 MG/DL
CHLORIDE SERPL-SCNC: 105 MMOL/L
CO2 SERPL-SCNC: 27 MMOL/L
CREAT SERPL-MCNC: 0.8 MG/DL
DIFFERENTIAL METHOD: ABNORMAL
EOSINOPHIL # BLD AUTO: 0.1 K/UL
EOSINOPHIL NFR BLD: 2.3 %
ERYTHROCYTE [DISTWIDTH] IN BLOOD BY AUTOMATED COUNT: 14.2 %
EST. GFR  (AFRICAN AMERICAN): >60 ML/MIN/1.73 M^2
EST. GFR  (NON AFRICAN AMERICAN): >60 ML/MIN/1.73 M^2
GLUCOSE SERPL-MCNC: 114 MG/DL
HCT VFR BLD AUTO: 45 %
HGB BLD-MCNC: 13.4 G/DL
IMM GRANULOCYTES # BLD AUTO: 0.04 K/UL
IMM GRANULOCYTES NFR BLD AUTO: 0.7 %
LYMPHOCYTES # BLD AUTO: 1.1 K/UL
LYMPHOCYTES NFR BLD: 17.6 %
MCH RBC QN AUTO: 25.8 PG
MCHC RBC AUTO-ENTMCNC: 29.8 G/DL
MCV RBC AUTO: 87 FL
MONOCYTES # BLD AUTO: 0.4 K/UL
MONOCYTES NFR BLD: 6 %
NEUTROPHILS # BLD AUTO: 4.4 K/UL
NEUTROPHILS NFR BLD: 72.9 %
NRBC BLD-RTO: 0 /100 WBC
PLATELET # BLD AUTO: 371 K/UL
PMV BLD AUTO: 9.7 FL
POTASSIUM SERPL-SCNC: 4.1 MMOL/L
RBC # BLD AUTO: 5.19 M/UL
SODIUM SERPL-SCNC: 141 MMOL/L
TROPONIN I SERPL DL<=0.01 NG/ML-MCNC: <0.006 NG/ML
WBC # BLD AUTO: 6.03 K/UL

## 2018-12-11 PROCEDURE — 84484 ASSAY OF TROPONIN QUANT: CPT

## 2018-12-11 PROCEDURE — 85025 COMPLETE CBC W/AUTO DIFF WBC: CPT

## 2018-12-11 PROCEDURE — 99285 EMERGENCY DEPT VISIT HI MDM: CPT

## 2018-12-11 PROCEDURE — 80048 BASIC METABOLIC PNL TOTAL CA: CPT

## 2018-12-11 PROCEDURE — 99284 EMERGENCY DEPT VISIT MOD MDM: CPT | Mod: ,,, | Performed by: EMERGENCY MEDICINE

## 2018-12-11 PROCEDURE — 93005 ELECTROCARDIOGRAM TRACING: CPT

## 2018-12-11 PROCEDURE — 93010 ELECTROCARDIOGRAM REPORT: CPT | Mod: ,,, | Performed by: INTERNAL MEDICINE

## 2018-12-11 NOTE — ED TRIAGE NOTES
"Patient states feeling "bad" x 3 days, yesterday the feeling never went away. States she may be exhausted and needs rest. Lupus diagnosis last year. Denies fevers, States she has a pulse ox with her, sats 99%, blood pressure in 150s. Has been alternating Prednisone doses between 5 and 7,5 until 2 weeks PTA  "

## 2018-12-11 NOTE — ED PROVIDER NOTES
Encounter Date: 12/11/2018    SCRIBE #1 NOTE: I, Terri Mckinley, am scribing for, and in the presence of, Dr. Dietrich.       History     Chief Complaint   Patient presents with    Fatigue     feeling bad for past 3 days with chest pain on and off but not now     48 y.o. female with a history of RA, lupus, ILD, HTN, obesity, and anemia presents to the ED complaining of fatigue and episodes of chest pain for 2 days. She describes her fatigue as general feelings of low energy. Her chest pain comes in brief episodes of stabbing pain lasting a few seconds. This is accompanied by feeling that her heart is racing and that her pulse is elevated. There were three episodes yesterday and two today. Her O2 has been between 70-79%. Denies fever or excessive bleeding.       The history is provided by the patient and medical records.     Review of patient's allergies indicates:   Allergen Reactions    Lisinopril Swelling     Mouth swelled, had to go to ED    Ventolin [albuterol] Swelling     Lips - swelling     Past Medical History:   Diagnosis Date    Arthritis     Hypertension 11/11/2013    ILD (interstitial lung disease)     Insomnia 2/26/2013    Lupus     Obesity 2/26/2013    RA (refractory anemia)     Steroid-induced hyperglycemia 3/1/2018    Thyroid nodule 3/1/2018     History reviewed. No pertinent surgical history.  Family History   Problem Relation Age of Onset    Hypertension Father     Diabetes Father     Rheum arthritis Father         methotrexate    Diabetes Mother     Hypertension Mother     Breast cancer Mother 62    Kidney disease Mother         on dialysis    Rheum arthritis Paternal Grandmother     Breast cancer Other 45    Lupus Neg Hx     Inflammatory bowel disease Neg Hx     Psoriasis Neg Hx     Thyroid disease Neg Hx     Ovarian cancer Neg Hx     Heart attack Neg Hx     Heart disease Neg Hx      Social History     Tobacco Use    Smoking status: Never Smoker    Smokeless  tobacco: Never Used    Tobacco comment: nurse;    Substance Use Topics    Alcohol use: No    Drug use: No     Review of Systems   Constitutional: Positive for fatigue. Negative for fever.   HENT: Negative for sore throat.    Respiratory: Negative for shortness of breath.    Cardiovascular: Positive for chest pain.   Gastrointestinal: Negative for abdominal pain.   Genitourinary: Negative for flank pain.   Musculoskeletal: Negative for neck pain.   Skin: Negative for rash.   Neurological: Negative for weakness.   Psychiatric/Behavioral: Negative for confusion.       Physical Exam     Initial Vitals [12/11/18 1357]   BP Pulse Resp Temp SpO2   (!) 188/86 65 18 98.4 °F (36.9 °C) 97 %      MAP       --         Physical Exam    Nursing note and vitals reviewed.  Constitutional: She appears well-developed and well-nourished. She is not diaphoretic. No distress.   HENT:   Head: Normocephalic and atraumatic.   Mouth/Throat: Oropharynx is clear and moist.   Eyes: Conjunctivae and EOM are normal. Pupils are equal, round, and reactive to light.   Neck: Normal range of motion. Neck supple. No JVD present.   Cardiovascular: Normal rate, regular rhythm and normal heart sounds.   No murmur heard.  Pulmonary/Chest: Breath sounds normal. No respiratory distress. She has no wheezes. She has no rhonchi. She has no rales.   Abdominal: Soft. Bowel sounds are normal. She exhibits no distension and no mass. There is no tenderness. There is no rebound and no guarding.   Musculoskeletal: Normal range of motion. She exhibits no edema or tenderness.   Neurological: She is alert and oriented to person, place, and time. She has normal strength. No cranial nerve deficit or sensory deficit.   Skin: Skin is warm and dry. No rash noted.   Psychiatric: She has a normal mood and affect. Thought content normal.         ED Course   Procedures  Labs Reviewed   CBC W/ AUTO DIFFERENTIAL - Abnormal; Notable for the following components:        Result Value    MCH 25.8 (*)     MCHC 29.8 (*)     Platelets 371 (*)     Immature Granulocytes 0.7 (*)     Lymph% 17.6 (*)     All other components within normal limits   BASIC METABOLIC PANEL - Abnormal; Notable for the following components:    Glucose 114 (*)     All other components within normal limits   TROPONIN I     EKG Readings: (Independently Interpreted)   Rhythm: Normal Sinus Rhythm. Heart Rate: 61 bpm.   No acute ischemic changes.       Imaging Results          X-Ray Chest PA And Lateral (Final result)  Result time 12/11/18 16:05:53    Final result by Juan Stuart MD (12/11/18 16:05:53)                 Impression:      1. Cardiomegaly.  2. Partial clearing of the platelike atelectatic changes in the lungs bilaterally since the previous study.      Electronically signed by: Juan Stuart MD  Date:    12/11/2018  Time:    16:05             Narrative:    EXAMINATION:  XR CHEST PA AND LATERAL    CLINICAL HISTORY:  Pain chest (786.50);    TECHNIQUE:  PA and lateral views of the chest were performed.    COMPARISON:  Chest 08/16/2018    FINDINGS:  Enlargement of the cardiac silhouette representing cardiomegaly.  Mediastinum is unremarkable.  There is no tracheal abnormality.    Improvement in the platelike atelectatic changes in the lungs bilaterally since the previous study.  There is no lobar consolidation.  No pleural effusions.  The visualized bony thorax is within normal limits.                                 Medical Decision Making:   Initial Assessment:   Patient with very brief chest pain lasting for a few seconds for last 2 days. Will check single troponin and basic labs. I doubt this is cardiac or PE.             Scribe Attestation:   Scribe #1: I performed the above scribed service and the documentation accurately describes the services I performed. I attest to the accuracy of the note.               Clinical Impression:   The primary encounter diagnosis was Chest pain, unspecified type. A diagnosis  of Fatigue was also pertinent to this visit.      Disposition:   Disposition: Discharged  Condition: Stable                        Henrique Dietrich MD  12/16/18 2009

## 2018-12-11 NOTE — ED NOTES
Patient identifiers verified and correct for Ms Hodgson  C/C: Gen fatigue  APPEARANCE: awake and alert in NAD.  SKIN: warm, dry and intact. No breakdown or bruising.  MUSCULOSKELETAL: Patient moving all extremities spontaneously, no obvious swelling or deformities noted. Ambulates independently.  RESPIRATORY: Intermittent  shortness of breath.Respirations unlabored. No cough, denies fever  CARDIAC: Intermittent  CP, 2+ distal pulses; no peripheral edema  ABDOMEN: S/ND/NT, Denies nausea  : voids spontaneously, denies difficulty  Neurologic: AAO x 4; follows commands equal strength in all extremities; denies numbness/tingling. Denies dizziness Positive gen weakness

## 2018-12-13 RX ORDER — PREDNISONE 5 MG/1
TABLET ORAL
Qty: 30 TABLET | Refills: 1 | Status: SHIPPED | OUTPATIENT
Start: 2018-12-13 | End: 2018-12-17 | Stop reason: SDUPTHER

## 2018-12-17 ENCOUNTER — PATIENT MESSAGE (OUTPATIENT)
Dept: RHEUMATOLOGY | Facility: CLINIC | Age: 48
End: 2018-12-17

## 2018-12-17 DIAGNOSIS — M05.79 RHEUMATOID ARTHRITIS INVOLVING MULTIPLE SITES WITH POSITIVE RHEUMATOID FACTOR: ICD-10-CM

## 2018-12-17 DIAGNOSIS — M32.8 LUPUS ERYTHEMATOSUS OVERLAP SYNDROME: Primary | ICD-10-CM

## 2018-12-17 DIAGNOSIS — J84.9 ILD (INTERSTITIAL LUNG DISEASE): ICD-10-CM

## 2018-12-17 RX ORDER — PREDNISONE 5 MG/1
TABLET ORAL
Qty: 90 TABLET | Refills: 0 | Status: SHIPPED | OUTPATIENT
Start: 2018-12-17 | End: 2019-02-11 | Stop reason: SDUPTHER

## 2018-12-24 ENCOUNTER — LAB VISIT (OUTPATIENT)
Dept: LAB | Facility: HOSPITAL | Age: 48
End: 2018-12-24
Attending: INTERNAL MEDICINE
Payer: OTHER GOVERNMENT

## 2018-12-24 DIAGNOSIS — J84.9 ILD (INTERSTITIAL LUNG DISEASE): ICD-10-CM

## 2018-12-24 DIAGNOSIS — M32.8 LUPUS ERYTHEMATOSUS OVERLAP SYNDROME: ICD-10-CM

## 2018-12-24 DIAGNOSIS — R53.83 OTHER FATIGUE: ICD-10-CM

## 2018-12-24 DIAGNOSIS — M05.79 RHEUMATOID ARTHRITIS INVOLVING MULTIPLE SITES WITH POSITIVE RHEUMATOID FACTOR: ICD-10-CM

## 2018-12-24 DIAGNOSIS — D84.9 IMMUNOSUPPRESSION: ICD-10-CM

## 2018-12-24 LAB
ALBUMIN SERPL BCP-MCNC: 3.4 G/DL
ALP SERPL-CCNC: 81 U/L
ALT SERPL W/O P-5'-P-CCNC: 10 U/L
ANION GAP SERPL CALC-SCNC: 7 MMOL/L
AST SERPL-CCNC: 14 U/L
BASOPHILS # BLD AUTO: 0.04 K/UL
BASOPHILS NFR BLD: 0.7 %
BILIRUB SERPL-MCNC: 0.3 MG/DL
BUN SERPL-MCNC: 13 MG/DL
CALCIUM SERPL-MCNC: 10.1 MG/DL
CHLORIDE SERPL-SCNC: 106 MMOL/L
CO2 SERPL-SCNC: 30 MMOL/L
CREAT SERPL-MCNC: 0.9 MG/DL
CRP SERPL-MCNC: 4.5 MG/L
DIFFERENTIAL METHOD: ABNORMAL
EOSINOPHIL # BLD AUTO: 0.2 K/UL
EOSINOPHIL NFR BLD: 3.1 %
ERYTHROCYTE [DISTWIDTH] IN BLOOD BY AUTOMATED COUNT: 14.3 %
ERYTHROCYTE [SEDIMENTATION RATE] IN BLOOD BY WESTERGREN METHOD: 98 MM/HR
EST. GFR  (AFRICAN AMERICAN): >60 ML/MIN/1.73 M^2
EST. GFR  (NON AFRICAN AMERICAN): >60 ML/MIN/1.73 M^2
GLUCOSE SERPL-MCNC: 103 MG/DL
HCT VFR BLD AUTO: 40.4 %
HGB BLD-MCNC: 11.7 G/DL
LYMPHOCYTES # BLD AUTO: 2 K/UL
LYMPHOCYTES NFR BLD: 31.9 %
MCH RBC QN AUTO: 25.6 PG
MCHC RBC AUTO-ENTMCNC: 29 G/DL
MCV RBC AUTO: 88 FL
MONOCYTES # BLD AUTO: 0.4 K/UL
MONOCYTES NFR BLD: 6.8 %
NEUTROPHILS # BLD AUTO: 3.5 K/UL
NEUTROPHILS NFR BLD: 57.2 %
NRBC BLD-RTO: 0 /100 WBC
PLATELET # BLD AUTO: 349 K/UL
PMV BLD AUTO: 10.3 FL
POTASSIUM SERPL-SCNC: 3.9 MMOL/L
PROT SERPL-MCNC: 7.8 G/DL
RBC # BLD AUTO: 4.57 M/UL
SODIUM SERPL-SCNC: 143 MMOL/L
WBC # BLD AUTO: 6.14 K/UL

## 2018-12-24 PROCEDURE — 36415 COLL VENOUS BLD VENIPUNCTURE: CPT

## 2018-12-24 PROCEDURE — 80053 COMPREHEN METABOLIC PANEL: CPT

## 2018-12-24 PROCEDURE — 85652 RBC SED RATE AUTOMATED: CPT

## 2018-12-24 PROCEDURE — 86140 C-REACTIVE PROTEIN: CPT

## 2018-12-24 PROCEDURE — 85025 COMPLETE CBC W/AUTO DIFF WBC: CPT

## 2019-01-03 ENCOUNTER — PATIENT MESSAGE (OUTPATIENT)
Dept: RHEUMATOLOGY | Facility: CLINIC | Age: 49
End: 2019-01-03

## 2019-01-10 ENCOUNTER — PATIENT MESSAGE (OUTPATIENT)
Dept: RHEUMATOLOGY | Facility: CLINIC | Age: 49
End: 2019-01-10

## 2019-01-10 DIAGNOSIS — M05.79 RHEUMATOID ARTHRITIS INVOLVING MULTIPLE SITES WITH POSITIVE RHEUMATOID FACTOR: Primary | ICD-10-CM

## 2019-01-10 DIAGNOSIS — R53.83 OTHER FATIGUE: ICD-10-CM

## 2019-01-10 DIAGNOSIS — D84.9 IMMUNOSUPPRESSION: ICD-10-CM

## 2019-01-10 DIAGNOSIS — J84.9 ILD (INTERSTITIAL LUNG DISEASE): ICD-10-CM

## 2019-01-10 DIAGNOSIS — M32.8 LUPUS ERYTHEMATOSUS OVERLAP SYNDROME: ICD-10-CM

## 2019-01-10 RX ORDER — AZATHIOPRINE 50 MG/1
TABLET ORAL
Qty: 270 TABLET | Refills: 0 | Status: SHIPPED | OUTPATIENT
Start: 2019-01-10 | End: 2019-03-19 | Stop reason: SDUPTHER

## 2019-01-25 ENCOUNTER — LAB VISIT (OUTPATIENT)
Dept: LAB | Facility: HOSPITAL | Age: 49
End: 2019-01-25
Attending: INTERNAL MEDICINE
Payer: OTHER GOVERNMENT

## 2019-01-25 DIAGNOSIS — R70.0 ELEVATED SEDIMENTATION RATE: ICD-10-CM

## 2019-01-25 DIAGNOSIS — M06.9 RA (RHEUMATOID ARTHRITIS): ICD-10-CM

## 2019-01-25 LAB
ALBUMIN SERPL BCP-MCNC: 3.3 G/DL
ALP SERPL-CCNC: 85 U/L
ALT SERPL W/O P-5'-P-CCNC: 8 U/L
ANION GAP SERPL CALC-SCNC: 7 MMOL/L
AST SERPL-CCNC: 13 U/L
BASOPHILS # BLD AUTO: 0.02 K/UL
BASOPHILS NFR BLD: 0.4 %
BILIRUB SERPL-MCNC: 0.3 MG/DL
BUN SERPL-MCNC: 13 MG/DL
CALCIUM SERPL-MCNC: 9.8 MG/DL
CHLORIDE SERPL-SCNC: 104 MMOL/L
CO2 SERPL-SCNC: 29 MMOL/L
CREAT SERPL-MCNC: 0.9 MG/DL
CRP SERPL-MCNC: 5.9 MG/L
DIFFERENTIAL METHOD: ABNORMAL
EOSINOPHIL # BLD AUTO: 0.2 K/UL
EOSINOPHIL NFR BLD: 3.8 %
ERYTHROCYTE [DISTWIDTH] IN BLOOD BY AUTOMATED COUNT: 14.8 %
ERYTHROCYTE [SEDIMENTATION RATE] IN BLOOD BY WESTERGREN METHOD: 99 MM/HR
EST. GFR  (AFRICAN AMERICAN): >60 ML/MIN/1.73 M^2
EST. GFR  (NON AFRICAN AMERICAN): >60 ML/MIN/1.73 M^2
GLUCOSE SERPL-MCNC: 109 MG/DL
HCT VFR BLD AUTO: 40.7 %
HGB BLD-MCNC: 12.1 G/DL
LYMPHOCYTES # BLD AUTO: 1.5 K/UL
LYMPHOCYTES NFR BLD: 31.7 %
MCH RBC QN AUTO: 25.5 PG
MCHC RBC AUTO-ENTMCNC: 29.7 G/DL
MCV RBC AUTO: 86 FL
MONOCYTES # BLD AUTO: 0.3 K/UL
MONOCYTES NFR BLD: 6.3 %
NEUTROPHILS # BLD AUTO: 2.8 K/UL
NEUTROPHILS NFR BLD: 57.8 %
PLATELET # BLD AUTO: 298 K/UL
PMV BLD AUTO: 9.4 FL
POTASSIUM SERPL-SCNC: 4.5 MMOL/L
PROT SERPL-MCNC: 8 G/DL
RBC # BLD AUTO: 4.75 M/UL
SODIUM SERPL-SCNC: 140 MMOL/L
WBC # BLD AUTO: 4.76 K/UL

## 2019-01-25 PROCEDURE — 80053 COMPREHEN METABOLIC PANEL: CPT

## 2019-01-25 PROCEDURE — 85652 RBC SED RATE AUTOMATED: CPT

## 2019-01-25 PROCEDURE — 85025 COMPLETE CBC W/AUTO DIFF WBC: CPT

## 2019-01-25 PROCEDURE — 36415 COLL VENOUS BLD VENIPUNCTURE: CPT

## 2019-01-25 PROCEDURE — 86140 C-REACTIVE PROTEIN: CPT

## 2019-01-28 ENCOUNTER — PATIENT MESSAGE (OUTPATIENT)
Dept: RHEUMATOLOGY | Facility: CLINIC | Age: 49
End: 2019-01-28

## 2019-01-28 DIAGNOSIS — M05.79 RHEUMATOID ARTHRITIS INVOLVING MULTIPLE SITES WITH POSITIVE RHEUMATOID FACTOR: Primary | ICD-10-CM

## 2019-01-28 DIAGNOSIS — R70.0 ELEVATED SEDIMENTATION RATE: ICD-10-CM

## 2019-01-28 DIAGNOSIS — J84.9 ILD (INTERSTITIAL LUNG DISEASE): ICD-10-CM

## 2019-01-28 DIAGNOSIS — M32.8 LUPUS ERYTHEMATOSUS OVERLAP SYNDROME: ICD-10-CM

## 2019-01-28 NOTE — TELEPHONE ENCOUNTER
No visit since August. Sed rate still elevated.  CRP normal.  Will check SPEP.    Unable to reach patient by phone.  Will see if she would like to come on 2/11 when she sees primary doctor.

## 2019-02-11 ENCOUNTER — OFFICE VISIT (OUTPATIENT)
Dept: RHEUMATOLOGY | Facility: CLINIC | Age: 49
End: 2019-02-11
Payer: OTHER GOVERNMENT

## 2019-02-11 ENCOUNTER — OFFICE VISIT (OUTPATIENT)
Dept: INTERNAL MEDICINE | Facility: CLINIC | Age: 49
End: 2019-02-11
Payer: OTHER GOVERNMENT

## 2019-02-11 VITALS
OXYGEN SATURATION: 95 % | BODY MASS INDEX: 48.82 KG/M2 | HEART RATE: 86 BPM | SYSTOLIC BLOOD PRESSURE: 122 MMHG | WEIGHT: 293 LBS | HEIGHT: 65 IN | DIASTOLIC BLOOD PRESSURE: 80 MMHG

## 2019-02-11 VITALS — HEIGHT: 65 IN | BODY MASS INDEX: 48.82 KG/M2 | WEIGHT: 293 LBS

## 2019-02-11 DIAGNOSIS — M05.79 RHEUMATOID ARTHRITIS INVOLVING MULTIPLE SITES WITH POSITIVE RHEUMATOID FACTOR: ICD-10-CM

## 2019-02-11 DIAGNOSIS — E13.9 DIABETES MELLITUS DUE TO ABNORMAL INSULIN: ICD-10-CM

## 2019-02-11 DIAGNOSIS — R73.9 STEROID-INDUCED HYPERGLYCEMIA: ICD-10-CM

## 2019-02-11 DIAGNOSIS — D84.9 IMMUNOSUPPRESSION: ICD-10-CM

## 2019-02-11 DIAGNOSIS — E66.01 MORBID OBESITY WITH BMI OF 50.0-59.9, ADULT: ICD-10-CM

## 2019-02-11 DIAGNOSIS — T38.0X5A STEROID-INDUCED HYPERGLYCEMIA: ICD-10-CM

## 2019-02-11 DIAGNOSIS — E66.01 CLASS 3 SEVERE OBESITY DUE TO EXCESS CALORIES WITHOUT SERIOUS COMORBIDITY WITH BODY MASS INDEX (BMI) OF 50.0 TO 59.9 IN ADULT: ICD-10-CM

## 2019-02-11 DIAGNOSIS — E55.9 VITAMIN D DEFICIENCY DISEASE: ICD-10-CM

## 2019-02-11 DIAGNOSIS — M32.8 LUPUS ERYTHEMATOSUS OVERLAP SYNDROME: ICD-10-CM

## 2019-02-11 DIAGNOSIS — I10 ESSENTIAL HYPERTENSION: ICD-10-CM

## 2019-02-11 DIAGNOSIS — J84.9 ILD (INTERSTITIAL LUNG DISEASE): ICD-10-CM

## 2019-02-11 DIAGNOSIS — M32.8 LUPUS ERYTHEMATOSUS OVERLAP SYNDROME: Primary | ICD-10-CM

## 2019-02-11 DIAGNOSIS — Z12.11 SCREENING FOR MALIGNANT NEOPLASM OF COLON: Primary | ICD-10-CM

## 2019-02-11 PROBLEM — R09.89: Status: RESOLVED | Noted: 2018-08-16 | Resolved: 2019-02-11

## 2019-02-11 PROBLEM — R53.83 OTHER FATIGUE: Status: RESOLVED | Noted: 2018-08-16 | Resolved: 2019-02-11

## 2019-02-11 PROBLEM — I50.9 ACUTE CONGESTIVE HEART FAILURE: Status: RESOLVED | Noted: 2017-12-20 | Resolved: 2019-02-11

## 2019-02-11 PROBLEM — I31.39 PERICARDIAL EFFUSION: Status: RESOLVED | Noted: 2017-12-26 | Resolved: 2019-02-11

## 2019-02-11 PROBLEM — R00.2 PALPITATION: Status: RESOLVED | Noted: 2018-08-16 | Resolved: 2019-02-11

## 2019-02-11 PROBLEM — I42.5 RESTRICTIVE CARDIOMYOPATHY: Status: RESOLVED | Noted: 2018-08-16 | Resolved: 2019-02-11

## 2019-02-11 PROBLEM — R91.8 GROUND GLASS OPACITY PRESENT ON IMAGING OF LUNG: Status: RESOLVED | Noted: 2017-12-22 | Resolved: 2019-02-11

## 2019-02-11 PROBLEM — R09.89 JUGULAR VENOUS DISTENSION: Status: RESOLVED | Noted: 2018-08-16 | Resolved: 2019-02-11

## 2019-02-11 PROCEDURE — 99214 PR OFFICE/OUTPT VISIT, EST, LEVL IV, 30-39 MIN: ICD-10-PCS | Mod: S$PBB,,, | Performed by: INTERNAL MEDICINE

## 2019-02-11 PROCEDURE — 99999 PR PBB SHADOW E&M-EST. PATIENT-LVL III: ICD-10-PCS | Mod: PBBFAC,,, | Performed by: INTERNAL MEDICINE

## 2019-02-11 PROCEDURE — 99213 OFFICE O/P EST LOW 20 MIN: CPT | Mod: PBBFAC | Performed by: INTERNAL MEDICINE

## 2019-02-11 PROCEDURE — 99213 OFFICE O/P EST LOW 20 MIN: CPT | Mod: PBBFAC,27 | Performed by: INTERNAL MEDICINE

## 2019-02-11 PROCEDURE — 99999 PR PBB SHADOW E&M-EST. PATIENT-LVL III: CPT | Mod: PBBFAC,,, | Performed by: INTERNAL MEDICINE

## 2019-02-11 PROCEDURE — 99214 OFFICE O/P EST MOD 30 MIN: CPT | Mod: S$PBB,,, | Performed by: INTERNAL MEDICINE

## 2019-02-11 RX ORDER — PREDNISONE 5 MG/1
TABLET ORAL
Qty: 90 TABLET | Refills: 0 | Status: SHIPPED | OUTPATIENT
Start: 2019-02-11 | End: 2019-06-03 | Stop reason: SDUPTHER

## 2019-02-11 ASSESSMENT — ROUTINE ASSESSMENT OF PATIENT INDEX DATA (RAPID3)
TOTAL RAPID3 SCORE: .67
PATIENT GLOBAL ASSESSMENT SCORE: 2
FATIGUE SCORE: 5
MDHAQ FUNCTION SCORE: 0
PAIN SCORE: 0
PSYCHOLOGICAL DISTRESS SCORE: 0
AM STIFFNESS SCORE: 0, NO

## 2019-02-11 NOTE — PROGRESS NOTES
"Subjective:       Patient ID: Malika Hodgson is a 48 y.o. female.    Chief Complaint: RA/SLE    HPI:  Malika Hodgson is a 48 y.o. female with history of RA and lupus overlap.  Lupus diagnosed 12/2017 based on TIARA that was initially negative 2009 now positive 1: 2560 speckled, +NILS, +SSA,SSB, dsDNA neg, +RNA polymerase III as well as  acute hypoxemic respiratory failure, pericardial/pleural effusion, ground glass opacities on chest CT.     In hospital 12/2017 started on solumedrol 16mg q8 and switched to prednisone 60 mg daily. Discharged with home O2.  Pulmonary decided not to bronch.  Concerned for SLE overlap and we started her on HCQ 200mg BID.     Her repeat CT chest showed a decrease in the pleural and pericardial effusions.         Interval History:  She is currently on azathioprine 100 mg in AM and 50 mg PM, Plaquenil 200 mg bid and 5 mg prednisone.    Interval History:  Seen in ER 2 months ago for CP.  Workup is negative.  Dealing with stress of children.   Reports pulse ox runs 97-98% off oxygen.    Review of Systems   Constitutional: Positive for fatigue. Negative for fever and unexpected weight change.   HENT: Negative for trouble swallowing.    Eyes: Negative for redness.   Respiratory: Negative for cough and shortness of breath.    Cardiovascular: Negative for chest pain.   Gastrointestinal: Negative for constipation and diarrhea.   Endocrine: Negative.    Genitourinary: Negative for dysuria and genital sores.   Musculoskeletal: Negative.    Skin: Negative for rash.   Allergic/Immunologic: Negative.    Neurological: Negative for headaches.   Hematological: Does not bruise/bleed easily.   Psychiatric/Behavioral: Negative.          Objective:   Ht 5' 5" (1.651 m)   Wt (!) 159.6 kg (351 lb 13.7 oz)   BMI 58.55 kg/m²  HR=66     Physical Exam   Constitutional: She is oriented to person, place, and time and well-developed, well-nourished, and in no distress.   HENT:   Head: Normocephalic and atraumatic. "   Eyes: Conjunctivae and EOM are normal.   Neck: Neck supple.   Cardiovascular: Normal rate, regular rhythm and normal heart sounds.    HR 66       Pulmonary/Chest: Effort normal and breath sounds normal.   Abdominal: Soft. Bowel sounds are normal.   Neurological: She is alert and oriented to person, place, and time. Gait normal.   Skin: Skin is warm and dry.     Psychiatric: Mood and affect normal.   Musculoskeletal: Normal range of motion.           LABS    Component      Latest Ref Rng & Units 1/25/2019   WBC      3.90 - 12.70 K/uL 4.76   RBC      4.00 - 5.40 M/uL 4.75   Hemoglobin      12.0 - 16.0 g/dL 12.1   Hematocrit      37.0 - 48.5 % 40.7   MCV      82 - 98 fL 86   MCH      27.0 - 31.0 pg 25.5 (L)   MCHC      32.0 - 36.0 g/dL 29.7 (L)   RDW      11.5 - 14.5 % 14.8 (H)   Platelets      150 - 350 K/uL 298   MPV      9.2 - 12.9 fL 9.4   Gran # (ANC)      1.8 - 7.7 K/uL 2.8   Lymph #      1.0 - 4.8 K/uL 1.5   Mono #      0.3 - 1.0 K/uL 0.3   Eos #      0.0 - 0.5 K/uL 0.2   Baso #      0.00 - 0.20 K/uL 0.02   Gran%      38.0 - 73.0 % 57.8   Lymph%      18.0 - 48.0 % 31.7   Mono%      4.0 - 15.0 % 6.3   Eosinophil%      0.0 - 8.0 % 3.8   Basophil%      0.0 - 1.9 % 0.4   Differential Method       Automated   Sodium      136 - 145 mmol/L 140   Potassium      3.5 - 5.1 mmol/L 4.5   Chloride      95 - 110 mmol/L 104   CO2      23 - 29 mmol/L 29   Glucose      70 - 110 mg/dL 109   BUN, Bld      6 - 20 mg/dL 13   Creatinine      0.5 - 1.4 mg/dL 0.9   Calcium      8.7 - 10.5 mg/dL 9.8   Total Protein      6.0 - 8.4 g/dL 8.0   Albumin      3.5 - 5.2 g/dL 3.3 (L)   Total Bilirubin      0.1 - 1.0 mg/dL 0.3   Alkaline Phosphatase      55 - 135 U/L 85   AST      10 - 40 U/L 13   ALT      10 - 44 U/L 8 (L)   Anion Gap      8 - 16 mmol/L 7 (L)   eGFR if African American      >60 mL/min/1.73 m:2 >60   eGFR if non African American      >60 mL/min/1.73 m:2 >60   CRP      0.0 - 8.2 mg/L 5.9   Sed Rate      0 - 36 mm/Hr 99 (H)         Assessment:       1. SLE manifested by hypoxemic respiratory failure withCT with interstial changes, pericardial effusion and pleural effusion, TIARA that was initially negative 2009 now positive 1: 2560 speckled, +NILS, +SSA,SSB, dsDNA neg, +RNA polymerase III.   On Imuran, Plaquenil and prednisone however ESR elevated.  2. Rheumatoid arthritis. Manifested by previous small joint involvement of the hands, +RF/CCP and elevated inflammatory markers.  Now without activity  3. Morbid obesity. Patient has not been exercising at [place].  Encourage walking and restarting weight watchers (50 pounds since hospitalization and Weight Watchers).  4. Abnormal SPEP without monoclonal gammopathy  5. Bradycardia.  Persistent despite beta blocker.  48 today; fatigue.  Similar to when admitted fpr pericardial effusion  6. Jugular vernous distension.  JVD to 9 cm with increase on compression of liver.  History of pericardial effusion in 12/2017.  Gained 8 pounds since May but no leg edema today.  6. Elevated ESR    Plan:       1.  Repeat labs 2/25/19.  Continue prednisone 5 mg daily.  2.  Consider increase azathioprine 100 mg in am and 100 mg in pm after labs  3.  Continue Plaquenil.  4.  Encourage self care   RTO in 3 months/prn.

## 2019-02-11 NOTE — PROGRESS NOTES
CHIEF COMPLAINT: Annual exam and  follow up of RA/sle, hypertension, steroid induced diabetes.     HISTORY OF PRESENT ILLNESS: This is a 48 year old woman who presents for follow up.    She had an episode of chest pain on 18 and she went to the ED. Work up unremarkable and chest pain resolved and never returned.      Joints are doing ok.  She is now taking prednisone 5 mg every day.   She is taking azathoprine 100 mg in the morning and 50 mg in the evening and plaquenil 200 mg twice daily for her RA and lupus overlap with hypoxemic respiratory failure 2017 withCT with interstial changes, pericardial effusion and pleural effusion, TIARA that was initially negative  now positive 1: 2560 speckled, +NILS, +SSA,SSB, dsDNA neg, +RNA polymerase III.  Joints are doing well. Breathing is good.  NO fever, chills, oral ulcers, chest pain, shortness of breath, nausea, vomiting, constipation, diarrhea.  NO reflux on pantoprazole.      Blood sugars are better overall and she has not needed Victoza. BLood sugars are between  on diet alone.        She is taking Norvasc 5 mg daily  for hypertension.  No palpitations.    She is taking vitamin D 50,000 units twice weekly.     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      Mood is good. Sleep is good. Children are 5 and 7.         PAST MEDICAL HISTORY:   1. Early Rheumatoid arthritis  2. History of vitamin D deficiency.   3. History of amenorrhea   4. Morbid obesity.     PAST SURGICAL HISTORY: Negative.     ALLERGIES, MEDICATIONS: Updated on [facility]     SOCIAL HISTORY: No tobacco, alcohol use. 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 Father is living diabetes, hypertension and rheumatoid arthritis.  Brother with diverticulosis. Brother healthy. Brother healthy.      PHYSICAL EXAMINATION:      General: Is alert, oriented, in no apparent distress. Affect is within   normal limits.   HEENT: Conjunctivae are anicteric. PERRL. TM's are clear. Oropharynx is   clear.   Neck: Supple. No cervical lymphadenopathy. No thyromegaly. Respiratory   effort is normal.   Lungs: Clear to auscultation bilaterally.   Heart: Regular rate and rhythm, no murmur, rub or gallop. No lower   extremity edema.        ASSESSMENT AND PLAN:         1. RA with lupus overlab with hypoxemic respiratory failure 12/2017 withCT with interstial changes, pericardial effusion and pleural effusion, TIARA that was initially negative 2009 now positive 1: 2560 speckled, +NILS, +SSA,SSB, dsDNA neg, +RNA polymerase III. Hypoxemic respiratory failure -CT with interstial changes, pericardial effusion and pleural effusion. Followed by rheumatology     3. Steroid induced diabetes - hemoglobin A1C  4. Anemia - resolved  5. Hypertension - stable.   6. Vitamin D deficiency - vitamin D 50,000 units twice weekly  7. Morbid obesity - discussed weight. Discussed gastric sleeve when gets off prednisone.   8. GERD -s table.   Screening - [facility] 10/18. GYN exam 5/16.  Needs colonoscopy as 48.   Will see her back in 4 months, sooner if problems arise

## 2019-02-13 ENCOUNTER — TELEPHONE (OUTPATIENT)
Dept: ADMINISTRATIVE | Facility: HOSPITAL | Age: 49
End: 2019-02-13

## 2019-02-13 RX ORDER — LISINOPRIL 5 MG/1
5 TABLET ORAL
COMMUNITY
End: 2019-06-03

## 2019-03-02 ENCOUNTER — LAB VISIT (OUTPATIENT)
Dept: LAB | Facility: HOSPITAL | Age: 49
End: 2019-03-02
Attending: INTERNAL MEDICINE
Payer: OTHER GOVERNMENT

## 2019-03-02 DIAGNOSIS — R70.0 ELEVATED SEDIMENTATION RATE: ICD-10-CM

## 2019-03-02 DIAGNOSIS — J84.9 ILD (INTERSTITIAL LUNG DISEASE): ICD-10-CM

## 2019-03-02 DIAGNOSIS — M05.79 RHEUMATOID ARTHRITIS INVOLVING MULTIPLE SITES WITH POSITIVE RHEUMATOID FACTOR: ICD-10-CM

## 2019-03-02 DIAGNOSIS — D84.9 IMMUNOSUPPRESSION: ICD-10-CM

## 2019-03-02 DIAGNOSIS — M32.8 LUPUS ERYTHEMATOSUS OVERLAP SYNDROME: ICD-10-CM

## 2019-03-02 LAB
ALBUMIN SERPL BCP-MCNC: 3.3 G/DL
ALP SERPL-CCNC: 90 U/L
ALT SERPL W/O P-5'-P-CCNC: 7 U/L
ANION GAP SERPL CALC-SCNC: 9 MMOL/L
AST SERPL-CCNC: 13 U/L
BASOPHILS # BLD AUTO: 0.02 K/UL
BASOPHILS NFR BLD: 0.4 %
BILIRUB SERPL-MCNC: 0.3 MG/DL
BUN SERPL-MCNC: 11 MG/DL
C3 SERPL-MCNC: 167 MG/DL
C4 SERPL-MCNC: 30 MG/DL
CALCIUM SERPL-MCNC: 9.8 MG/DL
CHLORIDE SERPL-SCNC: 104 MMOL/L
CO2 SERPL-SCNC: 28 MMOL/L
CREAT SERPL-MCNC: 0.9 MG/DL
CRP SERPL-MCNC: 7.7 MG/L
DIFFERENTIAL METHOD: ABNORMAL
EOSINOPHIL # BLD AUTO: 0.1 K/UL
EOSINOPHIL NFR BLD: 2.7 %
ERYTHROCYTE [DISTWIDTH] IN BLOOD BY AUTOMATED COUNT: 14.6 %
ERYTHROCYTE [SEDIMENTATION RATE] IN BLOOD BY WESTERGREN METHOD: >120 MM/HR
EST. GFR  (AFRICAN AMERICAN): >60 ML/MIN/1.73 M^2
EST. GFR  (NON AFRICAN AMERICAN): >60 ML/MIN/1.73 M^2
GLUCOSE SERPL-MCNC: 97 MG/DL
HCT VFR BLD AUTO: 40 %
HGB BLD-MCNC: 12 G/DL
LYMPHOCYTES # BLD AUTO: 1.5 K/UL
LYMPHOCYTES NFR BLD: 30.8 %
MCH RBC QN AUTO: 25.6 PG
MCHC RBC AUTO-ENTMCNC: 30 G/DL
MCV RBC AUTO: 85 FL
MONOCYTES # BLD AUTO: 0.3 K/UL
MONOCYTES NFR BLD: 6.4 %
NEUTROPHILS # BLD AUTO: 2.9 K/UL
NEUTROPHILS NFR BLD: 59.5 %
PLATELET # BLD AUTO: 321 K/UL
PMV BLD AUTO: 9.8 FL
POTASSIUM SERPL-SCNC: 3.8 MMOL/L
PROT SERPL-MCNC: 7.9 G/DL
RBC # BLD AUTO: 4.69 M/UL
SODIUM SERPL-SCNC: 141 MMOL/L
WBC # BLD AUTO: 4.83 K/UL

## 2019-03-02 PROCEDURE — 80053 COMPREHEN METABOLIC PANEL: CPT

## 2019-03-02 PROCEDURE — 86140 C-REACTIVE PROTEIN: CPT

## 2019-03-02 PROCEDURE — 36415 COLL VENOUS BLD VENIPUNCTURE: CPT

## 2019-03-02 PROCEDURE — 84165 PROTEIN E-PHORESIS SERUM: CPT | Mod: 26,,, | Performed by: PATHOLOGY

## 2019-03-02 PROCEDURE — 85025 COMPLETE CBC W/AUTO DIFF WBC: CPT

## 2019-03-02 PROCEDURE — 85652 RBC SED RATE AUTOMATED: CPT

## 2019-03-02 PROCEDURE — 86160 COMPLEMENT ANTIGEN: CPT

## 2019-03-02 PROCEDURE — 86160 COMPLEMENT ANTIGEN: CPT | Mod: 59

## 2019-03-02 PROCEDURE — 86225 DNA ANTIBODY NATIVE: CPT

## 2019-03-02 PROCEDURE — 84165 PATHOLOGIST INTERPRETATION SPE: ICD-10-PCS | Mod: 26,,, | Performed by: PATHOLOGY

## 2019-03-02 PROCEDURE — 84165 PROTEIN E-PHORESIS SERUM: CPT

## 2019-03-04 LAB
ALBUMIN SERPL ELPH-MCNC: 3.45 G/DL
ALPHA1 GLOB SERPL ELPH-MCNC: 0.36 G/DL
ALPHA2 GLOB SERPL ELPH-MCNC: 0.89 G/DL
B-GLOBULIN SERPL ELPH-MCNC: 1.09 G/DL
DSDNA AB SER-ACNC: NORMAL [IU]/ML
GAMMA GLOB SERPL ELPH-MCNC: 1.71 G/DL
PATHOLOGIST INTERPRETATION SPE: NORMAL
PROT SERPL-MCNC: 7.5 G/DL

## 2019-03-15 ENCOUNTER — PATIENT MESSAGE (OUTPATIENT)
Dept: RHEUMATOLOGY | Facility: CLINIC | Age: 49
End: 2019-03-15

## 2019-03-15 ENCOUNTER — PATIENT MESSAGE (OUTPATIENT)
Dept: INTERNAL MEDICINE | Facility: CLINIC | Age: 49
End: 2019-03-15

## 2019-03-15 DIAGNOSIS — Z79.899 LONG-TERM USE OF PLAQUENIL: ICD-10-CM

## 2019-03-15 DIAGNOSIS — E13.9 DIABETES MELLITUS DUE TO ABNORMAL INSULIN: Primary | ICD-10-CM

## 2019-03-15 DIAGNOSIS — E55.9 VITAMIN D DEFICIENCY DISEASE: ICD-10-CM

## 2019-03-15 DIAGNOSIS — L93.0 LUPUS ERYTHEMATOSUS, UNSPECIFIED FORM: ICD-10-CM

## 2019-03-15 DIAGNOSIS — D84.9 IMMUNOSUPPRESSION: ICD-10-CM

## 2019-03-15 DIAGNOSIS — M05.79 RHEUMATOID ARTHRITIS INVOLVING MULTIPLE SITES WITH POSITIVE RHEUMATOID FACTOR: ICD-10-CM

## 2019-03-15 DIAGNOSIS — M32.8 LUPUS ERYTHEMATOSUS OVERLAP SYNDROME: Primary | ICD-10-CM

## 2019-03-19 RX ORDER — AZATHIOPRINE 50 MG/1
100 TABLET ORAL 2 TIMES DAILY
Qty: 360 TABLET | Refills: 0 | Status: SHIPPED | OUTPATIENT
Start: 2019-03-19 | End: 2019-07-14 | Stop reason: SDUPTHER

## 2019-03-19 NOTE — TELEPHONE ENCOUNTER
Can you please place another external referral for ophthalmologist. Please see patient portal message

## 2019-03-25 ENCOUNTER — PATIENT MESSAGE (OUTPATIENT)
Dept: RHEUMATOLOGY | Facility: CLINIC | Age: 49
End: 2019-03-25

## 2019-04-26 ENCOUNTER — LAB VISIT (OUTPATIENT)
Dept: LAB | Facility: HOSPITAL | Age: 49
End: 2019-04-26
Attending: INTERNAL MEDICINE
Payer: OTHER GOVERNMENT

## 2019-04-26 DIAGNOSIS — M05.79 RHEUMATOID ARTHRITIS INVOLVING MULTIPLE SITES WITH POSITIVE RHEUMATOID FACTOR: ICD-10-CM

## 2019-04-26 DIAGNOSIS — M32.8 LUPUS ERYTHEMATOSUS OVERLAP SYNDROME: ICD-10-CM

## 2019-04-26 DIAGNOSIS — D84.9 IMMUNOSUPPRESSION: ICD-10-CM

## 2019-04-26 LAB
ALBUMIN SERPL BCP-MCNC: 3.5 G/DL (ref 3.5–5.2)
ALP SERPL-CCNC: 76 U/L (ref 55–135)
ALT SERPL W/O P-5'-P-CCNC: 31 U/L (ref 10–44)
ANION GAP SERPL CALC-SCNC: 9 MMOL/L (ref 8–16)
AST SERPL-CCNC: 33 U/L (ref 10–40)
BASOPHILS # BLD AUTO: 0.02 K/UL (ref 0–0.2)
BASOPHILS NFR BLD: 0.4 % (ref 0–1.9)
BILIRUB SERPL-MCNC: 0.3 MG/DL (ref 0.1–1)
BUN SERPL-MCNC: 14 MG/DL (ref 6–20)
C3 SERPL-MCNC: 150 MG/DL (ref 50–180)
C4 SERPL-MCNC: 26 MG/DL (ref 11–44)
CALCIUM SERPL-MCNC: 9.8 MG/DL (ref 8.7–10.5)
CHLORIDE SERPL-SCNC: 106 MMOL/L (ref 95–110)
CK SERPL-CCNC: 120 U/L (ref 20–180)
CO2 SERPL-SCNC: 28 MMOL/L (ref 23–29)
CREAT SERPL-MCNC: 0.9 MG/DL (ref 0.5–1.4)
CRP SERPL-MCNC: 5.5 MG/L (ref 0–8.2)
DIFFERENTIAL METHOD: ABNORMAL
EOSINOPHIL # BLD AUTO: 0.2 K/UL (ref 0–0.5)
EOSINOPHIL NFR BLD: 4.9 % (ref 0–8)
ERYTHROCYTE [DISTWIDTH] IN BLOOD BY AUTOMATED COUNT: 14.6 % (ref 11.5–14.5)
ERYTHROCYTE [SEDIMENTATION RATE] IN BLOOD BY WESTERGREN METHOD: 106 MM/HR (ref 0–36)
EST. GFR  (AFRICAN AMERICAN): >60 ML/MIN/1.73 M^2
EST. GFR  (NON AFRICAN AMERICAN): >60 ML/MIN/1.73 M^2
GLUCOSE SERPL-MCNC: 104 MG/DL (ref 70–110)
HCT VFR BLD AUTO: 39.4 % (ref 37–48.5)
HGB BLD-MCNC: 11.7 G/DL (ref 12–16)
LYMPHOCYTES # BLD AUTO: 1.2 K/UL (ref 1–4.8)
LYMPHOCYTES NFR BLD: 27.3 % (ref 18–48)
MCH RBC QN AUTO: 25.9 PG (ref 27–31)
MCHC RBC AUTO-ENTMCNC: 29.7 G/DL (ref 32–36)
MCV RBC AUTO: 87 FL (ref 82–98)
MONOCYTES # BLD AUTO: 0.3 K/UL (ref 0.3–1)
MONOCYTES NFR BLD: 7.3 % (ref 4–15)
NEUTROPHILS # BLD AUTO: 2.7 K/UL (ref 1.8–7.7)
NEUTROPHILS NFR BLD: 59.9 % (ref 38–73)
PLATELET # BLD AUTO: 294 K/UL (ref 150–350)
PMV BLD AUTO: 10.1 FL (ref 9.2–12.9)
POTASSIUM SERPL-SCNC: 4.1 MMOL/L (ref 3.5–5.1)
PROT SERPL-MCNC: 8 G/DL (ref 6–8.4)
RBC # BLD AUTO: 4.52 M/UL (ref 4–5.4)
SODIUM SERPL-SCNC: 143 MMOL/L (ref 136–145)
WBC # BLD AUTO: 4.51 K/UL (ref 3.9–12.7)

## 2019-04-26 PROCEDURE — 86160 COMPLEMENT ANTIGEN: CPT

## 2019-04-26 PROCEDURE — 86160 COMPLEMENT ANTIGEN: CPT | Mod: 59

## 2019-04-26 PROCEDURE — 85025 COMPLETE CBC W/AUTO DIFF WBC: CPT

## 2019-04-26 PROCEDURE — 86140 C-REACTIVE PROTEIN: CPT

## 2019-04-26 PROCEDURE — 86225 DNA ANTIBODY NATIVE: CPT

## 2019-04-26 PROCEDURE — 85652 RBC SED RATE AUTOMATED: CPT

## 2019-04-26 PROCEDURE — 80053 COMPREHEN METABOLIC PANEL: CPT

## 2019-04-26 PROCEDURE — 82550 ASSAY OF CK (CPK): CPT

## 2019-04-26 PROCEDURE — 36415 COLL VENOUS BLD VENIPUNCTURE: CPT

## 2019-04-29 LAB — DSDNA AB SER-ACNC: NORMAL [IU]/ML

## 2019-06-03 ENCOUNTER — LAB VISIT (OUTPATIENT)
Dept: LAB | Facility: HOSPITAL | Age: 49
End: 2019-06-03
Attending: INTERNAL MEDICINE
Payer: OTHER GOVERNMENT

## 2019-06-03 ENCOUNTER — OFFICE VISIT (OUTPATIENT)
Dept: RHEUMATOLOGY | Facility: CLINIC | Age: 49
End: 2019-06-03
Payer: OTHER GOVERNMENT

## 2019-06-03 VITALS
BODY MASS INDEX: 48.82 KG/M2 | HEIGHT: 65 IN | WEIGHT: 293 LBS | HEART RATE: 61 BPM | SYSTOLIC BLOOD PRESSURE: 144 MMHG | DIASTOLIC BLOOD PRESSURE: 79 MMHG

## 2019-06-03 DIAGNOSIS — D84.9 IMMUNOSUPPRESSION: ICD-10-CM

## 2019-06-03 DIAGNOSIS — M05.79 RHEUMATOID ARTHRITIS INVOLVING MULTIPLE SITES WITH POSITIVE RHEUMATOID FACTOR: ICD-10-CM

## 2019-06-03 DIAGNOSIS — J84.9 ILD (INTERSTITIAL LUNG DISEASE): ICD-10-CM

## 2019-06-03 DIAGNOSIS — R70.0 ELEVATED SEDIMENTATION RATE: ICD-10-CM

## 2019-06-03 DIAGNOSIS — E55.9 VITAMIN D DEFICIENCY DISEASE: ICD-10-CM

## 2019-06-03 DIAGNOSIS — M32.8 LUPUS ERYTHEMATOSUS OVERLAP SYNDROME: Primary | ICD-10-CM

## 2019-06-03 DIAGNOSIS — M32.8 LUPUS ERYTHEMATOSUS OVERLAP SYNDROME: ICD-10-CM

## 2019-06-03 DIAGNOSIS — R19.7 DIARRHEA, UNSPECIFIED TYPE: ICD-10-CM

## 2019-06-03 DIAGNOSIS — E66.01 MORBID OBESITY WITH BMI OF 50.0-59.9, ADULT: ICD-10-CM

## 2019-06-03 LAB
ALBUMIN SERPL BCP-MCNC: 3.7 G/DL (ref 3.5–5.2)
ALP SERPL-CCNC: 86 U/L (ref 55–135)
ALT SERPL W/O P-5'-P-CCNC: 12 U/L (ref 10–44)
ANION GAP SERPL CALC-SCNC: 8 MMOL/L (ref 8–16)
AST SERPL-CCNC: 16 U/L (ref 10–40)
BASOPHILS # BLD AUTO: 0.04 K/UL (ref 0–0.2)
BASOPHILS NFR BLD: 0.7 % (ref 0–1.9)
BILIRUB SERPL-MCNC: 0.3 MG/DL (ref 0.1–1)
BUN SERPL-MCNC: 15 MG/DL (ref 6–20)
C3 SERPL-MCNC: 163 MG/DL (ref 50–180)
C4 SERPL-MCNC: 26 MG/DL (ref 11–44)
CALCIUM SERPL-MCNC: 10.4 MG/DL (ref 8.7–10.5)
CHLORIDE SERPL-SCNC: 105 MMOL/L (ref 95–110)
CK SERPL-CCNC: 141 U/L (ref 20–180)
CO2 SERPL-SCNC: 29 MMOL/L (ref 23–29)
CREAT SERPL-MCNC: 1 MG/DL (ref 0.5–1.4)
CRP SERPL-MCNC: 5.1 MG/L (ref 0–8.2)
DIFFERENTIAL METHOD: ABNORMAL
EOSINOPHIL # BLD AUTO: 0.2 K/UL (ref 0–0.5)
EOSINOPHIL NFR BLD: 3.1 % (ref 0–8)
ERYTHROCYTE [DISTWIDTH] IN BLOOD BY AUTOMATED COUNT: 14.4 % (ref 11.5–14.5)
ERYTHROCYTE [SEDIMENTATION RATE] IN BLOOD BY WESTERGREN METHOD: 51 MM/HR (ref 0–36)
EST. GFR  (AFRICAN AMERICAN): >60 ML/MIN/1.73 M^2
EST. GFR  (NON AFRICAN AMERICAN): >60 ML/MIN/1.73 M^2
GLUCOSE SERPL-MCNC: 127 MG/DL (ref 70–110)
HCT VFR BLD AUTO: 40.9 % (ref 37–48.5)
HGB BLD-MCNC: 12.2 G/DL (ref 12–16)
IMM GRANULOCYTES # BLD AUTO: 0.02 K/UL (ref 0–0.04)
IMM GRANULOCYTES NFR BLD AUTO: 0.4 % (ref 0–0.5)
LYMPHOCYTES # BLD AUTO: 1.3 K/UL (ref 1–4.8)
LYMPHOCYTES NFR BLD: 23.9 % (ref 18–48)
MCH RBC QN AUTO: 26 PG (ref 27–31)
MCHC RBC AUTO-ENTMCNC: 29.8 G/DL (ref 32–36)
MCV RBC AUTO: 87 FL (ref 82–98)
MONOCYTES # BLD AUTO: 0.4 K/UL (ref 0.3–1)
MONOCYTES NFR BLD: 7.8 % (ref 4–15)
NEUTROPHILS # BLD AUTO: 3.5 K/UL (ref 1.8–7.7)
NEUTROPHILS NFR BLD: 64.1 % (ref 38–73)
NRBC BLD-RTO: 0 /100 WBC
PLATELET # BLD AUTO: 327 K/UL (ref 150–350)
PMV BLD AUTO: 10.1 FL (ref 9.2–12.9)
POTASSIUM SERPL-SCNC: 4.6 MMOL/L (ref 3.5–5.1)
PROT SERPL-MCNC: 8.4 G/DL (ref 6–8.4)
RBC # BLD AUTO: 4.7 M/UL (ref 4–5.4)
SODIUM SERPL-SCNC: 142 MMOL/L (ref 136–145)
WBC # BLD AUTO: 5.4 K/UL (ref 3.9–12.7)

## 2019-06-03 PROCEDURE — 80053 COMPREHEN METABOLIC PANEL: CPT

## 2019-06-03 PROCEDURE — 99999 PR PBB SHADOW E&M-EST. PATIENT-LVL III: CPT | Mod: PBBFAC,,, | Performed by: INTERNAL MEDICINE

## 2019-06-03 PROCEDURE — 86225 DNA ANTIBODY NATIVE: CPT

## 2019-06-03 PROCEDURE — 82550 ASSAY OF CK (CPK): CPT

## 2019-06-03 PROCEDURE — 85025 COMPLETE CBC W/AUTO DIFF WBC: CPT

## 2019-06-03 PROCEDURE — 99999 PR PBB SHADOW E&M-EST. PATIENT-LVL III: ICD-10-PCS | Mod: PBBFAC,,, | Performed by: INTERNAL MEDICINE

## 2019-06-03 PROCEDURE — 36415 COLL VENOUS BLD VENIPUNCTURE: CPT

## 2019-06-03 PROCEDURE — 86160 COMPLEMENT ANTIGEN: CPT | Mod: 59

## 2019-06-03 PROCEDURE — 99214 PR OFFICE/OUTPT VISIT, EST, LEVL IV, 30-39 MIN: ICD-10-PCS | Mod: S$PBB,,, | Performed by: INTERNAL MEDICINE

## 2019-06-03 PROCEDURE — 99213 OFFICE O/P EST LOW 20 MIN: CPT | Mod: PBBFAC | Performed by: INTERNAL MEDICINE

## 2019-06-03 PROCEDURE — 86140 C-REACTIVE PROTEIN: CPT

## 2019-06-03 PROCEDURE — 99214 OFFICE O/P EST MOD 30 MIN: CPT | Mod: S$PBB,,, | Performed by: INTERNAL MEDICINE

## 2019-06-03 PROCEDURE — 85652 RBC SED RATE AUTOMATED: CPT

## 2019-06-03 PROCEDURE — 86160 COMPLEMENT ANTIGEN: CPT

## 2019-06-03 ASSESSMENT — ROUTINE ASSESSMENT OF PATIENT INDEX DATA (RAPID3)
FATIGUE SCORE: 2
AM STIFFNESS SCORE: 0, NO
PSYCHOLOGICAL DISTRESS SCORE: 0
TOTAL RAPID3 SCORE: 1.5
MDHAQ FUNCTION SCORE: 0
PATIENT GLOBAL ASSESSMENT SCORE: 2.5
PAIN SCORE: 2

## 2019-06-03 NOTE — PROGRESS NOTES
"Subjective:       Patient ID: Malika Hodgson is a 48 y.o. female.    Chief Complaint: RA/SLE    HPI:  Malika Hodgson is a 48 y.o. female with history of RA and lupus overlap.  Lupus diagnosed 12/2017 based on TIARA that was initially negative 2009 now positive 1: 2560 speckled, +NILS, +SSA,SSB, dsDNA neg, +RNA polymerase III as well as  acute hypoxemic respiratory failure, pericardial/pleural effusion, ground glass opacities on chest CT.     In hospital 12/2017 started on solumedrol 16mg q8 and switched to prednisone 60 mg daily. Discharged with home O2.  Pulmonary decided not to bronch.  Concerned for SLE overlap and we started her on HCQ 200mg BID.     Her repeat CT chest showed a decrease in the pleural and pericardial effusions.         Interval History:  She is currently on azathioprine 100 mg in AM and 100 mg PM, Plaquenil 200 mg bid and 5 mg prednisone (at least 6 months).    Feeling well.  No issues except one week of intermittent diarrhea with mild cramping.  No melena or hematochezia.   Reports pulse ox runs 97% off oxygen.    Review of Systems   Constitutional: Positive for fatigue. Negative for fever and unexpected weight change.   HENT: Negative for trouble swallowing.    Eyes: Negative for redness.   Respiratory: Negative for cough and shortness of breath.    Cardiovascular: Negative for chest pain.   Gastrointestinal: Positive for diarrhea. Negative for constipation.   Endocrine: Negative.    Genitourinary: Negative for dysuria and genital sores.   Musculoskeletal: Negative.    Skin: Negative for rash.   Allergic/Immunologic: Negative.    Neurological: Negative for headaches.   Hematological: Does not bruise/bleed easily.   Psychiatric/Behavioral: Negative.          Objective:   BP (!) 144/79   Pulse 61   Ht 5' 5" (1.651 m)   Wt (!) 161.2 kg (355 lb 6.1 oz)   BMI 59.14 kg/m²  HR=66     Physical Exam   Constitutional: She is oriented to person, place, and time and well-developed, well-nourished, and " in no distress.   HENT:   Head: Normocephalic and atraumatic.   Eyes: Conjunctivae and EOM are normal.   Neck: Neck supple.   Cardiovascular: Normal rate, regular rhythm and normal heart sounds.           Pulmonary/Chest: Effort normal and breath sounds normal.   Abdominal: Soft. Bowel sounds are normal.   Neurological: She is alert and oriented to person, place, and time. Gait normal.   Skin: Skin is warm and dry.     Psychiatric: Mood and affect normal.   Musculoskeletal: Normal range of motion.   28 joint count: 0 swollen and 0 tender             LABS    Component      Latest Ref Rng & Units 4/26/2019   WBC      3.90 - 12.70 K/uL 4.51   RBC      4.00 - 5.40 M/uL 4.52   Hemoglobin      12.0 - 16.0 g/dL 11.7 (L)   Hematocrit      37.0 - 48.5 % 39.4   MCV      82 - 98 fL 87   MCH      27.0 - 31.0 pg 25.9 (L)   MCHC      32.0 - 36.0 g/dL 29.7 (L)   RDW      11.5 - 14.5 % 14.6 (H)   Platelets      150 - 350 K/uL 294   MPV      9.2 - 12.9 fL 10.1   Gran # (ANC)      1.8 - 7.7 K/uL 2.7   Lymph #      1.0 - 4.8 K/uL 1.2   Mono #      0.3 - 1.0 K/uL 0.3   Eos #      0.0 - 0.5 K/uL 0.2   Baso #      0.00 - 0.20 K/uL 0.02   Gran%      38.0 - 73.0 % 59.9   Lymph%      18.0 - 48.0 % 27.3   Mono%      4.0 - 15.0 % 7.3   Eosinophil%      0.0 - 8.0 % 4.9   Basophil%      0.0 - 1.9 % 0.4   Differential Method       Automated   Sodium      136 - 145 mmol/L 143   Potassium      3.5 - 5.1 mmol/L 4.1   Chloride      95 - 110 mmol/L 106   CO2      23 - 29 mmol/L 28   Glucose      70 - 110 mg/dL 104   BUN, Bld      6 - 20 mg/dL 14   Creatinine      0.5 - 1.4 mg/dL 0.9   Calcium      8.7 - 10.5 mg/dL 9.8   PROTEIN TOTAL      6.0 - 8.4 g/dL 8.0   Albumin      3.5 - 5.2 g/dL 3.5   BILIRUBIN TOTAL      0.1 - 1.0 mg/dL 0.3   Alkaline Phosphatase      55 - 135 U/L 76   AST      10 - 40 U/L 33   ALT      10 - 44 U/L 31   Anion Gap      8 - 16 mmol/L 9   eGFR if African American      >60 mL/min/1.73 m:2 >60   eGFR if non        >60 mL/min/1.73 m:2 >60   Specimen UA       Urine, Unspecified   Color, UA      Yellow, Straw, [place]   Appearance, UA      Clear Clear   pH, UA      5.0 - 8.0 5.0   Specific Gravity, UA      1.005 - 1.030 1.030   Protein, UA      Negative Negative   Glucose, UA      Negative Negative   Ketones, UA      Negative Negative   Bilirubin (UA)      Negative Negative   Occult Blood UA      Negative Negative   NITRITE UA      Negative Negative   Leukocytes, UA      Negative Negative   Protein, Urine Random      0 - 15 mg/dL 24 (H)   Creatinine, Random Ur      15.0 - 325.0 mg/dL 258.0   Prot/Creat Ratio, Ur      0.00 - 0.20 0.09   ds DNA Ab      Negative 1:10 Negative 1:10   Complement (C-3)      50 - 180 mg/dL 150   Complement (C-4)      11 - 44 mg/dL 26   CPK      20 - 180 U/L 120   CRP      0.0 - 8.2 mg/L 5.5   Sed Rate      0 - 36 mm/Hr 106 (H)      Assessment:       1. SLE manifested by hypoxemic respiratory failure withCT with interstial changes, pericardial effusion and pleural effusion, TIARA that was initially negative 2009 now positive 1: 2560 speckled, +NILS, +SSA,SSB, dsDNA neg, +RNA polymerase III.   On Imuran, Plaquenil and prednisone however ESR elevated still.  2. Rheumatoid arthritis. Manifested by previous small joint involvement of the hands, +RF/CCP and elevated inflammatory markers.  Now without activity  3. Morbid obesity. Patient has not been exercising at [place].  Encourage walking and restarting weight watchers (50 pounds since hospitalization and Weight Watchers).  4. Abnormal SPEP without monoclonal gammopathy  5. Bradycardia.  Persistent despite beta blocker.  48 today; fatigue.  Similar to when admitted fpr pericardial effusion  6. Jugular vernous distension.  JVD to 9 cm with increase on compression of liver.  History of pericardial effusion in 12/2017.  Gained 8 pounds since May but no leg edema today.  6. Elevated ESR    Plan:       1.  Labs.  Consider prednisone 2.5 mg daily.  2.  Consider  increase azathioprine 100 mg in am and 100 mg in pm after labs  3.  Continue Plaquenil.  4.  Encourage self care     RTO in 3 months/prn.

## 2019-06-04 LAB — DSDNA AB SER-ACNC: NORMAL [IU]/ML

## 2019-06-04 RX ORDER — PREDNISONE 2.5 MG/1
TABLET ORAL
Qty: 90 TABLET | Refills: 0 | Status: SHIPPED | OUTPATIENT
Start: 2019-06-04 | End: 2019-10-18

## 2019-06-04 RX ORDER — HYDROXYCHLOROQUINE SULFATE 200 MG/1
TABLET, FILM COATED ORAL
Qty: 180 TABLET | Refills: 1 | Status: SHIPPED | OUTPATIENT
Start: 2019-06-04 | End: 2019-11-24 | Stop reason: SDUPTHER

## 2019-06-24 ENCOUNTER — OFFICE VISIT (OUTPATIENT)
Dept: INTERNAL MEDICINE | Facility: CLINIC | Age: 49
End: 2019-06-24
Payer: OTHER GOVERNMENT

## 2019-06-24 VITALS
SYSTOLIC BLOOD PRESSURE: 129 MMHG | HEART RATE: 74 BPM | WEIGHT: 293 LBS | TEMPERATURE: 98 F | DIASTOLIC BLOOD PRESSURE: 86 MMHG | BODY MASS INDEX: 48.82 KG/M2 | HEIGHT: 65 IN | OXYGEN SATURATION: 96 %

## 2019-06-24 DIAGNOSIS — Z12.31 SCREENING MAMMOGRAM, ENCOUNTER FOR: ICD-10-CM

## 2019-06-24 DIAGNOSIS — I10 ESSENTIAL HYPERTENSION: ICD-10-CM

## 2019-06-24 DIAGNOSIS — Z01.00 DIABETIC EYE EXAM: Primary | ICD-10-CM

## 2019-06-24 DIAGNOSIS — Z12.11 SCREENING FOR MALIGNANT NEOPLASM OF COLON: ICD-10-CM

## 2019-06-24 DIAGNOSIS — E11.9 DIABETIC EYE EXAM: Primary | ICD-10-CM

## 2019-06-24 DIAGNOSIS — E66.01 CLASS 3 SEVERE OBESITY DUE TO EXCESS CALORIES WITHOUT SERIOUS COMORBIDITY WITH BODY MASS INDEX (BMI) OF 50.0 TO 59.9 IN ADULT: ICD-10-CM

## 2019-06-24 DIAGNOSIS — E66.01 MORBID OBESITY WITH BMI OF 50.0-59.9, ADULT: ICD-10-CM

## 2019-06-24 DIAGNOSIS — E55.9 VITAMIN D DEFICIENCY DISEASE: ICD-10-CM

## 2019-06-24 DIAGNOSIS — M05.79 RHEUMATOID ARTHRITIS INVOLVING MULTIPLE SITES WITH POSITIVE RHEUMATOID FACTOR: ICD-10-CM

## 2019-06-24 PROCEDURE — 99214 PR OFFICE/OUTPT VISIT, EST, LEVL IV, 30-39 MIN: ICD-10-PCS | Mod: S$PBB,,, | Performed by: INTERNAL MEDICINE

## 2019-06-24 PROCEDURE — 99999 PR PBB SHADOW E&M-EST. PATIENT-LVL III: ICD-10-PCS | Mod: PBBFAC,,, | Performed by: INTERNAL MEDICINE

## 2019-06-24 PROCEDURE — 99213 OFFICE O/P EST LOW 20 MIN: CPT | Mod: PBBFAC | Performed by: INTERNAL MEDICINE

## 2019-06-24 PROCEDURE — 99214 OFFICE O/P EST MOD 30 MIN: CPT | Mod: S$PBB,,, | Performed by: INTERNAL MEDICINE

## 2019-06-24 PROCEDURE — 99999 PR PBB SHADOW E&M-EST. PATIENT-LVL III: CPT | Mod: PBBFAC,,, | Performed by: INTERNAL MEDICINE

## 2019-06-24 NOTE — PROGRESS NOTES
CHIEF COMPLAINT: Annual exam and  follow up of RA/sle, hypertension, steroid induced diabetes.     HISTORY OF PRESENT ILLNESS: This is a 48 year old woman who presents for follow up.     She had an episode of chest pain on 18 and she went to the ED. Work up unremarkable and chest pain resolved and never returned.  Still has not had any chest pain or shortness of breath.     Joints are doing ok.  She is now taking prednisone 2.5 mg every day as of 2019.  She feels ok on the lower dose of prednisone.   She is taking azathoprine 100 mg twice daily and plaquenil 200 mg twice daily for her RA and lupus overlap with hypoxemic respiratory failure 2017 withCT with interstial changes, pericardial effusion and pleural effusion, TIARA that was initially negative  now positive 1: 2560 speckled, +NILS, +SSA,SSB, dsDNA neg, +RNA polymerase III.  Joints are doing well. Breathing is good.  NO fever, chills, oral ulcers, chest pain, shortness of breath, nausea, vomiting, constipation, diarrhea.  NO reflux on pantoprazole.      Blood sugars are better overall and she has not needed Victoza. BLood sugars are between  on diet alone.        She is taking Norvasc 5 mg daily  for hypertension.  No palpitations.     She is taking vitamin D 50,000 units twice weekly.     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      Mood is good. Sleep is good. Children are 5 and 7.     No nausea or vomiting. She gets gassy at times.  She has a soft brown stool once a week.  Some mild cramping.  No abdominal pain.  No melana or bloody stools.         PAST MEDICAL HISTORY:   1. Early Rheumatoid arthritis  2. History of vitamin D deficiency.   3. History of amenorrhea   4. Morbid obesity.     PAST SURGICAL HISTORY: Negative.     ALLERGIES, MEDICATIONS: Updated on [facility]     SOCIAL HISTORY: No tobacco, alcohol use. 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 Father is living diabetes, hypertension and rheumatoid arthritis. Brother with diverticulosis. Brother healthy. Brother healthy.      PHYSICAL EXAMINATION:       General: Is alert, oriented, in no apparent distress. Affect is within   normal limits.   HEENT: Conjunctivae are anicteric. PERRL. TM's are clear. Oropharynx is   clear.   Neck: Supple. No cervical lymphadenopathy. No thyromegaly. Respiratory   effort is normal.   Lungs: Clear to auscultation bilaterally.   Heart: Regular rate and rhythm, no murmur, rub or gallop. No lower   extremity edema.        ASSESSMENT AND PLAN:         1. RA with lupus overlab with hypoxemic respiratory failure 12/2017 withCT with interstial changes, pericardial effusion and pleural effusion, TIARA that was initially negative 2009 now positive 1: 2560 speckled, +NILS, +SSA,SSB, dsDNA neg, +RNA polymerase III. Hypoxemic respiratory failure -CT with interstial changes, pericardial effusion and pleural effusion. Followed by rheumatology     3. Steroid induced diabetes - hemoglobin A1C  4. Anemia - resolved  5. Hypertension - stable.   6. Vitamin D deficiency - vitamin D 50,000 units twice weekly  7. Morbid obesity - discussed weight. Discussed gastric sleeve when gets off prednisone.   8. GERD -s table.   Screening - [facility] 10/18. GYN exam 5/16.  Needs colonoscopy as 48.   Will see her back in 4 months, sooner if problems arise

## 2019-07-14 DIAGNOSIS — M32.8 LUPUS ERYTHEMATOSUS OVERLAP SYNDROME: ICD-10-CM

## 2019-07-15 RX ORDER — AZATHIOPRINE 50 MG/1
TABLET ORAL
Qty: 360 TABLET | Refills: 0 | Status: SHIPPED | OUTPATIENT
Start: 2019-07-15 | End: 2019-07-15 | Stop reason: SDUPTHER

## 2019-07-15 RX ORDER — AZATHIOPRINE 50 MG/1
100 TABLET ORAL 2 TIMES DAILY
Qty: 360 TABLET | Refills: 0 | Status: SHIPPED | OUTPATIENT
Start: 2019-07-15 | End: 2019-07-16 | Stop reason: SDUPTHER

## 2019-07-16 DIAGNOSIS — M32.8 LUPUS ERYTHEMATOSUS OVERLAP SYNDROME: ICD-10-CM

## 2019-07-16 RX ORDER — AZATHIOPRINE 50 MG/1
100 TABLET ORAL 2 TIMES DAILY
Qty: 360 TABLET | Refills: 0 | Status: CANCELLED | OUTPATIENT
Start: 2019-07-16

## 2019-07-16 RX ORDER — AZATHIOPRINE 50 MG/1
100 TABLET ORAL 2 TIMES DAILY
Qty: 360 TABLET | Refills: 0 | Status: SHIPPED | OUTPATIENT
Start: 2019-07-16 | End: 2019-10-09 | Stop reason: SDUPTHER

## 2019-09-06 ENCOUNTER — LAB VISIT (OUTPATIENT)
Dept: LAB | Facility: HOSPITAL | Age: 49
End: 2019-09-06
Attending: INTERNAL MEDICINE
Payer: OTHER GOVERNMENT

## 2019-09-06 DIAGNOSIS — J84.9 ILD (INTERSTITIAL LUNG DISEASE): ICD-10-CM

## 2019-09-06 DIAGNOSIS — E13.9 DIABETES MELLITUS DUE TO ABNORMAL INSULIN: ICD-10-CM

## 2019-09-06 DIAGNOSIS — R53.83 OTHER FATIGUE: ICD-10-CM

## 2019-09-06 DIAGNOSIS — D84.9 IMMUNOSUPPRESSION: ICD-10-CM

## 2019-09-06 DIAGNOSIS — M05.79 RHEUMATOID ARTHRITIS INVOLVING MULTIPLE SITES WITH POSITIVE RHEUMATOID FACTOR: ICD-10-CM

## 2019-09-06 DIAGNOSIS — M32.8 LUPUS ERYTHEMATOSUS OVERLAP SYNDROME: ICD-10-CM

## 2019-09-06 LAB
ALBUMIN SERPL BCP-MCNC: 3.5 G/DL (ref 3.5–5.2)
ALP SERPL-CCNC: 75 U/L (ref 55–135)
ALT SERPL W/O P-5'-P-CCNC: 11 U/L (ref 10–44)
ANION GAP SERPL CALC-SCNC: 7 MMOL/L (ref 8–16)
AST SERPL-CCNC: 17 U/L (ref 10–40)
BASOPHILS # BLD AUTO: 0.02 K/UL (ref 0–0.2)
BASOPHILS NFR BLD: 0.5 % (ref 0–1.9)
BILIRUB SERPL-MCNC: 0.4 MG/DL (ref 0.1–1)
BUN SERPL-MCNC: 14 MG/DL (ref 6–20)
C3 SERPL-MCNC: 152 MG/DL (ref 50–180)
C4 SERPL-MCNC: 24 MG/DL (ref 11–44)
CALCIUM SERPL-MCNC: 9.5 MG/DL (ref 8.7–10.5)
CHLORIDE SERPL-SCNC: 103 MMOL/L (ref 95–110)
CO2 SERPL-SCNC: 30 MMOL/L (ref 23–29)
CREAT SERPL-MCNC: 0.9 MG/DL (ref 0.5–1.4)
CRP SERPL-MCNC: 3.5 MG/L (ref 0–8.2)
DIFFERENTIAL METHOD: ABNORMAL
EOSINOPHIL # BLD AUTO: 0.2 K/UL (ref 0–0.5)
EOSINOPHIL NFR BLD: 4.1 % (ref 0–8)
ERYTHROCYTE [DISTWIDTH] IN BLOOD BY AUTOMATED COUNT: 14.7 % (ref 11.5–14.5)
ERYTHROCYTE [SEDIMENTATION RATE] IN BLOOD BY WESTERGREN METHOD: 57 MM/HR (ref 0–36)
EST. GFR  (AFRICAN AMERICAN): >60 ML/MIN/1.73 M^2
EST. GFR  (NON AFRICAN AMERICAN): >60 ML/MIN/1.73 M^2
ESTIMATED AVG GLUCOSE: 126 MG/DL (ref 68–131)
GLUCOSE SERPL-MCNC: 95 MG/DL (ref 70–110)
HBA1C MFR BLD HPLC: 6 % (ref 4–5.6)
HCT VFR BLD AUTO: 38.1 % (ref 37–48.5)
HGB BLD-MCNC: 11.6 G/DL (ref 12–16)
LYMPHOCYTES # BLD AUTO: 1 K/UL (ref 1–4.8)
LYMPHOCYTES NFR BLD: 26.5 % (ref 18–48)
MCH RBC QN AUTO: 25.7 PG (ref 27–31)
MCHC RBC AUTO-ENTMCNC: 30.4 G/DL (ref 32–36)
MCV RBC AUTO: 85 FL (ref 82–98)
MONOCYTES # BLD AUTO: 0.3 K/UL (ref 0.3–1)
MONOCYTES NFR BLD: 8.1 % (ref 4–15)
NEUTROPHILS # BLD AUTO: 2.2 K/UL (ref 1.8–7.7)
NEUTROPHILS NFR BLD: 60.8 % (ref 38–73)
PLATELET # BLD AUTO: 282 K/UL (ref 150–350)
PMV BLD AUTO: 10.7 FL (ref 9.2–12.9)
POTASSIUM SERPL-SCNC: 4.3 MMOL/L (ref 3.5–5.1)
PROT SERPL-MCNC: 8.1 G/DL (ref 6–8.4)
RBC # BLD AUTO: 4.51 M/UL (ref 4–5.4)
SODIUM SERPL-SCNC: 140 MMOL/L (ref 136–145)
TSH SERPL DL<=0.005 MIU/L-ACNC: 1.41 UIU/ML (ref 0.4–4)
WBC # BLD AUTO: 3.7 K/UL (ref 3.9–12.7)

## 2019-09-06 PROCEDURE — 85025 COMPLETE CBC W/AUTO DIFF WBC: CPT

## 2019-09-06 PROCEDURE — 86140 C-REACTIVE PROTEIN: CPT

## 2019-09-06 PROCEDURE — 83036 HEMOGLOBIN GLYCOSYLATED A1C: CPT

## 2019-09-06 PROCEDURE — 86160 COMPLEMENT ANTIGEN: CPT | Mod: 59

## 2019-09-06 PROCEDURE — 85652 RBC SED RATE AUTOMATED: CPT

## 2019-09-06 PROCEDURE — 86160 COMPLEMENT ANTIGEN: CPT

## 2019-09-06 PROCEDURE — 84443 ASSAY THYROID STIM HORMONE: CPT

## 2019-09-06 PROCEDURE — 80053 COMPREHEN METABOLIC PANEL: CPT

## 2019-09-06 PROCEDURE — 36415 COLL VENOUS BLD VENIPUNCTURE: CPT

## 2019-09-06 PROCEDURE — 86225 DNA ANTIBODY NATIVE: CPT

## 2019-09-09 LAB — DSDNA AB SER-ACNC: NORMAL [IU]/ML

## 2019-09-10 ENCOUNTER — PATIENT MESSAGE (OUTPATIENT)
Dept: RHEUMATOLOGY | Facility: CLINIC | Age: 49
End: 2019-09-10

## 2019-09-12 ENCOUNTER — OFFICE VISIT (OUTPATIENT)
Dept: RHEUMATOLOGY | Facility: CLINIC | Age: 49
End: 2019-09-12
Payer: OTHER GOVERNMENT

## 2019-09-12 VITALS
WEIGHT: 293 LBS | DIASTOLIC BLOOD PRESSURE: 83 MMHG | HEART RATE: 55 BPM | BODY MASS INDEX: 48.82 KG/M2 | SYSTOLIC BLOOD PRESSURE: 143 MMHG | HEIGHT: 65 IN

## 2019-09-12 DIAGNOSIS — M32.8 LUPUS ERYTHEMATOSUS OVERLAP SYNDROME: Primary | ICD-10-CM

## 2019-09-12 DIAGNOSIS — Z79.52 CURRENT CHRONIC USE OF SYSTEMIC STEROIDS: ICD-10-CM

## 2019-09-12 DIAGNOSIS — D84.9 IMMUNOSUPPRESSION: ICD-10-CM

## 2019-09-12 DIAGNOSIS — E66.01 MORBID OBESITY WITH BMI OF 50.0-59.9, ADULT: ICD-10-CM

## 2019-09-12 DIAGNOSIS — J84.9 ILD (INTERSTITIAL LUNG DISEASE): ICD-10-CM

## 2019-09-12 DIAGNOSIS — M05.79 RHEUMATOID ARTHRITIS INVOLVING MULTIPLE SITES WITH POSITIVE RHEUMATOID FACTOR: ICD-10-CM

## 2019-09-12 PROCEDURE — 99999 PR PBB SHADOW E&M-EST. PATIENT-LVL III: CPT | Mod: PBBFAC,,, | Performed by: INTERNAL MEDICINE

## 2019-09-12 PROCEDURE — 99214 OFFICE O/P EST MOD 30 MIN: CPT | Mod: S$PBB,,, | Performed by: INTERNAL MEDICINE

## 2019-09-12 PROCEDURE — 99213 OFFICE O/P EST LOW 20 MIN: CPT | Mod: PBBFAC | Performed by: INTERNAL MEDICINE

## 2019-09-12 PROCEDURE — 99999 PR PBB SHADOW E&M-EST. PATIENT-LVL III: ICD-10-PCS | Mod: PBBFAC,,, | Performed by: INTERNAL MEDICINE

## 2019-09-12 PROCEDURE — 99214 PR OFFICE/OUTPT VISIT, EST, LEVL IV, 30-39 MIN: ICD-10-PCS | Mod: S$PBB,,, | Performed by: INTERNAL MEDICINE

## 2019-09-12 ASSESSMENT — ROUTINE ASSESSMENT OF PATIENT INDEX DATA (RAPID3)
AM STIFFNESS SCORE: 1, YES
PAIN SCORE: 1
TOTAL RAPID3 SCORE: .67
WHEN YOU AWAKENED IN THE MORNING OVER THE LAST WEEK, PLEASE INDICATE THE AMOUNT OF TIME IT TAKES UNTIL YOU ARE AS LIMBER AS YOU WILL BE FOR THE DAY: 5 MINUTES
PSYCHOLOGICAL DISTRESS SCORE: 1.1
PATIENT GLOBAL ASSESSMENT SCORE: 1
MDHAQ FUNCTION SCORE: 0
FATIGUE SCORE: 0

## 2019-09-12 NOTE — PROGRESS NOTES
"Subjective:       Patient ID: Malika Valdez is a 49 y.o. female.    Chief Complaint: RA/SLE    HPI:  Malika Valdez is a 49 y.o. female with history of RA and lupus overlap.  Lupus diagnosed 12/2017 based on TIARA that was initially negative 2009 now positive 1: 2560 speckled, +NILS, +SSA,SSB, dsDNA neg, +RNA polymerase III as well as  acute hypoxemic respiratory failure, pericardial/pleural effusion, ground glass opacities on chest CT.     In hospital 12/2017 started on solumedrol 16mg q8 and switched to prednisone 60 mg daily. Discharged with home O2.  Pulmonary decided not to bronch.  Concerned for SLE overlap and we started her on HCQ 200mg BID.     Her repeat CT chest showed a decrease in the pleural and pericardial effusions.         Interval History:  She is currently on azathioprine 100 mg in AM and 100 mg PM, Plaquenil 200 mg bid and 5 mg prednisone (at least 6 months).    Feeling well.  No issues except one week of intermittent diarrhea with mild cramping.  No melena or hematochezia.   Reports pulse ox runs 97% off oxygen.    Review of Systems   Constitutional: Positive for fatigue. Negative for fever and unexpected weight change.   HENT: Negative for trouble swallowing.    Eyes: Negative for redness.   Respiratory: Negative for cough and shortness of breath.    Cardiovascular: Negative for chest pain.   Gastrointestinal: Positive for diarrhea. Negative for constipation.   Endocrine: Negative.    Genitourinary: Negative for dysuria and genital sores.   Musculoskeletal: Negative.    Skin: Negative for rash.   Allergic/Immunologic: Negative.    Neurological: Negative for headaches.   Hematological: Does not bruise/bleed easily.   Psychiatric/Behavioral: Negative.          Objective:   BP (!) 143/83 (BP Location: Right arm, Patient Position: Sitting, BP Method: Large (Automatic))   Pulse (!) 55   Ht 5' 5" (1.651 m)   Wt (!) 163.4 kg (360 lb 3.7 oz)   BMI 59.95 kg/m²      Physical Exam   Constitutional: She is " oriented to person, place, and time and well-developed, well-nourished, and in no distress.   HENT:   Head: Normocephalic and atraumatic.   Eyes: Conjunctivae and EOM are normal.   Neck: Neck supple.   Cardiovascular: Normal rate, regular rhythm and normal heart sounds.           Pulmonary/Chest: Effort normal and breath sounds normal.   Abdominal: Soft. Bowel sounds are normal.   Neurological: She is alert and oriented to person, place, and time. Gait normal.   Skin: Skin is warm and dry.     Psychiatric: Mood and affect normal.   Musculoskeletal: Normal range of motion.   28 joint count: 0 swollen and 0 tender             LABS    Component      Latest Ref Rng & Units 4/26/2019   WBC      3.90 - 12.70 K/uL 4.51   RBC      4.00 - 5.40 M/uL 4.52   Hemoglobin      12.0 - 16.0 g/dL 11.7 (L)   Hematocrit      37.0 - 48.5 % 39.4   MCV      82 - 98 fL 87   MCH      27.0 - 31.0 pg 25.9 (L)   MCHC      32.0 - 36.0 g/dL 29.7 (L)   RDW      11.5 - 14.5 % 14.6 (H)   Platelets      150 - 350 K/uL 294   MPV      9.2 - 12.9 fL 10.1   Gran # (ANC)      1.8 - 7.7 K/uL 2.7   Lymph #      1.0 - 4.8 K/uL 1.2   Mono #      0.3 - 1.0 K/uL 0.3   Eos #      0.0 - 0.5 K/uL 0.2   Baso #      0.00 - 0.20 K/uL 0.02   Gran%      38.0 - 73.0 % 59.9   Lymph%      18.0 - 48.0 % 27.3   Mono%      4.0 - 15.0 % 7.3   Eosinophil%      0.0 - 8.0 % 4.9   Basophil%      0.0 - 1.9 % 0.4   Differential Method       Automated   Sodium      136 - 145 mmol/L 143   Potassium      3.5 - 5.1 mmol/L 4.1   Chloride      95 - 110 mmol/L 106   CO2      23 - 29 mmol/L 28   Glucose      70 - 110 mg/dL 104   BUN, Bld      6 - 20 mg/dL 14   Creatinine      0.5 - 1.4 mg/dL 0.9   Calcium      8.7 - 10.5 mg/dL 9.8   PROTEIN TOTAL      6.0 - 8.4 g/dL 8.0   Albumin      3.5 - 5.2 g/dL 3.5   BILIRUBIN TOTAL      0.1 - 1.0 mg/dL 0.3   Alkaline Phosphatase      55 - 135 U/L 76   AST      10 - 40 U/L 33   ALT      10 - 44 U/L 31   Anion Gap      8 - 16 mmol/L 9   eGFR if  African American      >60 mL/min/1.73 m:2 >60   eGFR if non African American      >60 mL/min/1.73 m:2 >60   Specimen UA       Urine, Unspecified   Color, UA      Yellow, Straw, Mai [place]   Appearance, UA      Clear Clear   pH, UA      5.0 - 8.0 5.0   Specific Gravity, UA      1.005 - 1.030 1.030   Protein, UA      Negative Negative   Glucose, UA      Negative Negative   Ketones, UA      Negative Negative   Bilirubin (UA)      Negative Negative   Occult Blood UA      Negative Negative   NITRITE UA      Negative Negative   Leukocytes, UA      Negative Negative   Protein, Urine Random      0 - 15 mg/dL 24 (H)   Creatinine, Random Ur      15.0 - 325.0 mg/dL 258.0   Prot/Creat Ratio, Ur      0.00 - 0.20 0.09   ds DNA Ab      Negative 1:10 Negative 1:10   Complement (C-3)      50 - 180 mg/dL 150   Complement (C-4)      11 - 44 mg/dL 26   CPK      20 - 180 U/L 120   CRP      0.0 - 8.2 mg/L 5.5   Sed Rate      0 - 36 mm/Hr 106 (H)      Assessment:       1. SLE manifested by hypoxemic respiratory failure with CT with interstial changes, pericardial effusion and pleural effusion, TIARA that was initially negative 2009 now positive 1: 2560 speckled, +NILS, +SSA,SSB, dsDNA neg, +RNA polymerase III.   On Imuran, Plaquenil and prednisone however ESR elevated still.  2. Rheumatoid arthritis. Manifested by previous small joint involvement of the hands, +RF/CCP and elevated inflammatory markers.  Now without activity  3. Morbid obesity. Patient has not been exercising at [place].  Encourage walking and restarting weight watchers (50 pounds since hospitalization and Weight Watchers).  Discussed with her primary doctor possible weight loss surgery  4. Abnormal SPEP without monoclonal gammopathy  5. Bradycardia.  Persistent despite beta blocker.  55 today    Plan:       1.  Labs.  Decrease prednisone to 2.5 mg every other day for 2 weeks then if no issues then stop.  2.  Continue azathioprine 100 mg in am and 100 mg in pm  3.  Continue  Plaquenil.  4.  Encourage self care   5.  DEXA  6.  Discussed weight loss and she will consider restarting     RTO in 3 months/prn.

## 2019-10-02 ENCOUNTER — TELEPHONE (OUTPATIENT)
Dept: INTERNAL MEDICINE | Facility: CLINIC | Age: 49
End: 2019-10-02

## 2019-10-02 NOTE — TELEPHONE ENCOUNTER
----- Message from Krissy Eller sent at 10/2/2019  6:30 AM CDT -----  Contact: pt sent message via [email or web address]  Appointment Request From: Malika Valdez    With Provider: Sarah Robles MD [[facility]]    Preferred Date Range: 10/2/2019 - 10/2/2019    Preferred Times: Any time    Reason for visit: Chills, nausea, vomiting for 24 hrs    Comments:  Relief from symptoms, avoid complications

## 2019-10-03 NOTE — TELEPHONE ENCOUNTER
Spoke with pt, she says she was feeling bad yesterday. She thinks she had a 24hour virus. Today she is much better. Advised to stay hydrated and call us back if anything changes

## 2019-10-06 ENCOUNTER — PATIENT MESSAGE (OUTPATIENT)
Dept: RHEUMATOLOGY | Facility: CLINIC | Age: 49
End: 2019-10-06

## 2019-10-06 DIAGNOSIS — J84.9 ILD (INTERSTITIAL LUNG DISEASE): ICD-10-CM

## 2019-10-06 DIAGNOSIS — M32.8 LUPUS ERYTHEMATOSUS OVERLAP SYNDROME: Primary | ICD-10-CM

## 2019-10-06 DIAGNOSIS — R53.83 OTHER FATIGUE: ICD-10-CM

## 2019-10-06 DIAGNOSIS — D84.9 IMMUNOSUPPRESSION: ICD-10-CM

## 2019-10-06 DIAGNOSIS — M05.79 RHEUMATOID ARTHRITIS INVOLVING MULTIPLE SITES WITH POSITIVE RHEUMATOID FACTOR: ICD-10-CM

## 2019-10-08 ENCOUNTER — PATIENT MESSAGE (OUTPATIENT)
Dept: RHEUMATOLOGY | Facility: CLINIC | Age: 49
End: 2019-10-08

## 2019-10-09 ENCOUNTER — PATIENT MESSAGE (OUTPATIENT)
Dept: RHEUMATOLOGY | Facility: CLINIC | Age: 49
End: 2019-10-09

## 2019-10-09 RX ORDER — AZATHIOPRINE 50 MG/1
100 TABLET ORAL 2 TIMES DAILY
Qty: 360 TABLET | Refills: 0 | Status: SHIPPED | OUTPATIENT
Start: 2019-10-09 | End: 2020-01-09

## 2019-10-18 ENCOUNTER — OFFICE VISIT (OUTPATIENT)
Dept: INTERNAL MEDICINE | Facility: CLINIC | Age: 49
End: 2019-10-18
Payer: OTHER GOVERNMENT

## 2019-10-18 ENCOUNTER — IMMUNIZATION (OUTPATIENT)
Dept: INTERNAL MEDICINE | Facility: CLINIC | Age: 49
End: 2019-10-18
Payer: OTHER GOVERNMENT

## 2019-10-18 ENCOUNTER — HOSPITAL ENCOUNTER (OUTPATIENT)
Dept: RADIOLOGY | Facility: HOSPITAL | Age: 49
Discharge: HOME | End: 2019-10-18
Attending: INTERNAL MEDICINE
Payer: OTHER GOVERNMENT

## 2019-10-18 VITALS
DIASTOLIC BLOOD PRESSURE: 86 MMHG | TEMPERATURE: 98 F | SYSTOLIC BLOOD PRESSURE: 124 MMHG | HEART RATE: 77 BPM | OXYGEN SATURATION: 97 % | BODY MASS INDEX: 48.82 KG/M2 | WEIGHT: 293 LBS | HEIGHT: 65 IN

## 2019-10-18 DIAGNOSIS — Z01.419 GYNECOLOGIC EXAM NORMAL: ICD-10-CM

## 2019-10-18 DIAGNOSIS — T78.3XXD ANGIOEDEMA, SUBSEQUENT ENCOUNTER: ICD-10-CM

## 2019-10-18 DIAGNOSIS — Z12.31 SCREENING MAMMOGRAM, ENCOUNTER FOR: ICD-10-CM

## 2019-10-18 DIAGNOSIS — Z12.11 SCREENING FOR MALIGNANT NEOPLASM OF COLON: ICD-10-CM

## 2019-10-18 DIAGNOSIS — I10 ESSENTIAL HYPERTENSION: ICD-10-CM

## 2019-10-18 DIAGNOSIS — M32.8 LUPUS ERYTHEMATOSUS OVERLAP SYNDROME: ICD-10-CM

## 2019-10-18 DIAGNOSIS — M05.10 RHEUMATOID LUNG DISEASE WITH RHEUMATOID ARTHRITIS OF UNSPECIFIED SITE: ICD-10-CM

## 2019-10-18 DIAGNOSIS — Z79.899 LONG-TERM USE OF PLAQUENIL: Primary | ICD-10-CM

## 2019-10-18 PROCEDURE — 77063 MAMMO DIGITAL SCREENING BILAT WITH TOMOSYNTHESIS_CAD: ICD-10-PCS | Mod: 26,,, | Performed by: RADIOLOGY

## 2019-10-18 PROCEDURE — 90471 IMMUNIZATION ADMIN: CPT | Mod: PBBFAC

## 2019-10-18 PROCEDURE — 99214 OFFICE O/P EST MOD 30 MIN: CPT | Mod: S$PBB,,, | Performed by: INTERNAL MEDICINE

## 2019-10-18 PROCEDURE — 99214 PR OFFICE/OUTPT VISIT, EST, LEVL IV, 30-39 MIN: ICD-10-PCS | Mod: S$PBB,,, | Performed by: INTERNAL MEDICINE

## 2019-10-18 PROCEDURE — 77067 SCR MAMMO BI INCL CAD: CPT | Mod: 26,,, | Performed by: RADIOLOGY

## 2019-10-18 PROCEDURE — 99999 PR PBB SHADOW E&M-EST. PATIENT-LVL IV: ICD-10-PCS | Mod: PBBFAC,,, | Performed by: INTERNAL MEDICINE

## 2019-10-18 PROCEDURE — 99214 OFFICE O/P EST MOD 30 MIN: CPT | Mod: PBBFAC | Performed by: INTERNAL MEDICINE

## 2019-10-18 PROCEDURE — 77067 MAMMO DIGITAL SCREENING BILAT WITH TOMOSYNTHESIS_CAD: ICD-10-PCS | Mod: 26,,, | Performed by: RADIOLOGY

## 2019-10-18 PROCEDURE — 77063 BREAST TOMOSYNTHESIS BI: CPT | Mod: 26,,, | Performed by: RADIOLOGY

## 2019-10-18 PROCEDURE — 77067 SCR MAMMO BI INCL CAD: CPT | Mod: TC

## 2019-10-18 PROCEDURE — 99999 PR PBB SHADOW E&M-EST. PATIENT-LVL IV: CPT | Mod: PBBFAC,,, | Performed by: INTERNAL MEDICINE

## 2019-10-18 RX ORDER — AMLODIPINE BESYLATE 5 MG/1
5 TABLET ORAL DAILY
Qty: 90 TABLET | Refills: 4 | Status: SHIPPED | OUTPATIENT
Start: 2019-10-18 | End: 2020-11-15

## 2019-10-18 NOTE — PROGRESS NOTES
CHIEF COMPLAINT:  follow up of RA/sle, hypertension, steroid induced diabetes.     HISTORY OF PRESENT ILLNESS: This is a 49 year old woman who presents for follow up.     She had an episode of chest pain on 18 and she went to the ED. Work up unremarkable and chest pain resolved and never returned.  Still has not had any chest pain or shortness of breath.     Joints are doing ok.  She has been off prednisone since 10/7/19.  She is doing well off prednisone.  She is taking azathoprine 100 mg twice daily and plaquenil 200 mg twice daily for her RA and lupus overlap with hypoxemic respiratory failure 2017 withCT with interstial changes, pericardial effusion and pleural effusion, TIARA that was initially negative   positive 1: 2560 speckled, +NILS, +SSA,SSB, dsDNA neg, +RNA polymerase III.  Joints are doing well. Breathing is good.  NO fever, chills, oral ulcers, chest pain, shortness of breath, nausea, vomiting, constipation, diarrhea.  NO reflux on pantoprazole.      Blood sugars are better overall and she has not needed Victoza. BLood sugars are between  on diet alone.        She is taking Norvasc 5 mg daily  for hypertension.  No palpitations.     She is taking vitamin D 50,000 units twice weekly.     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      Mood is good. Sleep is good. Children are 6 and 8.      No nausea or vomiting. She gets gassy at times.  She has a soft brown stool once a week.  Some mild cramping.  No abdominal pain.  No melana or bloody stools.     She has lost 11 pounds with weight watchers.         PAST MEDICAL HISTORY:   1.  RA and lupus overlap  2. History of vitamin D deficiency.   3. History of amenorrhea   4. Morbid obesity.     PAST SURGICAL HISTORY: Negative.     ALLERGIES, MEDICATIONS: Updated on [facility]     SOCIAL HISTORY: No tobacco, alcohol use. She is , has no children.   She is a RN    FAMILY HISTORY: Mother  at age 69, ESRD on  "dialysis, aortic stenosis, diabetes, history hypertension, breast cancer age 64 (in remission), benign colon cancer. Father is living diabetes, hypertension and rheumatoid arthritis. Brother with diverticulosis. Brother healthy. Brother healthy.      PHYSICAL EXAMINATION:     /86 (BP Location: Right arm, Patient Position: Sitting, BP Method: Large (Manual))   Pulse 77   Temp 98.2 °F (36.8 °C) (Oral)   Ht 5' 5" (1.651 m)   Wt (!) 158.6 kg (349 lb 8.6 oz)   SpO2 97%   BMI 58.17 kg/m²     General: Is alert, oriented, in no apparent distress. Affect is within   normal limits.   HEENT: Conjunctivae are anicteric. PERRL. TM's are clear. Oropharynx is   clear.   Neck: Supple. No cervical lymphadenopathy. No thyromegaly. Respiratory   effort is normal.   Lungs: Clear to auscultation bilaterally.   Heart: Regular rate and rhythm, no murmur, rub or gallop. No lower   extremity edema.     L;abs 9/6/16       ASSESSMENT AND PLAN:         1. RA with lupus overlab with hypoxemic respiratory failure 12/2017 withCT with interstial changes, pericardial effusion and pleural effusion, TIARA that was initially negative 2009 now positive 1: 2560 speckled, +NILS, +SSA,SSB, dsDNA neg, +RNA polymerase III. Hypoxemic respiratory failure -CT with interstial changes, pericardial effusion and pleural effusion. Followed by rheumatology. Eye exam.      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 Anemia - resolved  5. Hypertension - stable.   6. Vitamin D deficiency - vitamin D 50,000 units twice weekly  7. Morbid obesity - discussed weight. Discussed gastric sleeve when gets off prednisone. She wants to give it a little more time  8. GERD -s table.   Screening - MMG 10/18 - today. GYN exam 5/16- ordered.  Needs colonoscopy as 48 - ordered  Will see her back in 4 months, sooner if problems arise    "

## 2019-10-23 ENCOUNTER — TELEPHONE (OUTPATIENT)
Dept: RADIOLOGY | Facility: HOSPITAL | Age: 49
End: 2019-10-23

## 2019-10-23 NOTE — TELEPHONE ENCOUNTER
Spoke with patient and explained mammogram findings.Patient expressed understanding of results. Patient scheduled abnormal mammogram follow up appointment at [facility] on 10/24/2019.

## 2019-10-24 ENCOUNTER — HOSPITAL ENCOUNTER (OUTPATIENT)
Dept: RADIOLOGY | Facility: HOSPITAL | Age: 49
Discharge: HOME | End: 2019-10-24
Attending: INTERNAL MEDICINE
Payer: OTHER GOVERNMENT

## 2019-10-24 DIAGNOSIS — R92.8 ABNORMAL MAMMOGRAM: ICD-10-CM

## 2019-10-24 PROCEDURE — 77065 DX MAMMO INCL CAD UNI: CPT | Mod: TC,PO,LT

## 2019-10-24 PROCEDURE — 77061 BREAST TOMOSYNTHESIS UNI: CPT | Mod: 26,LT,, | Performed by: RADIOLOGY

## 2019-10-24 PROCEDURE — 77061 BREAST TOMOSYNTHESIS UNI: CPT | Mod: TC,PO,LT

## 2019-10-24 PROCEDURE — 77065 MAMMO DIGITAL DIAGNOSTIC LEFT WITH TOMOSYNTHESIS_CAD: ICD-10-PCS | Mod: 26,LT,, | Performed by: RADIOLOGY

## 2019-10-24 PROCEDURE — 77061 MAMMO DIGITAL DIAGNOSTIC LEFT WITH TOMOSYNTHESIS_CAD: ICD-10-PCS | Mod: 26,LT,, | Performed by: RADIOLOGY

## 2019-10-24 PROCEDURE — 77065 DX MAMMO INCL CAD UNI: CPT | Mod: 26,LT,, | Performed by: RADIOLOGY

## 2019-10-25 ENCOUNTER — TELEPHONE (OUTPATIENT)
Dept: INTERNAL MEDICINE | Facility: CLINIC | Age: 49
End: 2019-10-25

## 2019-10-25 ENCOUNTER — TELEPHONE (OUTPATIENT)
Dept: RADIOLOGY | Facility: HOSPITAL | Age: 49
End: 2019-10-25

## 2019-10-25 NOTE — TELEPHONE ENCOUNTER
Spoke with patient. Reviewed breast biopsy procedure and reviewed instructions for breast biopsy. Patient expressed understanding and all questions were answered. Provided patient with my phone number to call for any further concerns or questions.   Patient scheduled breast biopsy at the [facility] on 11/7/2019.

## 2019-10-25 NOTE — TELEPHONE ENCOUNTER
----- Message from Tasia Brown sent at 10/25/2019 10:14 AM CDT -----  Contact: Pt @[phone]  Pt calling to possibly get a sooner appt for biopsy 11/7. Willing to go to another location. Please call.

## 2019-10-28 ENCOUNTER — TELEPHONE (OUTPATIENT)
Dept: INTERNAL MEDICINE | Facility: CLINIC | Age: 49
End: 2019-10-28

## 2019-10-31 ENCOUNTER — HOSPITAL ENCOUNTER (OUTPATIENT)
Dept: RADIOLOGY | Facility: OTHER | Age: 49
Discharge: HOME | End: 2019-10-31
Attending: INTERNAL MEDICINE
Payer: OTHER GOVERNMENT

## 2019-10-31 DIAGNOSIS — N63.0 LUMP OR MASS IN BREAST: Primary | ICD-10-CM

## 2019-10-31 DIAGNOSIS — R92.8 ABNORMAL MAMMOGRAM: ICD-10-CM

## 2019-10-31 PROCEDURE — 27201044 MAMMO BREAST STEREOTACTIC BREAST BIOPSY LEFT

## 2019-10-31 PROCEDURE — 19081 BX BREAST 1ST LESION STRTCTC: CPT | Mod: LT

## 2019-10-31 PROCEDURE — 19081 MAMMO BREAST STEREOTACTIC BREAST BIOPSY LEFT: ICD-10-PCS | Mod: LT,,, | Performed by: RADIOLOGY

## 2019-10-31 PROCEDURE — 88305 TISSUE SPECIMEN TO PATHOLOGY, RADIOLOGY: ICD-10-PCS | Mod: 26,,, | Performed by: PATHOLOGY

## 2019-10-31 PROCEDURE — 88305 TISSUE EXAM BY PATHOLOGIST: CPT | Mod: 26,,, | Performed by: PATHOLOGY

## 2019-10-31 PROCEDURE — 88305 TISSUE EXAM BY PATHOLOGIST: CPT | Performed by: PATHOLOGY

## 2019-10-31 PROCEDURE — 19081 BX BREAST 1ST LESION STRTCTC: CPT | Mod: LT,,, | Performed by: RADIOLOGY

## 2019-10-31 PROCEDURE — 25000003 PHARM REV CODE 250: Performed by: INTERNAL MEDICINE

## 2019-10-31 RX ORDER — LIDOCAINE HYDROCHLORIDE AND EPINEPHRINE 20; 10 MG/ML; UG/ML
10 INJECTION, SOLUTION INFILTRATION; PERINEURAL ONCE
Status: COMPLETED | OUTPATIENT
Start: 2019-10-31 | End: 2019-10-31

## 2019-10-31 RX ORDER — LIDOCAINE HYDROCHLORIDE 10 MG/ML
5 INJECTION INFILTRATION; PERINEURAL ONCE
Status: COMPLETED | OUTPATIENT
Start: 2019-10-31 | End: 2019-10-31

## 2019-10-31 RX ADMIN — LIDOCAINE HYDROCHLORIDE 5 ML: 10 INJECTION, SOLUTION INFILTRATION; PERINEURAL at 11:10

## 2019-10-31 RX ADMIN — LIDOCAINE HYDROCHLORIDE AND EPINEPHRINE 10 ML: 20; 10 INJECTION, SOLUTION INFILTRATION; PERINEURAL at 11:10

## 2019-11-04 ENCOUNTER — TELEPHONE (OUTPATIENT)
Dept: RADIOLOGY | Facility: OTHER | Age: 49
End: 2019-11-04

## 2019-11-04 NOTE — TELEPHONE ENCOUNTER
Patient returned call. Breast pathology results reviewed and concordant per Dr. Phillip.  Patient notified of left breast biopsy results per pathology reported on 11/1/19. Diagnosis: BENIGN BREAST TISSUE WITH FIBROADENOMATOID CHANGE AND ASSOCIATED MICROCALCIFICATIONS. Explained to patient results are negative for breast cancer. Instructed on need for short interval follow up in 6 months. Questions answered. Patient verbalized understanding. Biopsy site without abnormalities. Encouraged to call [phone] for further questions or concerns.

## 2019-11-11 ENCOUNTER — PATIENT MESSAGE (OUTPATIENT)
Dept: RADIOLOGY | Facility: HOSPITAL | Age: 49
End: 2019-11-11

## 2019-11-12 ENCOUNTER — PATIENT OUTREACH (OUTPATIENT)
Dept: ADMINISTRATIVE | Facility: OTHER | Age: 49
End: 2019-11-12

## 2019-11-13 ENCOUNTER — OFFICE VISIT (OUTPATIENT)
Dept: OBSTETRICS AND GYNECOLOGY | Facility: CLINIC | Age: 49
End: 2019-11-13
Payer: OTHER GOVERNMENT

## 2019-11-13 VITALS
DIASTOLIC BLOOD PRESSURE: 87 MMHG | BODY MASS INDEX: 48.82 KG/M2 | SYSTOLIC BLOOD PRESSURE: 128 MMHG | HEIGHT: 65 IN | WEIGHT: 293 LBS

## 2019-11-13 DIAGNOSIS — Z78.0 MENOPAUSE PRESENT: ICD-10-CM

## 2019-11-13 DIAGNOSIS — Z01.419 ENCOUNTER FOR GYNECOLOGICAL EXAMINATION WITHOUT ABNORMAL FINDING: Primary | ICD-10-CM

## 2019-11-13 DIAGNOSIS — Z12.4 PAP SMEAR FOR CERVICAL CANCER SCREENING: ICD-10-CM

## 2019-11-13 PROCEDURE — 99999 PR PBB SHADOW E&M-EST. PATIENT-LVL III: CPT | Mod: PBBFAC,,, | Performed by: OBSTETRICS & GYNECOLOGY

## 2019-11-13 PROCEDURE — 88175 CYTOPATH C/V AUTO FLUID REDO: CPT

## 2019-11-13 PROCEDURE — 99386 PREV VISIT NEW AGE 40-64: CPT | Mod: S$PBB,,, | Performed by: OBSTETRICS & GYNECOLOGY

## 2019-11-13 PROCEDURE — 99999 PR PBB SHADOW E&M-EST. PATIENT-LVL III: ICD-10-PCS | Mod: PBBFAC,,, | Performed by: OBSTETRICS & GYNECOLOGY

## 2019-11-13 PROCEDURE — 87624 HPV HI-RISK TYP POOLED RSLT: CPT

## 2019-11-13 PROCEDURE — 99213 OFFICE O/P EST LOW 20 MIN: CPT | Mod: PBBFAC | Performed by: OBSTETRICS & GYNECOLOGY

## 2019-11-13 PROCEDURE — 99386 PR PREVENTIVE VISIT,NEW,40-64: ICD-10-PCS | Mod: S$PBB,,, | Performed by: OBSTETRICS & GYNECOLOGY

## 2019-11-13 RX ORDER — LISINOPRIL 5 MG/1
5 TABLET ORAL
COMMUNITY
End: 2020-07-10

## 2019-11-13 NOTE — PROGRESS NOTES
Subjective:       Patient ID: Malika Valdez is a 49 y.o. female.    Chief Complaint:  Well Woman      History of Present Illness  HPI    Malika Valdez is a 49 y.o. female  NEW TO ME  here for her annual GYN exam.    She describes her periods as stopped with menopause about 2 years ago.     denies break through bleeding.   denies vaginal itching or irritation.  Denies vaginal discharge.  She is not currently sexually active.    History of abnormal pap: No  Last Pap: approximate date  and was normal  Last MMG: abnormal, had stereotactic bx: showed fibroadenoma with calcifications  Last Colonoscopy:  None  denies domestic violence. She does feel safe at home.     Past Medical History:   Diagnosis Date    Arthritis     Hypertension 2013    ILD (interstitial lung disease)     Insomnia 2013    Lupus     Obesity 2013    RA (refractory anemia)     Rheumatoid arthritis     Steroid-induced hyperglycemia 3/1/2018    Thyroid nodule 3/1/2018     History reviewed. No pertinent surgical history.  Social History     Socioeconomic History    Marital status:      Spouse name: Not on file    Number of children: Not on file    Years of education: Not on file    Highest education level: Not on file   Occupational History    Not on file   Social Needs    Financial resource strain: Not on file    Food insecurity:     Worry: Not on file     Inability: Not on file    Transportation needs:     Medical: Not on file     Non-medical: Not on file   Tobacco Use    Smoking status: Never Smoker    Smokeless tobacco: Never Used    Tobacco comment: nurse;    Substance and Sexual Activity    Alcohol use: No    Drug use: No    Sexual activity: Not Currently     Partners: Male     Birth control/protection: None   Lifestyle    Physical activity:     Days per week: Not on file     Minutes per session: Not on file    Stress: Not on file   Relationships    Social connections:     Talks on  "phone: Not on file     Gets together: Not on file     Attends [employer] service: Not on file     Active member of club or organization: Not on file     Attends meetings of clubs or organizations: Not on file     Relationship status: Not on file   Other Topics Concern    Not on file   Social History Narrative    Not on file     Family History   Problem Relation Age of Onset    Hypertension Father     Diabetes Father     Rheum arthritis Father         methotrexate    Diabetes Mother     Hypertension Mother     Breast cancer Mother 62    Kidney disease Mother         on dialysis    Rheum arthritis Paternal Grandmother     Hypertension Paternal Grandmother     Breast cancer Other 45    Hypertension Maternal Grandmother     Lupus Neg Hx     Inflammatory bowel disease Neg Hx     Psoriasis Neg Hx     Thyroid disease Neg Hx     Ovarian cancer Neg Hx     Heart attack Neg Hx     Heart disease Neg Hx     Colon cancer Neg Hx      OB History        0    Para        Term   0            AB        Living           SAB        TAB        Ectopic        Multiple        Live Births                     /87   Ht 5' 5" (1.651 m)   Wt (!) 158.4 kg (349 lb 3.2 oz)   LMP 2017   BMI 58.11 kg/m²         GYN & OB History  Patient's last menstrual period was 2017.   Date of Last Pap: 2016    OB History    Para Term  AB Living   0   0         SAB TAB Ectopic Multiple Live Births                   Review of Systems  Review of Systems   Constitutional: Negative for activity change, appetite change, fatigue and unexpected weight change.   HENT: Negative.    Eyes: Negative for visual disturbance.   Respiratory: Negative for shortness of breath and wheezing.    Cardiovascular: Negative for chest pain, palpitations and leg swelling.   Gastrointestinal: Negative for abdominal pain, bloating and blood in stool.   Endocrine: Negative for diabetes, hair loss and hot flashes. "   Genitourinary: Negative for bladder incontinence, decreased libido, dyspareunia, hot flashes, postmenopausal bleeding and vaginal dryness.   Musculoskeletal: Negative for back pain and joint swelling.   Integumentary:  Negative for acne, hair changes and nipple discharge.   Neurological: Negative for headaches.   Hematological: Does not bruise/bleed easily.   Psychiatric/Behavioral: Negative for depression and sleep disturbance. The patient is not nervous/anxious.    Breast: Negative for mastodynia and nipple discharge          Objective:      Physical Exam:   Constitutional: She is oriented to person, place, and time. She appears well-developed and well-nourished.    HENT:   Head: Normocephalic and atraumatic.    Eyes: Pupils are equal, round, and reactive to light. EOM are normal.    Neck: Normal range of motion. Neck supple.    Cardiovascular: Normal rate and regular rhythm.     Pulmonary/Chest: Effort normal and breath sounds normal.   BREASTS:  no mass, no tenderness, no deformity and no retraction. Right breast exhibits no inverted nipple, no mass, no nipple discharge, no skin change, no tenderness, no bleeding and no swelling. Left breast exhibits no inverted nipple, no mass, no nipple discharge, no skin change, no tenderness, no bleeding and no swelling. Breasts are symmetrical.              Abdominal: Soft. Bowel sounds are normal.     Genitourinary: Pelvic exam was performed with patient supine.   Genitourinary Comments: PELVIC: Normal external genitalia without lesions.  Normal hair distribution.  Adequate perineal body, normal urethral meatus.  Vagina moist and well rugated without lesions or discharge.  Cervix pink, without lesions, discharge or tenderness.  No significant cystocele or rectocele.  Bimanual exam shows uterus AND ADNEXAE to be NOT PALPABLE SECONDARY TO HABITUS           Musculoskeletal: Normal range of motion and moves all extremeties.       Neurological: She is alert and oriented to  person, place, and time.    Skin: Skin is warm and dry.    Psychiatric: She has a normal mood and affect.              Assessment:        1. Encounter for gynecological examination without abnormal finding    2. Pap smear for cervical cancer screening    3. Menopause present                Plan:        1. Encounter for gynecological examination without abnormal finding  COUNSELING:  The patient was counseled today on regular weight bearing exercise. Patient was counseled today on the new ACS guidelines for cervical cytology screening as well as the current recommendations for breast cancer screening. Counseling session lasted approximately 10 minutes, and all her questions were answered. She was advised to see her primary care physician for all other health maintenance.   FOLLOW-UP with me for next routine visit.         2. Pap smear for cervical cancer screening      - Liquid-Based Pap Smear, Screening  - HPV High Risk Genotypes, PCR    3. Menopause present           Follow up in about 1 year (around 11/13/2020).

## 2019-11-13 NOTE — LETTER
November 13, 2019      Sarah Robles MD  [address]  [place] [address]           [facility] - OB/GYN 5th Floor  [address]  [place] [address]  Phone: [phone]          Patient: Malika Valdez   MR Number: [id number]   YOB: 1970   Date of Visit: 11/13/2019       Dear Dr. Sarah Robles:    Thank you for referring Malika Valdez to me for evaluation. Attached you will find relevant portions of my assessment and plan of care.    If you have questions, please do not hesitate to call me. I look forward to following Malika Valdez along with you.    Sincerely,    Ramona Camacho MD    Enclosure  CC:  No Recipients    If you would like to receive this communication electronically, please contact [email or web address] or [phone] to request more information on [facility] access.    For providers and/or their staff who would like to refer a patient to [facility], please contact us through our one-stop-shop provider referral line, [facility] [place], at [phone].    If you feel you have received this communication in error or would no longer like to receive these types of communications, please e-mail [email or web address]

## 2019-11-20 LAB
HPV HR 12 DNA SPEC QL NAA+PROBE: NEGATIVE
HPV16 AG SPEC QL: NEGATIVE
HPV18 DNA SPEC QL NAA+PROBE: NEGATIVE

## 2019-11-22 LAB
FINAL PATHOLOGIC DIAGNOSIS: NORMAL
Lab: NORMAL

## 2019-11-24 DIAGNOSIS — M05.79 RHEUMATOID ARTHRITIS INVOLVING MULTIPLE SITES WITH POSITIVE RHEUMATOID FACTOR: ICD-10-CM

## 2019-11-24 DIAGNOSIS — M32.8 LUPUS ERYTHEMATOSUS OVERLAP SYNDROME: ICD-10-CM

## 2019-11-25 RX ORDER — ERGOCALCIFEROL 1.25 MG/1
CAPSULE ORAL
Qty: 24 CAPSULE | Refills: 4 | Status: SHIPPED | OUTPATIENT
Start: 2019-11-25 | End: 2020-11-15

## 2019-11-25 RX ORDER — HYDROXYCHLOROQUINE SULFATE 200 MG/1
TABLET, FILM COATED ORAL
Qty: 180 TABLET | Refills: 4 | Status: SHIPPED | OUTPATIENT
Start: 2019-11-25 | End: 2021-02-08

## 2019-12-17 ENCOUNTER — PATIENT MESSAGE (OUTPATIENT)
Dept: RHEUMATOLOGY | Facility: CLINIC | Age: 49
End: 2019-12-17

## 2020-01-08 DIAGNOSIS — M32.8 LUPUS ERYTHEMATOSUS OVERLAP SYNDROME: ICD-10-CM

## 2020-01-09 DIAGNOSIS — R53.83 OTHER FATIGUE: ICD-10-CM

## 2020-01-09 DIAGNOSIS — D84.9 IMMUNOSUPPRESSION: ICD-10-CM

## 2020-01-09 DIAGNOSIS — M05.79 RHEUMATOID ARTHRITIS INVOLVING MULTIPLE SITES WITH POSITIVE RHEUMATOID FACTOR: ICD-10-CM

## 2020-01-09 DIAGNOSIS — M32.8 LUPUS ERYTHEMATOSUS OVERLAP SYNDROME: Primary | ICD-10-CM

## 2020-01-09 DIAGNOSIS — J84.9 ILD (INTERSTITIAL LUNG DISEASE): ICD-10-CM

## 2020-01-09 RX ORDER — AZATHIOPRINE 50 MG/1
TABLET ORAL
Qty: 360 TABLET | Refills: 0 | Status: SHIPPED | OUTPATIENT
Start: 2020-01-09 | End: 2020-04-07

## 2020-01-09 RX ORDER — AZATHIOPRINE 50 MG/1
100 TABLET ORAL 2 TIMES DAILY
Qty: 360 TABLET | Refills: 0 | OUTPATIENT
Start: 2020-01-09

## 2020-01-09 NOTE — TELEPHONE ENCOUNTER
Staff to inform patient that labs are needed as soon as possible.  Will give 1 refill but labs must be done..

## 2020-01-10 RX ORDER — PANTOPRAZOLE SODIUM 40 MG/1
TABLET, DELAYED RELEASE ORAL
Qty: 90 TABLET | Refills: 1 | Status: SHIPPED | OUTPATIENT
Start: 2020-01-10 | End: 2020-06-16

## 2020-01-10 NOTE — PROGRESS NOTES
Refill Authorization Note     is requesting a refill authorization.    Brief assessment and rationale for refill: APPROVE: prr          Medication Therapy Plan: GERD lco(10/19)                              Comments:   Requested Prescriptions   Pending Prescriptions Disp Refills    pantoprazole (PROTONIX) 40 MG tablet [Pharmacy Med Name: PANTOPRAZOLE SOD DR TABS 40MG] 90 tablet 1     Sig: TAKE 1 TABLET DAILY       Gastroenterology: Proton Pump Inhibitors Failed - 1/9/2020 12:02 PM        Failed - An appropriate indication is on the problem list        Passed - Patient is at least 18 years old        Passed - Osteoporosis is not on problem list        Passed - Plavix is not on active medication list        Passed - Office visit in past 6 months or future 90 days.     Recent Outpatient Visits            1 month ago Encounter for gynecological examination without abnormal finding    [facility] 5th Floor Ramona Camacho MD    2 months ago Long-term use of Plaquenil    [facility] - Internal Medicine Sarah Robles MD    4 months ago Lupus erythematosus overlap syndrome    [facility] - Rheumatology Roxie Paz MD    6 months ago Diabetic eye exam    [facility] - Internal Medicine Sarah Robles MD    7 months ago Lupus erythematosus overlap syndrome    [facility] Rheumatology Roxie Paz MD          Future Appointments              In 3 weeks MD [facility] - Internal Medicine, [facility]

## 2020-01-18 ENCOUNTER — LAB VISIT (OUTPATIENT)
Dept: LAB | Facility: HOSPITAL | Age: 50
End: 2020-01-18
Attending: INTERNAL MEDICINE
Payer: OTHER GOVERNMENT

## 2020-01-18 DIAGNOSIS — Z79.52 CURRENT CHRONIC USE OF SYSTEMIC STEROIDS: ICD-10-CM

## 2020-01-18 DIAGNOSIS — E66.01 MORBID OBESITY WITH BMI OF 50.0-59.9, ADULT: ICD-10-CM

## 2020-01-18 DIAGNOSIS — D84.9 IMMUNOSUPPRESSION: ICD-10-CM

## 2020-01-18 DIAGNOSIS — J84.9 ILD (INTERSTITIAL LUNG DISEASE): ICD-10-CM

## 2020-01-18 DIAGNOSIS — M32.8 LUPUS ERYTHEMATOSUS OVERLAP SYNDROME: ICD-10-CM

## 2020-01-18 DIAGNOSIS — M05.79 RHEUMATOID ARTHRITIS INVOLVING MULTIPLE SITES WITH POSITIVE RHEUMATOID FACTOR: ICD-10-CM

## 2020-01-18 LAB
ALBUMIN SERPL BCP-MCNC: 3.5 G/DL (ref 3.5–5.2)
ALP SERPL-CCNC: 80 U/L (ref 55–135)
ALT SERPL W/O P-5'-P-CCNC: 6 U/L (ref 10–44)
ANION GAP SERPL CALC-SCNC: 9 MMOL/L (ref 8–16)
AST SERPL-CCNC: 20 U/L (ref 10–40)
BASOPHILS # BLD AUTO: 0.02 K/UL (ref 0–0.2)
BASOPHILS NFR BLD: 0.6 % (ref 0–1.9)
BILIRUB SERPL-MCNC: 0.4 MG/DL (ref 0.1–1)
BUN SERPL-MCNC: 12 MG/DL (ref 6–20)
C3 SERPL-MCNC: 158 MG/DL (ref 50–180)
C4 SERPL-MCNC: 24 MG/DL (ref 11–44)
CALCIUM SERPL-MCNC: 9.6 MG/DL (ref 8.7–10.5)
CHLORIDE SERPL-SCNC: 104 MMOL/L (ref 95–110)
CO2 SERPL-SCNC: 27 MMOL/L (ref 23–29)
CREAT SERPL-MCNC: 1 MG/DL (ref 0.5–1.4)
CRP SERPL-MCNC: 2.5 MG/L (ref 0–8.2)
DIFFERENTIAL METHOD: ABNORMAL
EOSINOPHIL # BLD AUTO: 0.1 K/UL (ref 0–0.5)
EOSINOPHIL NFR BLD: 3.4 % (ref 0–8)
ERYTHROCYTE [DISTWIDTH] IN BLOOD BY AUTOMATED COUNT: 14.8 % (ref 11.5–14.5)
ERYTHROCYTE [SEDIMENTATION RATE] IN BLOOD BY WESTERGREN METHOD: 40 MM/HR (ref 0–36)
EST. GFR  (AFRICAN AMERICAN): >60 ML/MIN/1.73 M^2
EST. GFR  (NON AFRICAN AMERICAN): >60 ML/MIN/1.73 M^2
GLUCOSE SERPL-MCNC: 103 MG/DL (ref 70–110)
HCT VFR BLD AUTO: 37.8 % (ref 37–48.5)
HGB BLD-MCNC: 11.7 G/DL (ref 12–16)
LYMPHOCYTES # BLD AUTO: 0.8 K/UL (ref 1–4.8)
LYMPHOCYTES NFR BLD: 24.6 % (ref 18–48)
MCH RBC QN AUTO: 26.1 PG (ref 27–31)
MCHC RBC AUTO-ENTMCNC: 31 G/DL (ref 32–36)
MCV RBC AUTO: 84 FL (ref 82–98)
MONOCYTES # BLD AUTO: 0.3 K/UL (ref 0.3–1)
MONOCYTES NFR BLD: 8.6 % (ref 4–15)
NEUTROPHILS # BLD AUTO: 2 K/UL (ref 1.8–7.7)
NEUTROPHILS NFR BLD: 62.8 % (ref 38–73)
PLATELET # BLD AUTO: 275 K/UL (ref 150–350)
PMV BLD AUTO: 10.5 FL (ref 9.2–12.9)
POTASSIUM SERPL-SCNC: 4.5 MMOL/L (ref 3.5–5.1)
PROT SERPL-MCNC: 8.2 G/DL (ref 6–8.4)
RBC # BLD AUTO: 4.48 M/UL (ref 4–5.4)
SODIUM SERPL-SCNC: 140 MMOL/L (ref 136–145)
WBC # BLD AUTO: 3.25 K/UL (ref 3.9–12.7)

## 2020-01-18 PROCEDURE — 85025 COMPLETE CBC W/AUTO DIFF WBC: CPT

## 2020-01-18 PROCEDURE — 86160 COMPLEMENT ANTIGEN: CPT

## 2020-01-18 PROCEDURE — 86225 DNA ANTIBODY NATIVE: CPT

## 2020-01-18 PROCEDURE — 86160 COMPLEMENT ANTIGEN: CPT | Mod: 59

## 2020-01-18 PROCEDURE — 36415 COLL VENOUS BLD VENIPUNCTURE: CPT

## 2020-01-18 PROCEDURE — 80053 COMPREHEN METABOLIC PANEL: CPT

## 2020-01-18 PROCEDURE — 86140 C-REACTIVE PROTEIN: CPT

## 2020-01-18 PROCEDURE — 85652 RBC SED RATE AUTOMATED: CPT

## 2020-01-20 ENCOUNTER — PATIENT OUTREACH (OUTPATIENT)
Dept: ADMINISTRATIVE | Facility: HOSPITAL | Age: 50
End: 2020-01-20

## 2020-01-20 DIAGNOSIS — E78.5 HYPERLIPIDEMIA, UNSPECIFIED HYPERLIPIDEMIA TYPE: Primary | ICD-10-CM

## 2020-01-20 LAB — DSDNA AB SER-ACNC: NORMAL [IU]/ML

## 2020-01-22 ENCOUNTER — PATIENT MESSAGE (OUTPATIENT)
Dept: ADMINISTRATIVE | Facility: HOSPITAL | Age: 50
End: 2020-01-22

## 2020-01-27 DIAGNOSIS — Z12.11 SCREENING FOR COLON CANCER: Primary | ICD-10-CM

## 2020-01-27 RX ORDER — POLYETHYLENE GLYCOL 3350, SODIUM SULFATE ANHYDROUS, SODIUM BICARBONATE, SODIUM CHLORIDE, POTASSIUM CHLORIDE 236; 22.74; 6.74; 5.86; 2.97 G/4L; G/4L; G/4L; G/4L; G/4L
4 POWDER, FOR SOLUTION ORAL ONCE
Qty: 4000 ML | Refills: 0 | Status: SHIPPED | OUTPATIENT
Start: 2020-01-27 | End: 2020-01-27

## 2020-01-31 ENCOUNTER — PATIENT OUTREACH (OUTPATIENT)
Dept: ADMINISTRATIVE | Facility: OTHER | Age: 50
End: 2020-01-31

## 2020-01-31 DIAGNOSIS — Z12.11 ENCOUNTER FOR FIT (FECAL IMMUNOCHEMICAL TEST) SCREENING: Primary | ICD-10-CM

## 2020-02-03 ENCOUNTER — OFFICE VISIT (OUTPATIENT)
Dept: INTERNAL MEDICINE | Facility: CLINIC | Age: 50
End: 2020-02-03
Payer: OTHER GOVERNMENT

## 2020-02-03 ENCOUNTER — OFFICE VISIT (OUTPATIENT)
Dept: RHEUMATOLOGY | Facility: CLINIC | Age: 50
End: 2020-02-03
Payer: OTHER GOVERNMENT

## 2020-02-03 VITALS
WEIGHT: 293 LBS | HEIGHT: 65 IN | DIASTOLIC BLOOD PRESSURE: 80 MMHG | HEART RATE: 87 BPM | BODY MASS INDEX: 48.82 KG/M2 | SYSTOLIC BLOOD PRESSURE: 128 MMHG | OXYGEN SATURATION: 96 %

## 2020-02-03 VITALS
WEIGHT: 293 LBS | HEIGHT: 65 IN | BODY MASS INDEX: 48.82 KG/M2 | DIASTOLIC BLOOD PRESSURE: 81 MMHG | SYSTOLIC BLOOD PRESSURE: 140 MMHG | HEART RATE: 60 BPM

## 2020-02-03 DIAGNOSIS — M32.8 LUPUS ERYTHEMATOSUS OVERLAP SYNDROME: ICD-10-CM

## 2020-02-03 DIAGNOSIS — E55.9 VITAMIN D DEFICIENCY DISEASE: ICD-10-CM

## 2020-02-03 DIAGNOSIS — E13.9 DIABETES MELLITUS DUE TO ABNORMAL INSULIN: ICD-10-CM

## 2020-02-03 DIAGNOSIS — J84.9 ILD (INTERSTITIAL LUNG DISEASE): ICD-10-CM

## 2020-02-03 DIAGNOSIS — Z79.899 LONG-TERM USE OF PLAQUENIL: ICD-10-CM

## 2020-02-03 DIAGNOSIS — D84.9 IMMUNOSUPPRESSION: ICD-10-CM

## 2020-02-03 DIAGNOSIS — R70.0 ELEVATED SEDIMENTATION RATE: ICD-10-CM

## 2020-02-03 DIAGNOSIS — M32.8 LUPUS ERYTHEMATOSUS OVERLAP SYNDROME: Primary | ICD-10-CM

## 2020-02-03 DIAGNOSIS — I10 ESSENTIAL HYPERTENSION: ICD-10-CM

## 2020-02-03 DIAGNOSIS — E66.01 MORBID OBESITY WITH BMI OF 50.0-59.9, ADULT: ICD-10-CM

## 2020-02-03 DIAGNOSIS — M05.10 RHEUMATOID LUNG DISEASE WITH RHEUMATOID ARTHRITIS OF UNSPECIFIED SITE: Primary | ICD-10-CM

## 2020-02-03 DIAGNOSIS — R53.83 OTHER FATIGUE: ICD-10-CM

## 2020-02-03 DIAGNOSIS — M05.79 RHEUMATOID ARTHRITIS INVOLVING MULTIPLE SITES WITH POSITIVE RHEUMATOID FACTOR: ICD-10-CM

## 2020-02-03 PROCEDURE — 99213 OFFICE O/P EST LOW 20 MIN: CPT | Mod: PBBFAC | Performed by: INTERNAL MEDICINE

## 2020-02-03 PROCEDURE — 99214 PR OFFICE/OUTPT VISIT, EST, LEVL IV, 30-39 MIN: ICD-10-PCS | Mod: S$PBB,,, | Performed by: INTERNAL MEDICINE

## 2020-02-03 PROCEDURE — 99999 PR PBB SHADOW E&M-EST. PATIENT-LVL III: CPT | Mod: PBBFAC,,, | Performed by: INTERNAL MEDICINE

## 2020-02-03 PROCEDURE — 99999 PR PBB SHADOW E&M-EST. PATIENT-LVL III: ICD-10-PCS | Mod: PBBFAC,,, | Performed by: INTERNAL MEDICINE

## 2020-02-03 PROCEDURE — 99214 OFFICE O/P EST MOD 30 MIN: CPT | Mod: S$PBB,,, | Performed by: INTERNAL MEDICINE

## 2020-02-03 PROCEDURE — 99213 OFFICE O/P EST LOW 20 MIN: CPT | Mod: PBBFAC,27 | Performed by: INTERNAL MEDICINE

## 2020-02-03 RX ORDER — POLYETHYLENE GLYCOL-3350 AND ELECTROLYTES 236; 6.74; 5.86; 2.97; 22.74 G/274.31G; G/274.31G; G/274.31G; G/274.31G; G/274.31G
POWDER, FOR SOLUTION ORAL
COMMUNITY
Start: 2020-01-27 | End: 2023-12-22

## 2020-02-03 ASSESSMENT — ROUTINE ASSESSMENT OF PATIENT INDEX DATA (RAPID3)
FATIGUE SCORE: 2
TOTAL RAPID3 SCORE: 1.67
MDHAQ FUNCTION SCORE: 0
WHEN YOU AWAKENED IN THE MORNING OVER THE LAST WEEK, PLEASE INDICATE THE AMOUNT OF TIME IT TAKES UNTIL YOU ARE AS LIMBER AS YOU WILL BE FOR THE DAY: 5 MINUTES
PAIN SCORE: 2
PSYCHOLOGICAL DISTRESS SCORE: 0
PATIENT GLOBAL ASSESSMENT SCORE: 3
AM STIFFNESS SCORE: 1, YES

## 2020-02-03 NOTE — PROGRESS NOTES
"Subjective:       Patient ID: Malika Valdez is a 49 y.o. female.    Chief Complaint: RA/SLE    HPI:  Malika Valdez is a 49 y.o. female with history of RA and lupus overlap.  Lupus diagnosed 12/2017 based on TIARA that was initially negative 2009 now positive 1: 2560 speckled, +NILS, +SSA,SSB, dsDNA neg, +RNA polymerase III as well as  acute hypoxemic respiratory failure, pericardial/pleural effusion, ground glass opacities on chest CT.     In hospital 12/2017 started on solumedrol 16mg q8 and switched to prednisone 60 mg daily. Discharged with home O2.  Pulmonary decided not to bronch.  Concerned for SLE overlap and we started her on HCQ 200mg BID.     Her repeat CT chest showed a decrease in the pleural and pericardial effusions.         Interval History:  She is currently on azathioprine 100 mg in AM and 100 mg PM, Plaquenil 200 mg bid.  Off prednisone since Oct 2019.    Feeling well.  No issues except one week of intermittent diarrhea with mild cramping.  No melena or hematochezia.   Reports pulse ox has been as low as 90% with activity and 94-95% at rest.    Review of Systems   Constitutional: Positive for fatigue. Negative for fever and unexpected weight change.   HENT: Negative for trouble swallowing.    Eyes: Negative for redness.   Respiratory: Negative for cough and shortness of breath.    Cardiovascular: Negative for chest pain.   Gastrointestinal: Positive for diarrhea. Negative for constipation.   Endocrine: Negative.    Genitourinary: Negative for dysuria and genital sores.   Musculoskeletal: Negative.    Skin: Negative for rash.   Allergic/Immunologic: Negative.    Neurological: Negative for headaches.   Hematological: Does not bruise/bleed easily.   Psychiatric/Behavioral: Negative.          Objective:   BP (!) 140/81 (BP Location: Right arm, Patient Position: Sitting, BP Method: Large (Automatic))   Pulse 60   Ht 5' 5" (1.651 m)   Wt (!) 160 kg (352 lb 11.8 oz)   LMP  (LMP Unknown)   BMI 58.70 " kg/m²      Physical Exam   Constitutional: She is oriented to person, place, and time and well-developed, well-nourished, and in no distress.   HENT:   Head: Normocephalic and atraumatic.   Eyes: Conjunctivae and EOM are normal.   Neck: Neck supple.   Cardiovascular: Normal rate, regular rhythm and normal heart sounds.           Pulmonary/Chest: Effort normal and breath sounds normal.   Abdominal: Soft. Bowel sounds are normal.   Neurological: She is alert and oriented to person, place, and time. Gait normal.   Skin: Skin is warm and dry.     Psychiatric: Mood and affect normal.   Musculoskeletal: Normal range of motion.   28 joint count: 0 swollen and 0 tender             LABS    Component      Latest Ref Rng & Units 1/18/2020   WBC      3.90 - 12.70 K/uL 3.25 (L)   RBC      4.00 - 5.40 M/uL 4.48   Hemoglobin      12.0 - 16.0 g/dL 11.7 (L)   Hematocrit      37.0 - 48.5 % 37.8   MCV      82 - 98 fL 84   MCH      27.0 - 31.0 pg 26.1 (L)   MCHC      32.0 - 36.0 g/dL 31.0 (L)   RDW      11.5 - 14.5 % 14.8 (H)   Platelets      150 - 350 K/uL 275   MPV      9.2 - 12.9 fL 10.5   Gran # (ANC)      1.8 - 7.7 K/uL 2.0   Lymph #      1.0 - 4.8 K/uL 0.8 (L)   Mono #      0.3 - 1.0 K/uL 0.3   Eos #      0.0 - 0.5 K/uL 0.1   Baso #      0.00 - 0.20 K/uL 0.02   Gran%      38.0 - 73.0 % 62.8   Lymph%      18.0 - 48.0 % 24.6   Mono%      4.0 - 15.0 % 8.6   Eosinophil%      0.0 - 8.0 % 3.4   Basophil%      0.0 - 1.9 % 0.6   Differential Method       Automated   Sodium      136 - 145 mmol/L 140   Potassium      3.5 - 5.1 mmol/L 4.5   Chloride      95 - 110 mmol/L 104   CO2      23 - 29 mmol/L 27   Glucose      70 - 110 mg/dL 103   BUN, Bld      6 - 20 mg/dL 12   Creatinine      0.5 - 1.4 mg/dL 1.0   Calcium      8.7 - 10.5 mg/dL 9.6   PROTEIN TOTAL      6.0 - 8.4 g/dL 8.2   Albumin      3.5 - 5.2 g/dL 3.5   BILIRUBIN TOTAL      0.1 - 1.0 mg/dL 0.4   Alkaline Phosphatase      55 - 135 U/L 80   AST      10 - 40 U/L 20   ALT      10 -  44 U/L 6 (L)   Anion Gap      8 - 16 mmol/L 9   eGFR if African American      >60 mL/min/1.73 m:2 >60   eGFR if non African American      >60 mL/min/1.73 m:2 >60   Specimen UA       Urine, Clean Catch   Color, UA      Yellow, Straw, Mai Yellow   Appearance, UA      Clear Clear   pH, UA      5.0 - 8.0 6.0   Specific Gravity, UA      1.005 - 1.030 1.020   Protein, UA      Negative Negative   Glucose, UA      Negative Negative   Ketones, UA      Negative Negative   Bilirubin (UA)      Negative Negative   Occult Blood UA      Negative Negative   NITRITE UA      Negative Negative   Leukocytes, UA      Negative Negative   Protein, Urine Random      0 - 15 mg/dL 20 (H)   Creatinine, Random Ur      15.0 - 325.0 mg/dL 135.0   Prot/Creat Ratio, Ur      0.00 - 0.20 0.15   Complement (C-3)      50 - 180 mg/dL 158   Complement (C-4)      11 - 44 mg/dL 24   CRP      0.0 - 8.2 mg/L 2.5   Sed Rate      0 - 36 mm/Hr 40 (H)   ds DNA Ab      Negative 1:10 Negative 1:10      Assessment:       1. SLE manifested by hypoxemic respiratory failure with CT with interstial changes, pericardial effusion and pleural effusion, TIARA that was initially negative 2009 now positive 1: 2560 speckled, +NILS, +SSA,SSB, dsDNA neg, +RNA polymerase III.   On Imuran and Plaquenil and doing well  2. Rheumatoid arthritis. Manifested by previous small joint involvement of the hands, +RF/CCP and elevated inflammatory markers.  Now without activity  3. Morbid obesity. Patient has not been exercising at [place].  Encourage walking and restarting weight watchers (50 pounds since hospitalization and Weight Watchers).  Discussed with her primary doctor possible weight loss surgery  4. Abnormal SPEP without monoclonal gammopathy  5. Bradycardia.  Persistent despite beta blocker.     Plan:       1.  Labs.    2.  Continue azathioprine 100 mg in am and 100 mg in pm  3.  Continue Plaquenil.  Schedule eye exam  4.  Encourage self care   5.  DEXA  6.  Discussed weight loss  and she will consider restarting.  Consider Noom.    RTO in 3 months/prn.

## 2020-02-03 NOTE — PROGRESS NOTES
CHIEF COMPLAINT:  follow up of RA/sle, hypertension, steroid induced diabetes.     HISTORY OF PRESENT ILLNESS: This is a 49 year old woman who presents for follow up.    She has had right knee pain for the last 2-3 weeks when she gets out of bed or she goes to get up from prolonged sitting.  It takes a few minutes for pain to go away after she gets up.   NO swelling. No injury.     She has burning sensation on the lateral left thigh if she is standing a lot or walking a lot. Better with rest or sitting.      she has not had any chest pain or shortness of breath, nausea, vomiting, constipation, diarrhea. .     Other Joints are doing ok.  She has been off prednisone since 10/7/19.  She is doing well off prednisone.  She is taking azathoprine 100 mg twice daily and plaquenil 200 mg twice daily for her RA and lupus overlap with hypoxemic respiratory failure 12/2017 withCT with interstial changes, pericardial effusion and pleural effusion, TIARA that was initially negative 2009  positive 1: 2560 speckled, +NILS, +SSA,SSB, dsDNA neg, +RNA polymerase III.  Joints are doing well. Breathing is good.  NO fever, chills, oral ulcers, chest pain, shortness of breath, nausea, vomiting, constipation, diarrhea.  NO reflux on pantoprazole.      Blood sugars are better overall and she has not needed Victoza. BLood sugars are between  on diet alone.        She is taking Norvasc 5 mg daily  for hypertension.  No palpitations.     She is taking vitamin D 50,000 units twice weekly.     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      Mood is good. Sleep is good. Children are 6 and 8.      No nausea or vomiting. She gets gassy at times.  She has a soft brown stool once a week.  Some mild cramping.  No abdominal pain.  No melana or bloody stools.      She has lost 4 more pounds with weight watchers since our last visit.          PAST MEDICAL HISTORY:   1.  RA and  "lupus overlap  2. History of vitamin D deficiency.   3. History of amenorrhea   4. Morbid obesity.     PAST SURGICAL HISTORY: Negative.     ALLERGIES, MEDICATIONS: Updated on [facility]     SOCIAL HISTORY: No tobacco, alcohol use. 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 Father is living diabetes, hypertension and rheumatoid arthritis. Brother with diverticulosis. Brother healthy. Brother healthy.      PHYSICAL EXAMINATION:     /80 Comment: home readings  Pulse 87   Ht 5' 5" (1.651 m)   Wt (!) 156.7 kg (345 lb 7 oz)   LMP  (LMP Unknown)   SpO2 96%   BMI 57.48 kg/m²     General: Is alert, oriented, in no apparent distress. Affect is within   normal limits.   HEENT: Conjunctivae are anicteric. PERRL. TM's are clear. Oropharynx is   clear.   Neck: Supple. No cervical lymphadenopathy. No thyromegaly. Respiratory   effort is normal.   Lungs: Clear to auscultation bilaterally.   Heart: Regular rate and rhythm, no murmur, rub or gallop. No lower   extremity edema.     Labs 20          ASSESSMENT AND PLAN:         1. RA with lupus overlab with hypoxemic respiratory failure 2017 withCT with interstial changes, pericardial effusion and pleural effusion, TIARA that was initially negative  now positive 1: 2560 speckled, +NILS, +SSA,SSB, dsDNA neg, +RNA polymerase III. Hypoxemic respiratory failure -CT with interstial changes, pericardial effusion and pleural effusion. Followed by rheumatology. Eye exam.      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 Anemia - resolved  5. Hypertension - stable.   6. Vitamin D deficiency - vitamin D 50,000 units twice weekly  7. Morbid obesity - discussed weight. Discussed gastric sleeve when gets off prednisone. She wants to give it a little more time  8. GERD -s table.   Screening - [facility] 10/18 - " today. GYN exam 5/16- ordered.  Needs colonoscopy as 48 - scheduled for 3/2020  Will see her back in 4 months, sooner if problems arise

## 2020-03-12 ENCOUNTER — PATIENT MESSAGE (OUTPATIENT)
Dept: RHEUMATOLOGY | Facility: CLINIC | Age: 50
End: 2020-03-12

## 2020-03-19 ENCOUNTER — PATIENT MESSAGE (OUTPATIENT)
Dept: INTERNAL MEDICINE | Facility: CLINIC | Age: 50
End: 2020-03-19

## 2020-03-20 ENCOUNTER — TELEPHONE (OUTPATIENT)
Dept: ENDOSCOPY | Facility: HOSPITAL | Age: 50
End: 2020-03-20

## 2020-04-06 DIAGNOSIS — M32.8 LUPUS ERYTHEMATOSUS OVERLAP SYNDROME: ICD-10-CM

## 2020-04-07 RX ORDER — AZATHIOPRINE 50 MG/1
TABLET ORAL
Qty: 360 TABLET | Refills: 3 | Status: SHIPPED | OUTPATIENT
Start: 2020-04-07 | End: 2021-02-08

## 2020-04-30 ENCOUNTER — PATIENT MESSAGE (OUTPATIENT)
Dept: RHEUMATOLOGY | Facility: CLINIC | Age: 50
End: 2020-04-30

## 2020-05-01 ENCOUNTER — PATIENT MESSAGE (OUTPATIENT)
Dept: RHEUMATOLOGY | Facility: CLINIC | Age: 50
End: 2020-05-01

## 2020-05-01 ENCOUNTER — LAB VISIT (OUTPATIENT)
Dept: LAB | Facility: HOSPITAL | Age: 50
End: 2020-05-01
Attending: INTERNAL MEDICINE
Payer: OTHER GOVERNMENT

## 2020-05-01 DIAGNOSIS — M05.79 RHEUMATOID ARTHRITIS INVOLVING MULTIPLE SITES WITH POSITIVE RHEUMATOID FACTOR: ICD-10-CM

## 2020-05-01 DIAGNOSIS — E55.9 VITAMIN D DEFICIENCY DISEASE: ICD-10-CM

## 2020-05-01 DIAGNOSIS — R53.83 OTHER FATIGUE: ICD-10-CM

## 2020-05-01 DIAGNOSIS — E13.9 DIABETES MELLITUS DUE TO ABNORMAL INSULIN: ICD-10-CM

## 2020-05-01 DIAGNOSIS — M32.8 LUPUS ERYTHEMATOSUS OVERLAP SYNDROME: ICD-10-CM

## 2020-05-01 DIAGNOSIS — J84.9 ILD (INTERSTITIAL LUNG DISEASE): ICD-10-CM

## 2020-05-01 DIAGNOSIS — D84.9 IMMUNOSUPPRESSION: ICD-10-CM

## 2020-05-01 DIAGNOSIS — R70.0 ELEVATED SEDIMENTATION RATE: ICD-10-CM

## 2020-05-01 DIAGNOSIS — E66.01 MORBID OBESITY WITH BMI OF 50.0-59.9, ADULT: ICD-10-CM

## 2020-05-01 LAB
ALBUMIN SERPL BCP-MCNC: 3.5 G/DL (ref 3.5–5.2)
ALP SERPL-CCNC: 77 U/L (ref 55–135)
ALT SERPL W/O P-5'-P-CCNC: 7 U/L (ref 10–44)
ANION GAP SERPL CALC-SCNC: 10 MMOL/L (ref 8–16)
AST SERPL-CCNC: 17 U/L (ref 10–40)
BASOPHILS # BLD AUTO: 0.03 K/UL (ref 0–0.2)
BASOPHILS # BLD AUTO: 0.03 K/UL (ref 0–0.2)
BASOPHILS NFR BLD: 0.7 % (ref 0–1.9)
BASOPHILS NFR BLD: 0.7 % (ref 0–1.9)
BILIRUB SERPL-MCNC: 0.4 MG/DL (ref 0.1–1)
BUN SERPL-MCNC: 13 MG/DL (ref 6–20)
C3 SERPL-MCNC: 152 MG/DL (ref 50–180)
C4 SERPL-MCNC: 25 MG/DL (ref 11–44)
CALCIUM SERPL-MCNC: 9.8 MG/DL (ref 8.7–10.5)
CHLORIDE SERPL-SCNC: 107 MMOL/L (ref 95–110)
CHOLEST SERPL-MCNC: 156 MG/DL (ref 120–199)
CHOLEST/HDLC SERPL: 2.8 {RATIO} (ref 2–5)
CK SERPL-CCNC: 96 U/L (ref 20–180)
CO2 SERPL-SCNC: 26 MMOL/L (ref 23–29)
CREAT SERPL-MCNC: 1 MG/DL (ref 0.5–1.4)
CRP SERPL-MCNC: 2.8 MG/L (ref 0–8.2)
DIFFERENTIAL METHOD: ABNORMAL
DIFFERENTIAL METHOD: ABNORMAL
EOSINOPHIL # BLD AUTO: 0.1 K/UL (ref 0–0.5)
EOSINOPHIL # BLD AUTO: 0.1 K/UL (ref 0–0.5)
EOSINOPHIL NFR BLD: 3 % (ref 0–8)
EOSINOPHIL NFR BLD: 3 % (ref 0–8)
ERYTHROCYTE [DISTWIDTH] IN BLOOD BY AUTOMATED COUNT: 15.2 % (ref 11.5–14.5)
ERYTHROCYTE [DISTWIDTH] IN BLOOD BY AUTOMATED COUNT: 15.2 % (ref 11.5–14.5)
EST. GFR  (AFRICAN AMERICAN): >60 ML/MIN/1.73 M^2
EST. GFR  (NON AFRICAN AMERICAN): >60 ML/MIN/1.73 M^2
ESTIMATED AVG GLUCOSE: 123 MG/DL (ref 68–131)
GLUCOSE SERPL-MCNC: 101 MG/DL (ref 70–110)
HBA1C MFR BLD HPLC: 5.9 % (ref 4–5.6)
HCT VFR BLD AUTO: 40.4 % (ref 37–48.5)
HCT VFR BLD AUTO: 40.4 % (ref 37–48.5)
HDLC SERPL-MCNC: 55 MG/DL (ref 40–75)
HDLC SERPL: 35.3 % (ref 20–50)
HGB BLD-MCNC: 11.7 G/DL (ref 12–16)
HGB BLD-MCNC: 11.7 G/DL (ref 12–16)
IMM GRANULOCYTES # BLD AUTO: 0.01 K/UL (ref 0–0.04)
IMM GRANULOCYTES # BLD AUTO: 0.01 K/UL (ref 0–0.04)
IMM GRANULOCYTES NFR BLD AUTO: 0.2 % (ref 0–0.5)
IMM GRANULOCYTES NFR BLD AUTO: 0.2 % (ref 0–0.5)
LDLC SERPL CALC-MCNC: 91.2 MG/DL (ref 63–159)
LYMPHOCYTES # BLD AUTO: 1.3 K/UL (ref 1–4.8)
LYMPHOCYTES # BLD AUTO: 1.3 K/UL (ref 1–4.8)
LYMPHOCYTES NFR BLD: 32.5 % (ref 18–48)
LYMPHOCYTES NFR BLD: 32.5 % (ref 18–48)
MCH RBC QN AUTO: 25.5 PG (ref 27–31)
MCH RBC QN AUTO: 25.5 PG (ref 27–31)
MCHC RBC AUTO-ENTMCNC: 29 G/DL (ref 32–36)
MCHC RBC AUTO-ENTMCNC: 29 G/DL (ref 32–36)
MCV RBC AUTO: 88 FL (ref 82–98)
MCV RBC AUTO: 88 FL (ref 82–98)
MONOCYTES # BLD AUTO: 0.4 K/UL (ref 0.3–1)
MONOCYTES # BLD AUTO: 0.4 K/UL (ref 0.3–1)
MONOCYTES NFR BLD: 8.7 % (ref 4–15)
MONOCYTES NFR BLD: 8.7 % (ref 4–15)
NEUTROPHILS # BLD AUTO: 2.2 K/UL (ref 1.8–7.7)
NEUTROPHILS # BLD AUTO: 2.2 K/UL (ref 1.8–7.7)
NEUTROPHILS NFR BLD: 54.9 % (ref 38–73)
NEUTROPHILS NFR BLD: 54.9 % (ref 38–73)
NONHDLC SERPL-MCNC: 101 MG/DL
NRBC BLD-RTO: 0 /100 WBC
NRBC BLD-RTO: 0 /100 WBC
PLATELET # BLD AUTO: 276 K/UL (ref 150–350)
PLATELET # BLD AUTO: 276 K/UL (ref 150–350)
PMV BLD AUTO: 11 FL (ref 9.2–12.9)
PMV BLD AUTO: 11 FL (ref 9.2–12.9)
POTASSIUM SERPL-SCNC: 4.1 MMOL/L (ref 3.5–5.1)
PROT SERPL-MCNC: 8.1 G/DL (ref 6–8.4)
RBC # BLD AUTO: 4.58 M/UL (ref 4–5.4)
RBC # BLD AUTO: 4.58 M/UL (ref 4–5.4)
SODIUM SERPL-SCNC: 143 MMOL/L (ref 136–145)
TRIGL SERPL-MCNC: 49 MG/DL (ref 30–150)
WBC # BLD AUTO: 4.03 K/UL (ref 3.9–12.7)
WBC # BLD AUTO: 4.03 K/UL (ref 3.9–12.7)

## 2020-05-01 PROCEDURE — 83036 HEMOGLOBIN GLYCOSYLATED A1C: CPT

## 2020-05-01 PROCEDURE — 86160 COMPLEMENT ANTIGEN: CPT

## 2020-05-01 PROCEDURE — 86140 C-REACTIVE PROTEIN: CPT

## 2020-05-01 PROCEDURE — 86160 COMPLEMENT ANTIGEN: CPT | Mod: 59

## 2020-05-01 PROCEDURE — 86225 DNA ANTIBODY NATIVE: CPT

## 2020-05-01 PROCEDURE — 80053 COMPREHEN METABOLIC PANEL: CPT

## 2020-05-01 PROCEDURE — 82550 ASSAY OF CK (CPK): CPT

## 2020-05-01 PROCEDURE — 80061 LIPID PANEL: CPT

## 2020-05-01 PROCEDURE — 36415 COLL VENOUS BLD VENIPUNCTURE: CPT

## 2020-05-01 PROCEDURE — 85025 COMPLETE CBC W/AUTO DIFF WBC: CPT

## 2020-05-02 ENCOUNTER — PATIENT MESSAGE (OUTPATIENT)
Dept: RHEUMATOLOGY | Facility: CLINIC | Age: 50
End: 2020-05-02

## 2020-05-05 ENCOUNTER — OFFICE VISIT (OUTPATIENT)
Dept: RHEUMATOLOGY | Facility: CLINIC | Age: 50
End: 2020-05-05
Payer: OTHER GOVERNMENT

## 2020-05-05 ENCOUNTER — PATIENT OUTREACH (OUTPATIENT)
Dept: ADMINISTRATIVE | Facility: OTHER | Age: 50
End: 2020-05-05

## 2020-05-05 DIAGNOSIS — E66.01 CLASS 3 SEVERE OBESITY DUE TO EXCESS CALORIES WITHOUT SERIOUS COMORBIDITY WITH BODY MASS INDEX (BMI) OF 50.0 TO 59.9 IN ADULT: ICD-10-CM

## 2020-05-05 DIAGNOSIS — R53.83 OTHER FATIGUE: ICD-10-CM

## 2020-05-05 DIAGNOSIS — E66.01 MORBID OBESITY WITH BMI OF 50.0-59.9, ADULT: ICD-10-CM

## 2020-05-05 DIAGNOSIS — Z79.899 LONG-TERM USE OF PLAQUENIL: ICD-10-CM

## 2020-05-05 DIAGNOSIS — D84.9 IMMUNOSUPPRESSION: ICD-10-CM

## 2020-05-05 DIAGNOSIS — M05.79 RHEUMATOID ARTHRITIS INVOLVING MULTIPLE SITES WITH POSITIVE RHEUMATOID FACTOR: ICD-10-CM

## 2020-05-05 DIAGNOSIS — M32.8 LUPUS ERYTHEMATOSUS OVERLAP SYNDROME: Primary | ICD-10-CM

## 2020-05-05 DIAGNOSIS — J84.9 ILD (INTERSTITIAL LUNG DISEASE): ICD-10-CM

## 2020-05-05 LAB — DSDNA AB SER-ACNC: ABNORMAL [IU]/ML

## 2020-05-05 PROCEDURE — 99214 PR OFFICE/OUTPT VISIT, EST, LEVL IV, 30-39 MIN: ICD-10-PCS | Mod: 95,,, | Performed by: INTERNAL MEDICINE

## 2020-05-05 PROCEDURE — 99214 OFFICE O/P EST MOD 30 MIN: CPT | Mod: 95,,, | Performed by: INTERNAL MEDICINE

## 2020-05-05 ASSESSMENT — SYSTEMIC LUPUS ERYTHEMATOSUS DISEASE ACTIVITY INDEX (SLEDAI): TOTAL_SCORE: 0

## 2020-05-05 NOTE — PROGRESS NOTES
Subjective:       Patient ID: Malika Valdez is a 49 y.o. female.    Chief Complaint: RA/SLE    HPI:  Malika Valdez is a 49 y.o. female with history of RA and lupus overlap.  Lupus diagnosed 12/2017 based on TIARA that was initially negative 2009 now positive 1: 2560 speckled, +NILS, +SSA,SSB, dsDNA neg, +RNA polymerase III as well as  acute hypoxemic respiratory failure, pericardial/pleural effusion, ground glass opacities on chest CT.     In hospital 12/2017 started on solumedrol 16mg q8 and switched to prednisone 60 mg daily. Discharged with home O2.  Pulmonary decided not to bronch.  Concerned for SLE overlap and we started her on HCQ 200mg BID.     Her repeat CT chest showed a decrease in the pleural and pericardial effusions.         Interval History:  She is currently on azathioprine 100 mg in AM and 100 mg PM, Plaquenil 200 mg bid.  Off prednisone since Oct 2019.    One week ago had right hand stiffness.  Middle finger had to be straightened with other hand.  Pain was 3/10 ache at that time but now pain free.  15-20 minutes of morning stiffness.  Feeling well.  No ulcers in mouth or nose, no rashes, no hair loss.  Reports pulse ox has been as low as 94% with activity and 97-98% at rest.    Review of Systems   Constitutional: Positive for fatigue. Negative for fever and unexpected weight change.   HENT: Negative for trouble swallowing.    Eyes: Negative for redness.   Respiratory: Negative for cough and shortness of breath.    Cardiovascular: Negative for chest pain.   Gastrointestinal: Positive for diarrhea. Negative for constipation.   Endocrine: Negative.    Genitourinary: Negative for dysuria and genital sores.   Musculoskeletal: Negative.    Skin: Negative for rash.   Allergic/Immunologic: Negative.    Neurological: Negative for headaches.   Hematological: Does not bruise/bleed easily.   Psychiatric/Behavioral: Negative.          Objective:   LMP  (LMP Unknown)   Virtual visit     Physical Exam    Constitutional: She is oriented to person, place, and time and well-developed, well-nourished, and in no distress.   HENT:   Head: Normocephalic and atraumatic.   Eyes: Conjunctivae and EOM are normal.   Neck: Neck supple.   Cardiovascular:           Neurological: She is alert and oriented to person, place, and time.   Psychiatric: Mood and affect normal.         LABS    Component      Latest Ref Rng & Units 5/1/2020 5/1/2020 5/1/2020           7:23 AM  7:13 AM  7:13 AM   WBC      3.90 - 12.70 K/uL  4.03 4.03   RBC      4.00 - 5.40 M/uL  4.58 4.58   Hemoglobin      12.0 - 16.0 g/dL  11.7 (L) 11.7 (L)   Hematocrit      37.0 - 48.5 %  40.4 40.4   MCV      82 - 98 fL  88 88   MCH      27.0 - 31.0 pg  25.5 (L) 25.5 (L)   MCHC      32.0 - 36.0 g/dL  29.0 (L) 29.0 (L)   RDW      11.5 - 14.5 %  15.2 (H) 15.2 (H)   Platelets      150 - 350 K/uL  276 276   MPV      9.2 - 12.9 fL  11.0 11.0   Immature Granulocytes      0.0 - 0.5 %  0.2 0.2   Gran # (ANC)      1.8 - 7.7 K/uL  2.2 2.2   Immature Grans (Abs)      0.00 - 0.04 K/uL  0.01 0.01   Lymph #      1.0 - 4.8 K/uL  1.3 1.3   Mono #      0.3 - 1.0 K/uL  0.4 0.4   Eos #      0.0 - 0.5 K/uL  0.1 0.1   Baso #      0.00 - 0.20 K/uL  0.03 0.03   nRBC      0 /100 WBC  0 0   Gran%      38.0 - 73.0 %  54.9 54.9   Lymph%      18.0 - 48.0 %  32.5 32.5   Mono%      4.0 - 15.0 %  8.7 8.7   Eosinophil%      0.0 - 8.0 %  3.0 3.0   Basophil%      0.0 - 1.9 %  0.7 0.7   Differential Method        Automated Automated   Sodium      136 - 145 mmol/L   143   Potassium      3.5 - 5.1 mmol/L   4.1   Chloride      95 - 110 mmol/L   107   CO2      23 - 29 mmol/L   26   Glucose      70 - 110 mg/dL   101   BUN, Bld      6 - 20 mg/dL   13   Creatinine      0.5 - 1.4 mg/dL   1.0   Calcium      8.7 - 10.5 mg/dL   9.8   PROTEIN TOTAL      6.0 - 8.4 g/dL   8.1   Albumin      3.5 - 5.2 g/dL   3.5   BILIRUBIN TOTAL      0.1 - 1.0 mg/dL   0.4   Alkaline Phosphatase      55 - 135 U/L   77   AST      10 -  40 U/L   17   ALT      10 - 44 U/L   7 (L)   Anion Gap      8 - 16 mmol/L   10   eGFR if African American      >60 mL/min/1.73 m:2   >60.0   eGFR if non African American      >60 mL/min/1.73 m:2   >60.0   Specimen UA       Urine, Unspecified     Color, UA      Yellow, Straw, Mai Yellow     Appearance, UA      Clear Hazy (A)     pH, UA      5.0 - 8.0 5.0     Specific Gravity, UA      1.005 - 1.030 1.020     Protein, UA      Negative Negative     Glucose, UA      Negative Negative     Ketones, UA      Negative Negative     Bilirubin (UA)      Negative Negative     Occult Blood UA      Negative Negative     NITRITE UA      Negative Negative     Leukocytes, UA      Negative Negative     Cholesterol      120 - 199 mg/dL   156   Triglycerides      30 - 150 mg/dL   49   HDL      40 - 75 mg/dL   55   LDL Cholesterol External      63.0 - 159.0 mg/dL   91.2   Hdl/Cholesterol Ratio      20.0 - 50.0 %   35.3   Total Cholesterol/HDL Ratio      2.0 - 5.0   2.8   Non-HDL Cholesterol      mg/dL   101   Protein, Urine Random      0 - 15 mg/dL 26 (H)     Creatinine, Random Ur      15.0 - 325.0 mg/dL 191.0     Prot/Creat Ratio, Ur      0.00 - 0.20 0.14     Hemoglobin A1C External      4.0 - 5.6 %   5.9 (H)   Estimated Avg Glucose      68 - 131 mg/dL   123   Complement (C-4)      11 - 44 mg/dL   25   Complement (C-3)      50 - 180 mg/dL   152   CPK      20 - 180 U/L   96   CRP      0.0 - 8.2 mg/L   2.8      Assessment:       1. SLE manifested by hypoxemic respiratory failure with CT with interstial changes, pericardial effusion and pleural effusion, TIARA that was initially negative 2009 now positive 1: 2560 speckled, +NILS, +SSA,SSB, dsDNA neg, +RNA polymerase III.   On Imuran and Plaquenil.  She is doing well  2. Rheumatoid arthritis. Manifested by previous small joint involvement of the hands, +RF/CCP and elevated inflammatory markers.  Now without activity  3. Morbid obesity. Patient has not been exercising at [place].  Encourage  walking and restarting weight watchers (50 pounds since hospitalization and Weight [employer]chers).  Discussed with her primary doctor possible weight loss surgery  4. Abnormal SPEP without monoclonal gammopathy  5. Bradycardia.  Persistent despite beta blocker.   6. Insomnia.    Plan:       1.  Labs.    2.  Continue azathioprine 100 mg in am and 100 mg in pm  3.  Continue Plaquenil.  Refer for eye exam  4.  Encourage self care   5.  DEXA  6.  Discussed weight loss and she will consider restarting.  Discussed compliance with Weight [employer]chers.  7.  Discussed night time routine to help with insomnia.    RTO in 3 months/prn.

## 2020-05-05 NOTE — PROGRESS NOTES
Answers for HPI/ROS submitted by the patient on 5/4/2020   fever: No  eye redness: No  headaches: No  shortness of breath: No  chest pain: No  trouble swallowing: No  diarrhea: No  constipation: No  unexpected weight change: No  genital sore: No  dysuria: No  During the last 3 days, have you had a skin rash?: No  Bruises or bleeds easily: No  cough: No

## 2020-05-13 ENCOUNTER — PATIENT MESSAGE (OUTPATIENT)
Dept: INTERNAL MEDICINE | Facility: CLINIC | Age: 50
End: 2020-05-13

## 2020-05-22 ENCOUNTER — PATIENT MESSAGE (OUTPATIENT)
Dept: RHEUMATOLOGY | Facility: CLINIC | Age: 50
End: 2020-05-22

## 2020-06-12 ENCOUNTER — LAB VISIT (OUTPATIENT)
Dept: PRIMARY CARE CLINIC | Facility: OTHER | Age: 50
End: 2020-06-12
Payer: OTHER GOVERNMENT

## 2020-06-12 DIAGNOSIS — Z11.59 SCREENING EXAMINATION FOR POLIOMYELITIS: ICD-10-CM

## 2020-06-12 PROCEDURE — U0003 INFECTIOUS AGENT DETECTION BY NUCLEIC ACID (DNA OR RNA); SEVERE ACUTE RESPIRATORY SYNDROME CORONAVIRUS 2 (SARS-COV-2) (CORONAVIRUS DISEASE [COVID-19]), AMPLIFIED PROBE TECHNIQUE, MAKING USE OF HIGH THROUGHPUT TECHNOLOGIES AS DESCRIBED BY CMS-2020-01-R: HCPCS

## 2020-06-13 LAB — SARS-COV-2 RNA RESP QL NAA+PROBE: NOT DETECTED

## 2020-06-16 DIAGNOSIS — K21.9 GASTROESOPHAGEAL REFLUX DISEASE, ESOPHAGITIS PRESENCE NOT SPECIFIED: Primary | ICD-10-CM

## 2020-06-17 RX ORDER — PANTOPRAZOLE SODIUM 40 MG/1
40 TABLET, DELAYED RELEASE ORAL DAILY
Qty: 90 TABLET | Refills: 2 | Status: SHIPPED | OUTPATIENT
Start: 2020-06-17 | End: 2021-12-15

## 2020-06-17 NOTE — PROGRESS NOTES
Refill Routing Note    Medication(s) are not appropriate for processing by [facility]:       Drug-Disease Interaction (pantoprazole and Lupus erythematosus overlap syndrome)     Medication-related problems identified: Drug-disease interaction  Medication Therapy Plan: Drug disease interaction; defer to you  Medication reconciliation completed: No      Automatic Epic Protocol Generated Data:    Requested Prescriptions   Pending Prescriptions Disp Refills    pantoprazole (PROTONIX) 40 MG tablet [Pharmacy Med Name: PANTOPRAZOLE SOD DR TABS 40MG] 90 tablet 2     Sig: Take 1 tablet (40 mg total) by mouth once daily.       Gastroenterology: Proton Pump Inhibitors 2 Failed - 6/16/2020  9:28 AM        Failed - An appropriate indication is on the problem list        Passed - Patient is at least 18 years old        Passed - Osteoporosis is not on problem list        Passed - Office visit in past 6 months or future 90 days.     Recent Outpatient Visits            1 month ago Lupus erythematosus overlap syndrome    [facility] - Rheumatology Roxie Paz MD    4 months ago Lupus erythematosus overlap syndrome    [facility] - Rheumatology Roxie Paz MD    4 months ago Rheumatoid lung disease with rheumatoid arthritis of unspecified site    [facility] - Internal Medicine Sarah Robles MD    7 months ago Encounter for gynecological examination without abnormal finding    [facility] - OB/GYN 5th Floor Ramona Camacho MD    8 months ago Long-term use of Plaquenil    [facility] - Internal Medicine Sarah Robles MD          Future Appointments              In 3 weeks Sarah Robles MD [facility] Internal Medicine, [facility]                      Appointments  past 12m or future 3m with PCP    Date Provider   Last Visit   2/3/2020 Sarah Robles MD   Next Visit   7/13/2020 Sarah Robles MD   ED visits in past 90 days: 0     Note composed:8:02 PM 06/16/2020

## 2020-07-06 ENCOUNTER — PATIENT MESSAGE (OUTPATIENT)
Dept: INTERNAL MEDICINE | Facility: CLINIC | Age: 50
End: 2020-07-06

## 2020-07-06 DIAGNOSIS — R92.8 ABNORMAL MAMMOGRAM: Primary | ICD-10-CM

## 2020-07-08 ENCOUNTER — TELEPHONE (OUTPATIENT)
Dept: INTERNAL MEDICINE | Facility: CLINIC | Age: 50
End: 2020-07-08

## 2020-07-08 DIAGNOSIS — E13.9 DIABETES MELLITUS DUE TO ABNORMAL INSULIN: Primary | ICD-10-CM

## 2020-07-10 ENCOUNTER — OFFICE VISIT (OUTPATIENT)
Dept: INTERNAL MEDICINE | Facility: CLINIC | Age: 50
End: 2020-07-10
Payer: OTHER GOVERNMENT

## 2020-07-10 DIAGNOSIS — Z79.899 LONG-TERM USE OF PLAQUENIL: Primary | ICD-10-CM

## 2020-07-10 DIAGNOSIS — M05.79 RHEUMATOID ARTHRITIS INVOLVING MULTIPLE SITES WITH POSITIVE RHEUMATOID FACTOR: ICD-10-CM

## 2020-07-10 DIAGNOSIS — M32.8 LUPUS ERYTHEMATOSUS OVERLAP SYNDROME: ICD-10-CM

## 2020-07-10 DIAGNOSIS — E55.9 VITAMIN D DEFICIENCY DISEASE: ICD-10-CM

## 2020-07-10 DIAGNOSIS — R73.9 STEROID-INDUCED HYPERGLYCEMIA: ICD-10-CM

## 2020-07-10 DIAGNOSIS — T38.0X5A STEROID-INDUCED HYPERGLYCEMIA: ICD-10-CM

## 2020-07-10 DIAGNOSIS — I10 ESSENTIAL HYPERTENSION: ICD-10-CM

## 2020-07-10 DIAGNOSIS — E66.01 MORBID OBESITY WITH BMI OF 50.0-59.9, ADULT: ICD-10-CM

## 2020-07-10 PROCEDURE — 99214 OFFICE O/P EST MOD 30 MIN: CPT | Mod: 95,,, | Performed by: INTERNAL MEDICINE

## 2020-07-10 PROCEDURE — 99214 PR OFFICE/OUTPT VISIT, EST, LEVL IV, 30-39 MIN: ICD-10-PCS | Mod: 95,,, | Performed by: INTERNAL MEDICINE

## 2020-07-10 NOTE — PROGRESS NOTES
The patient location is: home  The chief complaint leading to consultation is: follow up    Visit type: audiovisual    Face to Face time with patient: 20  30 minutes of total time spent on the encounter, which includes face to face time and non-face to face time preparing to see the patient (eg, review of tests), Obtaining and/or reviewing separately obtained history, Documenting clinical information in the electronic or other health record, Independently interpreting results (not separately reported) and communicating results to the patient/family/caregiver, or Care coordination (not separately reported).         Each patient to whom he or she provides medical services by telemedicine is:  (1) informed of the relationship between the physician and patient and the respective role of any other health care provider with respect to management of the patient; and (2) notified that he or she may decline to receive medical services by telemedicine and may withdraw from such care at any time.    Notes:     CHIEF COMPLAINT:  follow up of RA/sle, hypertension, steroid induced diabetes.     HISTORY OF PRESENT ILLNESS: This is a 50 year old woman who presents for follow up.     she has not had any chest pain or shortness of breath, nausea, vomiting, constipation, diarrhea. .     Joints are doing ok.  She has had right hand pain. She has been off prednisone since 10/7/19.  She is doing well off prednisone.  She is taking azathoprine 100 mg twice daily and plaquenil 200 mg twice daily for her RA and lupus overlap with hypoxemic respiratory failure 12/2017 withCT with interstial changes, pericardial effusion and pleural effusion, TIARA that was initially negative 2009  positive 1: 2560 speckled, +NILS, +SSA,SSB, dsDNA neg, +RNA polymerase III.  Joints are doing well. Breathing is good.  NO fever, chills, oral ulcers, chest pain, shortness of breath, nausea, vomiting, constipation, diarrhea.  NO reflux on pantoprazole.      Blood  sugars are better overall and she has not needed Victoza. BLood sugars are between  on diet alone.        She is taking Norvasc 5 mg daily  for hypertension.  No palpitations.     She is taking vitamin D 50,000 units twice weekly.     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      Mood is good. Sleep is good. Children are 6 (7 in September) and 8.      No nausea or vomiting. She gets gassy at times.  She has a soft brown stool once a week.  Some mild cramping.  No abdominal pain.  No melana or bloody stools.              PAST MEDICAL HISTORY:   1.  RA and lupus overlap  2. History of vitamin D deficiency.   3. History of amenorrhea   4. Morbid obesity.     PAST SURGICAL HISTORY: Negative.     ALLERGIES, MEDICATIONS: Updated on [facility]     SOCIAL HISTORY: No tobacco, alcohol use. 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 Father is living diabetes, hypertension and rheumatoid arthritis. Brother with diverticulosis. Brother healthy. Brother healthy.      PHYSICAL EXAMINATION:         General: Is alert, oriented, in no apparent distress. Affect is within   normal limits.   HEENT: Conjunctivae are anicteric. . Respiratory   effort is normal.              ASSESSMENT AND PLAN:         1. RA with lupus overlab with hypoxemic respiratory failure 2017 withCT with interstial changes, pericardial effusion and pleural effusion, TIARA that was initially negative  now positive 1: 2560 speckled, +NILS, +SSA,SSB, dsDNA neg, +RNA polymerase III. Hypoxemic respiratory failure -CT with interstial changes, pericardial effusion and pleural effusion. Followed by rheumatology. Eye exam.      3. Steroid induced diabetes - hemoglobin A1C stable  4. Anemia - resolved  5. Hypertension -  stable.   6. Vitamin D deficiency - vitamin D 50,000 units twice weekly  7. Morbid obesity - discussed weight. Discussed gastric sleeve when gets off prednisone. She wants to give it a little more time  8. GERD -try stopping pantoprazole  Screening - [facility] 10/18 scheduled. GYN exam 5/16- ordered.  Needs colonoscopy as 48 - scheduled for 3/2020  Will see her back in 4 months, sooner if problems arise    Answers for HPI/ROS submitted by the patient on 7/9/2020   activity change: No  unexpected weight change: No  neck pain: No  hearing loss: No  rhinorrhea: No  trouble swallowing: No  eye discharge: No  visual disturbance: No  chest tightness: No  wheezing: No  chest pain: No  palpitations: Yes  blood in stool: No  constipation: No  vomiting: No  diarrhea: No  polydipsia: No  polyuria: No  difficulty urinating: No  hematuria: No  menstrual problem: No  dysuria: No  joint swelling: No  arthralgias: Yes  headaches: No  weakness: No  confusion: No  dysphoric mood: No

## 2020-07-13 ENCOUNTER — HOSPITAL ENCOUNTER (OUTPATIENT)
Dept: RADIOLOGY | Facility: HOSPITAL | Age: 50
Discharge: HOME | End: 2020-07-13
Attending: INTERNAL MEDICINE
Payer: OTHER GOVERNMENT

## 2020-07-13 ENCOUNTER — LAB VISIT (OUTPATIENT)
Dept: LAB | Facility: HOSPITAL | Age: 50
End: 2020-07-13
Attending: INTERNAL MEDICINE
Payer: OTHER GOVERNMENT

## 2020-07-13 DIAGNOSIS — E13.9 DIABETES MELLITUS DUE TO ABNORMAL INSULIN: ICD-10-CM

## 2020-07-13 DIAGNOSIS — R53.83 OTHER FATIGUE: ICD-10-CM

## 2020-07-13 DIAGNOSIS — M32.8 LUPUS ERYTHEMATOSUS OVERLAP SYNDROME: ICD-10-CM

## 2020-07-13 DIAGNOSIS — J84.9 ILD (INTERSTITIAL LUNG DISEASE): ICD-10-CM

## 2020-07-13 DIAGNOSIS — M05.79 RHEUMATOID ARTHRITIS INVOLVING MULTIPLE SITES WITH POSITIVE RHEUMATOID FACTOR: ICD-10-CM

## 2020-07-13 DIAGNOSIS — D84.9 IMMUNOSUPPRESSION: ICD-10-CM

## 2020-07-13 DIAGNOSIS — R92.8 ABNORMAL MAMMOGRAM: ICD-10-CM

## 2020-07-13 LAB
ALBUMIN SERPL BCP-MCNC: 3.6 G/DL (ref 3.5–5.2)
ALP SERPL-CCNC: 102 U/L (ref 55–135)
ALT SERPL W/O P-5'-P-CCNC: 7 U/L (ref 10–44)
ANION GAP SERPL CALC-SCNC: 6 MMOL/L (ref 8–16)
AST SERPL-CCNC: 15 U/L (ref 10–40)
BASOPHILS # BLD AUTO: 0.02 K/UL (ref 0–0.2)
BASOPHILS NFR BLD: 0.5 % (ref 0–1.9)
BILIRUB SERPL-MCNC: 0.4 MG/DL (ref 0.1–1)
BUN SERPL-MCNC: 13 MG/DL (ref 6–20)
C3 SERPL-MCNC: 152 MG/DL (ref 50–180)
C4 SERPL-MCNC: 26 MG/DL (ref 11–44)
CALCIUM SERPL-MCNC: 9.7 MG/DL (ref 8.7–10.5)
CHLORIDE SERPL-SCNC: 106 MMOL/L (ref 95–110)
CK SERPL-CCNC: 99 U/L (ref 20–180)
CO2 SERPL-SCNC: 29 MMOL/L (ref 23–29)
CREAT SERPL-MCNC: 1 MG/DL (ref 0.5–1.4)
CRP SERPL-MCNC: 2.5 MG/L (ref 0–8.2)
DIFFERENTIAL METHOD: ABNORMAL
EOSINOPHIL # BLD AUTO: 0.2 K/UL (ref 0–0.5)
EOSINOPHIL NFR BLD: 4 % (ref 0–8)
ERYTHROCYTE [DISTWIDTH] IN BLOOD BY AUTOMATED COUNT: 15 % (ref 11.5–14.5)
ERYTHROCYTE [SEDIMENTATION RATE] IN BLOOD BY WESTERGREN METHOD: 73 MM/HR (ref 0–36)
EST. GFR  (AFRICAN AMERICAN): >60 ML/MIN/1.73 M^2
EST. GFR  (NON AFRICAN AMERICAN): >60 ML/MIN/1.73 M^2
ESTIMATED AVG GLUCOSE: 117 MG/DL (ref 68–131)
GLUCOSE SERPL-MCNC: 94 MG/DL (ref 70–110)
HBA1C MFR BLD HPLC: 5.7 % (ref 4–5.6)
HCT VFR BLD AUTO: 41.5 % (ref 37–48.5)
HGB BLD-MCNC: 12 G/DL (ref 12–16)
IMM GRANULOCYTES # BLD AUTO: 0.02 K/UL (ref 0–0.04)
IMM GRANULOCYTES NFR BLD AUTO: 0.5 % (ref 0–0.5)
LYMPHOCYTES # BLD AUTO: 1 K/UL (ref 1–4.8)
LYMPHOCYTES NFR BLD: 26.3 % (ref 18–48)
MCH RBC QN AUTO: 26.1 PG (ref 27–31)
MCHC RBC AUTO-ENTMCNC: 28.9 G/DL (ref 32–36)
MCV RBC AUTO: 90 FL (ref 82–98)
MONOCYTES # BLD AUTO: 0.3 K/UL (ref 0.3–1)
MONOCYTES NFR BLD: 7.7 % (ref 4–15)
NEUTROPHILS # BLD AUTO: 2.3 K/UL (ref 1.8–7.7)
NEUTROPHILS NFR BLD: 61 % (ref 38–73)
NRBC BLD-RTO: 0 /100 WBC
PLATELET # BLD AUTO: 269 K/UL (ref 150–350)
PMV BLD AUTO: 11.2 FL (ref 9.2–12.9)
POTASSIUM SERPL-SCNC: 4.5 MMOL/L (ref 3.5–5.1)
PROT SERPL-MCNC: 8.4 G/DL (ref 6–8.4)
RBC # BLD AUTO: 4.6 M/UL (ref 4–5.4)
SODIUM SERPL-SCNC: 141 MMOL/L (ref 136–145)
TSH SERPL DL<=0.005 MIU/L-ACNC: 1.49 UIU/ML (ref 0.4–4)
WBC # BLD AUTO: 3.76 K/UL (ref 3.9–12.7)

## 2020-07-13 PROCEDURE — 85025 COMPLETE CBC W/AUTO DIFF WBC: CPT

## 2020-07-13 PROCEDURE — 36415 COLL VENOUS BLD VENIPUNCTURE: CPT

## 2020-07-13 PROCEDURE — 77061 MAMMO DIGITAL DIAGNOSTIC LEFT WITH TOMOSYNTHESIS_CAD: ICD-10-PCS | Mod: 26,LT,, | Performed by: RADIOLOGY

## 2020-07-13 PROCEDURE — 85652 RBC SED RATE AUTOMATED: CPT

## 2020-07-13 PROCEDURE — 80053 COMPREHEN METABOLIC PANEL: CPT

## 2020-07-13 PROCEDURE — 84443 ASSAY THYROID STIM HORMONE: CPT

## 2020-07-13 PROCEDURE — 86140 C-REACTIVE PROTEIN: CPT

## 2020-07-13 PROCEDURE — 86225 DNA ANTIBODY NATIVE: CPT

## 2020-07-13 PROCEDURE — 77065 DX MAMMO INCL CAD UNI: CPT | Mod: TC,PO,LT

## 2020-07-13 PROCEDURE — 77065 DX MAMMO INCL CAD UNI: CPT | Mod: 26,LT,, | Performed by: RADIOLOGY

## 2020-07-13 PROCEDURE — 83036 HEMOGLOBIN GLYCOSYLATED A1C: CPT

## 2020-07-13 PROCEDURE — 82550 ASSAY OF CK (CPK): CPT

## 2020-07-13 PROCEDURE — 86225 DNA ANTIBODY NATIVE: CPT | Mod: 59

## 2020-07-13 PROCEDURE — 77065 MAMMO DIGITAL DIAGNOSTIC LEFT WITH TOMOSYNTHESIS_CAD: ICD-10-PCS | Mod: 26,LT,, | Performed by: RADIOLOGY

## 2020-07-13 PROCEDURE — 86160 COMPLEMENT ANTIGEN: CPT | Mod: 59

## 2020-07-13 PROCEDURE — 86160 COMPLEMENT ANTIGEN: CPT

## 2020-07-13 PROCEDURE — 77061 BREAST TOMOSYNTHESIS UNI: CPT | Mod: TC,PO,LT

## 2020-07-13 PROCEDURE — 77061 BREAST TOMOSYNTHESIS UNI: CPT | Mod: 26,LT,, | Performed by: RADIOLOGY

## 2020-07-15 LAB
DNA TITER: NORMAL
DSDNA AB SER-ACNC: POSITIVE [IU]/ML

## 2020-07-16 ENCOUNTER — PATIENT MESSAGE (OUTPATIENT)
Dept: RHEUMATOLOGY | Facility: CLINIC | Age: 50
End: 2020-07-16

## 2020-08-03 ENCOUNTER — LAB VISIT (OUTPATIENT)
Dept: LAB | Facility: OTHER | Age: 50
End: 2020-08-03
Payer: OTHER GOVERNMENT

## 2020-08-03 DIAGNOSIS — Z03.818 ENCOUNTER FOR OBSERVATION FOR SUSPECTED EXPOSURE TO OTHER BIOLOGICAL AGENTS RULED OUT: ICD-10-CM

## 2020-08-03 PROCEDURE — U0003 INFECTIOUS AGENT DETECTION BY NUCLEIC ACID (DNA OR RNA); SEVERE ACUTE RESPIRATORY SYNDROME CORONAVIRUS 2 (SARS-COV-2) (CORONAVIRUS DISEASE [COVID-19]), AMPLIFIED PROBE TECHNIQUE, MAKING USE OF HIGH THROUGHPUT TECHNOLOGIES AS DESCRIBED BY CMS-2020-01-R: HCPCS

## 2020-08-04 LAB — SARS-COV-2 RNA RESP QL NAA+PROBE: NOT DETECTED

## 2020-08-10 ENCOUNTER — LAB VISIT (OUTPATIENT)
Dept: LAB | Facility: OTHER | Age: 50
End: 2020-08-10
Payer: OTHER GOVERNMENT

## 2020-08-10 DIAGNOSIS — Z03.818 ENCOUNTER FOR OBSERVATION FOR SUSPECTED EXPOSURE TO OTHER BIOLOGICAL AGENTS RULED OUT: ICD-10-CM

## 2020-08-10 PROCEDURE — U0003 INFECTIOUS AGENT DETECTION BY NUCLEIC ACID (DNA OR RNA); SEVERE ACUTE RESPIRATORY SYNDROME CORONAVIRUS 2 (SARS-COV-2) (CORONAVIRUS DISEASE [COVID-19]), AMPLIFIED PROBE TECHNIQUE, MAKING USE OF HIGH THROUGHPUT TECHNOLOGIES AS DESCRIBED BY CMS-2020-01-R: HCPCS

## 2020-08-12 ENCOUNTER — PATIENT OUTREACH (OUTPATIENT)
Dept: ADMINISTRATIVE | Facility: OTHER | Age: 50
End: 2020-08-12

## 2020-08-12 LAB — SARS-COV-2 RNA RESP QL NAA+PROBE: NOT DETECTED

## 2020-08-12 NOTE — PROGRESS NOTES
Requested updates from Care Everywhere.  Reviewed chart for overdue MARVIN topics.  Updated Health Maintenance.   Reconciled immunizations in LINKS.

## 2020-08-13 ENCOUNTER — OFFICE VISIT (OUTPATIENT)
Dept: RHEUMATOLOGY | Facility: CLINIC | Age: 50
End: 2020-08-13
Payer: OTHER GOVERNMENT

## 2020-08-13 VITALS
DIASTOLIC BLOOD PRESSURE: 79 MMHG | TEMPERATURE: 98 F | HEIGHT: 65 IN | HEART RATE: 51 BPM | WEIGHT: 293 LBS | BODY MASS INDEX: 48.82 KG/M2 | SYSTOLIC BLOOD PRESSURE: 147 MMHG

## 2020-08-13 DIAGNOSIS — M05.79 RHEUMATOID ARTHRITIS INVOLVING MULTIPLE SITES WITH POSITIVE RHEUMATOID FACTOR: ICD-10-CM

## 2020-08-13 DIAGNOSIS — M79.641 PAIN OF RIGHT HAND: ICD-10-CM

## 2020-08-13 DIAGNOSIS — I10 ESSENTIAL HYPERTENSION: ICD-10-CM

## 2020-08-13 DIAGNOSIS — M32.8 LUPUS ERYTHEMATOSUS OVERLAP SYNDROME: Primary | ICD-10-CM

## 2020-08-13 DIAGNOSIS — D84.9 IMMUNOSUPPRESSION: ICD-10-CM

## 2020-08-13 DIAGNOSIS — Z78.0 MENOPAUSE: ICD-10-CM

## 2020-08-13 DIAGNOSIS — J84.9 ILD (INTERSTITIAL LUNG DISEASE): ICD-10-CM

## 2020-08-13 DIAGNOSIS — R53.83 OTHER FATIGUE: ICD-10-CM

## 2020-08-13 PROCEDURE — 99999 PR PBB SHADOW E&M-EST. PATIENT-LVL IV: ICD-10-PCS | Mod: PBBFAC,,, | Performed by: INTERNAL MEDICINE

## 2020-08-13 PROCEDURE — 99999 PR PBB SHADOW E&M-EST. PATIENT-LVL IV: CPT | Mod: PBBFAC,,, | Performed by: INTERNAL MEDICINE

## 2020-08-13 PROCEDURE — 99214 OFFICE O/P EST MOD 30 MIN: CPT | Mod: S$PBB,,, | Performed by: INTERNAL MEDICINE

## 2020-08-13 PROCEDURE — 99214 OFFICE O/P EST MOD 30 MIN: CPT | Mod: PBBFAC | Performed by: INTERNAL MEDICINE

## 2020-08-13 PROCEDURE — 99214 PR OFFICE/OUTPT VISIT, EST, LEVL IV, 30-39 MIN: ICD-10-PCS | Mod: S$PBB,,, | Performed by: INTERNAL MEDICINE

## 2020-08-13 RX ORDER — DICLOFENAC SODIUM 10 MG/G
2 GEL TOPICAL 4 TIMES DAILY
Qty: 2 TUBE | Refills: 0 | Status: SHIPPED | OUTPATIENT
Start: 2020-08-13

## 2020-08-13 ASSESSMENT — ROUTINE ASSESSMENT OF PATIENT INDEX DATA (RAPID3)
FATIGUE SCORE: 0
TOTAL RAPID3 SCORE: 3
AM STIFFNESS SCORE: 1, YES
PSYCHOLOGICAL DISTRESS SCORE: 2.2
PAIN SCORE: 5
WHEN YOU AWAKENED IN THE MORNING OVER THE LAST WEEK, PLEASE INDICATE THE AMOUNT OF TIME IT TAKES UNTIL YOU ARE AS LIMBER AS YOU WILL BE FOR THE DAY: 1 HOUR
PATIENT GLOBAL ASSESSMENT SCORE: 3
MDHAQ FUNCTION SCORE: 0.3

## 2020-08-13 NOTE — PROGRESS NOTES
Subjective:       Patient ID: Malika Valdez is a 50 y.o. female.    Chief Complaint: RA/SLE    HPI:  Malika Valdez is a 50 y.o. female with history of RA and lupus overlap with ILD.  Lupus diagnosed 12/2017 based on TIARA that was initially negative 2009 now positive 1: 2560 speckled, +NILS, +SSA,SSB, dsDNA neg, +RNA polymerase III as well as  acute hypoxemic respiratory failure, pericardial/pleural effusion, ground glass opacities on chest CT.     In hospital 12/2017 started on solumedrol 16mg q8 and switched to prednisone 60 mg daily. SSZ stopped in hospital due to concern it could have been cause of ILD.  Discharged with home O2.  Pulmonary decided not to bronch.  Concerned for SLE overlap and she was started on HCQ 200mg BID.     Her repeat CT chest showed a decrease in the pleural and pericardial effusions.         Interval History:  She is currently on azathioprine 100 mg in AM and 100 mg PM, Plaquenil 200 mg bid.  Off prednisone since Oct 2019.      Middle finger PIP painful, larger and difficult to straighten..  Pain was 2/10 ache at that time but now pain free.  30 minutes of morning stiffness.  Feeling well.  No ulcers in mouth or nose, no rashes, no hair loss.  Reports pulse ox has been as low as 94% with activity and 97-98% at rest.    Review of Systems   Constitutional: Positive for fatigue. Negative for fever and unexpected weight change.   HENT: Negative for trouble swallowing.    Eyes: Negative for redness.   Respiratory: Negative for cough and shortness of breath.    Cardiovascular: Negative for chest pain.   Gastrointestinal: Positive for diarrhea. Negative for constipation.   Endocrine: Negative.    Genitourinary: Negative for dysuria and genital sores.   Musculoskeletal: Negative.    Skin: Negative for rash.   Allergic/Immunologic: Negative.    Neurological: Negative for headaches.   Hematological: Does not bruise/bleed easily.   Psychiatric/Behavioral: Negative.          Objective:   BP (!) 147/79  "(BP Location: Right arm, Patient Position: Sitting, BP Method: Large (Automatic))   Pulse (!) 51   Temp 98.2 °F (36.8 °C) (Oral)   Ht 5' 5" (1.651 m)   Wt (!) 157.3 kg (346 lb 12.5 oz)   LMP  (LMP Unknown)   BMI 57.71 kg/m²   Virtual visit     Physical Exam   Constitutional: She is oriented to person, place, and time and well-developed, well-nourished, and in no distress.   HENT:   Head: Normocephalic and atraumatic.   Eyes: Conjunctivae and EOM are normal.   Neck: Neck supple.   Cardiovascular: Normal rate, regular rhythm and normal heart sounds.           Pulmonary/Chest: Effort normal and breath sounds normal.   Abdominal: Soft. Bowel sounds are normal.   Neurological: She is alert and oriented to person, place, and time.   Skin: Skin is warm and dry.     Psychiatric: Mood and affect normal.   Musculoskeletal:      Comments: Right 3rd finger mild swelling at PIP and appears bigger than left  28 joint count: 1 swollen (right 3rd PIP) and 0 tender           LABS    Component      Latest Ref Rng & Units 8/10/2020 8/3/2020 7/13/2020   WBC      3.90 - 12.70 K/uL   3.76 (L)   RBC      4.00 - 5.40 M/uL   4.60   Hemoglobin      12.0 - 16.0 g/dL   12.0   Hematocrit      37.0 - 48.5 %   41.5   MCV      82 - 98 fL   90   MCH      27.0 - 31.0 pg   26.1 (L)   MCHC      32.0 - 36.0 g/dL   28.9 (L)   RDW      11.5 - 14.5 %   15.0 (H)   Platelets      150 - 350 K/uL   269   MPV      9.2 - 12.9 fL   11.2   Immature Granulocytes      0.0 - 0.5 %   0.5   Gran # (ANC)      1.8 - 7.7 K/uL   2.3   Immature Grans (Abs)      0.00 - 0.04 K/uL   0.02   Lymph #      1.0 - 4.8 K/uL   1.0   Mono #      0.3 - 1.0 K/uL   0.3   Eos #      0.0 - 0.5 K/uL   0.2   Baso #      0.00 - 0.20 K/uL   0.02   nRBC      0 /100 WBC   0   Gran%      38.0 - 73.0 %   61.0   Lymph%      18.0 - 48.0 %   26.3   Mono%      4.0 - 15.0 %   7.7   Eosinophil%      0.0 - 8.0 %   4.0   Basophil%      0.0 - 1.9 %   0.5   Differential Method         Automated "   Sodium      136 - 145 mmol/L   141   Potassium      3.5 - 5.1 mmol/L   4.5   Chloride      95 - 110 mmol/L   106   CO2      23 - 29 mmol/L   29   Glucose      70 - 110 mg/dL   94   BUN, Bld      6 - 20 mg/dL   13   Creatinine      0.5 - 1.4 mg/dL   1.0   Calcium      8.7 - 10.5 mg/dL   9.7   PROTEIN TOTAL      6.0 - 8.4 g/dL   8.4   Albumin      3.5 - 5.2 g/dL   3.6   BILIRUBIN TOTAL      0.1 - 1.0 mg/dL   0.4   Alkaline Phosphatase      55 - 135 U/L   102   AST      10 - 40 U/L   15   ALT      10 - 44 U/L   7 (L)   Anion Gap      8 - 16 mmol/L   6 (L)   eGFR if African American      >60 mL/min/1.73 m:2   >60.0   eGFR if non African American      >60 mL/min/1.73 m:2   >60.0   Hemoglobin A1C External      4.0 - 5.6 %   5.7 (H)   Estimated Avg Glucose      68 - 131 mg/dL   117   CRP      0.0 - 8.2 mg/L   2.5   CPK      20 - 180 U/L   99   Complement (C-4)      11 - 44 mg/dL   26   Complement (C-3)      50 - 180 mg/dL   152   ds DNA Ab      Negative 1:10   Positive (A)   Sed Rate      0 - 36 mm/Hr   73 (H)   TSH      0.400 - 4.000 uIU/mL   1.493   DNA Titer         1:10   SARS-CoV2 (COVID-19) Qualitative PCR      Not Detected Not Detected Not Detected         Assessment:       1. SLE manifested by hypoxemic respiratory failure with CT with interstial changes, pericardial effusion and pleural effusion, TIARA that was initially negative 2009 now positive 1: 2560 speckled, +NILS, +SSA,SSB, dsDNA neg, +RNA polymerase III.   On Imuran and Plaquenil.  She is doing well  2. Rheumatoid arthritis. Manifested by previous small joint involvement of the hands, +RF/CCP and elevated inflammatory markers.  Now with activity in hand  3. Morbid obesity. Patient has not been exercising at [place].  Encourage walking and restarting weight watchers (50 pounds since hospitalization and Weight Watchers).  Discussed with her primary doctor possible weight loss surgery  4. Abnormal SPEP without monoclonal gammopathy  5. Bradycardia.   Persistent despite beta blocker.   6. Insomnia.  Improved with meditation    Plan:       1.  Labs.  If still with inflammation then consider Orencia.  Handout given in AVS  2.  Continue azathioprine 100 mg in am and 100 mg in pm  3.  Continue Plaquenil.  Will get soon.   4.  Encourage self care   5.  DEXA  6.  Discussed weight loss and she will consider restarting.  Discussed compliance with Weight Watchers.  Consider NOOM.  7.  Voltaren gel 2g qid    RTO in 3 months/prn.

## 2020-08-13 NOTE — PROGRESS NOTES
Rapid3 Question Responses and Scores 8/11/2020   MDHAQ Score 0.3   Psychologic Score 2.2   Pain Score 5   When you awakened in the morning OVER THE LAST WEEK, did you feel stiff? Yes   If Yes, please indicate the number of hours until you are as limber as you will be for the day 1   Fatigue Score 0   Global Health Score 3   RAPID3 Score 3       Answers for HPI/ROS submitted by the patient on 8/11/2020   fever: No  eye redness: No  headaches: No  shortness of breath: No  chest pain: No  trouble swallowing: No  diarrhea: No  constipation: No  unexpected weight change: No  genital sore: No  dysuria: No  During the last 3 days, have you had a skin rash?: No  Bruises or bleeds easily: No  cough: No

## 2020-08-17 ENCOUNTER — LAB VISIT (OUTPATIENT)
Dept: LAB | Facility: OTHER | Age: 50
End: 2020-08-17
Payer: OTHER GOVERNMENT

## 2020-08-17 DIAGNOSIS — Z03.818 ENCOUNTER FOR OBSERVATION FOR SUSPECTED EXPOSURE TO OTHER BIOLOGICAL AGENTS RULED OUT: ICD-10-CM

## 2020-08-17 PROCEDURE — U0003 INFECTIOUS AGENT DETECTION BY NUCLEIC ACID (DNA OR RNA); SEVERE ACUTE RESPIRATORY SYNDROME CORONAVIRUS 2 (SARS-COV-2) (CORONAVIRUS DISEASE [COVID-19]), AMPLIFIED PROBE TECHNIQUE, MAKING USE OF HIGH THROUGHPUT TECHNOLOGIES AS DESCRIBED BY CMS-2020-01-R: HCPCS

## 2020-08-18 ENCOUNTER — LAB VISIT (OUTPATIENT)
Dept: LAB | Facility: HOSPITAL | Age: 50
End: 2020-08-18
Attending: INTERNAL MEDICINE
Payer: OTHER GOVERNMENT

## 2020-08-18 DIAGNOSIS — R53.83 OTHER FATIGUE: ICD-10-CM

## 2020-08-18 DIAGNOSIS — M32.8 LUPUS ERYTHEMATOSUS OVERLAP SYNDROME: ICD-10-CM

## 2020-08-18 DIAGNOSIS — J84.9 ILD (INTERSTITIAL LUNG DISEASE): ICD-10-CM

## 2020-08-18 DIAGNOSIS — M05.79 RHEUMATOID ARTHRITIS INVOLVING MULTIPLE SITES WITH POSITIVE RHEUMATOID FACTOR: ICD-10-CM

## 2020-08-18 DIAGNOSIS — D84.9 IMMUNOSUPPRESSION: ICD-10-CM

## 2020-08-18 LAB
ALBUMIN SERPL BCP-MCNC: 3.5 G/DL (ref 3.5–5.2)
ALP SERPL-CCNC: 94 U/L (ref 55–135)
ALT SERPL W/O P-5'-P-CCNC: 7 U/L (ref 10–44)
ANION GAP SERPL CALC-SCNC: 8 MMOL/L (ref 8–16)
AST SERPL-CCNC: 20 U/L (ref 10–40)
BASOPHILS # BLD AUTO: 0.03 K/UL (ref 0–0.2)
BASOPHILS NFR BLD: 0.8 % (ref 0–1.9)
BILIRUB SERPL-MCNC: 0.4 MG/DL (ref 0.1–1)
BUN SERPL-MCNC: 17 MG/DL (ref 6–20)
C3 SERPL-MCNC: 152 MG/DL (ref 50–180)
C4 SERPL-MCNC: 25 MG/DL (ref 11–44)
CALCIUM SERPL-MCNC: 9.8 MG/DL (ref 8.7–10.5)
CHLORIDE SERPL-SCNC: 106 MMOL/L (ref 95–110)
CK SERPL-CCNC: 115 U/L (ref 20–180)
CO2 SERPL-SCNC: 27 MMOL/L (ref 23–29)
CREAT SERPL-MCNC: 0.9 MG/DL (ref 0.5–1.4)
CRP SERPL-MCNC: 2.3 MG/L (ref 0–8.2)
DIFFERENTIAL METHOD: ABNORMAL
EOSINOPHIL # BLD AUTO: 0.2 K/UL (ref 0–0.5)
EOSINOPHIL NFR BLD: 4.8 % (ref 0–8)
ERYTHROCYTE [DISTWIDTH] IN BLOOD BY AUTOMATED COUNT: 14.6 % (ref 11.5–14.5)
ERYTHROCYTE [SEDIMENTATION RATE] IN BLOOD BY WESTERGREN METHOD: 75 MM/HR (ref 0–36)
EST. GFR  (AFRICAN AMERICAN): >60 ML/MIN/1.73 M^2
EST. GFR  (NON AFRICAN AMERICAN): >60 ML/MIN/1.73 M^2
GLUCOSE SERPL-MCNC: 84 MG/DL (ref 70–110)
HCT VFR BLD AUTO: 41.3 % (ref 37–48.5)
HGB BLD-MCNC: 11.9 G/DL (ref 12–16)
IMM GRANULOCYTES # BLD AUTO: 0.01 K/UL (ref 0–0.04)
IMM GRANULOCYTES NFR BLD AUTO: 0.3 % (ref 0–0.5)
LYMPHOCYTES # BLD AUTO: 1 K/UL (ref 1–4.8)
LYMPHOCYTES NFR BLD: 25.9 % (ref 18–48)
MCH RBC QN AUTO: 26.2 PG (ref 27–31)
MCHC RBC AUTO-ENTMCNC: 28.8 G/DL (ref 32–36)
MCV RBC AUTO: 91 FL (ref 82–98)
MONOCYTES # BLD AUTO: 0.3 K/UL (ref 0.3–1)
MONOCYTES NFR BLD: 8.1 % (ref 4–15)
NEUTROPHILS # BLD AUTO: 2.4 K/UL (ref 1.8–7.7)
NEUTROPHILS NFR BLD: 60.1 % (ref 38–73)
NRBC BLD-RTO: 0 /100 WBC
PLATELET # BLD AUTO: 306 K/UL (ref 150–350)
PMV BLD AUTO: 10.2 FL (ref 9.2–12.9)
POTASSIUM SERPL-SCNC: 4.6 MMOL/L (ref 3.5–5.1)
PROT SERPL-MCNC: 8.1 G/DL (ref 6–8.4)
RBC # BLD AUTO: 4.54 M/UL (ref 4–5.4)
SODIUM SERPL-SCNC: 141 MMOL/L (ref 136–145)
WBC # BLD AUTO: 3.97 K/UL (ref 3.9–12.7)

## 2020-08-18 PROCEDURE — 86225 DNA ANTIBODY NATIVE: CPT

## 2020-08-18 PROCEDURE — 86140 C-REACTIVE PROTEIN: CPT

## 2020-08-18 PROCEDURE — 80053 COMPREHEN METABOLIC PANEL: CPT

## 2020-08-18 PROCEDURE — 85652 RBC SED RATE AUTOMATED: CPT

## 2020-08-18 PROCEDURE — 36415 COLL VENOUS BLD VENIPUNCTURE: CPT

## 2020-08-18 PROCEDURE — 82550 ASSAY OF CK (CPK): CPT

## 2020-08-18 PROCEDURE — 86160 COMPLEMENT ANTIGEN: CPT | Mod: 59

## 2020-08-18 PROCEDURE — 86160 COMPLEMENT ANTIGEN: CPT

## 2020-08-18 PROCEDURE — 85025 COMPLETE CBC W/AUTO DIFF WBC: CPT

## 2020-08-19 ENCOUNTER — PATIENT MESSAGE (OUTPATIENT)
Dept: RHEUMATOLOGY | Facility: CLINIC | Age: 50
End: 2020-08-19

## 2020-08-20 LAB
DSDNA AB SER-ACNC: NORMAL [IU]/ML
SARS-COV-2 RNA RESP QL NAA+PROBE: NOT DETECTED

## 2020-10-24 ENCOUNTER — IMMUNIZATION (OUTPATIENT)
Dept: PHARMACY | Facility: CLINIC | Age: 50
End: 2020-10-24
Payer: OTHER GOVERNMENT

## 2020-11-09 ENCOUNTER — PATIENT MESSAGE (OUTPATIENT)
Dept: RHEUMATOLOGY | Facility: CLINIC | Age: 50
End: 2020-11-09

## 2020-11-10 ENCOUNTER — PATIENT MESSAGE (OUTPATIENT)
Dept: RHEUMATOLOGY | Facility: CLINIC | Age: 50
End: 2020-11-10

## 2020-11-10 ENCOUNTER — PATIENT MESSAGE (OUTPATIENT)
Dept: INTERNAL MEDICINE | Facility: CLINIC | Age: 50
End: 2020-11-10

## 2020-11-10 DIAGNOSIS — E13.9 DIABETES MELLITUS DUE TO ABNORMAL INSULIN: Primary | ICD-10-CM

## 2020-11-13 ENCOUNTER — PATIENT MESSAGE (OUTPATIENT)
Dept: INTERNAL MEDICINE | Facility: CLINIC | Age: 50
End: 2020-11-13

## 2020-11-14 DIAGNOSIS — T78.3XXD ANGIOEDEMA, SUBSEQUENT ENCOUNTER: ICD-10-CM

## 2020-11-14 NOTE — TELEPHONE ENCOUNTER
No new care gaps identified.  Powered by [employer]. Reference number: [id number]. 11/14/2020 10:25:30 AM   CST

## 2020-11-15 ENCOUNTER — PATIENT OUTREACH (OUTPATIENT)
Dept: ADMINISTRATIVE | Facility: OTHER | Age: 50
End: 2020-11-15

## 2020-11-15 RX ORDER — AMLODIPINE BESYLATE 5 MG/1
TABLET ORAL
Qty: 90 TABLET | Refills: 2 | Status: SHIPPED | OUTPATIENT
Start: 2020-11-15 | End: 2021-08-17

## 2020-11-15 RX ORDER — ERGOCALCIFEROL 1.25 MG/1
CAPSULE ORAL
Qty: 24 CAPSULE | Refills: 3 | Status: SHIPPED | OUTPATIENT
Start: 2020-11-15 | End: 2021-11-11

## 2020-11-15 NOTE — PROGRESS NOTES
Refill Routing Note   Medication(s) are not appropriate for processing by [facility] for the following reason(s):     - Outside of protocol  - Required vitals are abnormal    [facility] actions taken in this encounter:   Defer  Route       Medication Therapy Plan: CDMR. BP elevated; Defer amLODIPine; Route VITAMIN D2   Medication reconciliation completed: No   Automatic Epic Generated Protocol Data:        Requested Prescriptions   Pending Prescriptions Disp Refills    ergocalciferol (VITAMIN D2) 50,000 unit Cap [Pharmacy Med Name: VIT D-2 CAP(ERGOCAL)1.25MG 50,000U] 24 capsule 3     Sig: TAKE 1 CAPSULE TWICE WEEKLY       There is no refill protocol information for this order       amLODIPine (NORVASC) 5 MG tablet [Pharmacy Med Name: AMLODIPINE BESYLATE TABS 5MG] 90 tablet 2     Sig: TAKE 1 TABLET DAILY       Cardiovascular:  Calcium Channel Blockers Failed - 11/14/2020 10:24 AM        Failed - Last BP in normal range within 360 days.     BP Readings from Last 3 Encounters:   08/13/20 (!) 147/79   02/03/20 (!) 140/81   02/03/20 128/80              Passed - Patient is at least 18 years old        Passed - Office visit in past 12 months or future 90 days     Recent Outpatient Visits            3 months ago Lupus erythematosus overlap syndrome    [id number] Roxie Paz MD    4 months ago Long-term use of Plaquenil    [facility] Sarah Robles MD    6 months ago Lupus erythematosus overlap syndrome    [facility] [id number] Roxie Paz MD    9 months ago Lupus erythematosus overlap syndrome    [facility] [id number] Roxie Paz MD    9 months ago Rheumatoid lung disease with rheumatoid arthritis of unspecified site    [facility] Sarah Robles MD          Future Appointments              Tomorrow [facility], DEXA1 [facility] 2nd Fl, [facility]    Tomorrow Roxie GONZALEZ  MD [id number], [facility]    Tomorrow LAB, APPOINTMENT [place] [facility]    In 2 days MD [facility], [facility] PC[facility]    In 1 week FOSTER, VISUAL العلي [facility] 1st Fl, [facility]    In 1 week Tess Mcgee, OD [facility], [facility] PCW    In 2 weeks MD [facility], [facility]    In 2 months MD [facility], [facility] PC[facility]                      Appointments  past 12m or future 3m with PCP    Date Provider   Last Visit   7/10/2020 Sarah Robles MD   Next Visit   11/17/2020 Sarah Robles MD   ED visits in past 90 days: 0        Note composed:9:29 AM 11/15/2020

## 2020-11-16 ENCOUNTER — OFFICE VISIT (OUTPATIENT)
Dept: RHEUMATOLOGY | Facility: CLINIC | Age: 50
End: 2020-11-16
Payer: OTHER GOVERNMENT

## 2020-11-16 ENCOUNTER — HOSPITAL ENCOUNTER (OUTPATIENT)
Dept: RADIOLOGY | Facility: CLINIC | Age: 50
Discharge: HOME | End: 2020-11-16
Attending: INTERNAL MEDICINE
Payer: OTHER GOVERNMENT

## 2020-11-16 VITALS
WEIGHT: 293 LBS | HEIGHT: 65 IN | TEMPERATURE: 98 F | HEART RATE: 57 BPM | SYSTOLIC BLOOD PRESSURE: 175 MMHG | DIASTOLIC BLOOD PRESSURE: 91 MMHG | BODY MASS INDEX: 48.82 KG/M2

## 2020-11-16 DIAGNOSIS — M32.8 LUPUS ERYTHEMATOSUS OVERLAP SYNDROME: ICD-10-CM

## 2020-11-16 DIAGNOSIS — Z78.0 MENOPAUSE: ICD-10-CM

## 2020-11-16 DIAGNOSIS — D84.9 IMMUNOSUPPRESSION: ICD-10-CM

## 2020-11-16 DIAGNOSIS — J84.9 ILD (INTERSTITIAL LUNG DISEASE): ICD-10-CM

## 2020-11-16 DIAGNOSIS — M05.79 RHEUMATOID ARTHRITIS INVOLVING MULTIPLE SITES WITH POSITIVE RHEUMATOID FACTOR: ICD-10-CM

## 2020-11-16 DIAGNOSIS — E66.01 CLASS 3 SEVERE OBESITY DUE TO EXCESS CALORIES WITHOUT SERIOUS COMORBIDITY WITH BODY MASS INDEX (BMI) OF 50.0 TO 59.9 IN ADULT: ICD-10-CM

## 2020-11-16 DIAGNOSIS — M32.8 LUPUS ERYTHEMATOSUS OVERLAP SYNDROME: Primary | ICD-10-CM

## 2020-11-16 PROCEDURE — 77080 DXA BONE DENSITY AXIAL: CPT | Mod: 26,,, | Performed by: INTERNAL MEDICINE

## 2020-11-16 PROCEDURE — 77080 DEXA BONE DENSITY SPINE HIP: ICD-10-PCS | Mod: 26,,, | Performed by: INTERNAL MEDICINE

## 2020-11-16 PROCEDURE — 99999 PR PBB SHADOW E&M-EST. PATIENT-LVL III: CPT | Mod: PBBFAC,,, | Performed by: INTERNAL MEDICINE

## 2020-11-16 PROCEDURE — 99214 OFFICE O/P EST MOD 30 MIN: CPT | Mod: S$PBB,,, | Performed by: INTERNAL MEDICINE

## 2020-11-16 PROCEDURE — 99999 PR PBB SHADOW E&M-EST. PATIENT-LVL III: ICD-10-PCS | Mod: PBBFAC,,, | Performed by: INTERNAL MEDICINE

## 2020-11-16 PROCEDURE — 77080 DXA BONE DENSITY AXIAL: CPT | Mod: TC

## 2020-11-16 PROCEDURE — 99214 PR OFFICE/OUTPT VISIT, EST, LEVL IV, 30-39 MIN: ICD-10-PCS | Mod: S$PBB,,, | Performed by: INTERNAL MEDICINE

## 2020-11-16 PROCEDURE — 99213 OFFICE O/P EST LOW 20 MIN: CPT | Mod: PBBFAC,25 | Performed by: INTERNAL MEDICINE

## 2020-11-16 ASSESSMENT — ROUTINE ASSESSMENT OF PATIENT INDEX DATA (RAPID3)
TOTAL RAPID3 SCORE: 1.5
PSYCHOLOGICAL DISTRESS SCORE: 1.1
WHEN YOU AWAKENED IN THE MORNING OVER THE LAST WEEK, PLEASE INDICATE THE AMOUNT OF TIME IT TAKES UNTIL YOU ARE AS LIMBER AS YOU WILL BE FOR THE DAY: 15 MINUTES
PATIENT GLOBAL ASSESSMENT SCORE: 2
PAIN SCORE: 2.5
FATIGUE SCORE: 8
AM STIFFNESS SCORE: 1, YES
MDHAQ FUNCTION SCORE: 0

## 2020-11-16 NOTE — PROGRESS NOTES
Subjective:       Patient ID: Malika Valdez is a 50 y.o. female.    Chief Complaint: RA/SLE    HPI:  Malika Valdez is a 50 y.o. female with history of RA and lupus overlap with ILD.  Lupus diagnosed 12/2017 based on TIARA that was initially negative 2009 now positive 1: 2560 speckled, +NILS, +SSA,SSB, dsDNA neg, +RNA polymerase III as well as  acute hypoxemic respiratory failure, pericardial/pleural effusion, ground glass opacities on chest CT.     In hospital 12/2017 started on solumedrol 16mg q8 and switched to prednisone 60 mg daily. SSZ stopped in hospital due to concern it could have been cause of ILD.  Discharged with home O2.  Pulmonary decided not to bronch.  Concerned for SLE overlap and she was started on HCQ 200mg BID.     Her repeat CT chest showed a decrease in the pleural and pericardial effusions.         Interval History:  She is currently on azathioprine 100 mg in AM and 100 mg PM, Plaquenil 200 mg bid.  Off prednisone since Oct 2019.      Middle finger PIP pain improved with Voltaren gel.  Pain was 2.5/10 ache at that time but now pain free.  15 minutes of morning stiffness.  Feeling well.  No ulcers in mouth or nose, no rashes, no hair loss.  Dealing with stress of work.      Review of Systems   Constitutional: Positive for fatigue. Negative for fever and unexpected weight change.   HENT: Negative for trouble swallowing.    Eyes: Negative for redness.   Respiratory: Negative for cough and shortness of breath.    Cardiovascular: Negative for chest pain.   Gastrointestinal: Positive for diarrhea. Negative for constipation.   Endocrine: Negative.    Genitourinary: Negative for dysuria and genital sores.   Musculoskeletal: Negative.    Skin: Negative for rash.   Allergic/Immunologic: Negative.    Neurological: Negative for headaches.   Hematological: Does not bruise/bleed easily.   Psychiatric/Behavioral: Negative.          Objective:   BP (!) 177/82 (BP Location: Left arm, Patient Position: Sitting, BP  "Method: Large (Automatic))   Pulse (!) 52   Temp 98.1 °F (36.7 °C) (Oral)   Ht 5' 5" (1.651 m)   Wt (!) 152.8 kg (336 lb 13.8 oz)   LMP  (LMP Unknown)   BMI 56.06 kg/m²   Virtual visit     Physical Exam   Constitutional: She is oriented to person, place, and time and well-developed, well-nourished, and in no distress.   HENT:   Head: Normocephalic and atraumatic.   Eyes: Conjunctivae and EOM are normal.   Neck: Neck supple.   Cardiovascular: Normal rate, regular rhythm and normal heart sounds.           Pulmonary/Chest: Effort normal and breath sounds normal.   Abdominal: Soft. Bowel sounds are normal.   Neurological: She is alert and oriented to person, place, and time.   Skin: Skin is warm and dry.     Psychiatric: Mood and affect normal.   Musculoskeletal:      Comments: 28 joint count: 0 swollen and 0 tender           LABS    Component      Latest Ref Rng & Units 8/18/2020   WBC      3.90 - 12.70 K/uL 3.97   RBC      4.00 - 5.40 M/uL 4.54   Hemoglobin      12.0 - 16.0 g/dL 11.9 (L)   Hematocrit      37.0 - 48.5 % 41.3   MCV      82 - 98 fL 91   MCH      27.0 - 31.0 pg 26.2 (L)   MCHC      32.0 - 36.0 g/dL 28.8 (L)   RDW      11.5 - 14.5 % 14.6 (H)   Platelets      150 - 350 K/uL 306   MPV      9.2 - 12.9 fL 10.2   Immature Granulocytes      0.0 - 0.5 % 0.3   Gran # (ANC)      1.8 - 7.7 K/uL 2.4   Immature Grans (Abs)      0.00 - 0.04 K/uL 0.01   Lymph #      1.0 - 4.8 K/uL 1.0   Mono #      0.3 - 1.0 K/uL 0.3   Eos #      0.0 - 0.5 K/uL 0.2   Baso #      0.00 - 0.20 K/uL 0.03   nRBC      0 /100 WBC 0   Gran %      38.0 - 73.0 % 60.1   Lymph %      18.0 - 48.0 % 25.9   Mono %      4.0 - 15.0 % 8.1   Eosinophil %      0.0 - 8.0 % 4.8   Basophil %      0.0 - 1.9 % 0.8   Differential Method       Automated   Sodium      136 - 145 mmol/L 141   Potassium      3.5 - 5.1 mmol/L 4.6   Chloride      95 - 110 mmol/L 106   CO2      23 - 29 mmol/L 27   Glucose      70 - 110 mg/dL 84   BUN      6 - 20 mg/dL 17 "   Creatinine      0.5 - 1.4 mg/dL 0.9   Calcium      8.7 - 10.5 mg/dL 9.8   PROTEIN TOTAL      6.0 - 8.4 g/dL 8.1   Albumin      3.5 - 5.2 g/dL 3.5   BILIRUBIN TOTAL      0.1 - 1.0 mg/dL 0.4   Alkaline Phosphatase      55 - 135 U/L 94   AST      10 - 40 U/L 20   ALT      10 - 44 U/L 7 (L)   Anion Gap      8 - 16 mmol/L 8   eGFR if African American      >60 mL/min/1.73 m:2 >60.0   eGFR if non African American      >60 mL/min/1.73 m:2 >60.0   Specimen UA       Urine, Unspecified   Color, UA      Yellow, Straw, Mai Yellow   Appearance, UA      Clear Clear   pH, UA      5.0 - 8.0 5.0   Specific Gravity, UA      1.005 - 1.030 1.020   Protein, UA      Negative Negative   Glucose, UA      Negative Negative   Ketones, UA      Negative Negative   Bilirubin (UA)      Negative Negative   Occult Blood UA      Negative Negative   NITRITE UA      Negative Negative   Leukocytes, UA      Negative Negative   Protein, Urine Random      0 - 15 mg/dL 14   Creatinine, Urine      15.0 - 325.0 mg/dL 132.0   Prot/Creat Ratio, Urine      0.00 - 0.20 0.11   ds DNA Ab      Negative 1:10 Negative 1:10   Complement (C-3)      50 - 180 mg/dL 152   Complement (C-4)      11 - 44 mg/dL 25   CPK      20 - 180 U/L 115   Sed Rate      0 - 36 mm/Hr 75 (H)   CRP      0.0 - 8.2 mg/L 2.3        Assessment:       1. SLE manifested by hypoxemic respiratory failure with CT with interstial changes, pericardial effusion and pleural effusion, TIARA that was initially negative 2009 now positive 1: 2560 speckled, +NILS, +SSA,SSB, dsDNA neg, +RNA polymerase III.   On Imuran and Plaquenil.  She is doing well  2. Rheumatoid arthritis. Manifested by previous small joint involvement of the hands, +RF/CCP and elevated inflammatory markers.  Now with improvement activity in hand with voltaren gel.  3. Morbid obesity. Patient has not been exercising at [place].  Encourage walking and restarting weight watchers (50 pounds since hospitalization and Weight Watchers).   Discussed with her primary doctor possible weight loss surgery  4. Abnormal SPEP without monoclonal gammopathy  5. Bradycardia.  Persistent despite beta blocker.   6. Insomnia.  Improved with meditation    Plan:       1.  Labs.  If return of joint pain with inflammation then consider Orencia.   2.  Continue azathioprine 100 mg in am and 100 mg in pm  3.  Continue Plaquenil.  Patient to get Plaquenil 11/27/2020  4.  Encourage self care   5.  Had flu vaccination  6.  Discussed weight loss and she will consider restarting. Continue with NOOM she likes chatting with team. Lost 10 pounds.  7.  Voltaren gel 2g qid prn     RTO in 3 months/prn.

## 2020-11-16 NOTE — PROGRESS NOTES
Care Everywhere: updated  Immunization: updated  Health Maintenance:updated  Media Review: review for outside colon cancer report   Legacy Review:   Order placed:   Upcoming appts:  Referral for colonoscopy 3/6/2020

## 2020-11-17 ENCOUNTER — OFFICE VISIT (OUTPATIENT)
Dept: INTERNAL MEDICINE | Facility: CLINIC | Age: 50
End: 2020-11-17
Payer: OTHER GOVERNMENT

## 2020-11-17 ENCOUNTER — LAB VISIT (OUTPATIENT)
Dept: LAB | Facility: HOSPITAL | Age: 50
End: 2020-11-17
Attending: INTERNAL MEDICINE
Payer: OTHER GOVERNMENT

## 2020-11-17 VITALS
DIASTOLIC BLOOD PRESSURE: 82 MMHG | HEART RATE: 57 BPM | SYSTOLIC BLOOD PRESSURE: 122 MMHG | WEIGHT: 293 LBS | OXYGEN SATURATION: 98 % | BODY MASS INDEX: 54.81 KG/M2

## 2020-11-17 DIAGNOSIS — M32.8 LUPUS ERYTHEMATOSUS OVERLAP SYNDROME: ICD-10-CM

## 2020-11-17 DIAGNOSIS — E55.9 VITAMIN D DEFICIENCY DISEASE: ICD-10-CM

## 2020-11-17 DIAGNOSIS — Z12.31 SCREENING MAMMOGRAM, ENCOUNTER FOR: Primary | ICD-10-CM

## 2020-11-17 DIAGNOSIS — M05.10 RHEUMATOID LUNG DISEASE WITH RHEUMATOID ARTHRITIS OF UNSPECIFIED SITE: ICD-10-CM

## 2020-11-17 DIAGNOSIS — I10 ESSENTIAL HYPERTENSION: ICD-10-CM

## 2020-11-17 DIAGNOSIS — R73.9 STEROID-INDUCED HYPERGLYCEMIA: ICD-10-CM

## 2020-11-17 DIAGNOSIS — T38.0X5A STEROID-INDUCED HYPERGLYCEMIA: ICD-10-CM

## 2020-11-17 LAB
BACTERIA #/AREA URNS AUTO: ABNORMAL /HPF
BILIRUB UR QL STRIP: NEGATIVE
CLARITY UR REFRACT.AUTO: ABNORMAL
COLOR UR AUTO: ABNORMAL
CREAT UR-MCNC: 191 MG/DL (ref 15–325)
GLUCOSE UR QL STRIP: NEGATIVE
HGB UR QL STRIP: NEGATIVE
HYALINE CASTS UR QL AUTO: 2 /LPF
KETONES UR QL STRIP: NEGATIVE
LEUKOCYTE ESTERASE UR QL STRIP: NEGATIVE
MICROSCOPIC COMMENT: ABNORMAL
NITRITE UR QL STRIP: NEGATIVE
PH UR STRIP: 5 [PH] (ref 5–8)
PROT UR QL STRIP: ABNORMAL
PROT UR-MCNC: 26 MG/DL (ref 0–15)
PROT/CREAT UR: 0.14 MG/G{CREAT} (ref 0–0.2)
RBC #/AREA URNS AUTO: 1 /HPF (ref 0–4)
SP GR UR STRIP: 1.02 (ref 1–1.03)
SQUAMOUS #/AREA URNS AUTO: 1 /HPF
URN SPEC COLLECT METH UR: ABNORMAL
WBC #/AREA URNS AUTO: 2 /HPF (ref 0–5)

## 2020-11-17 PROCEDURE — 82570 ASSAY OF URINE CREATININE: CPT

## 2020-11-17 PROCEDURE — 99999 PR PBB SHADOW E&M-EST. PATIENT-LVL III: ICD-10-PCS | Mod: PBBFAC,,, | Performed by: INTERNAL MEDICINE

## 2020-11-17 PROCEDURE — 81001 URINALYSIS AUTO W/SCOPE: CPT

## 2020-11-17 PROCEDURE — 99214 OFFICE O/P EST MOD 30 MIN: CPT | Mod: S$PBB,,, | Performed by: INTERNAL MEDICINE

## 2020-11-17 PROCEDURE — 99213 OFFICE O/P EST LOW 20 MIN: CPT | Mod: PBBFAC | Performed by: INTERNAL MEDICINE

## 2020-11-17 PROCEDURE — 99214 PR OFFICE/OUTPT VISIT, EST, LEVL IV, 30-39 MIN: ICD-10-PCS | Mod: S$PBB,,, | Performed by: INTERNAL MEDICINE

## 2020-11-17 PROCEDURE — 99999 PR PBB SHADOW E&M-EST. PATIENT-LVL III: CPT | Mod: PBBFAC,,, | Performed by: INTERNAL MEDICINE

## 2020-11-17 NOTE — LETTER
November 17, 2020    Malika Valdez  [address] [place] [address]             [facility]  [address]  [facility] [address]  Phone: [phone]  Fax: [phone] To Whom It May Concern    Due to multiple medical problems, Ms Valdez is to self isolate at home due to recent surge in COVID 19 cases.  If she gets exposed and gets infected with COVID 19 she is at high risk for severe complications or death from this virus. She will be seen in clinic again in clinic on 12/29/20 to further determine her work status.      Sincerely,        Sarah Robles MD

## 2020-11-17 NOTE — PROGRESS NOTES
CHIEF COMPLAINT:  follow up of RA/sle, hypertension, steroid induced diabetes.     HISTORY OF PRESENT ILLNESS: This is a 50 year old woman who presents for follow up.    She has been under a tremendous amount of stress. 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 The agency has been short staffed and she has had to work shifts in a group home for 12 hours on weekends in [facility]. She has been anxious because she is shuffling  duties and is not spending much time with her children. She is worried that her work will cause a lupus flare. She was also exposed to a co worker who had COVID. She got some rest this weekend and is feeling a little better. She is concerned about her covid risk with rising covid cases.      She has not had any chest pain or shortness of breath, nausea, vomiting, constipation, diarrhea. .     Joints are doing ok.  She has had right hand pain. She has been off prednisone since 10/7/19.  She continues to do well off prednisone.  She is taking azathoprine 100 mg twice daily and plaquenil 200 mg twice daily for her RA and lupus overlap with hypoxemic respiratory failure 12/2017 withCT with interstial changes, pericardial effusion and pleural effusion, TIARA that was initially negative 2009  positive 1: 2560 speckled, +NILS, +SSA,SSB, dsDNA neg, +RNA polymerase III.  Joints are doing well. Breathing is good.  NO fever, chills, oral ulcers, chest pain, shortness of breath, nausea, vomiting, constipation, diarrhea.  She is off pantoprazole and is doing well.       Blood sugars are better overall and she has not needed Victoza. BLood sugars are between  on diet alone.  She is following the [employer] diet arjun and has lost 23 pounds since Feb 2020.       She is taking Norvasc 5 mg daily  for hypertension.  No palpitations.     She is taking vitamin D 50,000 units twice weekly.       Children are 7 and  9.      REVIEW OF SYSTEMS: No fevers, chills, night sweats, fatigue, visual change, hearing loss, sinus congestion, sore throat, chest pain, shortness of breath, nausea, vomiting, constipation, diarrhea, dysuria, hematuria, polydipsia, polyuria, joint pain, muscle pain, headaches                PAST MEDICAL HISTORY:   1.  RA and lupus overlap  2. History of vitamin D deficiency.   3. History of amenorrhea   4. Morbid obesity.     PAST SURGICAL HISTORY: Negative.     ALLERGIES, MEDICATIONS: Updated on [facility]     SOCIAL HISTORY: No tobacco, alcohol use. 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 Father is living diabetes, hypertension and rheumatoid arthritis. Brother with diverticulosis. Brother healthy. Brother healthy.      PHYSICAL EXAMINATION:       /82   Pulse (!) 57   Wt (!) 149.4 kg (329 lb 5.9 oz)   LMP  (LMP Unknown)   SpO2 98%   BMI 54.81 kg/m²     General: Is alert, oriented, in no apparent distress. Affect is within   normal limits.   HEENT: Conjunctivae are anicteric. . Respiratory   effort is normal.       labs yesterday reviewed    ASSESSMENT AND PLAN:         1. RA with lupus overlab with hypoxemic respiratory failure 2017 withCT with interstial changes, pericardial effusion and pleural effusion, TIARA that was initially negative  now positive 1: 2560 speckled, +NILS, +SSA,SSB, dsDNA neg, +RNA polymerase III. Hypoxemic respiratory failure -CT with interstial changes, pericardial effusion and pleural effusion. Followed by rheumatology. Eye exam.      3. Steroid induced diabetes -resolved  4. Anemia - resolved  5. Hypertension - stable.   6. Vitamin D deficiency - vitamin D 50,000 units twice weekly  7. Morbid obesity - doing well with diet.   8. GERD -asx off pantoprazole  Screening - [facility] 1left 2020. GYN exam .   Needs colonoscopy as 48 - holding off due to covid pandemic.   Will see her back in 1 months, sooner if problems arise      Due to multiple medical problems, Ms Valdez is to self isolate at home due to recent surge in COVID 19 cases.  If she gets exposed and gets infected with COVID 19 she is at high risk for severe complications or death from this virus. She will be seen in clinic again in clinic on 12/29/20 to further determine her work status.

## 2020-11-19 ENCOUNTER — PATIENT MESSAGE (OUTPATIENT)
Dept: INTERNAL MEDICINE | Facility: CLINIC | Age: 50
End: 2020-11-19

## 2020-11-19 ENCOUNTER — LAB VISIT (OUTPATIENT)
Dept: LAB | Facility: OTHER | Age: 50
End: 2020-11-19
Payer: OTHER GOVERNMENT

## 2020-11-19 DIAGNOSIS — Z03.818 ENCOUNTER FOR OBSERVATION FOR SUSPECTED EXPOSURE TO OTHER BIOLOGICAL AGENTS RULED OUT: ICD-10-CM

## 2020-11-19 PROCEDURE — U0003 INFECTIOUS AGENT DETECTION BY NUCLEIC ACID (DNA OR RNA); SEVERE ACUTE RESPIRATORY SYNDROME CORONAVIRUS 2 (SARS-COV-2) (CORONAVIRUS DISEASE [COVID-19]), AMPLIFIED PROBE TECHNIQUE, MAKING USE OF HIGH THROUGHPUT TECHNOLOGIES AS DESCRIBED BY CMS-2020-01-R: HCPCS

## 2020-11-20 DIAGNOSIS — Z79.899 LONG-TERM USE OF PLAQUENIL: ICD-10-CM

## 2020-11-20 DIAGNOSIS — Z79.899 ENCOUNTER FOR EYE EXAM DUE TO HIGH RISK MEDICATION: Primary | ICD-10-CM

## 2020-11-20 NOTE — PROGRESS NOTES
Assessment /Plan     For exam results, see Encounter Report.    Encounter for eye exam due to high risk medication  -     [facility] - Intermediate - OU - Both Eyes; Future  -     Posterior Segment OCT Retina-Both eyes    Long-term use of Plaquenil  -     [facility] - Intermediate - OU - Both Eyes; Future  -     Posterior Segment OCT Retina-Both eyes      Orders for 10-2 HVF and Mac OCT placed. Testing to be completed before exam (due to plaquenil Tx).

## 2020-11-21 LAB — SARS-COV-2 RNA RESP QL NAA+PROBE: NOT DETECTED

## 2020-11-27 ENCOUNTER — OFFICE VISIT (OUTPATIENT)
Dept: OPTOMETRY | Facility: CLINIC | Age: 50
End: 2020-11-27
Payer: OTHER GOVERNMENT

## 2020-11-27 ENCOUNTER — CLINICAL SUPPORT (OUTPATIENT)
Dept: OPHTHALMOLOGY | Facility: CLINIC | Age: 50
End: 2020-11-27
Payer: OTHER GOVERNMENT

## 2020-11-27 DIAGNOSIS — H35.033 HYPERTENSIVE RETINOPATHY OF BOTH EYES: ICD-10-CM

## 2020-11-27 DIAGNOSIS — H52.203 MYOPIA WITH ASTIGMATISM AND PRESBYOPIA, BILATERAL: ICD-10-CM

## 2020-11-27 DIAGNOSIS — H52.13 MYOPIA WITH ASTIGMATISM AND PRESBYOPIA, BILATERAL: ICD-10-CM

## 2020-11-27 DIAGNOSIS — H52.4 MYOPIA WITH ASTIGMATISM AND PRESBYOPIA, BILATERAL: ICD-10-CM

## 2020-11-27 DIAGNOSIS — Z79.899 ENCOUNTER FOR EYE EXAM DUE TO HIGH RISK MEDICATION: Primary | ICD-10-CM

## 2020-11-27 DIAGNOSIS — Z79.899 LONG-TERM USE OF PLAQUENIL: ICD-10-CM

## 2020-11-27 DIAGNOSIS — H25.13 NUCLEAR SCLEROSIS OF BOTH EYES: ICD-10-CM

## 2020-11-27 PROCEDURE — 92004 COMPRE OPH EXAM NEW PT 1/>: CPT | Mod: S$PBB,,, | Performed by: OPTOMETRIST

## 2020-11-27 PROCEDURE — 99999 PR PBB SHADOW E&M-EST. PATIENT-LVL III: ICD-10-PCS | Mod: PBBFAC,,, | Performed by: OPTOMETRIST

## 2020-11-27 PROCEDURE — 92083 EXTENDED VISUAL FIELD XM: CPT | Mod: 26,S$PBB,, | Performed by: OPTOMETRIST

## 2020-11-27 PROCEDURE — 92015 DETERMINE REFRACTIVE STATE: CPT | Mod: ,,, | Performed by: OPTOMETRIST

## 2020-11-27 PROCEDURE — 92004 PR EYE EXAM, NEW PATIENT,COMPREHESV: ICD-10-PCS | Mod: S$PBB,,, | Performed by: OPTOMETRIST

## 2020-11-27 PROCEDURE — 99213 OFFICE O/P EST LOW 20 MIN: CPT | Mod: PBBFAC,25 | Performed by: OPTOMETRIST

## 2020-11-27 PROCEDURE — 68745 CREATE TEAR DUCT DRAIN: CPT | Mod: PBBFAC,LT | Performed by: OPTOMETRIST

## 2020-11-27 PROCEDURE — 99999 PR PBB SHADOW E&M-EST. PATIENT-LVL III: CPT | Mod: PBBFAC,,, | Performed by: OPTOMETRIST

## 2020-11-27 PROCEDURE — 92015 PR REFRACTION: ICD-10-PCS | Mod: ,,, | Performed by: OPTOMETRIST

## 2020-11-27 PROCEDURE — 92083 CONJUNCTIVORHINOSTOMY, W/O TUBE - OS - LEFT EYE: ICD-10-PCS | Mod: 26,S$PBB,, | Performed by: OPTOMETRIST

## 2020-11-27 NOTE — PROGRESS NOTES
10-2 Plaquenil completed.      Patient stated no allergies to Latex or Adhesives. Used coverlet patch.    Reliability & Fixation= Good   Cooperation = Good    Refraction    Wearing Rx     Sphere Cylinder Axis Add   Right -6.50 +2.00 043 +2.00   Left -6.00 +1.25 130 +2.00   Age: 1yr   Type: PAL         GM

## 2020-11-27 NOTE — PROGRESS NOTES
HPI     Ms. Malika Valdez was referred by PCP for a complete eye exam . Patient   is taking plaquenil.    Patient complains of no problems with vision. Requesting a new glasses rx.   Had HVF/OCT today.    (+)RA    (+)high risk medication, plaquenil  200 Mg BID  For 3 years  10-2 HVF + Mac OCT performed today   Patient hasn't noticed any vision changes recently    Would patient like a refraction today? yes     Paitent denies diplopia, headaches, flashes/floaters, itching, tearing,   burning, redness, and pain.    (-)drops  (-)diabetes    OCULAR HISTORY  Last Eye Exam: 1 year with Jelli  (-)eye surgery   (-)diagnosed or treated for any eye conditions or diseases, n/a    FAMILY HISTORY  (-)Glaucoma        Last edited by Tess Mcgee, OD on 11/27/2020  2:49 PM. (History)            Assessment /Plan     For exam results, see Encounter Report.    Encounter for eye exam due to high risk medication  -     [facility] Visual Field - Intermediate - OU - Both Eyes    Long-term use of Plaquenil  -     Ambulatory referral/consult to Optometry  -     [facility] Visual Field - Intermediate - OU - Both Eyes    Nuclear sclerosis of both eyes    Hypertensive retinopathy of both eyes    Myopia with astigmatism and presbyopia, bilateral      1-2. Educated pt on findings. No bullseye maculopathy noted OU. 10-2 HVF WNL OU. Mac OCT WNL OU.   Patient currently taking Plaquenil 200mg BID for almost 3 years. No vision changes noted since beginning medication. Discussed need to monitor yearly with DFE. Testing to be repeated every 2-3 years unless changes noted.   Monitor yearly.     3. Educated pt on findings. Not visually significant. No need for removal at this time. Monitor yearly.     4. Vessel changes, OU. Discussed importance of BP control, taking medications as directed, and following-up with primary care. If changes noted in vision, RTC. Monitor yearly unless changes noted sooner.     5. Updated SRx. Mild change from habitual.  Monitor yearly.       RTC in 1 year for annual eye exam or sooner if needed.

## 2020-12-01 ENCOUNTER — HOSPITAL ENCOUNTER (OUTPATIENT)
Dept: RADIOLOGY | Facility: HOSPITAL | Age: 50
Discharge: HOME | End: 2020-12-01
Attending: INTERNAL MEDICINE
Payer: OTHER GOVERNMENT

## 2020-12-01 DIAGNOSIS — Z12.31 SCREENING MAMMOGRAM, ENCOUNTER FOR: ICD-10-CM

## 2020-12-01 PROCEDURE — 77067 SCR MAMMO BI INCL CAD: CPT | Mod: TC

## 2020-12-01 PROCEDURE — 77067 MAMMO DIGITAL SCREENING BILAT WITH TOMO: ICD-10-PCS | Mod: 26,,, | Performed by: RADIOLOGY

## 2020-12-01 PROCEDURE — 77063 MAMMO DIGITAL SCREENING BILAT WITH TOMO: ICD-10-PCS | Mod: 26,,, | Performed by: RADIOLOGY

## 2020-12-01 PROCEDURE — 77063 BREAST TOMOSYNTHESIS BI: CPT | Mod: 26,,, | Performed by: RADIOLOGY

## 2020-12-01 PROCEDURE — 77067 SCR MAMMO BI INCL CAD: CPT | Mod: 26,,, | Performed by: RADIOLOGY

## 2020-12-04 ENCOUNTER — PATIENT MESSAGE (OUTPATIENT)
Dept: INTERNAL MEDICINE | Facility: CLINIC | Age: 50
End: 2020-12-04

## 2020-12-16 ENCOUNTER — PATIENT MESSAGE (OUTPATIENT)
Dept: INTERNAL MEDICINE | Facility: CLINIC | Age: 50
End: 2020-12-16

## 2020-12-29 ENCOUNTER — OFFICE VISIT (OUTPATIENT)
Dept: INTERNAL MEDICINE | Facility: CLINIC | Age: 50
End: 2020-12-29
Payer: OTHER GOVERNMENT

## 2020-12-29 VITALS
DIASTOLIC BLOOD PRESSURE: 84 MMHG | BODY MASS INDEX: 48.82 KG/M2 | HEART RATE: 61 BPM | SYSTOLIC BLOOD PRESSURE: 120 MMHG | WEIGHT: 293 LBS | OXYGEN SATURATION: 99 % | HEIGHT: 65 IN

## 2020-12-29 DIAGNOSIS — E13.9 DIABETES MELLITUS DUE TO ABNORMAL INSULIN: Primary | ICD-10-CM

## 2020-12-29 DIAGNOSIS — I10 ESSENTIAL HYPERTENSION: ICD-10-CM

## 2020-12-29 DIAGNOSIS — M32.8 LUPUS ERYTHEMATOSUS OVERLAP SYNDROME: ICD-10-CM

## 2020-12-29 DIAGNOSIS — E66.01 CLASS 3 SEVERE OBESITY DUE TO EXCESS CALORIES WITHOUT SERIOUS COMORBIDITY WITH BODY MASS INDEX (BMI) OF 50.0 TO 59.9 IN ADULT: ICD-10-CM

## 2020-12-29 DIAGNOSIS — M05.10 RHEUMATOID LUNG DISEASE WITH RHEUMATOID ARTHRITIS OF UNSPECIFIED SITE: ICD-10-CM

## 2020-12-29 DIAGNOSIS — E55.9 VITAMIN D DEFICIENCY DISEASE: ICD-10-CM

## 2020-12-29 PROCEDURE — 99214 OFFICE O/P EST MOD 30 MIN: CPT | Mod: S$PBB,,, | Performed by: INTERNAL MEDICINE

## 2020-12-29 PROCEDURE — 99213 OFFICE O/P EST LOW 20 MIN: CPT | Mod: PBBFAC | Performed by: INTERNAL MEDICINE

## 2020-12-29 PROCEDURE — 99214 PR OFFICE/OUTPT VISIT, EST, LEVL IV, 30-39 MIN: ICD-10-PCS | Mod: S$PBB,,, | Performed by: INTERNAL MEDICINE

## 2020-12-29 PROCEDURE — 99999 PR PBB SHADOW E&M-EST. PATIENT-LVL III: ICD-10-PCS | Mod: PBBFAC,,, | Performed by: INTERNAL MEDICINE

## 2020-12-29 PROCEDURE — 99999 PR PBB SHADOW E&M-EST. PATIENT-LVL III: CPT | Mod: PBBFAC,,, | Performed by: INTERNAL MEDICINE

## 2020-12-29 NOTE — PROGRESS NOTES
CHIEF COMPLAINT:  follow up of RA/sle, hypertension, steroid induced diabetes.     HISTORY OF PRESENT ILLNESS: This is a 50 year old woman who presents for follow up.    She has been home on FMLA since 11/23/20. Stress is much improved since she has been at home.  Anxiety is better. No depression. Sleep is good.         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     She has not had any chest pain or shortness of breath, nausea, vomiting, constipation, diarrhea. .     Joints are doing ok. She is stiff in the morning after she gets up. Right hand is doing well with voltaren gel as needed.  She has been off prednisone since 10/7/19.  She continues to do well off prednisone.  She is taking azathoprine 100 mg twice daily and plaquenil 200 mg twice daily for her RA and lupus overlap with hypoxemic respiratory failure 12/2017 withCT with interstial changes, pericardial effusion and pleural effusion, TIARA that was initially negative 2009  positive 1: 2560 speckled, +NILS, +SSA,SSB, dsDNA neg, +RNA polymerase III.   Breathing is good.  No fever, chills, oral ulcers, chest pain, shortness of breath, nausea, vomiting, constipation.  She had 3 episodes of diarrhea since our last visit - comes and goes.   She is off pantoprazole and is doing well.       Blood sugars are better overall and she has not needed Victoza. BLood sugars are between  on diet alone.  She is following the [employer] diet arjun and has lost 23 pounds since Feb 2020.  Weight is stable since our last visit.       She is taking Norvasc 5 mg daily  for hypertension.  No palpitations.     She is taking vitamin D 50,000 units twice weekly.       Children are 7 and 9.      REVIEW OF SYSTEMS: No fevers, chills, night sweats, fatigue, visual change, hearing loss, sinus congestion, sore throat, chest pain, shortness of breath, nausea, vomiting, constipation, diarrhea, dysuria,  "hematuria, polydipsia, polyuria, joint pain, muscle pain, headaches                 PAST MEDICAL HISTORY:   1.  RA and lupus overlap  2. History of vitamin D deficiency.   3. History of amenorrhea   4. Morbid obesity.     PAST SURGICAL HISTORY: Negative.     ALLERGIES, MEDICATIONS: Updated on [facility]     SOCIAL HISTORY: No tobacco, alcohol use. 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 Father is living diabetes, hypertension and rheumatoid arthritis. Brother with diverticulosis. Brother healthy. Brother healthy.      PHYSICAL EXAMINATION:        /84 (BP Location: Right arm, Patient Position: Sitting, BP Method: Medium (Manual))   Pulse 61   Ht 5' 5" (1.651 m)   Wt (!) 151 kg (332 lb 14.3 oz)   LMP  (LMP Unknown)   SpO2 99%   BMI 55.40 kg/m²     General: Is alert, oriented, in no apparent distress. Affect is within   normal limits.   HEENT: Conjunctivae are anicteric. . Respiratory   effort is normal.      Protective Sensation (w/ 10 gram monofilament):  Right: Intact  Left: Intact    Visual Inspection:  Dry Skin -  Bilateral     Pedal Pulses:   Right: Present  Left: Present    Posterior tibialis:   Right:Present  Left: Present       labs yesterday reviewed     ASSESSMENT AND PLAN:         1. RA with lupus overlab with hypoxemic respiratory failure 2017 withCT with interstial changes, pericardial effusion and pleural effusion, TIARA that was initially negative  now positive 1: 2560 speckled, +NILS, +SSA,SSB, dsDNA neg, +RNA polymerase III. Hypoxemic respiratory failure -CT with interstial changes, pericardial effusion and pleural effusion. Followed by rheumatology.      3. Steroid induced diabetes -resolved  4. Anemia - resolved  5. Hypertension - stable.   6. Vitamin D deficiency - vitamin D 50,000 units twice weekly  7. Morbid " obesity - doing well with diet.   8. GERD -asx off pantoprazole  Screening - [facility] 1left 7/2020. GYN exam 11/19.  Needs colonoscopy as 48 - holding off due to covid pandemic.   Will see her back in 3 months, sooner if problems arise       Due to multiple medical problems, Ms Valdez is to self isolate at home due to recent surge in COVID 19 cases.  If she gets exposed and gets infected with COVID 19 she is at high risk for severe complications or death from this virus. She cannot return to work until one week after receiving the first injection of the COVID vaccine.

## 2020-12-29 NOTE — LETTER
December 29, 2020    Malika Valdez  [address] [place] [address]             [facility]  [address]  [facility] [address]  Phone: [phone]  Fax: [phone] To Whom It May Concern    Due to multiple medical problems, Ms Valdez is to self isolate at home due to recent surge in COVID 19 cases.  If she gets exposed and gets infected with COVID 19 she is at high risk for severe complications or death from this virus. She may return to work one week after she receives the first injection of the COVID vaccine.     Sincerely,        Sarah Robles MD

## 2021-01-06 ENCOUNTER — TELEPHONE (OUTPATIENT)
Dept: INTERNAL MEDICINE | Facility: CLINIC | Age: 51
End: 2021-01-06

## 2021-01-15 ENCOUNTER — PATIENT MESSAGE (OUTPATIENT)
Dept: RHEUMATOLOGY | Facility: CLINIC | Age: 51
End: 2021-01-15

## 2021-01-29 ENCOUNTER — PATIENT MESSAGE (OUTPATIENT)
Dept: ADMINISTRATIVE | Facility: HOSPITAL | Age: 51
End: 2021-01-29

## 2021-02-23 ENCOUNTER — PATIENT MESSAGE (OUTPATIENT)
Dept: RHEUMATOLOGY | Facility: CLINIC | Age: 51
End: 2021-02-23

## 2021-05-05 DIAGNOSIS — E11.9 TYPE 2 DIABETES MELLITUS WITHOUT COMPLICATION: ICD-10-CM

## 2021-05-12 DIAGNOSIS — E11.9 TYPE 2 DIABETES MELLITUS WITHOUT COMPLICATION: ICD-10-CM

## 2021-07-07 ENCOUNTER — PATIENT MESSAGE (OUTPATIENT)
Dept: ADMINISTRATIVE | Facility: HOSPITAL | Age: 51
End: 2021-07-07

## 2021-08-03 ENCOUNTER — PATIENT MESSAGE (OUTPATIENT)
Dept: ADMINISTRATIVE | Facility: HOSPITAL | Age: 51
End: 2021-08-03

## 2021-08-14 DIAGNOSIS — T78.3XXD ANGIOEDEMA, SUBSEQUENT ENCOUNTER: ICD-10-CM

## 2021-08-17 RX ORDER — AMLODIPINE BESYLATE 5 MG/1
TABLET ORAL
Qty: 90 TABLET | Refills: 1 | Status: SHIPPED | OUTPATIENT
Start: 2021-08-17 | End: 2021-12-15 | Stop reason: SDUPTHER

## 2021-09-18 ENCOUNTER — TELEPHONE (OUTPATIENT)
Dept: INTERNAL MEDICINE | Facility: CLINIC | Age: 51
End: 2021-09-18

## 2021-09-18 DIAGNOSIS — E55.9 VITAMIN D DEFICIENCY DISEASE: ICD-10-CM

## 2021-09-18 DIAGNOSIS — I10 ESSENTIAL HYPERTENSION: ICD-10-CM

## 2021-09-18 DIAGNOSIS — M32.8 LUPUS ERYTHEMATOSUS OVERLAP SYNDROME: Primary | ICD-10-CM

## 2021-09-18 DIAGNOSIS — E53.8 VITAMIN B12 DEFICIENCY: ICD-10-CM

## 2021-09-18 DIAGNOSIS — T38.0X5A STEROID-INDUCED HYPERGLYCEMIA: ICD-10-CM

## 2021-09-18 DIAGNOSIS — R73.9 STEROID-INDUCED HYPERGLYCEMIA: ICD-10-CM

## 2021-09-25 ENCOUNTER — LAB VISIT (OUTPATIENT)
Dept: LAB | Facility: HOSPITAL | Age: 51
End: 2021-09-25
Attending: INTERNAL MEDICINE
Payer: OTHER GOVERNMENT

## 2021-09-25 DIAGNOSIS — M32.8 LUPUS ERYTHEMATOSUS OVERLAP SYNDROME: ICD-10-CM

## 2021-09-25 DIAGNOSIS — E55.9 VITAMIN D DEFICIENCY DISEASE: ICD-10-CM

## 2021-09-25 DIAGNOSIS — I10 ESSENTIAL HYPERTENSION: ICD-10-CM

## 2021-09-25 DIAGNOSIS — T38.0X5A STEROID-INDUCED HYPERGLYCEMIA: ICD-10-CM

## 2021-09-25 DIAGNOSIS — E53.8 VITAMIN B12 DEFICIENCY: ICD-10-CM

## 2021-09-25 DIAGNOSIS — R73.9 STEROID-INDUCED HYPERGLYCEMIA: ICD-10-CM

## 2021-09-25 LAB
25(OH)D3+25(OH)D2 SERPL-MCNC: 50 NG/ML (ref 30–96)
ALBUMIN SERPL BCP-MCNC: 3.5 G/DL (ref 3.5–5.2)
ALP SERPL-CCNC: 96 U/L (ref 55–135)
ALT SERPL W/O P-5'-P-CCNC: 7 U/L (ref 10–44)
ANION GAP SERPL CALC-SCNC: 10 MMOL/L (ref 8–16)
AST SERPL-CCNC: 15 U/L (ref 10–40)
BASOPHILS # BLD AUTO: 0.03 K/UL (ref 0–0.2)
BASOPHILS NFR BLD: 0.8 % (ref 0–1.9)
BILIRUB SERPL-MCNC: 0.4 MG/DL (ref 0.1–1)
BUN SERPL-MCNC: 12 MG/DL (ref 6–20)
C3 SERPL-MCNC: 143 MG/DL (ref 50–180)
C4 SERPL-MCNC: 24 MG/DL (ref 11–44)
CALCIUM SERPL-MCNC: 9.7 MG/DL (ref 8.7–10.5)
CHLORIDE SERPL-SCNC: 104 MMOL/L (ref 95–110)
CHOLEST SERPL-MCNC: 155 MG/DL (ref 120–199)
CHOLEST/HDLC SERPL: 2.7 {RATIO} (ref 2–5)
CK SERPL-CCNC: 152 U/L (ref 20–180)
CO2 SERPL-SCNC: 25 MMOL/L (ref 23–29)
CREAT SERPL-MCNC: 1 MG/DL (ref 0.5–1.4)
CRP SERPL-MCNC: 2.2 MG/L (ref 0–8.2)
DIFFERENTIAL METHOD: ABNORMAL
EOSINOPHIL # BLD AUTO: 0.2 K/UL (ref 0–0.5)
EOSINOPHIL NFR BLD: 4.8 % (ref 0–8)
ERYTHROCYTE [DISTWIDTH] IN BLOOD BY AUTOMATED COUNT: 14.4 % (ref 11.5–14.5)
ERYTHROCYTE [SEDIMENTATION RATE] IN BLOOD BY WESTERGREN METHOD: 89 MM/HR (ref 0–36)
EST. GFR  (AFRICAN AMERICAN): >60 ML/MIN/1.73 M^2
EST. GFR  (NON AFRICAN AMERICAN): >60 ML/MIN/1.73 M^2
ESTIMATED AVG GLUCOSE: 117 MG/DL (ref 68–131)
GLUCOSE SERPL-MCNC: 95 MG/DL (ref 70–110)
HBA1C MFR BLD: 5.7 % (ref 4–5.6)
HCT VFR BLD AUTO: 39.8 % (ref 37–48.5)
HDLC SERPL-MCNC: 58 MG/DL (ref 40–75)
HDLC SERPL: 37.4 % (ref 20–50)
HGB BLD-MCNC: 12.1 G/DL (ref 12–16)
IMM GRANULOCYTES # BLD AUTO: 0.01 K/UL (ref 0–0.04)
IMM GRANULOCYTES NFR BLD AUTO: 0.3 % (ref 0–0.5)
LDLC SERPL CALC-MCNC: 87.2 MG/DL (ref 63–159)
LYMPHOCYTES # BLD AUTO: 1.1 K/UL (ref 1–4.8)
LYMPHOCYTES NFR BLD: 28.2 % (ref 18–48)
MCH RBC QN AUTO: 27.2 PG (ref 27–31)
MCHC RBC AUTO-ENTMCNC: 30.4 G/DL (ref 32–36)
MCV RBC AUTO: 89 FL (ref 82–98)
MONOCYTES # BLD AUTO: 0.3 K/UL (ref 0.3–1)
MONOCYTES NFR BLD: 6.9 % (ref 4–15)
NEUTROPHILS # BLD AUTO: 2.2 K/UL (ref 1.8–7.7)
NEUTROPHILS NFR BLD: 59 % (ref 38–73)
NONHDLC SERPL-MCNC: 97 MG/DL
NRBC BLD-RTO: 0 /100 WBC
PLATELET # BLD AUTO: 306 K/UL (ref 150–450)
PMV BLD AUTO: 10 FL (ref 9.2–12.9)
POTASSIUM SERPL-SCNC: 4.6 MMOL/L (ref 3.5–5.1)
PROT SERPL-MCNC: 8.1 G/DL (ref 6–8.4)
RBC # BLD AUTO: 4.45 M/UL (ref 4–5.4)
SODIUM SERPL-SCNC: 139 MMOL/L (ref 136–145)
TRIGL SERPL-MCNC: 49 MG/DL (ref 30–150)
TSH SERPL DL<=0.005 MIU/L-ACNC: 1.7 UIU/ML (ref 0.4–4)
VIT B12 SERPL-MCNC: 316 PG/ML (ref 210–950)
WBC # BLD AUTO: 3.76 K/UL (ref 3.9–12.7)

## 2021-09-25 PROCEDURE — 82607 VITAMIN B-12: CPT | Performed by: INTERNAL MEDICINE

## 2021-09-25 PROCEDURE — 82306 VITAMIN D 25 HYDROXY: CPT | Performed by: INTERNAL MEDICINE

## 2021-09-25 PROCEDURE — 83036 HEMOGLOBIN GLYCOSYLATED A1C: CPT | Performed by: INTERNAL MEDICINE

## 2021-09-25 PROCEDURE — 86225 DNA ANTIBODY NATIVE: CPT | Performed by: INTERNAL MEDICINE

## 2021-09-25 PROCEDURE — 80053 COMPREHEN METABOLIC PANEL: CPT | Performed by: INTERNAL MEDICINE

## 2021-09-25 PROCEDURE — 86160 COMPLEMENT ANTIGEN: CPT | Mod: 59 | Performed by: INTERNAL MEDICINE

## 2021-09-25 PROCEDURE — 86140 C-REACTIVE PROTEIN: CPT | Performed by: INTERNAL MEDICINE

## 2021-09-25 PROCEDURE — 80061 LIPID PANEL: CPT | Performed by: INTERNAL MEDICINE

## 2021-09-25 PROCEDURE — 86160 COMPLEMENT ANTIGEN: CPT | Performed by: INTERNAL MEDICINE

## 2021-09-25 PROCEDURE — 36415 COLL VENOUS BLD VENIPUNCTURE: CPT | Performed by: INTERNAL MEDICINE

## 2021-09-25 PROCEDURE — 82550 ASSAY OF CK (CPK): CPT | Performed by: INTERNAL MEDICINE

## 2021-09-25 PROCEDURE — 85025 COMPLETE CBC W/AUTO DIFF WBC: CPT | Performed by: INTERNAL MEDICINE

## 2021-09-25 PROCEDURE — 84443 ASSAY THYROID STIM HORMONE: CPT | Performed by: INTERNAL MEDICINE

## 2021-09-25 PROCEDURE — 85652 RBC SED RATE AUTOMATED: CPT | Performed by: INTERNAL MEDICINE

## 2021-09-28 LAB — DSDNA AB SER-ACNC: NORMAL [IU]/ML

## 2021-10-02 ENCOUNTER — PATIENT MESSAGE (OUTPATIENT)
Dept: INTERNAL MEDICINE | Facility: CLINIC | Age: 51
End: 2021-10-02

## 2021-10-11 ENCOUNTER — NURSE TRIAGE (OUTPATIENT)
Dept: ADMINISTRATIVE | Facility: CLINIC | Age: 51
End: 2021-10-11

## 2021-10-11 ENCOUNTER — TELEPHONE (OUTPATIENT)
Dept: INTERNAL MEDICINE | Facility: CLINIC | Age: 51
End: 2021-10-11

## 2021-10-11 ENCOUNTER — PATIENT MESSAGE (OUTPATIENT)
Dept: INTERNAL MEDICINE | Facility: CLINIC | Age: 51
End: 2021-10-11

## 2021-10-11 DIAGNOSIS — M79.606 PAIN OF LOWER EXTREMITY, UNSPECIFIED LATERALITY: Primary | ICD-10-CM

## 2021-10-11 DIAGNOSIS — M79.605 LEFT LEG PAIN: Primary | ICD-10-CM

## 2021-10-13 DIAGNOSIS — E11.9 TYPE 2 DIABETES MELLITUS WITHOUT COMPLICATION: ICD-10-CM

## 2021-10-18 ENCOUNTER — PATIENT MESSAGE (OUTPATIENT)
Dept: RHEUMATOLOGY | Facility: CLINIC | Age: 51
End: 2021-10-18
Payer: OTHER GOVERNMENT

## 2021-10-18 ENCOUNTER — PATIENT MESSAGE (OUTPATIENT)
Dept: ADMINISTRATIVE | Facility: HOSPITAL | Age: 51
End: 2021-10-18
Payer: OTHER GOVERNMENT

## 2021-11-11 RX ORDER — ERGOCALCIFEROL 1.25 MG/1
CAPSULE ORAL
Qty: 24 CAPSULE | Refills: 3 | Status: SHIPPED | OUTPATIENT
Start: 2021-11-11 | End: 2022-10-10 | Stop reason: SDUPTHER

## 2021-12-15 ENCOUNTER — LAB VISIT (OUTPATIENT)
Dept: LAB | Facility: HOSPITAL | Age: 51
End: 2021-12-15
Payer: OTHER GOVERNMENT

## 2021-12-15 ENCOUNTER — RESEARCH ENCOUNTER (OUTPATIENT)
Dept: RESEARCH | Facility: HOSPITAL | Age: 51
End: 2021-12-15
Payer: OTHER GOVERNMENT

## 2021-12-15 ENCOUNTER — OFFICE VISIT (OUTPATIENT)
Dept: INTERNAL MEDICINE | Facility: CLINIC | Age: 51
End: 2021-12-15
Payer: OTHER GOVERNMENT

## 2021-12-15 VITALS
SYSTOLIC BLOOD PRESSURE: 120 MMHG | OXYGEN SATURATION: 100 % | HEART RATE: 56 BPM | BODY MASS INDEX: 48.82 KG/M2 | HEIGHT: 65 IN | WEIGHT: 293 LBS | DIASTOLIC BLOOD PRESSURE: 60 MMHG

## 2021-12-15 DIAGNOSIS — Z00.00 ADULT GENERAL MEDICAL EXAMINATION: Primary | ICD-10-CM

## 2021-12-15 DIAGNOSIS — E53.8 VITAMIN B12 DEFICIENCY: ICD-10-CM

## 2021-12-15 DIAGNOSIS — Z00.00 ROUTINE GENERAL MEDICAL EXAMINATION AT A HEALTH CARE FACILITY: Primary | ICD-10-CM

## 2021-12-15 DIAGNOSIS — Z23 NEED FOR VACCINATION FOR STREP PNEUMONIAE: ICD-10-CM

## 2021-12-15 DIAGNOSIS — T78.3XXD ANGIOEDEMA, SUBSEQUENT ENCOUNTER: ICD-10-CM

## 2021-12-15 DIAGNOSIS — E55.9 VITAMIN D DEFICIENCY DISEASE: ICD-10-CM

## 2021-12-15 DIAGNOSIS — M32.8 LUPUS ERYTHEMATOSUS OVERLAP SYNDROME: ICD-10-CM

## 2021-12-15 DIAGNOSIS — Z00.00 ADULT GENERAL MEDICAL EXAMINATION: ICD-10-CM

## 2021-12-15 DIAGNOSIS — Z00.6 RESEARCH STUDY PATIENT: ICD-10-CM

## 2021-12-15 DIAGNOSIS — Z12.31 SCREENING MAMMOGRAM FOR BREAST CANCER: ICD-10-CM

## 2021-12-15 DIAGNOSIS — Z12.11 SCREENING FOR MALIGNANT NEOPLASM OF COLON: ICD-10-CM

## 2021-12-15 DIAGNOSIS — E13.9 DIABETES MELLITUS DUE TO ABNORMAL INSULIN: ICD-10-CM

## 2021-12-15 LAB
DRUG STUDY SPECIMEN TYPE: NORMAL
DRUG STUDY TEST NAME: NORMAL
DRUG STUDY TEST RESULT: NORMAL

## 2021-12-15 PROCEDURE — 90471 IMMUNIZATION ADMIN: CPT | Mod: PBBFAC

## 2021-12-15 PROCEDURE — 99214 OFFICE O/P EST MOD 30 MIN: CPT | Mod: 25,PBBFAC | Performed by: INTERNAL MEDICINE

## 2021-12-15 PROCEDURE — 99999 PR PBB SHADOW E&M-EST. PATIENT-LVL IV: CPT | Mod: PBBFAC,,, | Performed by: INTERNAL MEDICINE

## 2021-12-15 PROCEDURE — 36415 COLL VENOUS BLD VENIPUNCTURE: CPT | Performed by: SURGERY

## 2021-12-15 PROCEDURE — 99000 SPECIMEN HANDLING OFFICE-LAB: CPT | Performed by: SURGERY

## 2021-12-15 PROCEDURE — 99396 PREV VISIT EST AGE 40-64: CPT | Mod: S$PBB,,, | Performed by: INTERNAL MEDICINE

## 2021-12-15 PROCEDURE — 99396 PR PREVENTIVE VISIT,EST,40-64: ICD-10-PCS | Mod: S$PBB,,, | Performed by: INTERNAL MEDICINE

## 2021-12-15 PROCEDURE — 99999 PR PBB SHADOW E&M-EST. PATIENT-LVL IV: ICD-10-PCS | Mod: PBBFAC,,, | Performed by: INTERNAL MEDICINE

## 2021-12-15 RX ORDER — AMLODIPINE BESYLATE 5 MG/1
5 TABLET ORAL DAILY
Qty: 90 TABLET | Refills: 4 | Status: SHIPPED | OUTPATIENT
Start: 2021-12-15 | End: 2022-10-10 | Stop reason: SDUPTHER

## 2021-12-20 ENCOUNTER — HOSPITAL ENCOUNTER (OUTPATIENT)
Dept: RADIOLOGY | Facility: HOSPITAL | Age: 51
Discharge: HOME | End: 2021-12-20
Attending: INTERNAL MEDICINE
Payer: OTHER GOVERNMENT

## 2021-12-20 DIAGNOSIS — Z12.31 SCREENING MAMMOGRAM FOR BREAST CANCER: ICD-10-CM

## 2021-12-20 PROCEDURE — 77063 BREAST TOMOSYNTHESIS BI: CPT | Mod: 26,,, | Performed by: RADIOLOGY

## 2021-12-20 PROCEDURE — 77067 SCR MAMMO BI INCL CAD: CPT | Mod: TC

## 2021-12-20 PROCEDURE — 77067 SCR MAMMO BI INCL CAD: CPT | Mod: 26,,, | Performed by: RADIOLOGY

## 2021-12-20 PROCEDURE — 77067 MAMMO DIGITAL SCREENING BILAT WITH TOMO: ICD-10-PCS | Mod: 26,,, | Performed by: RADIOLOGY

## 2021-12-20 PROCEDURE — 77063 MAMMO DIGITAL SCREENING BILAT WITH TOMO: ICD-10-PCS | Mod: 26,,, | Performed by: RADIOLOGY

## 2021-12-21 ENCOUNTER — PATIENT OUTREACH (OUTPATIENT)
Dept: ADMINISTRATIVE | Facility: OTHER | Age: 51
End: 2021-12-21
Payer: OTHER GOVERNMENT

## 2021-12-23 ENCOUNTER — OFFICE VISIT (OUTPATIENT)
Dept: RHEUMATOLOGY | Facility: CLINIC | Age: 51
End: 2021-12-23
Payer: OTHER GOVERNMENT

## 2021-12-23 VITALS
WEIGHT: 293 LBS | DIASTOLIC BLOOD PRESSURE: 74 MMHG | SYSTOLIC BLOOD PRESSURE: 141 MMHG | HEART RATE: 68 BPM | HEIGHT: 65 IN | BODY MASS INDEX: 48.82 KG/M2

## 2021-12-23 DIAGNOSIS — E66.01 CLASS 3 SEVERE OBESITY DUE TO EXCESS CALORIES WITHOUT SERIOUS COMORBIDITY WITH BODY MASS INDEX (BMI) OF 50.0 TO 59.9 IN ADULT: ICD-10-CM

## 2021-12-23 DIAGNOSIS — M32.8 LUPUS ERYTHEMATOSUS OVERLAP SYNDROME: ICD-10-CM

## 2021-12-23 DIAGNOSIS — D84.9 IMMUNOSUPPRESSION: ICD-10-CM

## 2021-12-23 DIAGNOSIS — J84.9 ILD (INTERSTITIAL LUNG DISEASE): ICD-10-CM

## 2021-12-23 DIAGNOSIS — M05.79 RHEUMATOID ARTHRITIS INVOLVING MULTIPLE SITES WITH POSITIVE RHEUMATOID FACTOR: Primary | ICD-10-CM

## 2021-12-23 PROCEDURE — 99214 OFFICE O/P EST MOD 30 MIN: CPT | Mod: S$PBB,,, | Performed by: INTERNAL MEDICINE

## 2021-12-23 PROCEDURE — 99214 PR OFFICE/OUTPT VISIT, EST, LEVL IV, 30-39 MIN: ICD-10-PCS | Mod: S$PBB,,, | Performed by: INTERNAL MEDICINE

## 2021-12-23 PROCEDURE — 99213 OFFICE O/P EST LOW 20 MIN: CPT | Mod: PBBFAC | Performed by: INTERNAL MEDICINE

## 2021-12-23 PROCEDURE — 99999 PR PBB SHADOW E&M-EST. PATIENT-LVL III: CPT | Mod: PBBFAC,,, | Performed by: INTERNAL MEDICINE

## 2021-12-23 PROCEDURE — 99999 PR PBB SHADOW E&M-EST. PATIENT-LVL III: ICD-10-PCS | Mod: PBBFAC,,, | Performed by: INTERNAL MEDICINE

## 2021-12-26 ENCOUNTER — IMMUNIZATION (OUTPATIENT)
Dept: PRIMARY CARE CLINIC | Facility: CLINIC | Age: 51
End: 2021-12-26
Payer: OTHER GOVERNMENT

## 2021-12-26 DIAGNOSIS — Z23 NEED FOR VACCINATION: Primary | ICD-10-CM

## 2021-12-26 PROCEDURE — 0013A COVID-19, MRNA, LNP-S, PF, 100 MCG/0.5 ML DOSE VACCINE: CPT | Mod: CV19,PBBFAC | Performed by: INTERNAL MEDICINE

## 2021-12-26 PROCEDURE — 91301 COVID-19, MRNA, LNP-S, PF, 100 MCG/0.5 ML DOSE VACCINE: CPT | Mod: PBBFAC | Performed by: INTERNAL MEDICINE

## 2021-12-30 ENCOUNTER — LAB VISIT (OUTPATIENT)
Dept: LAB | Facility: HOSPITAL | Age: 51
End: 2021-12-30
Attending: INTERNAL MEDICINE
Payer: OTHER GOVERNMENT

## 2021-12-30 ENCOUNTER — TELEPHONE (OUTPATIENT)
Dept: RHEUMATOLOGY | Facility: CLINIC | Age: 51
End: 2021-12-30
Payer: OTHER GOVERNMENT

## 2021-12-30 DIAGNOSIS — D84.9 IMMUNOSUPPRESSION: ICD-10-CM

## 2021-12-30 DIAGNOSIS — M32.8 LUPUS ERYTHEMATOSUS OVERLAP SYNDROME: ICD-10-CM

## 2021-12-30 DIAGNOSIS — E66.01 CLASS 3 SEVERE OBESITY DUE TO EXCESS CALORIES WITHOUT SERIOUS COMORBIDITY WITH BODY MASS INDEX (BMI) OF 50.0 TO 59.9 IN ADULT: ICD-10-CM

## 2021-12-30 DIAGNOSIS — J84.9 ILD (INTERSTITIAL LUNG DISEASE): ICD-10-CM

## 2021-12-30 DIAGNOSIS — M05.79 RHEUMATOID ARTHRITIS INVOLVING MULTIPLE SITES WITH POSITIVE RHEUMATOID FACTOR: ICD-10-CM

## 2021-12-30 LAB
ALBUMIN SERPL BCP-MCNC: 3.3 G/DL (ref 3.5–5.2)
ALP SERPL-CCNC: 85 U/L (ref 55–135)
ALT SERPL W/O P-5'-P-CCNC: 14 U/L (ref 10–44)
ANION GAP SERPL CALC-SCNC: 7 MMOL/L (ref 8–16)
AST SERPL-CCNC: 25 U/L (ref 10–40)
BASOPHILS # BLD AUTO: 0.02 K/UL (ref 0–0.2)
BASOPHILS NFR BLD: 0.5 % (ref 0–1.9)
BILIRUB SERPL-MCNC: 0.3 MG/DL (ref 0.1–1)
BUN SERPL-MCNC: 11 MG/DL (ref 6–20)
C3 SERPL-MCNC: 149 MG/DL (ref 50–180)
C4 SERPL-MCNC: 27 MG/DL (ref 11–44)
CALCIUM SERPL-MCNC: 9.2 MG/DL (ref 8.7–10.5)
CHLORIDE SERPL-SCNC: 103 MMOL/L (ref 95–110)
CK SERPL-CCNC: 199 U/L (ref 20–180)
CO2 SERPL-SCNC: 28 MMOL/L (ref 23–29)
CREAT SERPL-MCNC: 0.9 MG/DL (ref 0.5–1.4)
CRP SERPL-MCNC: 3.1 MG/L (ref 0–8.2)
DIFFERENTIAL METHOD: ABNORMAL
EOSINOPHIL # BLD AUTO: 0.1 K/UL (ref 0–0.5)
EOSINOPHIL NFR BLD: 3.1 % (ref 0–8)
ERYTHROCYTE [DISTWIDTH] IN BLOOD BY AUTOMATED COUNT: 14.6 % (ref 11.5–14.5)
ERYTHROCYTE [SEDIMENTATION RATE] IN BLOOD BY WESTERGREN METHOD: 64 MM/HR (ref 0–36)
EST. GFR  (AFRICAN AMERICAN): >60 ML/MIN/1.73 M^2
EST. GFR  (NON AFRICAN AMERICAN): >60 ML/MIN/1.73 M^2
GLUCOSE SERPL-MCNC: 115 MG/DL (ref 70–110)
HCT VFR BLD AUTO: 39 % (ref 37–48.5)
HGB BLD-MCNC: 11.5 G/DL (ref 12–16)
IMM GRANULOCYTES # BLD AUTO: 0.01 K/UL (ref 0–0.04)
IMM GRANULOCYTES NFR BLD AUTO: 0.2 % (ref 0–0.5)
LYMPHOCYTES # BLD AUTO: 0.6 K/UL (ref 1–4.8)
LYMPHOCYTES NFR BLD: 14.1 % (ref 18–48)
MCH RBC QN AUTO: 27.1 PG (ref 27–31)
MCHC RBC AUTO-ENTMCNC: 29.5 G/DL (ref 32–36)
MCV RBC AUTO: 92 FL (ref 82–98)
MONOCYTES # BLD AUTO: 0.3 K/UL (ref 0.3–1)
MONOCYTES NFR BLD: 6.9 % (ref 4–15)
NEUTROPHILS # BLD AUTO: 3.1 K/UL (ref 1.8–7.7)
NEUTROPHILS NFR BLD: 75.2 % (ref 38–73)
NRBC BLD-RTO: 0 /100 WBC
PLATELET # BLD AUTO: 271 K/UL (ref 150–450)
PMV BLD AUTO: 9.7 FL (ref 9.2–12.9)
POTASSIUM SERPL-SCNC: 4.2 MMOL/L (ref 3.5–5.1)
PROT SERPL-MCNC: 8.4 G/DL (ref 6–8.4)
RBC # BLD AUTO: 4.25 M/UL (ref 4–5.4)
SODIUM SERPL-SCNC: 138 MMOL/L (ref 136–145)
WBC # BLD AUTO: 4.18 K/UL (ref 3.9–12.7)

## 2021-12-30 PROCEDURE — 85025 COMPLETE CBC W/AUTO DIFF WBC: CPT | Performed by: INTERNAL MEDICINE

## 2021-12-30 PROCEDURE — 86225 DNA ANTIBODY NATIVE: CPT | Performed by: INTERNAL MEDICINE

## 2021-12-30 PROCEDURE — 86160 COMPLEMENT ANTIGEN: CPT | Performed by: INTERNAL MEDICINE

## 2021-12-30 PROCEDURE — 36415 COLL VENOUS BLD VENIPUNCTURE: CPT | Performed by: INTERNAL MEDICINE

## 2021-12-30 PROCEDURE — 86160 COMPLEMENT ANTIGEN: CPT | Mod: 59 | Performed by: INTERNAL MEDICINE

## 2021-12-30 PROCEDURE — 82550 ASSAY OF CK (CPK): CPT | Performed by: INTERNAL MEDICINE

## 2021-12-30 PROCEDURE — 80053 COMPREHEN METABOLIC PANEL: CPT | Performed by: INTERNAL MEDICINE

## 2021-12-30 PROCEDURE — 85652 RBC SED RATE AUTOMATED: CPT | Performed by: INTERNAL MEDICINE

## 2021-12-30 PROCEDURE — 86140 C-REACTIVE PROTEIN: CPT | Performed by: INTERNAL MEDICINE

## 2022-01-03 ENCOUNTER — PATIENT MESSAGE (OUTPATIENT)
Dept: RHEUMATOLOGY | Facility: CLINIC | Age: 52
End: 2022-01-03
Payer: OTHER GOVERNMENT

## 2022-01-03 DIAGNOSIS — R53.83 OTHER FATIGUE: ICD-10-CM

## 2022-01-03 DIAGNOSIS — J84.9 ILD (INTERSTITIAL LUNG DISEASE): ICD-10-CM

## 2022-01-03 DIAGNOSIS — M05.79 RHEUMATOID ARTHRITIS INVOLVING MULTIPLE SITES WITH POSITIVE RHEUMATOID FACTOR: ICD-10-CM

## 2022-01-03 DIAGNOSIS — M32.8 LUPUS ERYTHEMATOSUS OVERLAP SYNDROME: Primary | ICD-10-CM

## 2022-01-03 DIAGNOSIS — D84.9 IMMUNOSUPPRESSION: ICD-10-CM

## 2022-01-03 LAB — DSDNA AB SER-ACNC: NORMAL [IU]/ML

## 2022-01-12 ENCOUNTER — PATIENT MESSAGE (OUTPATIENT)
Dept: RHEUMATOLOGY | Facility: CLINIC | Age: 52
End: 2022-01-12
Payer: OTHER GOVERNMENT

## 2022-01-25 ENCOUNTER — OFFICE VISIT (OUTPATIENT)
Dept: OPTOMETRY | Facility: CLINIC | Age: 52
End: 2022-01-25
Payer: OTHER GOVERNMENT

## 2022-01-25 ENCOUNTER — LAB VISIT (OUTPATIENT)
Dept: LAB | Facility: HOSPITAL | Age: 52
End: 2022-01-25
Attending: INTERNAL MEDICINE
Payer: OTHER GOVERNMENT

## 2022-01-25 ENCOUNTER — OFFICE VISIT (OUTPATIENT)
Dept: OPTOMETRY | Facility: CLINIC | Age: 52
End: 2022-01-25
Payer: COMMERCIAL

## 2022-01-25 DIAGNOSIS — M32.8 LUPUS ERYTHEMATOSUS OVERLAP SYNDROME: ICD-10-CM

## 2022-01-25 DIAGNOSIS — H52.13 MYOPIA WITH ASTIGMATISM AND PRESBYOPIA, BILATERAL: ICD-10-CM

## 2022-01-25 DIAGNOSIS — J84.9 ILD (INTERSTITIAL LUNG DISEASE): ICD-10-CM

## 2022-01-25 DIAGNOSIS — H52.4 MYOPIA WITH ASTIGMATISM AND PRESBYOPIA, BILATERAL: ICD-10-CM

## 2022-01-25 DIAGNOSIS — H52.203 MYOPIA WITH ASTIGMATISM AND PRESBYOPIA, BILATERAL: ICD-10-CM

## 2022-01-25 DIAGNOSIS — Z97.3 WEARS CONTACT LENSES: ICD-10-CM

## 2022-01-25 DIAGNOSIS — H25.13 NUCLEAR SCLEROSIS OF BOTH EYES: ICD-10-CM

## 2022-01-25 DIAGNOSIS — Z79.899 ENCOUNTER FOR EYE EXAM DUE TO HIGH RISK MEDICATION: ICD-10-CM

## 2022-01-25 DIAGNOSIS — Z79.899 LONG-TERM USE OF PLAQUENIL: ICD-10-CM

## 2022-01-25 DIAGNOSIS — Z46.0 FITTING AND ADJUSTMENT OF SPECTACLES AND CONTACT LENSES: Primary | ICD-10-CM

## 2022-01-25 DIAGNOSIS — D84.9 IMMUNOSUPPRESSION: ICD-10-CM

## 2022-01-25 DIAGNOSIS — M05.79 RHEUMATOID ARTHRITIS INVOLVING MULTIPLE SITES WITH POSITIVE RHEUMATOID FACTOR: ICD-10-CM

## 2022-01-25 DIAGNOSIS — R53.83 OTHER FATIGUE: ICD-10-CM

## 2022-01-25 DIAGNOSIS — H35.033 HYPERTENSIVE RETINOPATHY OF BOTH EYES: ICD-10-CM

## 2022-01-25 DIAGNOSIS — Z01.00 ROUTINE EYE EXAM: Primary | ICD-10-CM

## 2022-01-25 LAB
ALBUMIN SERPL BCP-MCNC: 3.5 G/DL (ref 3.5–5.2)
ALP SERPL-CCNC: 85 U/L (ref 55–135)
ALT SERPL W/O P-5'-P-CCNC: 8 U/L (ref 10–44)
ANION GAP SERPL CALC-SCNC: 5 MMOL/L (ref 8–16)
AST SERPL-CCNC: 16 U/L (ref 10–40)
BASOPHILS # BLD AUTO: 0.02 K/UL (ref 0–0.2)
BASOPHILS NFR BLD: 0.5 % (ref 0–1.9)
BILIRUB SERPL-MCNC: 0.4 MG/DL (ref 0.1–1)
BUN SERPL-MCNC: 11 MG/DL (ref 6–20)
C3 SERPL-MCNC: 152 MG/DL (ref 50–180)
C4 SERPL-MCNC: 24 MG/DL (ref 11–44)
CALCIUM SERPL-MCNC: 9.8 MG/DL (ref 8.7–10.5)
CHLORIDE SERPL-SCNC: 105 MMOL/L (ref 95–110)
CK SERPL-CCNC: 145 U/L (ref 20–180)
CO2 SERPL-SCNC: 29 MMOL/L (ref 23–29)
CREAT SERPL-MCNC: 0.8 MG/DL (ref 0.5–1.4)
CRP SERPL-MCNC: 3.7 MG/L (ref 0–8.2)
DIFFERENTIAL METHOD: ABNORMAL
EOSINOPHIL # BLD AUTO: 0.2 K/UL (ref 0–0.5)
EOSINOPHIL NFR BLD: 4 % (ref 0–8)
ERYTHROCYTE [DISTWIDTH] IN BLOOD BY AUTOMATED COUNT: 14.4 % (ref 11.5–14.5)
ERYTHROCYTE [SEDIMENTATION RATE] IN BLOOD BY WESTERGREN METHOD: 94 MM/HR (ref 0–36)
EST. GFR  (AFRICAN AMERICAN): >60 ML/MIN/1.73 M^2
EST. GFR  (NON AFRICAN AMERICAN): >60 ML/MIN/1.73 M^2
GLUCOSE SERPL-MCNC: 90 MG/DL (ref 70–110)
HCT VFR BLD AUTO: 39.5 % (ref 37–48.5)
HGB BLD-MCNC: 11.9 G/DL (ref 12–16)
IMM GRANULOCYTES # BLD AUTO: 0.01 K/UL (ref 0–0.04)
IMM GRANULOCYTES NFR BLD AUTO: 0.3 % (ref 0–0.5)
LYMPHOCYTES # BLD AUTO: 0.9 K/UL (ref 1–4.8)
LYMPHOCYTES NFR BLD: 23.3 % (ref 18–48)
MCH RBC QN AUTO: 27.9 PG (ref 27–31)
MCHC RBC AUTO-ENTMCNC: 30.1 G/DL (ref 32–36)
MCV RBC AUTO: 93 FL (ref 82–98)
MONOCYTES # BLD AUTO: 0.3 K/UL (ref 0.3–1)
MONOCYTES NFR BLD: 7.7 % (ref 4–15)
NEUTROPHILS # BLD AUTO: 2.4 K/UL (ref 1.8–7.7)
NEUTROPHILS NFR BLD: 64.2 % (ref 38–73)
NRBC BLD-RTO: 0 /100 WBC
PLATELET # BLD AUTO: 280 K/UL (ref 150–450)
PMV BLD AUTO: 9.9 FL (ref 9.2–12.9)
POTASSIUM SERPL-SCNC: 4.2 MMOL/L (ref 3.5–5.1)
PROT SERPL-MCNC: 8.4 G/DL (ref 6–8.4)
RBC # BLD AUTO: 4.27 M/UL (ref 4–5.4)
SODIUM SERPL-SCNC: 139 MMOL/L (ref 136–145)
WBC # BLD AUTO: 3.78 K/UL (ref 3.9–12.7)

## 2022-01-25 PROCEDURE — 80053 COMPREHEN METABOLIC PANEL: CPT | Performed by: INTERNAL MEDICINE

## 2022-01-25 PROCEDURE — 92014 COMPRE OPH EXAM EST PT 1/>: CPT | Mod: S$GLB,,, | Performed by: OPTOMETRIST

## 2022-01-25 PROCEDURE — 86225 DNA ANTIBODY NATIVE: CPT | Performed by: INTERNAL MEDICINE

## 2022-01-25 PROCEDURE — 85025 COMPLETE CBC W/AUTO DIFF WBC: CPT | Performed by: INTERNAL MEDICINE

## 2022-01-25 PROCEDURE — 99999 PR PBB SHADOW E&M-EST. PATIENT-LVL II: ICD-10-PCS | Mod: PBBFAC,,, | Performed by: OPTOMETRIST

## 2022-01-25 PROCEDURE — 92015 DETERMINE REFRACTIVE STATE: CPT | Mod: S$GLB,,, | Performed by: OPTOMETRIST

## 2022-01-25 PROCEDURE — 36415 COLL VENOUS BLD VENIPUNCTURE: CPT | Performed by: INTERNAL MEDICINE

## 2022-01-25 PROCEDURE — 86140 C-REACTIVE PROTEIN: CPT | Performed by: INTERNAL MEDICINE

## 2022-01-25 PROCEDURE — 86160 COMPLEMENT ANTIGEN: CPT | Performed by: INTERNAL MEDICINE

## 2022-01-25 PROCEDURE — 99999 PR PBB SHADOW E&M-EST. PATIENT-LVL II: CPT | Mod: PBBFAC,,, | Performed by: OPTOMETRIST

## 2022-01-25 PROCEDURE — 92310 CONTACT LENS FITTING OU: CPT | Mod: S$GLB,,, | Performed by: OPTOMETRIST

## 2022-01-25 PROCEDURE — 82085 ASSAY OF ALDOLASE: CPT | Performed by: INTERNAL MEDICINE

## 2022-01-25 PROCEDURE — 82550 ASSAY OF CK (CPK): CPT | Performed by: INTERNAL MEDICINE

## 2022-01-25 PROCEDURE — 92014 PR EYE EXAM, EST PATIENT,COMPREHESV: ICD-10-PCS | Mod: S$GLB,,, | Performed by: OPTOMETRIST

## 2022-01-25 PROCEDURE — 99499 UNLISTED E&M SERVICE: CPT | Mod: S$GLB,,, | Performed by: OPTOMETRIST

## 2022-01-25 PROCEDURE — 99499 NO LOS: ICD-10-PCS | Mod: S$GLB,,, | Performed by: OPTOMETRIST

## 2022-01-25 PROCEDURE — 92310 PR CONTACT LENS FITTING (NO CHANGE): ICD-10-PCS | Mod: S$GLB,,, | Performed by: OPTOMETRIST

## 2022-01-25 PROCEDURE — 92015 PR REFRACTION: ICD-10-PCS | Mod: S$GLB,,, | Performed by: OPTOMETRIST

## 2022-01-25 PROCEDURE — 85652 RBC SED RATE AUTOMATED: CPT | Performed by: INTERNAL MEDICINE

## 2022-01-25 PROCEDURE — 86160 COMPLEMENT ANTIGEN: CPT | Mod: 59 | Performed by: INTERNAL MEDICINE

## 2022-01-25 NOTE — PROGRESS NOTES
"HPI     Diabetic Eye Exam      Additional comments: Patient Malika Valdez is a 51 year old female.              Comments     CC: Pt here for new patient CL fitting and annual Plaquenil eye exam. She   takes 200mg BID. Pt states that VA OU for both distance and near is stable   since TONIO. Patient denies diplopia, headaches, flashes/floaters, and pain.   Pt scheduled 01/27/2022 for Mac OCT and 10-2 HVF.    TONIO: 11/27/2020 with Dr. Mcgee    (-) Changes in vision   (-) Pain  (-) Irritation   (-) Itching   (-) Flashes  (-) Floaters  (+) Glasses wearer  (+) CL wearer: (+)pt previously wore daily CLs  (-) Uses eye gtts    Does patient want a refraction today? Yes.     (-) Eye injury  (-) Eye surgery   (+)POHx: (+)HTN retinopathy OU (+)cataracts OU  (+)FOHx: (+)cataracts - mother, father    (+)Medication: Plaquenil 200 mg po BID  (+)PMHx: (+)lupus (+)rheumatoid arthritis (+)rheumatoid lung disease   (+)h/o steroid-induced hyperglycemia    (-)DM, Pt has history steroid-induced hyperglycemia. Pt states that she   has D/C steroid for 1 year and BS levels are "fine."  Hemoglobin A1C       Date                     Value               Ref Range             Status                09/25/2021               5.7 (H)             4.0 - 5.6 %           Final                  11/16/2020               5.6                 4.0 - 5.6 %           Final                   07/13/2020               5.7 (H)             4.0 - 5.6 %           Final                            Last edited by Tess Mcgee, OD on 1/25/2022  1:04 PM. (History)            Assessment /Plan     For exam results, see Encounter Report.    Routine eye exam    Myopia with astigmatism and presbyopia, bilateral    Long-term use of Plaquenil  -     OCT, Retina - OU - Both Eyes; Future  -     [facility] Visual Field - OU - Extended - Both Eyes; Future    Encounter for eye exam due to high risk medication  -     OCT, Retina - OU - Both Eyes; Future  -     [facility] Visual Field - OU - " Extended - Both Eyes; Future    Hypertensive retinopathy of both eyes    Nuclear sclerosis of both eyes      1. Spectera Exam.    2. Updated SRx. Mild change from previous MRx (11/2020). Monitor yearly.    Updated CL Rx, DVO. Patient to wear +1.50 OTC reading glasses on top of CLs prn for near work.   Initially good comfort and vision with new CLs. Given CL trials to take home. If happy with comfort and vision of trial lenses, may order annual supply. If issues arise, RTC for CL f/u. Reviewed proper CL care and hygiene. Monitor yearly unless changes noted sooner.     3-4. Educated pt on findings. No bullseye maculopathy noted OU on DFE. 10-2 HVF and Mac OCT scheduled to be completed later this week. Will call with results.   Patient currently taking Plaquenil 200mg BID. No vision changes noted since starting medication ~4 years ago. Discussed need to monitor yearly with DFE and testing. Monitor yearly.     5. Vessel changes, OU. Stable since TONIO (11/2020). Discussed importance of BP control, taking medications as directed, and following-up with primary care. If changes in vision noted, RTC. Monitor yearly unless changes noted sooner.     6. Educated pt on findings. Not visually significant. No need for removal at this time. Monitor yearly.       RTC as directed for additional testing, then yearly for annual DFE + OCT/HVF or prn.       Addendum 02/01/2022  OCT/HVF completed. See results below. Testing WNL OU. Patient called with results. Monitor yearly with DFE + testing.     Mac OCT  OD: Macular contour WNL. (-)bullseye maculopathy  OS: Macular contour WNL. (-)bullseye maculopathy    10-2 HVF   OD: Reliable testing (FL: 0/15 FP: 3% FN: 0%). WNL. (-)VF defects.   OS: Reliable testing (FL: 0/15 FP: 0% FN: 1%). WNL. (-)VF defects.       RTC in 1 year for annual eye exam + OCT/HVF or sooner if needed.

## 2022-01-27 ENCOUNTER — CLINICAL SUPPORT (OUTPATIENT)
Dept: OPHTHALMOLOGY | Facility: CLINIC | Age: 52
End: 2022-01-27
Payer: OTHER GOVERNMENT

## 2022-01-27 DIAGNOSIS — Z79.899 LONG-TERM USE OF PLAQUENIL: ICD-10-CM

## 2022-01-27 DIAGNOSIS — Z79.899 ENCOUNTER FOR EYE EXAM DUE TO HIGH RISK MEDICATION: ICD-10-CM

## 2022-01-27 LAB
ALDOLASE SERPL-CCNC: 3.3 U/L (ref 1.2–7.6)
DSDNA AB SER-ACNC: NORMAL [IU]/ML

## 2022-01-27 NOTE — PROGRESS NOTES
Visual field test done.  Patient stated no latex allergies used coverlet     Sphere Cylinder Axis Dist VA Add   Right -6.50 +1.50 025 20/25+2 +2.00   Left -6.25 +2.00 145 20/25 +2.00   Expiration Date: 1/26/2023

## 2022-02-01 ENCOUNTER — TELEPHONE (OUTPATIENT)
Dept: OPTOMETRY | Facility: CLINIC | Age: 52
End: 2022-02-01
Payer: OTHER GOVERNMENT

## 2022-02-01 NOTE — TELEPHONE ENCOUNTER
----- Message from Tess Mcgee, OD sent at 2/1/2022  3:36 PM CST -----  Regarding: Plaquenil Testing  Patient had plaquenil testing last week. Testing WNL OU. We will continue to monitor her yearly with testing + DFE. If she notices any changes in her vision before next annual, she is to RTC.     Can you call and let the patient know? Thanks!!

## 2022-02-01 NOTE — TELEPHONE ENCOUNTER
Spoke to pt per Dr. Mcgee to review results of Plaquenil testing. Informed pt that testing WNL OU. Pt noted understanding. Informed pt that Dr. Mcgee would like to follow pt yearly with testing and annual DFE. Pt noted understanding. 1 year recall set for January 2023 (Mac OCT/10-2 HVF/annual CL fitting + routine eye exam).

## 2022-02-23 DIAGNOSIS — D84.9 IMMUNOSUPPRESSED STATUS: ICD-10-CM

## 2022-03-18 ENCOUNTER — PATIENT MESSAGE (OUTPATIENT)
Dept: RHEUMATOLOGY | Facility: CLINIC | Age: 52
End: 2022-03-18
Payer: OTHER GOVERNMENT

## 2022-03-19 ENCOUNTER — LAB VISIT (OUTPATIENT)
Dept: LAB | Facility: HOSPITAL | Age: 52
End: 2022-03-19
Attending: INTERNAL MEDICINE
Payer: OTHER GOVERNMENT

## 2022-03-19 DIAGNOSIS — E53.8 VITAMIN B12 DEFICIENCY: ICD-10-CM

## 2022-03-19 DIAGNOSIS — E55.9 VITAMIN D DEFICIENCY DISEASE: ICD-10-CM

## 2022-03-19 DIAGNOSIS — E13.9 DIABETES MELLITUS DUE TO ABNORMAL INSULIN: ICD-10-CM

## 2022-03-19 DIAGNOSIS — M32.8 LUPUS ERYTHEMATOSUS OVERLAP SYNDROME: ICD-10-CM

## 2022-03-19 LAB
25(OH)D3+25(OH)D2 SERPL-MCNC: 51 NG/ML (ref 30–96)
ALBUMIN SERPL BCP-MCNC: 3.5 G/DL (ref 3.5–5.2)
ALP SERPL-CCNC: 85 U/L (ref 55–135)
ALT SERPL W/O P-5'-P-CCNC: 8 U/L (ref 10–44)
ANION GAP SERPL CALC-SCNC: 9 MMOL/L (ref 8–16)
AST SERPL-CCNC: 19 U/L (ref 10–40)
BASOPHILS # BLD AUTO: 0.02 K/UL (ref 0–0.2)
BASOPHILS NFR BLD: 0.6 % (ref 0–1.9)
BILIRUB SERPL-MCNC: 0.3 MG/DL (ref 0.1–1)
BUN SERPL-MCNC: 12 MG/DL (ref 6–20)
C3 SERPL-MCNC: 141 MG/DL (ref 50–180)
C4 SERPL-MCNC: 25 MG/DL (ref 11–44)
CALCIUM SERPL-MCNC: 10.1 MG/DL (ref 8.7–10.5)
CHLORIDE SERPL-SCNC: 105 MMOL/L (ref 95–110)
CO2 SERPL-SCNC: 27 MMOL/L (ref 23–29)
CREAT SERPL-MCNC: 0.9 MG/DL (ref 0.5–1.4)
CRP SERPL-MCNC: 2 MG/L (ref 0–8.2)
DIFFERENTIAL METHOD: ABNORMAL
EOSINOPHIL # BLD AUTO: 0.1 K/UL (ref 0–0.5)
EOSINOPHIL NFR BLD: 3.6 % (ref 0–8)
ERYTHROCYTE [DISTWIDTH] IN BLOOD BY AUTOMATED COUNT: 14.2 % (ref 11.5–14.5)
ERYTHROCYTE [SEDIMENTATION RATE] IN BLOOD BY WESTERGREN METHOD: 96 MM/HR (ref 0–36)
EST. GFR  (AFRICAN AMERICAN): >60 ML/MIN/1.73 M^2
EST. GFR  (NON AFRICAN AMERICAN): >60 ML/MIN/1.73 M^2
ESTIMATED AVG GLUCOSE: 117 MG/DL (ref 68–131)
GLUCOSE SERPL-MCNC: 92 MG/DL (ref 70–110)
HBA1C MFR BLD: 5.7 % (ref 4–5.6)
HCT VFR BLD AUTO: 39.9 % (ref 37–48.5)
HGB BLD-MCNC: 11.7 G/DL (ref 12–16)
IMM GRANULOCYTES # BLD AUTO: 0.01 K/UL (ref 0–0.04)
IMM GRANULOCYTES NFR BLD AUTO: 0.3 % (ref 0–0.5)
LYMPHOCYTES # BLD AUTO: 1 K/UL (ref 1–4.8)
LYMPHOCYTES NFR BLD: 28.7 % (ref 18–48)
MCH RBC QN AUTO: 27.5 PG (ref 27–31)
MCHC RBC AUTO-ENTMCNC: 29.3 G/DL (ref 32–36)
MCV RBC AUTO: 94 FL (ref 82–98)
MONOCYTES # BLD AUTO: 0.3 K/UL (ref 0.3–1)
MONOCYTES NFR BLD: 7.5 % (ref 4–15)
NEUTROPHILS # BLD AUTO: 2 K/UL (ref 1.8–7.7)
NEUTROPHILS NFR BLD: 59.3 % (ref 38–73)
NRBC BLD-RTO: 0 /100 WBC
PLATELET # BLD AUTO: 303 K/UL (ref 150–450)
PMV BLD AUTO: 10.3 FL (ref 9.2–12.9)
POTASSIUM SERPL-SCNC: 4.4 MMOL/L (ref 3.5–5.1)
PROT SERPL-MCNC: 8.5 G/DL (ref 6–8.4)
RBC # BLD AUTO: 4.25 M/UL (ref 4–5.4)
SODIUM SERPL-SCNC: 141 MMOL/L (ref 136–145)
TSH SERPL DL<=0.005 MIU/L-ACNC: 1.62 UIU/ML (ref 0.4–4)
VIT B12 SERPL-MCNC: 488 PG/ML (ref 210–950)
WBC # BLD AUTO: 3.34 K/UL (ref 3.9–12.7)

## 2022-03-19 PROCEDURE — 86225 DNA ANTIBODY NATIVE: CPT | Performed by: INTERNAL MEDICINE

## 2022-03-19 PROCEDURE — 83036 HEMOGLOBIN GLYCOSYLATED A1C: CPT | Performed by: INTERNAL MEDICINE

## 2022-03-19 PROCEDURE — 82306 VITAMIN D 25 HYDROXY: CPT | Performed by: INTERNAL MEDICINE

## 2022-03-19 PROCEDURE — 36415 COLL VENOUS BLD VENIPUNCTURE: CPT | Performed by: INTERNAL MEDICINE

## 2022-03-19 PROCEDURE — 82607 VITAMIN B-12: CPT | Performed by: INTERNAL MEDICINE

## 2022-03-19 PROCEDURE — 80053 COMPREHEN METABOLIC PANEL: CPT | Performed by: INTERNAL MEDICINE

## 2022-03-19 PROCEDURE — 85652 RBC SED RATE AUTOMATED: CPT | Performed by: INTERNAL MEDICINE

## 2022-03-19 PROCEDURE — 86160 COMPLEMENT ANTIGEN: CPT | Performed by: INTERNAL MEDICINE

## 2022-03-19 PROCEDURE — 85025 COMPLETE CBC W/AUTO DIFF WBC: CPT | Performed by: INTERNAL MEDICINE

## 2022-03-19 PROCEDURE — 86160 COMPLEMENT ANTIGEN: CPT | Mod: 59 | Performed by: INTERNAL MEDICINE

## 2022-03-19 PROCEDURE — 84443 ASSAY THYROID STIM HORMONE: CPT | Performed by: INTERNAL MEDICINE

## 2022-03-19 PROCEDURE — 86140 C-REACTIVE PROTEIN: CPT | Performed by: INTERNAL MEDICINE

## 2022-03-21 LAB — DSDNA AB SER-ACNC: NORMAL [IU]/ML

## 2022-03-23 ENCOUNTER — PATIENT MESSAGE (OUTPATIENT)
Dept: INTERNAL MEDICINE | Facility: CLINIC | Age: 52
End: 2022-03-23
Payer: OTHER GOVERNMENT

## 2022-03-24 RX ORDER — DULAGLUTIDE 0.75 MG/.5ML
0.75 INJECTION, SOLUTION SUBCUTANEOUS
Qty: 4 PEN | Refills: 11 | Status: SHIPPED | OUTPATIENT
Start: 2022-03-24 | End: 2022-04-17

## 2022-03-24 NOTE — TELEPHONE ENCOUNTER
Contact her to see if she can see me on 4/2/22 and book her in an open slot if she can come that day

## 2022-04-02 ENCOUNTER — OFFICE VISIT (OUTPATIENT)
Dept: INTERNAL MEDICINE | Facility: CLINIC | Age: 52
End: 2022-04-02
Payer: OTHER GOVERNMENT

## 2022-04-02 VITALS
OXYGEN SATURATION: 99 % | DIASTOLIC BLOOD PRESSURE: 80 MMHG | BODY MASS INDEX: 48.82 KG/M2 | HEART RATE: 54 BPM | HEIGHT: 65 IN | SYSTOLIC BLOOD PRESSURE: 130 MMHG | WEIGHT: 293 LBS

## 2022-04-02 DIAGNOSIS — T38.0X5A STEROID-INDUCED HYPERGLYCEMIA: ICD-10-CM

## 2022-04-02 DIAGNOSIS — E66.01 MORBID OBESITY WITH BMI OF 50.0-59.9, ADULT: ICD-10-CM

## 2022-04-02 DIAGNOSIS — M05.79 RHEUMATOID ARTHRITIS INVOLVING MULTIPLE SITES WITH POSITIVE RHEUMATOID FACTOR: ICD-10-CM

## 2022-04-02 DIAGNOSIS — E55.9 VITAMIN D DEFICIENCY DISEASE: ICD-10-CM

## 2022-04-02 DIAGNOSIS — M32.8 LUPUS ERYTHEMATOSUS OVERLAP SYNDROME: ICD-10-CM

## 2022-04-02 DIAGNOSIS — R73.9 STEROID-INDUCED HYPERGLYCEMIA: ICD-10-CM

## 2022-04-02 DIAGNOSIS — I10 ESSENTIAL HYPERTENSION: Primary | ICD-10-CM

## 2022-04-02 PROCEDURE — 99999 PR PBB SHADOW E&M-EST. PATIENT-LVL III: CPT | Mod: PBBFAC,,, | Performed by: INTERNAL MEDICINE

## 2022-04-02 PROCEDURE — 99214 OFFICE O/P EST MOD 30 MIN: CPT | Mod: S$PBB,,, | Performed by: INTERNAL MEDICINE

## 2022-04-02 PROCEDURE — 99213 OFFICE O/P EST LOW 20 MIN: CPT | Mod: PBBFAC | Performed by: INTERNAL MEDICINE

## 2022-04-02 PROCEDURE — 99214 PR OFFICE/OUTPT VISIT, EST, LEVL IV, 30-39 MIN: ICD-10-PCS | Mod: S$PBB,,, | Performed by: INTERNAL MEDICINE

## 2022-04-02 PROCEDURE — 99999 PR PBB SHADOW E&M-EST. PATIENT-LVL III: ICD-10-PCS | Mod: PBBFAC,,, | Performed by: INTERNAL MEDICINE

## 2022-04-02 NOTE — PROGRESS NOTES
CHIEF COMPLAINT:  Annual exam and follow up of RA/sle, hypertension, steroid induced diabetes.     HISTORY OF PRESENT ILLNESS: This is a 51 year old woman who presents for follow up.     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   This job is so much better. Less stress.  Not commuting.   Daughter is 10 and son is 8.- school is going well for them.      Joints are doing well.  She uses voltaren gel as needed.  She has been off prednisone since 10/7/19.  She continues to do well off prednisone.  She is taking azathoprine 100 mg twice daily and plaquenil 200 mg twice daily for her RA and lupus overlap with hypoxemic respiratory failure 12/2017 withCT with interstial changes, pericardial effusion and pleural effusion, TIARA that was initially negative 2009  positive 1: 2560 speckled, +NILS, +SSA,SSB, dsDNA neg, +RNA polymerase III.   Breathing is good.  No fever, chills, night sweats, oral ulcers, chest pain, shortness of breath, nausea, vomiting, constipation.  No diarrhea.    She remains off pantoprazole and is doing well.         She has not started Trulicity yet.  Sugars are doing well.  She got back on Noom diet and has lost 18 pounds.       She is taking Norvasc 5 mg daily  for hypertension.  No palpitations.     She is taking vitamin D 50,000 units twice weekly.      REVIEW OF SYSTEMS: No fevers, chills, night sweats, fatigue, visual change, hearing loss, sinus congestion, sore throat, chest pain, shortness of breath, nausea, vomiting, constipation, diarrhea, dysuria, hematuria, polydipsia, polyuria, joint pain, muscle pain, headaches         PAST MEDICAL HISTORY:   1.  RA and lupus overlap  2. History of vitamin D deficiency.   3. History of amenorrhea   4. Morbid obesity.     PAST SURGICAL HISTORY: Negative.     ALLERGIES, MEDICATIONS: Updated on [facility]     SOCIAL HISTORY: No tobacco, alcohol use. 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 Father is living diabetes, hypertension and rheumatoid arthritis. Brother with diverticulosis. Brother healthy. Brother healthy.      PHYSICAL EXAMINATION:     /80 (BP Location: Right arm, Patient Position: Sitting, BP Method: Large (Manual))   Pulse (!) 54   Ht 5' 5" (1.651 m)   Wt (!) 155 kg (341 lb 11.4 oz)   LMP  (LMP Unknown)   SpO2 99%   BMI 56.86 kg/m²          General: Is alert, oriented, in no apparent distress. Affect is within   normal limits. TM clear, OP clear  HEENT: Conjunctivae are anicteric. . Respiratory   effort is normal.  CV RRR without murmur gallops or rubs. No lower extremity edema      labs 3/19/22 reviewed      ASSESSMENT AND PLAN:        .         1. RA with lupus overlab with hypoxemic respiratory failure 2017 withCT with interstial changes, pericardial effusion and pleural effusion, TIARA that was initially negative  now positive 1: 2560 speckled, +NILS, +SSA,SSB, dsDNA neg, +RNA polymerase III. Hypoxemic respiratory failure -CT with interstial changes, pericardial effusion and pleural effusion. Followed by rheumatology. Doing well.    3. Steroid induced diabetes -A1C fine.  truliticy for weight loss  4. Anemia - resolved  5. Hypertension - stable.   6. Vitamin D deficiency - vitamin D 50,000 units twice weekly  7. Morbid obesity - doing well with diet.   8. GERD -asx off pantoprazole  Screening - [facility] 2021- ordered.  GYN exam .  Needs colonoscopy as 48 - holding off due to covid pandemic. - ordered. Needs to schedule.     Will see her back in 5-6 months, sooner if problems arise    "

## 2022-04-06 ENCOUNTER — PATIENT OUTREACH (OUTPATIENT)
Dept: ADMINISTRATIVE | Facility: OTHER | Age: 52
End: 2022-04-06
Payer: OTHER GOVERNMENT

## 2022-04-06 NOTE — PROGRESS NOTES
Health Maintenance Due   Topic Date Due    Colorectal Cancer Screening  Never done    Shingles Vaccine (1 of 2) Never done    TETANUS VACCINE  03/14/2019     Updates were requested from care everywhere.  Chart was reviewed for overdue Proactive [facility] Encounters (MARVIN) topics (CRS, Breast Cancer Screening, Eye exam)  Health Maintenance has been updated.  LINKS immunization registry triggered.  Immunizations were reconciled.  Case Request Endoscopy: COLONOSCOPYOrdered 12/15/2021

## 2022-04-07 ENCOUNTER — OFFICE VISIT (OUTPATIENT)
Dept: RHEUMATOLOGY | Facility: CLINIC | Age: 52
End: 2022-04-07
Payer: OTHER GOVERNMENT

## 2022-04-07 VITALS
WEIGHT: 293 LBS | BODY MASS INDEX: 48.82 KG/M2 | HEART RATE: 55 BPM | HEIGHT: 65 IN | SYSTOLIC BLOOD PRESSURE: 145 MMHG | DIASTOLIC BLOOD PRESSURE: 65 MMHG

## 2022-04-07 DIAGNOSIS — R70.0 ELEVATED SED RATE: ICD-10-CM

## 2022-04-07 DIAGNOSIS — R53.83 OTHER FATIGUE: ICD-10-CM

## 2022-04-07 DIAGNOSIS — J84.9 ILD (INTERSTITIAL LUNG DISEASE): ICD-10-CM

## 2022-04-07 DIAGNOSIS — E66.01 MORBID OBESITY WITH BMI OF 50.0-59.9, ADULT: ICD-10-CM

## 2022-04-07 DIAGNOSIS — M05.79 RHEUMATOID ARTHRITIS INVOLVING MULTIPLE SITES WITH POSITIVE RHEUMATOID FACTOR: ICD-10-CM

## 2022-04-07 DIAGNOSIS — M32.8 LUPUS ERYTHEMATOSUS OVERLAP SYNDROME: Primary | ICD-10-CM

## 2022-04-07 DIAGNOSIS — D84.9 IMMUNOSUPPRESSION: ICD-10-CM

## 2022-04-07 PROCEDURE — 99213 OFFICE O/P EST LOW 20 MIN: CPT | Mod: PBBFAC | Performed by: INTERNAL MEDICINE

## 2022-04-07 PROCEDURE — 99999 PR PBB SHADOW E&M-EST. PATIENT-LVL III: CPT | Mod: PBBFAC,,, | Performed by: INTERNAL MEDICINE

## 2022-04-07 PROCEDURE — 99999 PR PBB SHADOW E&M-EST. PATIENT-LVL III: ICD-10-PCS | Mod: PBBFAC,,, | Performed by: INTERNAL MEDICINE

## 2022-04-07 PROCEDURE — 99214 OFFICE O/P EST MOD 30 MIN: CPT | Mod: S$PBB,,, | Performed by: INTERNAL MEDICINE

## 2022-04-07 PROCEDURE — 99214 PR OFFICE/OUTPT VISIT, EST, LEVL IV, 30-39 MIN: ICD-10-PCS | Mod: S$PBB,,, | Performed by: INTERNAL MEDICINE

## 2022-04-07 ASSESSMENT — ROUTINE ASSESSMENT OF PATIENT INDEX DATA (RAPID3)
TOTAL RAPID3 SCORE: 0.33
MDHAQ FUNCTION SCORE: 0
AM STIFFNESS SCORE: 1, YES
PSYCHOLOGICAL DISTRESS SCORE: 0
WHEN YOU AWAKENED IN THE MORNING OVER THE LAST WEEK, PLEASE INDICATE THE AMOUNT OF TIME IT TAKES UNTIL YOU ARE AS LIMBER AS YOU WILL BE FOR THE DAY: 5 MINUTES
PATIENT GLOBAL ASSESSMENT SCORE: 0.5
PAIN SCORE: 0.5
FATIGUE SCORE: 0

## 2022-04-07 NOTE — PROGRESS NOTES
Subjective:       Patient ID: Malika Valdez is a 51 y.o. female.    Chief Complaint: RA/SLE    HPI:  Malika Valdez is a 51 y.o. female with history of RA and lupus overlap with ILD.  Lupus diagnosed 12/2017 based on TIARA that was initially negative 2009 now positive 1: 2560 speckled, +NILS, +SSA,SSB, dsDNA neg, +RNA polymerase III as well as  acute hypoxemic respiratory failure, pericardial/pleural effusion, ground glass opacities on chest CT.     In hospital 12/2017 started on solumedrol 16mg q8 and switched to prednisone 60 mg daily. SSZ stopped in hospital due to concern it could have been cause of ILD.  Discharged with home O2.  Pulmonary decided not to bronch.  Concerned for SLE overlap and she was started on HCQ 200mg BID.     Her repeat CT chest showed a decrease in the pleural and pericardial effusions.         Interval History:    She is feeling well.   She is currently on azathioprine 100 mg in AM and 100 mg PM, Plaquenil 200 mg bid.  Off prednisone since Oct 2019.  NOOM has helped with weight loss.     No pain today.  5 minutes of morning stiffness.  Feeling well.  No ulcers in mouth or nose, no rashes, no hair loss.  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      Review of Systems   Constitutional: Positive for fatigue. Negative for fever and unexpected weight change.   HENT: Negative for trouble swallowing.    Eyes: Negative for redness.   Respiratory: Negative for cough and shortness of breath.    Cardiovascular: Negative for chest pain.   Gastrointestinal: Positive for diarrhea. Negative for constipation.   Endocrine: Negative.    Genitourinary: Negative for dysuria and genital sores.   Musculoskeletal: Negative.    Skin: Negative for rash.   Allergic/Immunologic: Negative.    Neurological: Negative for headaches.   Hematological: Does not bruise/bleed easily.   Psychiatric/Behavioral: Negative.          Objective:   BP (!) 145/65 (BP Location: Left arm, Patient Position: Sitting, BP Method: Large  "(Automatic))   Pulse (!) 55   Ht 5' 5" (1.651 m)   Wt (!) 161.7 kg (356 lb 7.7 oz)   LMP  (LMP Unknown)   BMI 59.32 kg/m²   Virtual visit     Physical Exam   Constitutional: She is oriented to person, place, and time.   HENT:   Head: Normocephalic and atraumatic.   Eyes: Conjunctivae are normal.   Cardiovascular: Normal rate, regular rhythm and normal heart sounds.          Pulmonary/Chest: Effort normal and breath sounds normal.   Abdominal: Soft. Bowel sounds are normal.   Musculoskeletal:      Cervical back: Neck supple.      Comments: 28 joint count: 0 swollen and 0 tender   Neurological: She is alert and oriented to person, place, and time.   Skin: Skin is warm and dry.   Psychiatric: Mood and affect normal.         LABS    Component      Latest Ref Rng & Units 3/19/2022   WBC      3.90 - 12.70 K/uL 3.34 (L)   RBC      4.00 - 5.40 M/uL 4.25   Hemoglobin      12.0 - 16.0 g/dL 11.7 (L)   Hematocrit      37.0 - 48.5 % 39.9   MCV      82 - 98 fL 94   MCH      27.0 - 31.0 pg 27.5   MCHC      32.0 - 36.0 g/dL 29.3 (L)   RDW      11.5 - 14.5 % 14.2   Platelets      150 - 450 K/uL 303   MPV      9.2 - 12.9 fL 10.3   Immature Granulocytes      0.0 - 0.5 % 0.3   Gran # (ANC)      1.8 - 7.7 K/uL 2.0   Immature Grans (Abs)      0.00 - 0.04 K/uL 0.01   Lymph #      1.0 - 4.8 K/uL 1.0   Mono #      0.3 - 1.0 K/uL 0.3   Eos #      0.0 - 0.5 K/uL 0.1   Baso #      0.00 - 0.20 K/uL 0.02   nRBC      0 /100 WBC 0   Gran %      38.0 - 73.0 % 59.3   Lymph %      18.0 - 48.0 % 28.7   Mono %      4.0 - 15.0 % 7.5   Eosinophil %      0.0 - 8.0 % 3.6   Basophil %      0.0 - 1.9 % 0.6   Differential Method       Automated   Sodium      136 - 145 mmol/L 141   Potassium      3.5 - 5.1 mmol/L 4.4   Chloride      95 - 110 mmol/L 105   CO2      23 - 29 mmol/L 27   Glucose      70 - 110 mg/dL 92   BUN      6 - 20 mg/dL 12   Creatinine      0.5 - 1.4 mg/dL 0.9   Calcium      8.7 - 10.5 mg/dL 10.1   PROTEIN TOTAL      6.0 - 8.4 g/dL 8.5 " (H)   Albumin      3.5 - 5.2 g/dL 3.5   BILIRUBIN TOTAL      0.1 - 1.0 mg/dL 0.3   Alkaline Phosphatase      55 - 135 U/L 85   AST      10 - 40 U/L 19   ALT      10 - 44 U/L 8 (L)   Anion Gap      8 - 16 mmol/L 9   eGFR if African American      >60 mL/min/1.73 m:2 >60.0   eGFR if non African American      >60 mL/min/1.73 m:2 >60.0   Specimen UA       Urine, Unspecified   Color, UA      Yellow, Straw, Mai Yellow   Appearance, UA      Clear Clear   pH, UA      5.0 - 8.0 6.0   Specific Gravity, UA      1.005 - 1.030 1.015   Protein, UA      Negative Negative   Glucose, UA      Negative Negative   Ketones, UA      Negative Negative   Bilirubin (UA)      Negative Negative   Occult Blood UA      Negative Negative   NITRITE UA      Negative Negative   Leukocytes, UA      Negative Negative   Microalbumin, Urine      ug/mL 69.0   Creatinine, Urine      15.0 - 325.0 mg/dL 160.0   MICROALB/CREAT RATIO      0.0 - 30.0 ug/mg 43.1 (H)   Hemoglobin A1C External      4.0 - 5.6 % 5.7 (H)   Estimated Avg Glucose      68 - 131 mg/dL 117   TSH      0.400 - 4.000 uIU/mL 1.619   Sed Rate      0 - 36 mm/Hr 96 (H)   CRP      0.0 - 8.2 mg/L 2.0   ds DNA Ab      Negative 1:10 Negative 1:10   Complement (C-3)      50 - 180 mg/dL 141   Complement (C-4)      11 - 44 mg/dL 25   Vitamin B-12      210 - 950 pg/mL 488   Vit D, 25-Hydroxy      30 - 96 ng/mL 51        Assessment:       1. SLE manifested by hypoxemic respiratory failure with CT with interstial changes, pericardial effusion and pleural effusion, TIARA that was initially negative 2009 now positive 1: 2560 speckled, +NILS, +SSA,SSB, dsDNA neg, +RNA polymerase III.   On Imuran and Plaquenil.  She continues to do well  2. Rheumatoid arthritis. Manifested by previous small joint involvement of the hands, +RF/CCP and elevated inflammatory markers.  Now with improvement activity in hand with voltaren gel.  3. Morbid obesity. Patient has not been exercising at [place].  Encourage walking and  restarting weight watchers (50 pounds since hospitalization and Weight Watchers).  Discussed with her primary doctor possible weight loss surgery  4. Abnormal SPEP without monoclonal gammopathy  5. Bradycardia.  Persistent despite beta blocker.   6. Insomnia.  Improved with meditation    Plan:       1.  Labs q3 months  2.  Continue azathioprine 100 mg in am and 100 mg in pm  3.  Continue Plaquenil.  Patient to get Plaquenil January 2022  4.  Encourage self care   5.  Had flu vaccination  6.  Discussed weight loss.  Continue with NOOM she likes chatting with team. Lost 10 pounds.  7.  Voltaren gel 2g qid prn     RTO in 6 months/prn.

## 2022-04-13 ENCOUNTER — TELEPHONE (OUTPATIENT)
Dept: INTERNAL MEDICINE | Facility: CLINIC | Age: 52
End: 2022-04-13
Payer: OTHER GOVERNMENT

## 2022-04-13 NOTE — TELEPHONE ENCOUNTER
Pt insurance will not cover Trulicity, however they will cover glyburide micronized, metformin, glipizide, glyburide or glimepiride. Please resend another Rx.

## 2022-05-01 ENCOUNTER — PATIENT MESSAGE (OUTPATIENT)
Dept: INTERNAL MEDICINE | Facility: CLINIC | Age: 52
End: 2022-05-01
Payer: OTHER GOVERNMENT

## 2022-05-01 DIAGNOSIS — E11.9 TYPE 2 DIABETES MELLITUS WITHOUT COMPLICATION, WITH LONG-TERM CURRENT USE OF INSULIN: Primary | ICD-10-CM

## 2022-05-01 DIAGNOSIS — Z79.4 TYPE 2 DIABETES MELLITUS WITHOUT COMPLICATION, WITH LONG-TERM CURRENT USE OF INSULIN: Primary | ICD-10-CM

## 2022-05-02 NOTE — TELEPHONE ENCOUNTER
Please work on a prior authorization for Ozempic    She has tried metormin and was intolerant to metformin int he past.  She had diarrhea from metformin.

## 2022-07-20 DIAGNOSIS — J02.9 SORE THROAT: Primary | ICD-10-CM

## 2022-07-20 LAB
CTP QC/QA: YES
SARS-COV-2 AG RESP QL IA.RAPID: NEGATIVE

## 2022-08-02 ENCOUNTER — PATIENT MESSAGE (OUTPATIENT)
Dept: OPTOMETRY | Facility: CLINIC | Age: 52
End: 2022-08-02
Payer: OTHER GOVERNMENT

## 2022-08-30 NOTE — SUBJECTIVE & OBJECTIVE
Interval History: Pt reports some improvement in her SOB.  Afebrile. No acute events overnight.    Current Facility-Administered Medications   Medication Frequency    acetaminophen tablet 650 mg Q6H PRN    amLODIPine tablet 5 mg Daily    azithromycin tablet 250 mg Daily    benzonatate capsule 200 mg Q6H PRN    carvedilol tablet 3.125 mg BID    enoxaparin injection 40 mg Q12H    ergocalciferol capsule 50,000 Units Twice Weekly    influenza (FLUZONE,FLUARIX QUADRIVALENT) vaccine 0.5 mL vaccine x 1 dose    ipratropium 0.02 % nebulizer solution 0.5 mg TID WAKE    ondansetron disintegrating tablet 4 mg Q8H PRN    ondansetron injection 4 mg Q8H PRN    predniSONE tablet 30 mg BID    senna-docusate 8.6-50 mg per tablet 1 tablet BID    sodium chloride 0.9% flush 3 mL Q8H     Objective:     Vital Signs (Most Recent):  Temp: 98.1 °F (36.7 °C) (12/30/17 1700)  Pulse: 80 (12/30/17 1943)  Resp: 16 (12/30/17 1943)  BP: 136/84 (12/30/17 1700)  SpO2: 96 % (12/30/17 2123)  O2 Device (Oxygen Therapy): nasal cannula (12/30/17 2123) Vital Signs (24h Range):  Temp:  [97.4 °F (36.3 °C)-99 °F (37.2 °C)] 98.1 °F (36.7 °C)  Pulse:  [] 80  Resp:  [16-20] 16  SpO2:  [86 %-96 %] 96 %  BP: (114-136)/(59-84) 136/84     Weight: (!) 159.8 kg (352 lb 4.7 oz) (12/27/17 0557)  Body mass index is 58.62 kg/m².  Body surface area is 2.71 meters squared.      Intake/Output Summary (Last 24 hours) at 12/30/17 2304  Last data filed at 12/30/17 0559   Gross per 24 hour   Intake              120 ml   Output                0 ml   Net              120 ml       Physical Exam   Constitutional: She is oriented to person, place, and time and well-developed, well-nourished, and in no distress.   HENT:   Head: Normocephalic and atraumatic.   Eyes: EOM are normal. Pupils are equal, round, and reactive to light.   Neck: Normal range of motion. Neck supple.   Cardiovascular: Normal rate and regular rhythm.    Distant heart sounds due to body habitus    Pulmonary/Chest: Effort normal.   Diffuse crackles throughout all lung field    Abdominal: Soft. Bowel sounds are normal.   Lymphadenopathy:     She has no cervical adenopathy.   Neurological: She is alert and oriented to person, place, and time.   Skin: Skin is warm and dry. No rash noted.     Psychiatric: Affect normal.   Musculoskeletal: Normal range of motion. She exhibits no edema, tenderness or deformity.   No synovitis         Significant Labs:  CBC:   Recent Labs  Lab 12/30/17 0530   WBC 13.14*   HGB 8.9*   HCT 30.2*   *     CMP:   Recent Labs  Lab 12/30/17 0529   *   CALCIUM 9.1   ALBUMIN 2.2*      K 4.2   CO2 32*      BUN 17   CREATININE 0.7     CRP:   Recent Labs  Lab 12/30/17 0529   CRP 52.8*     ESR:   Recent Labs  Lab 12/30/17 0529   SEDRATE 107*     Liver Function Test:   Recent Labs  Lab 12/30/17 0529   ALBUMIN 2.2*     Results for MARTIN BERGER (MRN [id number]) as of 12/30/2017 22:52   Ref. Range 12/27/2017 10:31 12/27/2017 10:31 12/27/2017 13:45   TIARA HEP-2 Titer Unknown Positive >=1:2560...     Anti-SSA Antibody Latest Ref Range: 0.00 - 19.99 .16 (H) 210.16 (H)    Anti-SSA Interpretation Latest Ref Range: Negative  Positive (A) Positive (A)    Anti-SSB Antibody Latest Ref Range: 0.00 - 19.99 .44 (H) 178.44 (H)    Anti-SSB Interpretation Latest Ref Range: Negative  Positive (A) Positive (A)    Anti Sm Antibody Latest Ref Range: 0.00 - 19.99 EU  2.16    Anti-Sm Interpretation Latest Ref Range: Negative   Negative    Anti Sm/RNP Antibody Latest Ref Range: 0.00 - 19.99 EU  3.51    Anti-Sm/RNP Interpretation Latest Ref Range: Negative   Negative    Cytoplasmic Neutrophilic Ab Latest Ref Range: <1:20 Titer   <1:20   Perinuclear (P-ANCA) Latest Ref Range: <1:20 Titer   <1:20   Scleroderma SCL- Latest Ref Range: <20 UNITS   6   MPO Latest Ref Range: <=20 UNITS   3     Results for MARTIN BERGER (MRN [id number]) as of 12/30/2017 22:52   Ref. Range 12/27/2017  10:32   Complement (C-3) Latest Ref Range: 50 - 180 mg/dL 137   Complement (C-4) Latest Ref Range: 11 - 44 mg/dL 15     Results for MARTIN BERGER (MRN [id number]) as of 12/30/2017 22:52   Ref. Range 2/26/2013 16:42 12/12/2014 07:56   CCP Antibodies Latest Ref Range: 0 - 4.9 U/mL 15.8 (H)    Rheumatoid Factor Latest Ref Range: 0.0 - 15.0 IU/mL 60 (H) 43.0 (H)   Results for MARTIN BERGER (MRN [id number]) as of 12/30/2017 22:52   Ref. Range 12/27/2017 10:32   Hep B Core Total Ab Unknown Negative   Hep B S Ab Unknown Positive (A)   Hepatitis B Surface Ag Unknown Negative   Hepatitis C Ab Unknown Negative     Results for MARTIN BERGER (MRN [id number]) as of 12/30/2017 22:52   Ref. Range 12/24/2017 14:13   Enterovirus Latest Ref Range: Not Detected  Not Detected   Human Bocavirus Latest Ref Range: Not Detected  Not Detected   Human Coronavirus Latest Ref Range: Not Detected  Positive (A)   Influenza A - P7T3-32 Latest Ref Range: Not Detected  Not Detected   Parainfluenza Latest Ref Range: Not Detected  Not Detected   TEM - Acinetobacter baumannii Latest Ref Range: Not Detected  Not Detected   TEM - Bordetella pertussis Latest Ref Range: Not Detected  Not Detected   TEM - Chlamydophila pneumoniae Latest Ref Range: Not Detected  Not Detected   TEM- Haemophilus influenzae Latest Ref Range: Not Detected  Not Detected   TEM- Haemophilus influenzae B Latest Ref Range: Not Detected  Not Detected   TEM - Klebsiella pneumoniae Latest Ref Range: Not Detected  Positive (A)   TEM - Legionella pneumophila Latest Ref Range: Not Detected  Not Detected   TEM - Staphylococcus aureus Latest Ref Range: Not Detected  Positive (A)   TEM - MRSA Latest Ref Range: Not Detected  Not Detected   TEM - Pawan -Amaya Latest Ref Range: Not Detected  Not Detected   TEM - Mycoplasma pneumoniae Latest Ref Range: Not Detected  Not Detected   TEM - Neisseria meningiditis Latest Ref Range: Not Detected  Not Detected   TEM - Pseudomonas aeruginosa Latest  Ref Range: Not Detected  Not Detected   TEM - Streptococcus pneumoniae Latest Ref Range: Not Detected  Not Detected   TEM - Streptococcus pyogenes A Latest Ref Range: Not Detected  Not Detected   Respiratory Syncytial VirusVirus (RSV) A Latest Ref Range: Not Detected  Not Detected   TEM - Moraxella catarrhalis Latest Ref Range: Not Detected  Not Detected     Results for MARTIN BERGER (MRN [id number]) as of 12/30/2017 22:52   Ref. Range 12/27/2017 14:00   Specimen UA Unknown Urine, Clean Catch   Color, UA Latest Ref Range: Yellow, Straw, Mai  Straw   pH, UA Latest Ref Range: 5.0 - 8.0  6.0   Specific Gravity, UA Latest Ref Range: 1.005 - 1.030  1.005   Appearance, UA Latest Ref Range: Clear  Clear   Protein, UA Latest Ref Range: Negative  Negative   Glucose, UA Latest Ref Range: Negative  Negative   Ketones, UA Latest Ref Range: Negative  Negative   Occult Blood UA Latest Ref Range: Negative  Negative   Nitrite, UA Latest Ref Range: Negative  Negative   Urobilinogen, UA Latest Ref Range: <2.0 EU/dL Negative   Bilirubin (UA) Latest Ref Range: Negative  Negative   Leukocytes, UA Latest Ref Range: Negative  Negative   Prot/Creat Ratio, Ur Latest Ref Range: 0.00 - 0.20  Unable to calculate   Protein, Urine Random Latest Ref Range: 0 - 15 mg/dL <7       Significant Imaging:  Imaging results within the past 24 hours have been reviewed.   Mercedes Flap Text: The defect edges were debeveled with a #15 scalpel blade.  Given the location of the defect, shape of the defect and the proximity to free margins a Mercedes flap was deemed most appropriate.  Using a sterile surgical marker, an appropriate advancement flap was drawn incorporating the defect and placing the expected incisions within the relaxed skin tension lines where possible. The area thus outlined was incised deep to adipose tissue with a #15 scalpel blade.  The skin margins were undermined to an appropriate distance in all directions utilizing iris scissors.

## 2022-09-20 ENCOUNTER — PATIENT MESSAGE (OUTPATIENT)
Dept: INTERNAL MEDICINE | Facility: CLINIC | Age: 52
End: 2022-09-20
Payer: OTHER GOVERNMENT

## 2022-10-10 ENCOUNTER — IMMUNIZATION (OUTPATIENT)
Dept: INTERNAL MEDICINE | Facility: CLINIC | Age: 52
End: 2022-10-10
Payer: OTHER GOVERNMENT

## 2022-10-10 ENCOUNTER — OFFICE VISIT (OUTPATIENT)
Dept: INTERNAL MEDICINE | Facility: CLINIC | Age: 52
End: 2022-10-10
Payer: OTHER GOVERNMENT

## 2022-10-10 ENCOUNTER — LAB VISIT (OUTPATIENT)
Dept: LAB | Facility: HOSPITAL | Age: 52
End: 2022-10-10
Attending: INTERNAL MEDICINE
Payer: OTHER GOVERNMENT

## 2022-10-10 VITALS
WEIGHT: 293 LBS | DIASTOLIC BLOOD PRESSURE: 80 MMHG | BODY MASS INDEX: 48.82 KG/M2 | HEIGHT: 65 IN | OXYGEN SATURATION: 95 % | HEART RATE: 69 BPM | SYSTOLIC BLOOD PRESSURE: 144 MMHG

## 2022-10-10 DIAGNOSIS — R53.83 OTHER FATIGUE: ICD-10-CM

## 2022-10-10 DIAGNOSIS — E11.9 TYPE 2 DIABETES MELLITUS WITHOUT COMPLICATION, WITH LONG-TERM CURRENT USE OF INSULIN: ICD-10-CM

## 2022-10-10 DIAGNOSIS — Z00.00 ROUTINE GENERAL MEDICAL EXAMINATION AT A HEALTH CARE FACILITY: Primary | ICD-10-CM

## 2022-10-10 DIAGNOSIS — T78.3XXD ANGIOEDEMA, SUBSEQUENT ENCOUNTER: ICD-10-CM

## 2022-10-10 DIAGNOSIS — M32.8 LUPUS ERYTHEMATOSUS OVERLAP SYNDROME: ICD-10-CM

## 2022-10-10 DIAGNOSIS — E66.01 MORBID OBESITY WITH BMI OF 50.0-59.9, ADULT: ICD-10-CM

## 2022-10-10 DIAGNOSIS — Z12.31 SCREENING MAMMOGRAM, ENCOUNTER FOR: ICD-10-CM

## 2022-10-10 DIAGNOSIS — R70.0 ELEVATED SED RATE: ICD-10-CM

## 2022-10-10 DIAGNOSIS — Z12.11 SCREENING FOR MALIGNANT NEOPLASM OF COLON: ICD-10-CM

## 2022-10-10 DIAGNOSIS — M05.79 RHEUMATOID ARTHRITIS INVOLVING MULTIPLE SITES WITH POSITIVE RHEUMATOID FACTOR: ICD-10-CM

## 2022-10-10 DIAGNOSIS — Z79.4 TYPE 2 DIABETES MELLITUS WITHOUT COMPLICATION, WITH LONG-TERM CURRENT USE OF INSULIN: ICD-10-CM

## 2022-10-10 DIAGNOSIS — D84.9 IMMUNOSUPPRESSION: ICD-10-CM

## 2022-10-10 DIAGNOSIS — J84.9 ILD (INTERSTITIAL LUNG DISEASE): ICD-10-CM

## 2022-10-10 LAB
ALBUMIN SERPL BCP-MCNC: 3.6 G/DL (ref 3.5–5.2)
ALP SERPL-CCNC: 89 U/L (ref 55–135)
ALT SERPL W/O P-5'-P-CCNC: 7 U/L (ref 10–44)
ANION GAP SERPL CALC-SCNC: 10 MMOL/L (ref 8–16)
AST SERPL-CCNC: 16 U/L (ref 10–40)
BASOPHILS # BLD AUTO: 0.03 K/UL (ref 0–0.2)
BASOPHILS # BLD AUTO: 0.03 K/UL (ref 0–0.2)
BASOPHILS NFR BLD: 0.8 % (ref 0–1.9)
BASOPHILS NFR BLD: 0.8 % (ref 0–1.9)
BILIRUB SERPL-MCNC: 0.4 MG/DL (ref 0.1–1)
BUN SERPL-MCNC: 13 MG/DL (ref 6–20)
C3 SERPL-MCNC: 158 MG/DL (ref 50–180)
C4 SERPL-MCNC: 25 MG/DL (ref 11–44)
CALCIUM SERPL-MCNC: 9.8 MG/DL (ref 8.7–10.5)
CHLORIDE SERPL-SCNC: 103 MMOL/L (ref 95–110)
CK SERPL-CCNC: 188 U/L (ref 20–180)
CO2 SERPL-SCNC: 26 MMOL/L (ref 23–29)
CREAT SERPL-MCNC: 0.8 MG/DL (ref 0.5–1.4)
CRP SERPL-MCNC: 2.3 MG/L (ref 0–8.2)
DIFFERENTIAL METHOD: ABNORMAL
DIFFERENTIAL METHOD: ABNORMAL
EOSINOPHIL # BLD AUTO: 0.1 K/UL (ref 0–0.5)
EOSINOPHIL # BLD AUTO: 0.1 K/UL (ref 0–0.5)
EOSINOPHIL NFR BLD: 3.3 % (ref 0–8)
EOSINOPHIL NFR BLD: 3.3 % (ref 0–8)
ERYTHROCYTE [DISTWIDTH] IN BLOOD BY AUTOMATED COUNT: 14.9 % (ref 11.5–14.5)
ERYTHROCYTE [DISTWIDTH] IN BLOOD BY AUTOMATED COUNT: 14.9 % (ref 11.5–14.5)
ERYTHROCYTE [SEDIMENTATION RATE] IN BLOOD BY PHOTOMETRIC METHOD: 119 MM/HR (ref 0–36)
EST. GFR  (NO RACE VARIABLE): >60 ML/MIN/1.73 M^2
ESTIMATED AVG GLUCOSE: 120 MG/DL (ref 68–131)
GLUCOSE SERPL-MCNC: 88 MG/DL (ref 70–110)
HBA1C MFR BLD: 5.8 % (ref 4–5.6)
HCT VFR BLD AUTO: 38.9 % (ref 37–48.5)
HCT VFR BLD AUTO: 38.9 % (ref 37–48.5)
HGB BLD-MCNC: 11.6 G/DL (ref 12–16)
HGB BLD-MCNC: 11.6 G/DL (ref 12–16)
IMM GRANULOCYTES # BLD AUTO: 0.01 K/UL (ref 0–0.04)
IMM GRANULOCYTES # BLD AUTO: 0.01 K/UL (ref 0–0.04)
IMM GRANULOCYTES NFR BLD AUTO: 0.3 % (ref 0–0.5)
IMM GRANULOCYTES NFR BLD AUTO: 0.3 % (ref 0–0.5)
LYMPHOCYTES # BLD AUTO: 1 K/UL (ref 1–4.8)
LYMPHOCYTES # BLD AUTO: 1 K/UL (ref 1–4.8)
LYMPHOCYTES NFR BLD: 24.9 % (ref 18–48)
LYMPHOCYTES NFR BLD: 24.9 % (ref 18–48)
MCH RBC QN AUTO: 27.8 PG (ref 27–31)
MCH RBC QN AUTO: 27.8 PG (ref 27–31)
MCHC RBC AUTO-ENTMCNC: 29.8 G/DL (ref 32–36)
MCHC RBC AUTO-ENTMCNC: 29.8 G/DL (ref 32–36)
MCV RBC AUTO: 93 FL (ref 82–98)
MCV RBC AUTO: 93 FL (ref 82–98)
MONOCYTES # BLD AUTO: 0.3 K/UL (ref 0.3–1)
MONOCYTES # BLD AUTO: 0.3 K/UL (ref 0.3–1)
MONOCYTES NFR BLD: 7.3 % (ref 4–15)
MONOCYTES NFR BLD: 7.3 % (ref 4–15)
NEUTROPHILS # BLD AUTO: 2.5 K/UL (ref 1.8–7.7)
NEUTROPHILS # BLD AUTO: 2.5 K/UL (ref 1.8–7.7)
NEUTROPHILS NFR BLD: 63.4 % (ref 38–73)
NEUTROPHILS NFR BLD: 63.4 % (ref 38–73)
NRBC BLD-RTO: 0 /100 WBC
NRBC BLD-RTO: 0 /100 WBC
PLATELET # BLD AUTO: 313 K/UL (ref 150–450)
PLATELET # BLD AUTO: 313 K/UL (ref 150–450)
PMV BLD AUTO: 10.3 FL (ref 9.2–12.9)
PMV BLD AUTO: 10.3 FL (ref 9.2–12.9)
POTASSIUM SERPL-SCNC: 4 MMOL/L (ref 3.5–5.1)
PROT SERPL-MCNC: 8.5 G/DL (ref 6–8.4)
RBC # BLD AUTO: 4.17 M/UL (ref 4–5.4)
RBC # BLD AUTO: 4.17 M/UL (ref 4–5.4)
SODIUM SERPL-SCNC: 139 MMOL/L (ref 136–145)
WBC # BLD AUTO: 3.97 K/UL (ref 3.9–12.7)
WBC # BLD AUTO: 3.97 K/UL (ref 3.9–12.7)

## 2022-10-10 PROCEDURE — 86334 PATHOLOGIST INTERPRETATION IFE: ICD-10-PCS | Mod: 26,,, | Performed by: PATHOLOGY

## 2022-10-10 PROCEDURE — 86140 C-REACTIVE PROTEIN: CPT | Performed by: INTERNAL MEDICINE

## 2022-10-10 PROCEDURE — 90471 IMMUNIZATION ADMIN: CPT | Mod: PBBFAC

## 2022-10-10 PROCEDURE — 86334 IMMUNOFIX E-PHORESIS SERUM: CPT | Mod: 26,,, | Performed by: PATHOLOGY

## 2022-10-10 PROCEDURE — 99214 OFFICE O/P EST MOD 30 MIN: CPT | Mod: PBBFAC | Performed by: INTERNAL MEDICINE

## 2022-10-10 PROCEDURE — 86225 DNA ANTIBODY NATIVE: CPT | Performed by: INTERNAL MEDICINE

## 2022-10-10 PROCEDURE — 99396 PREV VISIT EST AGE 40-64: CPT | Mod: S$PBB,,, | Performed by: INTERNAL MEDICINE

## 2022-10-10 PROCEDURE — 84165 PROTEIN E-PHORESIS SERUM: CPT | Performed by: INTERNAL MEDICINE

## 2022-10-10 PROCEDURE — 82085 ASSAY OF ALDOLASE: CPT | Performed by: INTERNAL MEDICINE

## 2022-10-10 PROCEDURE — 99396 PR PREVENTIVE VISIT,EST,40-64: ICD-10-PCS | Mod: S$PBB,,, | Performed by: INTERNAL MEDICINE

## 2022-10-10 PROCEDURE — 86160 COMPLEMENT ANTIGEN: CPT | Mod: 59 | Performed by: INTERNAL MEDICINE

## 2022-10-10 PROCEDURE — 80053 COMPREHEN METABOLIC PANEL: CPT | Performed by: INTERNAL MEDICINE

## 2022-10-10 PROCEDURE — 84165 PATHOLOGIST INTERPRETATION SPE: ICD-10-PCS | Mod: 26,,, | Performed by: PATHOLOGY

## 2022-10-10 PROCEDURE — 99999 PR PBB SHADOW E&M-EST. PATIENT-LVL IV: ICD-10-PCS | Mod: PBBFAC,,, | Performed by: INTERNAL MEDICINE

## 2022-10-10 PROCEDURE — 82550 ASSAY OF CK (CPK): CPT | Performed by: INTERNAL MEDICINE

## 2022-10-10 PROCEDURE — 86334 IMMUNOFIX E-PHORESIS SERUM: CPT | Performed by: INTERNAL MEDICINE

## 2022-10-10 PROCEDURE — 86160 COMPLEMENT ANTIGEN: CPT | Performed by: INTERNAL MEDICINE

## 2022-10-10 PROCEDURE — 84165 PROTEIN E-PHORESIS SERUM: CPT | Mod: 26,,, | Performed by: PATHOLOGY

## 2022-10-10 PROCEDURE — 83036 HEMOGLOBIN GLYCOSYLATED A1C: CPT | Performed by: INTERNAL MEDICINE

## 2022-10-10 PROCEDURE — 85652 RBC SED RATE AUTOMATED: CPT | Performed by: INTERNAL MEDICINE

## 2022-10-10 PROCEDURE — 36415 COLL VENOUS BLD VENIPUNCTURE: CPT | Performed by: INTERNAL MEDICINE

## 2022-10-10 PROCEDURE — 85025 COMPLETE CBC W/AUTO DIFF WBC: CPT | Performed by: INTERNAL MEDICINE

## 2022-10-10 PROCEDURE — 99999 PR PBB SHADOW E&M-EST. PATIENT-LVL IV: CPT | Mod: PBBFAC,,, | Performed by: INTERNAL MEDICINE

## 2022-10-10 RX ORDER — AMLODIPINE BESYLATE 5 MG/1
5 TABLET ORAL DAILY
Qty: 90 TABLET | Refills: 4 | Status: SHIPPED | OUTPATIENT
Start: 2022-10-10 | End: 2023-10-24

## 2022-10-10 RX ORDER — DULAGLUTIDE 0.75 MG/.5ML
0.75 INJECTION, SOLUTION SUBCUTANEOUS
Qty: 4 PEN | Refills: 11 | Status: SHIPPED | OUTPATIENT
Start: 2022-10-10 | End: 2022-10-10 | Stop reason: SDUPTHER

## 2022-10-10 RX ORDER — DULAGLUTIDE 0.75 MG/.5ML
0.75 INJECTION, SOLUTION SUBCUTANEOUS
Qty: 4 PEN | Refills: 11 | Status: SHIPPED | OUTPATIENT
Start: 2022-10-10 | End: 2023-03-07

## 2022-10-10 RX ORDER — ERGOCALCIFEROL 1.25 MG/1
CAPSULE ORAL
Qty: 24 CAPSULE | Refills: 3 | Status: SHIPPED | OUTPATIENT
Start: 2022-10-10 | End: 2023-03-07 | Stop reason: SDUPTHER

## 2022-10-10 NOTE — PROGRESS NOTES
CHIEF COMPLAINT:  Annual exam and follow up of RA/sle, hypertension, steroid induced diabetes.     HISTORY OF PRESENT ILLNESS: This is a 52 year old woman who presents for follow up.     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  Daughter will be 11 in Novemberand son is 9.- school is going well for them.  Daugher need her home more. She gets off at 3:30     Joints are doing well.  She uses voltaren gel as needed.  She has been off prednisone since 10/7/19.  She continues to do well off prednisone.  She is taking azathoprine 100 mg twice daily and plaquenil 200 mg twice daily for her RA and lupus overlap with hypoxemic respiratory failure 12/2017 withCT with interstial changes, pericardial effusion and pleural effusion, TIARA that was initially negative 2009  positive 1: 2560 speckled, +NILS, +SSA,SSB, dsDNA neg, +RNA polymerase III.   Breathing is good.  No fever, chills, night sweats, oral ulcers, chest pain, shortness of breath, nausea, vomiting, constipation.  No diarrhea.    She remains off pantoprazole and is doing well.         She has taken  metformin for her diabetes in the past which caused diarrhea.  She did not start on Trulicity or Ozempic - could not get approved through her insurance.  She has gained 13 pounds since our last visit.  She is not on the Noom diet currently.       She is taking Norvasc 5 mg daily  for hypertension.  No palpitations.     She is taking vitamin D 50,000 units twice weekly.      REVIEW OF SYSTEMS: No fevers, chills, night sweats, fatigue, visual change, hearing loss, sinus congestion, sore throat, chest pain, shortness of breath, nausea, vomiting, constipation, diarrhea, dysuria, hematuria, polydipsia, polyuria, joint pain, muscle pain, headaches         PAST MEDICAL HISTORY:   1.  RA and lupus overlap  2. History of vitamin D deficiency.   3. History of amenorrhea   4. Morbid obesity.     PAST SURGICAL HISTORY: Negative.     ALLERGIES, MEDICATIONS:  Updated on [facility]     SOCIAL HISTORY: No tobacco, alcohol use. She is , has no children. She is a RN    FAMILY HISTORY: Mother  at age 69, ESRD on dialysis, aortic stenosis, diabetes, history hypertension, breast cancer age 64 (in remission), benign colon cancer. Father is living diabetes, hypertension and rheumatoid arthritis. Brother with diverticulosis. Brother healthy. Brother healthy.      PHYSICAL EXAMINATION:          General: Is alert, oriented, in no apparent distress. Affect is within   normal limits. TM clear, OP clear  HEENT: Conjunctivae are anicteric. . Respiratory   effort is normal.  CV RRR without murmur gallops or rubs. No lower extremity edema      labs 3/19/22 reviewed      ASSESSMENT AND PLAN:       Annual exam - discussed diet, exercise and safety issues.       .         1. RA with lupus overlab with hypoxemic respiratory failure 2017 withCT with interstial changes, pericardial effusion and pleural effusion, TIARA that was initially negative  now positive 1: 2560 speckled, +NILS, +SSA,SSB, dsDNA neg, +RNA polymerase III. Hypoxemic respiratory failure -CT with interstial changes, pericardial effusion and pleural effusion. Followed by rheumatology. Doing well.    3. Steroid induced diabetes - Has had diarrhea with metformin.  truliticy 0.75 mg weekly for diabetes. A1C  4. Anemia - resolved  5. Hypertension - stable.   6. Vitamin D deficiency - vitamin D 50,000 units twice weekly  7. Morbid obesity - doing well with diet.   8. GERD -asx off pantoprazole  Screening - [facility] 2021- ordered.  GYN exam .  Needs colonoscopy as 48 - holding off due to covid pandemic. - ordered. Needs to schedule.     Will see her back in 5-6 months, sooner if problems arise

## 2022-10-11 LAB
ALBUMIN SERPL ELPH-MCNC: 3.76 G/DL (ref 3.35–5.55)
ALPHA1 GLOB SERPL ELPH-MCNC: 0.34 G/DL (ref 0.17–0.41)
ALPHA2 GLOB SERPL ELPH-MCNC: 0.88 G/DL (ref 0.43–0.99)
B-GLOBULIN SERPL ELPH-MCNC: 1.17 G/DL (ref 0.5–1.1)
DSDNA AB SER-ACNC: NORMAL [IU]/ML
GAMMA GLOB SERPL ELPH-MCNC: 2.04 G/DL (ref 0.67–1.58)
INTERPRETATION SERPL IFE-IMP: NORMAL
PROT SERPL-MCNC: 8.2 G/DL (ref 6–8.4)

## 2022-10-12 ENCOUNTER — PATIENT MESSAGE (OUTPATIENT)
Dept: RHEUMATOLOGY | Facility: CLINIC | Age: 52
End: 2022-10-12
Payer: OTHER GOVERNMENT

## 2022-10-12 ENCOUNTER — TELEPHONE (OUTPATIENT)
Dept: RADIOLOGY | Facility: HOSPITAL | Age: 52
End: 2022-10-12
Payer: OTHER GOVERNMENT

## 2022-10-12 LAB — ALDOLASE SERPL-CCNC: 4.7 U/L (ref 1.2–7.6)

## 2022-10-12 NOTE — TELEPHONE ENCOUNTER
----- Message from Lizzeth Sandhu sent at 10/12/2022  1:03 PM CDT -----  Dr Robles placed a referral for mammogram. Pt's past mammograms have been completed at [facility]. Please assist with scheduling.    Contact #[phone]    Thanks

## 2022-10-13 ENCOUNTER — PATIENT MESSAGE (OUTPATIENT)
Dept: RHEUMATOLOGY | Facility: CLINIC | Age: 52
End: 2022-10-13
Payer: OTHER GOVERNMENT

## 2022-10-13 LAB
PATHOLOGIST INTERPRETATION IFE: NORMAL
PATHOLOGIST INTERPRETATION SPE: NORMAL

## 2022-10-14 DIAGNOSIS — E11.9 TYPE 2 DIABETES MELLITUS WITHOUT COMPLICATION: ICD-10-CM

## 2022-10-18 ENCOUNTER — PATIENT MESSAGE (OUTPATIENT)
Dept: ADMINISTRATIVE | Facility: HOSPITAL | Age: 52
End: 2022-10-18
Payer: OTHER GOVERNMENT

## 2022-11-10 ENCOUNTER — CLINICAL SUPPORT (OUTPATIENT)
Dept: ENDOSCOPY | Facility: HOSPITAL | Age: 52
End: 2022-11-10
Attending: INTERNAL MEDICINE
Payer: OTHER GOVERNMENT

## 2022-11-10 VITALS — WEIGHT: 293 LBS | HEIGHT: 65 IN | BODY MASS INDEX: 48.82 KG/M2

## 2022-11-10 DIAGNOSIS — Z12.11 SCREENING FOR MALIGNANT NEOPLASM OF COLON: ICD-10-CM

## 2022-11-10 RX ORDER — SODIUM, POTASSIUM,MAG SULFATES 17.5-3.13G
SOLUTION, RECONSTITUTED, ORAL ORAL
Qty: 1 KIT | Refills: 0 | Status: SHIPPED | OUTPATIENT
Start: 2022-11-10 | End: 2023-07-07

## 2022-12-10 ENCOUNTER — PATIENT OUTREACH (OUTPATIENT)
Dept: ADMINISTRATIVE | Facility: HOSPITAL | Age: 52
End: 2022-12-10
Payer: OTHER GOVERNMENT

## 2022-12-10 ENCOUNTER — PATIENT MESSAGE (OUTPATIENT)
Dept: ADMINISTRATIVE | Facility: HOSPITAL | Age: 52
End: 2022-12-10
Payer: OTHER GOVERNMENT

## 2022-12-10 NOTE — PROGRESS NOTES
Health Maintenance Due   Topic Date Due    Shingles Vaccine (1 of 2) Never done    Colorectal Cancer Screening  Never done    TETANUS VACCINE  03/14/2019    COVID-19 Vaccine (4 - Booster for Moderna series) 02/20/2022    Lipid Panel  09/25/2022    Foot Exam  12/15/2022    Mammogram  12/20/2022       HM updated. Triggered LINKS and Care everywhere. Outreached patient regarding blood pressure.     Leonie Ward LPN   Clinical Care Coordinator  Primary Care and Wellness

## 2022-12-20 ENCOUNTER — HOSPITAL ENCOUNTER (OUTPATIENT)
Facility: HOSPITAL | Age: 52
Discharge: HOME | End: 2022-12-20
Attending: INTERNAL MEDICINE | Admitting: INTERNAL MEDICINE
Payer: OTHER GOVERNMENT

## 2022-12-20 ENCOUNTER — ANESTHESIA EVENT (OUTPATIENT)
Dept: ENDOSCOPY | Facility: HOSPITAL | Age: 52
End: 2022-12-20
Payer: OTHER GOVERNMENT

## 2022-12-20 ENCOUNTER — ANESTHESIA (OUTPATIENT)
Dept: ENDOSCOPY | Facility: HOSPITAL | Age: 52
End: 2022-12-20
Payer: OTHER GOVERNMENT

## 2022-12-20 VITALS
BODY MASS INDEX: 48.82 KG/M2 | RESPIRATION RATE: 16 BRPM | OXYGEN SATURATION: 100 % | HEIGHT: 65 IN | TEMPERATURE: 98 F | SYSTOLIC BLOOD PRESSURE: 134 MMHG | WEIGHT: 293 LBS | HEART RATE: 56 BPM | DIASTOLIC BLOOD PRESSURE: 70 MMHG

## 2022-12-20 DIAGNOSIS — Z12.11 COLON CANCER SCREENING: Primary | ICD-10-CM

## 2022-12-20 PROCEDURE — 63600175 PHARM REV CODE 636 W HCPCS: Performed by: NURSE ANESTHETIST, CERTIFIED REGISTERED

## 2022-12-20 PROCEDURE — G0121 COLON CA SCRN NOT HI RSK IND: ICD-10-PCS | Mod: ,,, | Performed by: INTERNAL MEDICINE

## 2022-12-20 PROCEDURE — 25000003 PHARM REV CODE 250: Performed by: INTERNAL MEDICINE

## 2022-12-20 PROCEDURE — D9220A PRA ANESTHESIA: Mod: ANES,,, | Performed by: ANESTHESIOLOGY

## 2022-12-20 PROCEDURE — G0121 COLON CA SCRN NOT HI RSK IND: HCPCS | Mod: ,,, | Performed by: INTERNAL MEDICINE

## 2022-12-20 PROCEDURE — D9220A PRA ANESTHESIA: ICD-10-PCS | Mod: ANES,,, | Performed by: ANESTHESIOLOGY

## 2022-12-20 PROCEDURE — D9220A PRA ANESTHESIA: Mod: CRNA,,, | Performed by: NURSE ANESTHETIST, CERTIFIED REGISTERED

## 2022-12-20 PROCEDURE — 37000009 HC ANESTHESIA EA ADD 15 MINS: Performed by: INTERNAL MEDICINE

## 2022-12-20 PROCEDURE — G0121 COLON CA SCRN NOT HI RSK IND: HCPCS | Performed by: INTERNAL MEDICINE

## 2022-12-20 PROCEDURE — 25000003 PHARM REV CODE 250: Performed by: NURSE ANESTHETIST, CERTIFIED REGISTERED

## 2022-12-20 PROCEDURE — D9220A PRA ANESTHESIA: ICD-10-PCS | Mod: CRNA,,, | Performed by: NURSE ANESTHETIST, CERTIFIED REGISTERED

## 2022-12-20 PROCEDURE — 37000008 HC ANESTHESIA 1ST 15 MINUTES: Performed by: INTERNAL MEDICINE

## 2022-12-20 RX ORDER — LIDOCAINE HYDROCHLORIDE 20 MG/ML
INJECTION INTRAVENOUS
Status: DISCONTINUED | OUTPATIENT
Start: 2022-12-20 | End: 2022-12-20

## 2022-12-20 RX ORDER — SODIUM CHLORIDE 9 MG/ML
INJECTION, SOLUTION INTRAVENOUS CONTINUOUS
Status: DISCONTINUED | OUTPATIENT
Start: 2022-12-20 | End: 2022-12-20 | Stop reason: HOSPADM

## 2022-12-20 RX ORDER — PROPOFOL 10 MG/ML
VIAL (ML) INTRAVENOUS CONTINUOUS PRN
Status: DISCONTINUED | OUTPATIENT
Start: 2022-12-20 | End: 2022-12-20

## 2022-12-20 RX ORDER — KETAMINE HCL IN 0.9 % NACL 50 MG/5 ML
SYRINGE (ML) INTRAVENOUS
Status: DISCONTINUED | OUTPATIENT
Start: 2022-12-20 | End: 2022-12-20

## 2022-12-20 RX ADMIN — LIDOCAINE HYDROCHLORIDE 60 MG: 20 INJECTION INTRAVENOUS at 09:12

## 2022-12-20 RX ADMIN — SODIUM CHLORIDE: 0.9 INJECTION, SOLUTION INTRAVENOUS at 09:12

## 2022-12-20 RX ADMIN — Medication 25 MG: at 09:12

## 2022-12-20 RX ADMIN — PROPOFOL 150 MCG/KG/MIN: 10 INJECTION, EMULSION INTRAVENOUS at 09:12

## 2022-12-20 NOTE — PROVATION PATIENT INSTRUCTIONS
Discharge Summary/Instructions after an Endoscopic Procedure  Patient Name: Malika Valdez  Patient MRN: [id number]  Patient YOB: 1970 Tuesday, December 20, 2022  Elmer Stuart MD  Dear patient,  As a result of recent federal legislation (The Federal Cures Act), you may   receive lab or pathology results from your procedure in your [facility]   account before your physician is able to contact you. Your physician or   their representative will relay the results to you with their   recommendations at their soonest availability.  Thank you,  RESTRICTIONS:  During your procedure today, you received medications for sedation.  These   medications may affect your judgment, balance and coordination.  Therefore,   for 24 hours, you have the following restrictions:   - DO NOT drive a car, operate machinery, make legal/financial decisions,   sign important papers or drink alcohol.    ACTIVITY:  Today: no heavy lifting, straining or running due to procedural   sedation/anesthesia.  The following day: return to full activity including work.  DIET:  Eat and drink normally unless instructed otherwise.     TREATMENT FOR COMMON SIDE EFFECTS:  - Mild abdominal pain, nausea, belching, bloating or excessive gas:  rest,   eat lightly and use a heating pad.  - Sore Throat: treat with throat lozenges and/or gargle with warm salt   water.  - Because air was used during the procedure, expelling large amounts of air   from your rectum or belching is normal.  - If a bowel prep was taken, you may not have a bowel movement for 1-3 days.    This is normal.  SYMPTOMS TO WATCH FOR AND REPORT TO YOUR PHYSICIAN:  1. Abdominal pain or bloating, other than gas cramps.  2. Chest pain.  3. Back pain.  4. Signs of infection such as: chills or fever occurring within 24 hours   after the procedure.  5. Rectal bleeding, which would show as bright red, maroon, or black stools.   (A tablespoon of blood from the rectum is not serious, especially if    hemorrhoids are present.)  6. Vomiting.  7. Weakness or dizziness.  GO DIRECTLY TO THE NEAREST EMERGENCY ROOM IF YOU HAVE ANY OF THE FOLLOWING:      Difficulty breathing              Chills and/or fever over 101 F   Persistent vomiting and/or vomiting blood   Severe abdominal pain   Severe chest pain   Black, tarry stools   Bleeding- more than one tablespoon   Any other symptom or condition that you feel may need urgent attention  Your doctor recommends these additional instructions:  If any biopsies were taken, your doctors clinic will contact you in 1 to 2   weeks with any results.  - Discharge patient to home.   - Patient has a contact number available for emergencies.  The signs and   symptoms of potential delayed complications were discussed with the   patient.  Return to normal activities tomorrow.  Written discharge   instructions were provided to the patient.   - Resume previous diet.   - Continue present medications.   - Repeat colonoscopy in 10 years for screening purposes.   - Return to referring physician.  For questions, problems or results please call your physician - Elmer Stuart MD at Work:  [phone].  [facility], EMERGENCY ROOM PHONE NUMBER: [phone]  IF A COMPLICATION OR EMERGENCY SITUATION ARISES AND YOU ARE UNABLE TO REACH   YOUR PHYSICIAN - GO DIRECTLY TO THE EMERGENCY ROOM.  Elmer Stuart MD  12/20/2022 9:57:11 AM  This report has been verified and signed electronically.  Dear patient,  As a result of recent federal legislation (The Federal Cures Act), you may   receive lab or pathology results from your procedure in your [facility]   account before your physician is able to contact you. Your physician or   their representative will relay the results to you with their   recommendations at their soonest availability.  Thank you,  PROVATION

## 2022-12-20 NOTE — H&P
Short Stay Endoscopy History and Physical    PCP - Sarah Robles MD  Referring Physician - PRE-ADMIT, ENDO -[facility]  No address on file    Procedure - Colonoscopy  ASA - per anesthesia  Mallampati - per anesthesia  History of Anesthesia problems - per anesthesia  Family history Anesthesia problems -  per anesthesia   Plan of anesthesia - per anesthesia    HPI:  This is a 52 y.o. female here for evaluation of: first screening colonoscopy, average risk    Reflux - no  Dysphagia - no  Abdominal pain - no  Diarrhea - no    ROS:  Constitutional: No fevers, chills, No weight loss  CV: No chest pain  Pulm: No cough, No shortness of breath  Ophtho: No vision changes  GI: see HPI  Derm: No rash    Medical History:  has a past medical history of Arthritis, Hypertension (11/11/2013), ILD (interstitial lung disease), Insomnia (2/26/2013), Lupus, Obesity (2/26/2013), RA (refractory anemia), Rheumatoid arthritis, Steroid-induced hyperglycemia (3/1/2018), and Thyroid nodule (3/1/2018).    Surgical History:  has a past surgical history that includes Breast biopsy (Left, 10/13/2019).    Family History: family history includes Breast cancer (age of onset: 45) in an other family member; Breast cancer (age of onset: 62) in her mother; Diabetes in her father and mother; Hypertension in her father, maternal grandmother, mother, and paternal grandmother; Kidney disease in her mother; Rheum arthritis in her father and paternal grandmother..    Social History:  reports that she has never smoked. She has never used smokeless tobacco. She reports that she does not drink alcohol and does not use drugs.    Review of patient's allergies indicates:   Allergen Reactions    Lisinopril Swelling     Mouth swelled, had to go to ED    Ventolin [albuterol] Swelling     Lips - swelling       Medications:   Medications Prior to Admission   Medication Sig Dispense Refill Last Dose    amLODIPine (NORVASC) 5 MG tablet Take 1 tablet (5 mg  "total) by mouth once daily. 90 tablet 4     azaTHIOprine (IMURAN) 50 mg Tab TAKE 2 TABLETS TWICE A  tablet 0     blood sugar diagnostic (FREESTYLE LITE STRIPS) Strp TEST BLOOD SUGAR EVERY  strip 11     blood-glucose meter kit Use as instructed 1 each 0     diclofenac sodium (VOLTAREN) 1 % Gel Apply 2 g topically 4 (four) times daily. 2 Tube 0     dulaglutide (TRULICITY) 0.75 mg/0.5 mL pen injector Inject 0.75 mg into the skin every 7 days. 4 pen 11     ergocalciferol (VITAMIN D2) 50,000 unit Cap TAKE 1 CAPSULE TWICE WEEKLY 24 capsule 3     FREESTYLE LANCETS 28 gauge lancets Inject 1 lancet into the skin once daily. 100 each 0     GAVILYTE-G 236-22.74-6.74 -5.86 gram suspension        multivitamin with iron Tab Take 1 tablet by mouth once daily. (Patient not taking: Reported on 10/10/2022)  0     pen needle, diabetic (BD ULTRA-FINE SOLOMON PEN NEEDLE) 32 gauge x 5/32" Ndle Takes once daily 200 each 0     PLAQUENIL 200 mg tablet TAKE 1 TABLET TWICE A  tablet 3     sodium,potassium,mag sulfates (SUPREP BOWEL PREP KIT) 17.5-3.13-1.6 gram SolR Follow instructions given by Endoscopy scheduling nurse 1 kit 0        Physical Exam:    Vital Signs: There were no vitals filed for this visit.    General Appearance: Well appearing in no acute distress    Labs:  Lab Results   Component Value Date    WBC 3.97 10/10/2022    WBC 3.97 10/10/2022    HGB 11.6 (L) 10/10/2022    HGB 11.6 (L) 10/10/2022    HCT 38.9 10/10/2022    HCT 38.9 10/10/2022     10/10/2022     10/10/2022    CHOL 155 09/25/2021    TRIG 49 09/25/2021    HDL 58 09/25/2021    ALT 7 (L) 10/10/2022    AST 16 10/10/2022     10/10/2022    K 4.0 10/10/2022     10/10/2022    CREATININE 0.8 10/10/2022    BUN 13 10/10/2022    CO2 26 10/10/2022    TSH 1.619 03/19/2022    HGBA1C 5.8 (H) 10/10/2022       I have explained the risks and benefits of this endoscopic procedure to the patient including but not limited to bleeding, " inflammation, infection, perforation, missing a lesion and death.      Elmer Stuart MD

## 2022-12-20 NOTE — TRANSFER OF CARE
"Anesthesia Transfer of Care Note    Patient: Malika Valdez    Procedure(s) Performed: Procedure(s) (LRB):  COLONOSCOPY (N/A)    Patient location: [facility]    Anesthesia Type: MAC    Transport from OR: Transported from OR on 2-3 L/min O2 by NC with adequate spontaneous ventilation    Post pain: adequate analgesia    Post assessment: no apparent anesthetic complications    Post vital signs: stable    Level of consciousness: awake    Nausea/Vomiting: no nausea/vomiting    Complications: none    Transfer of care protocol was followed      Last vitals:   Visit Vitals  BP (!) 166/72   Pulse 60   Temp 36.6 °C (97.9 °F)   Resp 16   Ht 5' 5" (1.651 m)   Wt (!) 158.8 kg (350 lb)   LMP  (LMP Unknown)   SpO2 99%   Breastfeeding No   BMI 58.24 kg/m²     "

## 2022-12-21 ENCOUNTER — HOSPITAL ENCOUNTER (OUTPATIENT)
Dept: RADIOLOGY | Facility: HOSPITAL | Age: 52
Discharge: HOME | End: 2022-12-21
Attending: INTERNAL MEDICINE
Payer: OTHER GOVERNMENT

## 2022-12-21 VITALS — HEIGHT: 65 IN | BODY MASS INDEX: 48.82 KG/M2 | WEIGHT: 293 LBS

## 2022-12-21 DIAGNOSIS — Z12.31 SCREENING MAMMOGRAM, ENCOUNTER FOR: ICD-10-CM

## 2022-12-21 PROCEDURE — 77067 SCR MAMMO BI INCL CAD: CPT | Mod: TC

## 2022-12-21 PROCEDURE — 77063 MAMMO DIGITAL SCREENING BILAT WITH TOMO: ICD-10-PCS | Mod: 26,,, | Performed by: RADIOLOGY

## 2022-12-21 PROCEDURE — 77063 BREAST TOMOSYNTHESIS BI: CPT | Mod: TC

## 2022-12-21 PROCEDURE — 77063 BREAST TOMOSYNTHESIS BI: CPT | Mod: 26,,, | Performed by: RADIOLOGY

## 2022-12-21 PROCEDURE — 77067 MAMMO DIGITAL SCREENING BILAT WITH TOMO: ICD-10-PCS | Mod: 26,,, | Performed by: RADIOLOGY

## 2022-12-21 PROCEDURE — 77067 SCR MAMMO BI INCL CAD: CPT | Mod: 26,,, | Performed by: RADIOLOGY

## 2022-12-21 NOTE — ANESTHESIA POSTPROCEDURE EVALUATION
Anesthesia Post Evaluation    Patient: Malika Valdez    Procedure(s) Performed: Procedure(s) (LRB):  COLONOSCOPY (N/A)    Final Anesthesia Type: general      Patient location during evaluation: PACU  Patient participation: Yes- Able to Participate  Level of consciousness: awake and alert  Post-procedure vital signs: reviewed and stable  Pain management: adequate  Airway patency: patent    PONV status at discharge: No PONV  Anesthetic complications: no      Cardiovascular status: blood pressure returned to baseline  Respiratory status: unassisted, spontaneous ventilation and room air  Hydration status: euvolemic            Vitals Value Taken Time   /70 12/20/22 1020   Temp 36.7 °C (98.1 °F) 12/20/22 1020   Pulse 56 12/20/22 1020   Resp 16 12/20/22 1020   SpO2 100 % 12/20/22 1020         No case tracking events are documented in the log.      Pain/Rony Score: Rony Score: 9 (12/20/2022 10:05 AM)

## 2023-01-09 ENCOUNTER — PATIENT MESSAGE (OUTPATIENT)
Dept: RHEUMATOLOGY | Facility: CLINIC | Age: 53
End: 2023-01-09
Payer: OTHER GOVERNMENT

## 2023-01-27 ENCOUNTER — PATIENT MESSAGE (OUTPATIENT)
Dept: RHEUMATOLOGY | Facility: CLINIC | Age: 53
End: 2023-01-27
Payer: OTHER GOVERNMENT

## 2023-01-27 ENCOUNTER — PATIENT MESSAGE (OUTPATIENT)
Dept: INTERNAL MEDICINE | Facility: CLINIC | Age: 53
End: 2023-01-27
Payer: OTHER GOVERNMENT

## 2023-01-27 ENCOUNTER — TELEPHONE (OUTPATIENT)
Dept: INTERNAL MEDICINE | Facility: CLINIC | Age: 53
End: 2023-01-27
Payer: OTHER GOVERNMENT

## 2023-01-27 NOTE — TELEPHONE ENCOUNTER
----- Message from Rebecca Taylor sent at 1/27/2023  2:16 PM CST -----  Contact: [phone]  Pt tested positive for Covid on today. Pt is immunocompromised. Pt would like to know if something can be called in for her.  Pt is using   [facility] [employer] [facility] [facility] #[id number] [place] [address] AT [address] [address]  Phone: [phone] Fax: [phone]          Thank you

## 2023-01-27 NOTE — TELEPHONE ENCOUNTER
Spoke with patient  Today is her 4th day of symptoms.  She has nasal congestion, sneezing, runny nose, slight cough.  Symptoms are mild.  No fever, chills, nausea, vomiting, diarrhea.     Symptoms are mild , due to immunosupression, will send in Paxlovid should she worsen the next 1-2 days  If she continues to have mild symptoms and improve, no need to take Paxlovid

## 2023-02-10 ENCOUNTER — PATIENT MESSAGE (OUTPATIENT)
Dept: RHEUMATOLOGY | Facility: CLINIC | Age: 53
End: 2023-02-10
Payer: OTHER GOVERNMENT

## 2023-02-10 DIAGNOSIS — D84.9 IMMUNOSUPPRESSION: ICD-10-CM

## 2023-02-10 DIAGNOSIS — M32.8 LUPUS ERYTHEMATOSUS OVERLAP SYNDROME: ICD-10-CM

## 2023-02-10 DIAGNOSIS — J84.9 ILD (INTERSTITIAL LUNG DISEASE): Primary | ICD-10-CM

## 2023-02-10 DIAGNOSIS — M05.79 RHEUMATOID ARTHRITIS INVOLVING MULTIPLE SITES WITH POSITIVE RHEUMATOID FACTOR: ICD-10-CM

## 2023-02-13 DIAGNOSIS — M32.8 LUPUS ERYTHEMATOSUS OVERLAP SYNDROME: ICD-10-CM

## 2023-02-13 RX ORDER — AZATHIOPRINE 50 MG/1
100 TABLET ORAL 2 TIMES DAILY
Qty: 360 TABLET | Refills: 0 | Status: CANCELLED | OUTPATIENT
Start: 2023-02-13

## 2023-02-14 RX ORDER — AZATHIOPRINE 50 MG/1
100 TABLET ORAL 2 TIMES DAILY
Qty: 360 TABLET | Refills: 0 | OUTPATIENT
Start: 2023-02-14

## 2023-02-17 ENCOUNTER — LAB VISIT (OUTPATIENT)
Dept: LAB | Facility: HOSPITAL | Age: 53
End: 2023-02-17
Attending: INTERNAL MEDICINE
Payer: OTHER GOVERNMENT

## 2023-02-17 DIAGNOSIS — M32.8 LUPUS ERYTHEMATOSUS OVERLAP SYNDROME: ICD-10-CM

## 2023-02-17 DIAGNOSIS — R53.83 OTHER FATIGUE: ICD-10-CM

## 2023-02-17 DIAGNOSIS — J84.9 ILD (INTERSTITIAL LUNG DISEASE): ICD-10-CM

## 2023-02-17 DIAGNOSIS — D84.9 IMMUNOSUPPRESSION: ICD-10-CM

## 2023-02-17 DIAGNOSIS — M05.79 RHEUMATOID ARTHRITIS INVOLVING MULTIPLE SITES WITH POSITIVE RHEUMATOID FACTOR: ICD-10-CM

## 2023-02-17 LAB
ALBUMIN SERPL BCP-MCNC: 3.5 G/DL (ref 3.5–5.2)
ALP SERPL-CCNC: 91 U/L (ref 55–135)
ALT SERPL W/O P-5'-P-CCNC: 11 U/L (ref 10–44)
ANION GAP SERPL CALC-SCNC: 8 MMOL/L (ref 8–16)
AST SERPL-CCNC: 20 U/L (ref 10–40)
BASOPHILS # BLD AUTO: 0.03 K/UL (ref 0–0.2)
BASOPHILS NFR BLD: 0.7 % (ref 0–1.9)
BILIRUB SERPL-MCNC: 0.3 MG/DL (ref 0.1–1)
BUN SERPL-MCNC: 11 MG/DL (ref 6–20)
C3 SERPL-MCNC: 167 MG/DL (ref 50–180)
C4 SERPL-MCNC: 28 MG/DL (ref 11–44)
CALCIUM SERPL-MCNC: 9.6 MG/DL (ref 8.7–10.5)
CHLORIDE SERPL-SCNC: 106 MMOL/L (ref 95–110)
CK SERPL-CCNC: 165 U/L (ref 20–180)
CO2 SERPL-SCNC: 28 MMOL/L (ref 23–29)
CREAT SERPL-MCNC: 1 MG/DL (ref 0.5–1.4)
CRP SERPL-MCNC: 3.8 MG/L (ref 0–8.2)
DIFFERENTIAL METHOD: ABNORMAL
EOSINOPHIL # BLD AUTO: 0.2 K/UL (ref 0–0.5)
EOSINOPHIL NFR BLD: 3.7 % (ref 0–8)
ERYTHROCYTE [DISTWIDTH] IN BLOOD BY AUTOMATED COUNT: 14.7 % (ref 11.5–14.5)
ERYTHROCYTE [SEDIMENTATION RATE] IN BLOOD BY PHOTOMETRIC METHOD: 49 MM/HR (ref 0–36)
EST. GFR  (NO RACE VARIABLE): >60 ML/MIN/1.73 M^2
GLUCOSE SERPL-MCNC: 95 MG/DL (ref 70–110)
HCT VFR BLD AUTO: 39.2 % (ref 37–48.5)
HGB BLD-MCNC: 12 G/DL (ref 12–16)
IMM GRANULOCYTES # BLD AUTO: 0.01 K/UL (ref 0–0.04)
IMM GRANULOCYTES NFR BLD AUTO: 0.2 % (ref 0–0.5)
LYMPHOCYTES # BLD AUTO: 1.2 K/UL (ref 1–4.8)
LYMPHOCYTES NFR BLD: 26.2 % (ref 18–48)
MCH RBC QN AUTO: 27.8 PG (ref 27–31)
MCHC RBC AUTO-ENTMCNC: 30.6 G/DL (ref 32–36)
MCV RBC AUTO: 91 FL (ref 82–98)
MONOCYTES # BLD AUTO: 0.3 K/UL (ref 0.3–1)
MONOCYTES NFR BLD: 6.8 % (ref 4–15)
NEUTROPHILS # BLD AUTO: 2.8 K/UL (ref 1.8–7.7)
NEUTROPHILS NFR BLD: 62.4 % (ref 38–73)
NRBC BLD-RTO: 0 /100 WBC
PLATELET # BLD AUTO: 336 K/UL (ref 150–450)
PMV BLD AUTO: 9.3 FL (ref 9.2–12.9)
POTASSIUM SERPL-SCNC: 4.3 MMOL/L (ref 3.5–5.1)
PROT SERPL-MCNC: 8.5 G/DL (ref 6–8.4)
RBC # BLD AUTO: 4.32 M/UL (ref 4–5.4)
SODIUM SERPL-SCNC: 142 MMOL/L (ref 136–145)
WBC # BLD AUTO: 4.54 K/UL (ref 3.9–12.7)

## 2023-02-17 PROCEDURE — 80053 COMPREHEN METABOLIC PANEL: CPT | Performed by: INTERNAL MEDICINE

## 2023-02-17 PROCEDURE — 86160 COMPLEMENT ANTIGEN: CPT | Performed by: INTERNAL MEDICINE

## 2023-02-17 PROCEDURE — 85025 COMPLETE CBC W/AUTO DIFF WBC: CPT | Performed by: INTERNAL MEDICINE

## 2023-02-17 PROCEDURE — 86140 C-REACTIVE PROTEIN: CPT | Performed by: INTERNAL MEDICINE

## 2023-02-17 PROCEDURE — 36415 COLL VENOUS BLD VENIPUNCTURE: CPT | Performed by: INTERNAL MEDICINE

## 2023-02-17 PROCEDURE — 85652 RBC SED RATE AUTOMATED: CPT | Performed by: INTERNAL MEDICINE

## 2023-02-17 PROCEDURE — 86225 DNA ANTIBODY NATIVE: CPT | Performed by: INTERNAL MEDICINE

## 2023-02-17 PROCEDURE — 82550 ASSAY OF CK (CPK): CPT | Performed by: INTERNAL MEDICINE

## 2023-02-17 PROCEDURE — 82085 ASSAY OF ALDOLASE: CPT | Performed by: INTERNAL MEDICINE

## 2023-02-17 PROCEDURE — 86160 COMPLEMENT ANTIGEN: CPT | Mod: 59 | Performed by: INTERNAL MEDICINE

## 2023-02-20 RX ORDER — AZATHIOPRINE 50 MG/1
100 TABLET ORAL 2 TIMES DAILY
Qty: 56 TABLET | Refills: 0 | Status: SHIPPED | OUTPATIENT
Start: 2023-02-20 | End: 2023-05-04

## 2023-02-20 RX ORDER — HYDROXYCHLOROQUINE SULFATE 200 MG/1
200 TABLET, FILM COATED ORAL 2 TIMES DAILY
Qty: 180 TABLET | Refills: 0 | Status: SHIPPED | OUTPATIENT
Start: 2023-02-20 | End: 2023-05-22

## 2023-02-20 RX ORDER — AZATHIOPRINE 50 MG/1
100 TABLET ORAL 2 TIMES DAILY
Qty: 360 TABLET | Refills: 0 | Status: SHIPPED | OUTPATIENT
Start: 2023-02-20 | End: 2023-05-04 | Stop reason: SDUPTHER

## 2023-02-21 LAB — ALDOLASE SERPL-CCNC: 3.9 U/L (ref 1.2–7.6)

## 2023-02-23 LAB — DSDNA AB SER-ACNC: NORMAL [IU]/ML

## 2023-02-24 ENCOUNTER — PATIENT MESSAGE (OUTPATIENT)
Dept: RHEUMATOLOGY | Facility: CLINIC | Age: 53
End: 2023-02-24
Payer: OTHER GOVERNMENT

## 2023-03-07 ENCOUNTER — OFFICE VISIT (OUTPATIENT)
Dept: INTERNAL MEDICINE | Facility: CLINIC | Age: 53
End: 2023-03-07
Payer: OTHER GOVERNMENT

## 2023-03-07 VITALS
HEIGHT: 65 IN | SYSTOLIC BLOOD PRESSURE: 130 MMHG | OXYGEN SATURATION: 96 % | BODY MASS INDEX: 48.82 KG/M2 | DIASTOLIC BLOOD PRESSURE: 80 MMHG | HEART RATE: 53 BPM | WEIGHT: 293 LBS

## 2023-03-07 DIAGNOSIS — E55.9 VITAMIN D DEFICIENCY DISEASE: ICD-10-CM

## 2023-03-07 DIAGNOSIS — Z79.4 TYPE 2 DIABETES MELLITUS WITHOUT COMPLICATION, WITH LONG-TERM CURRENT USE OF INSULIN: ICD-10-CM

## 2023-03-07 DIAGNOSIS — E11.9 TYPE 2 DIABETES MELLITUS WITHOUT COMPLICATION, WITH LONG-TERM CURRENT USE OF INSULIN: ICD-10-CM

## 2023-03-07 DIAGNOSIS — I10 ESSENTIAL HYPERTENSION: ICD-10-CM

## 2023-03-07 DIAGNOSIS — M32.8 LUPUS ERYTHEMATOSUS OVERLAP SYNDROME: ICD-10-CM

## 2023-03-07 DIAGNOSIS — M05.79 RHEUMATOID ARTHRITIS INVOLVING MULTIPLE SITES WITH POSITIVE RHEUMATOID FACTOR: ICD-10-CM

## 2023-03-07 DIAGNOSIS — E66.01 MORBID OBESITY WITH BMI OF 50.0-59.9, ADULT: Primary | ICD-10-CM

## 2023-03-07 PROCEDURE — 99214 OFFICE O/P EST MOD 30 MIN: CPT | Mod: PBBFAC | Performed by: INTERNAL MEDICINE

## 2023-03-07 PROCEDURE — 99214 OFFICE O/P EST MOD 30 MIN: CPT | Mod: S$PBB,,, | Performed by: INTERNAL MEDICINE

## 2023-03-07 PROCEDURE — 99999 PR PBB SHADOW E&M-EST. PATIENT-LVL IV: CPT | Mod: PBBFAC,,, | Performed by: INTERNAL MEDICINE

## 2023-03-07 PROCEDURE — 99999 PR PBB SHADOW E&M-EST. PATIENT-LVL IV: ICD-10-PCS | Mod: PBBFAC,,, | Performed by: INTERNAL MEDICINE

## 2023-03-07 PROCEDURE — 99214 PR OFFICE/OUTPT VISIT, EST, LEVL IV, 30-39 MIN: ICD-10-PCS | Mod: S$PBB,,, | Performed by: INTERNAL MEDICINE

## 2023-03-07 RX ORDER — SEMAGLUTIDE 0.25 MG/.5ML
0.25 INJECTION, SOLUTION SUBCUTANEOUS
Qty: 2 ML | Refills: 3 | Status: SHIPPED | OUTPATIENT
Start: 2023-03-07 | End: 2023-03-07 | Stop reason: SDUPTHER

## 2023-03-07 RX ORDER — SEMAGLUTIDE 0.25 MG/.5ML
0.25 INJECTION, SOLUTION SUBCUTANEOUS
Qty: 6 ML | Refills: 3 | Status: SHIPPED | OUTPATIENT
Start: 2023-03-07 | End: 2023-03-26

## 2023-03-07 RX ORDER — ERGOCALCIFEROL 1.25 MG/1
CAPSULE ORAL
Qty: 24 CAPSULE | Refills: 3 | Status: SHIPPED | OUTPATIENT
Start: 2023-03-07

## 2023-03-07 NOTE — PROGRESS NOTES
CHIEF COMPLAINT:   follow up of RA/sle, hypertension, steroid induced diabetes.     HISTORY OF PRESENT ILLNESS: This is a 52 year old woman who presents for follow up.     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  Daughter will be 11 in November and son is 9.- school is going well for them.  Daugher need her home more. She gets off at 3:30     Joints are doing well.  She uses voltaren gel as needed.  She has been off prednisone since 10/7/19.  She continues to do well off prednisone.  She is taking azathoprine 100 mg twice daily and plaquenil 200 mg twice daily for her RA and lupus overlap with hypoxemic respiratory failure 12/2017 withCT with interstial changes, pericardial effusion and pleural effusion, TIARA that was initially negative 2009  positive 1: 2560 speckled, +NILS, +SSA,SSB, dsDNA neg, +RNA polymerase III.   Breathing is good.  No fever, chills, night sweats, oral ulcers, chest pain, shortness of breath, nausea, vomiting, constipation.  No diarrhea.    She remains off pantoprazole and is doing well.          She has taken  metformin for her diabetes in the past which caused diarrhea.  She did not start on Trulicity or Ozempic - could not get approved through her insurance.  She has gained 13 pounds since our last visit.  She does the Noom diet intermittently. .       She is taking Norvasc 5 mg daily  for hypertension.  No palpitations.     She is taking vitamin D 50,000 units twice weekly.      REVIEW OF SYSTEMS: No fevers, chills, night sweats, fatigue, visual change, hearing loss, sinus congestion, sore throat, chest pain, shortness of breath, nausea, vomiting, constipation, diarrhea, dysuria, hematuria, polydipsia, polyuria, joint pain, muscle pain, headaches         PAST MEDICAL HISTORY:   1.  RA and lupus overlap  2. History of vitamin D deficiency.   3. History of amenorrhea   4. Morbid obesity.     PAST SURGICAL HISTORY: Negative.     ALLERGIES, MEDICATIONS: Updated on  "[facility]     SOCIAL HISTORY: No tobacco, alcohol use. She is , has no children. She is a RN    FAMILY HISTORY: Mother  at age 69, ESRD on dialysis, aortic stenosis, diabetes, history hypertension, breast cancer age 64 (in remission), benign colon cancer. Father is living diabetes, hypertension and rheumatoid arthritis. Brother with diverticulosis. Brother healthy. Brother healthy.      PHYSICAL EXAMINATION:     /80 (BP Location: Right arm, Patient Position: Sitting)   Pulse (!) 53   Ht 5' 5" (1.651 m)   Wt (!) 158.9 kg (350 lb 5 oz)   LMP 2017   SpO2 96%   BMI 58.29 kg/m²        General: Is alert, oriented, in no apparent distress. Affect is within   normal limits. TM clear, OP clear  HEENT: Conjunctivae are anicteric. . Respiratory   effort is normal.  CV RRR without murmur gallops or rubs. No lower extremity edema       Protective Sensation (w/ 10 gram monofilament):  Right: Intact  Left: Intact    Visual Inspection:  Normal -  Bilateral    Pedal Pulses:   Right: Present  Left: Present    Posterior Tibialis Pulses:   Right:Present  Left: Present     labs 23 reviewed      ASSESSMENT AND PLAN:         1. RA with lupus overlab with hypoxemic respiratory failure 2017 withCT with interstial changes, pericardial effusion and pleural effusion, TIARA that was initially negative  now positive 1: 2560 speckled, +NILS, +SSA,SSB, dsDNA neg, +RNA polymerase III. Hypoxemic respiratory failure -CT with interstial changes, pericardial effusion and pleural effusion. Followed by rheumatology. Doing well.    3. Steroid induced diabetes - Has had diarrhea with metformin.  Will try Wegovy 0.25 mg once weekly for 1 month then titrate to 0.5 mg once weekly for one month then 1 mg once weekly.   4. Anemia - resolved  5. Hypertension - stable.   6. Vitamin D deficiency - vitamin D 50,000 units twice weekly  7. Morbid obesity - Wegovy. As above.   8. GERD -asx off pantoprazole  Screening - [facility] 2022.  " GYN exam 11/19.    Colonoscopy 12/20/22 - diverticulosis otherwise normal - due 2032

## 2023-03-07 NOTE — TELEPHONE ENCOUNTER
----- Message from Sarah Robles MD sent at 3/7/2023  8:38 AM CST -----  Starting on Wegovy- may n eed prior auth - diabetes - diarrhea from metformin.  Also morbid obesity with BMI o f 58 with hypertension.

## 2023-03-07 NOTE — Clinical Note
Starting on Wegovy- may n eed prior auth - diabetes - diarrhea from metformin.  Also morbid obesity with BMI o f 58 with hypertension.

## 2023-03-07 NOTE — PATIENT INSTRUCTIONS
Will try Wegovy 0.25 mg once weekly for 1 month then titrate to 0.5 mg once weekly for one month then 1 mg once weekly.

## 2023-03-25 ENCOUNTER — PATIENT MESSAGE (OUTPATIENT)
Dept: INTERNAL MEDICINE | Facility: CLINIC | Age: 53
End: 2023-03-25
Payer: OTHER GOVERNMENT

## 2023-03-25 DIAGNOSIS — Z79.4 TYPE 2 DIABETES MELLITUS WITHOUT COMPLICATION, WITH LONG-TERM CURRENT USE OF INSULIN: Primary | ICD-10-CM

## 2023-03-25 DIAGNOSIS — E11.9 TYPE 2 DIABETES MELLITUS WITHOUT COMPLICATION, WITH LONG-TERM CURRENT USE OF INSULIN: Primary | ICD-10-CM

## 2023-03-26 RX ORDER — SEMAGLUTIDE 1.34 MG/ML
0.25 INJECTION, SOLUTION SUBCUTANEOUS
Qty: 3 EACH | Refills: 4 | Status: SHIPPED | OUTPATIENT
Start: 2023-03-26 | End: 2023-07-07

## 2023-03-29 NOTE — TELEPHONE ENCOUNTER
Schedule a prostate MRI, Dr. Haynes will call with the results.    It was a pleasure meeting with you today.  Thank you for allowing me and my team the privilege of caring for you today.  YOU are the reason we are here, and I truly hope we provided you with the excellent service you deserve.  Please let us know if there is anything else we can do for you so that we can be sure you are leaving completely satisfied with your care experience.         See other message

## 2023-04-12 ENCOUNTER — TELEPHONE (OUTPATIENT)
Dept: BARIATRICS | Facility: CLINIC | Age: 53
End: 2023-04-12
Payer: OTHER GOVERNMENT

## 2023-04-20 ENCOUNTER — TELEPHONE (OUTPATIENT)
Dept: BARIATRICS | Facility: CLINIC | Age: 53
End: 2023-04-20
Payer: OTHER GOVERNMENT

## 2023-04-20 NOTE — TELEPHONE ENCOUNTER
Notified patient of the date & time of financial phone call appt.  Pt aware appt is a telephone call.    Dashboard updated  Appt. 06/23/2023

## 2023-05-04 ENCOUNTER — OFFICE VISIT (OUTPATIENT)
Dept: RHEUMATOLOGY | Facility: CLINIC | Age: 53
End: 2023-05-04
Payer: OTHER GOVERNMENT

## 2023-05-04 ENCOUNTER — LAB VISIT (OUTPATIENT)
Dept: LAB | Facility: HOSPITAL | Age: 53
End: 2023-05-04
Attending: INTERNAL MEDICINE
Payer: OTHER GOVERNMENT

## 2023-05-04 VITALS
SYSTOLIC BLOOD PRESSURE: 129 MMHG | WEIGHT: 293 LBS | HEIGHT: 65 IN | BODY MASS INDEX: 48.82 KG/M2 | HEART RATE: 50 BPM | DIASTOLIC BLOOD PRESSURE: 77 MMHG

## 2023-05-04 DIAGNOSIS — D84.9 IMMUNOSUPPRESSION: ICD-10-CM

## 2023-05-04 DIAGNOSIS — M32.8 LUPUS ERYTHEMATOSUS OVERLAP SYNDROME: ICD-10-CM

## 2023-05-04 DIAGNOSIS — M05.79 RHEUMATOID ARTHRITIS INVOLVING MULTIPLE SITES WITH POSITIVE RHEUMATOID FACTOR: ICD-10-CM

## 2023-05-04 DIAGNOSIS — R53.83 OTHER FATIGUE: ICD-10-CM

## 2023-05-04 DIAGNOSIS — J84.9 ILD (INTERSTITIAL LUNG DISEASE): ICD-10-CM

## 2023-05-04 DIAGNOSIS — M05.79 RHEUMATOID ARTHRITIS INVOLVING MULTIPLE SITES WITH POSITIVE RHEUMATOID FACTOR: Primary | ICD-10-CM

## 2023-05-04 LAB
ALBUMIN SERPL BCP-MCNC: 3.4 G/DL (ref 3.5–5.2)
ALP SERPL-CCNC: 91 U/L (ref 55–135)
ALT SERPL W/O P-5'-P-CCNC: 6 U/L (ref 10–44)
ANION GAP SERPL CALC-SCNC: 7 MMOL/L (ref 8–16)
AST SERPL-CCNC: 15 U/L (ref 10–40)
BASOPHILS # BLD AUTO: 0.02 K/UL (ref 0–0.2)
BASOPHILS NFR BLD: 0.5 % (ref 0–1.9)
BILIRUB SERPL-MCNC: 0.4 MG/DL (ref 0.1–1)
BUN SERPL-MCNC: 15 MG/DL (ref 6–20)
C3 SERPL-MCNC: 160 MG/DL (ref 50–180)
C4 SERPL-MCNC: 27 MG/DL (ref 11–44)
CALCIUM SERPL-MCNC: 9.6 MG/DL (ref 8.7–10.5)
CHLORIDE SERPL-SCNC: 106 MMOL/L (ref 95–110)
CK SERPL-CCNC: 192 U/L (ref 20–180)
CO2 SERPL-SCNC: 29 MMOL/L (ref 23–29)
CREAT SERPL-MCNC: 0.9 MG/DL (ref 0.5–1.4)
CRP SERPL-MCNC: 8.8 MG/L (ref 0–8.2)
DIFFERENTIAL METHOD: ABNORMAL
EOSINOPHIL # BLD AUTO: 0.2 K/UL (ref 0–0.5)
EOSINOPHIL NFR BLD: 4.8 % (ref 0–8)
ERYTHROCYTE [DISTWIDTH] IN BLOOD BY AUTOMATED COUNT: 14.4 % (ref 11.5–14.5)
ERYTHROCYTE [SEDIMENTATION RATE] IN BLOOD BY PHOTOMETRIC METHOD: 57 MM/HR (ref 0–36)
EST. GFR  (NO RACE VARIABLE): >60 ML/MIN/1.73 M^2
GLUCOSE SERPL-MCNC: 88 MG/DL (ref 70–110)
HCT VFR BLD AUTO: 37.9 % (ref 37–48.5)
HGB BLD-MCNC: 11.5 G/DL (ref 12–16)
IMM GRANULOCYTES # BLD AUTO: 0 K/UL (ref 0–0.04)
IMM GRANULOCYTES NFR BLD AUTO: 0 % (ref 0–0.5)
LYMPHOCYTES # BLD AUTO: 0.8 K/UL (ref 1–4.8)
LYMPHOCYTES NFR BLD: 20.1 % (ref 18–48)
MCH RBC QN AUTO: 27.6 PG (ref 27–31)
MCHC RBC AUTO-ENTMCNC: 30.3 G/DL (ref 32–36)
MCV RBC AUTO: 91 FL (ref 82–98)
MONOCYTES # BLD AUTO: 0.3 K/UL (ref 0.3–1)
MONOCYTES NFR BLD: 7.7 % (ref 4–15)
NEUTROPHILS # BLD AUTO: 2.8 K/UL (ref 1.8–7.7)
NEUTROPHILS NFR BLD: 66.9 % (ref 38–73)
NRBC BLD-RTO: 0 /100 WBC
PLATELET # BLD AUTO: 277 K/UL (ref 150–450)
PMV BLD AUTO: 9.8 FL (ref 9.2–12.9)
POTASSIUM SERPL-SCNC: 4.5 MMOL/L (ref 3.5–5.1)
PROT SERPL-MCNC: 8.2 G/DL (ref 6–8.4)
RBC # BLD AUTO: 4.16 M/UL (ref 4–5.4)
SODIUM SERPL-SCNC: 142 MMOL/L (ref 136–145)
WBC # BLD AUTO: 4.17 K/UL (ref 3.9–12.7)

## 2023-05-04 PROCEDURE — 86140 C-REACTIVE PROTEIN: CPT | Performed by: INTERNAL MEDICINE

## 2023-05-04 PROCEDURE — 99999 PR PBB SHADOW E&M-EST. PATIENT-LVL III: CPT | Mod: PBBFAC,,, | Performed by: INTERNAL MEDICINE

## 2023-05-04 PROCEDURE — 85025 COMPLETE CBC W/AUTO DIFF WBC: CPT | Performed by: INTERNAL MEDICINE

## 2023-05-04 PROCEDURE — 86225 DNA ANTIBODY NATIVE: CPT | Performed by: INTERNAL MEDICINE

## 2023-05-04 PROCEDURE — 86160 COMPLEMENT ANTIGEN: CPT | Mod: 59 | Performed by: INTERNAL MEDICINE

## 2023-05-04 PROCEDURE — 36415 COLL VENOUS BLD VENIPUNCTURE: CPT | Performed by: INTERNAL MEDICINE

## 2023-05-04 PROCEDURE — 99214 OFFICE O/P EST MOD 30 MIN: CPT | Mod: S$PBB,,, | Performed by: INTERNAL MEDICINE

## 2023-05-04 PROCEDURE — 80053 COMPREHEN METABOLIC PANEL: CPT | Performed by: INTERNAL MEDICINE

## 2023-05-04 PROCEDURE — 82550 ASSAY OF CK (CPK): CPT | Performed by: INTERNAL MEDICINE

## 2023-05-04 PROCEDURE — 86160 COMPLEMENT ANTIGEN: CPT | Performed by: INTERNAL MEDICINE

## 2023-05-04 PROCEDURE — 99999 PR PBB SHADOW E&M-EST. PATIENT-LVL III: ICD-10-PCS | Mod: PBBFAC,,, | Performed by: INTERNAL MEDICINE

## 2023-05-04 PROCEDURE — 85652 RBC SED RATE AUTOMATED: CPT | Performed by: INTERNAL MEDICINE

## 2023-05-04 PROCEDURE — 99213 OFFICE O/P EST LOW 20 MIN: CPT | Mod: PBBFAC | Performed by: INTERNAL MEDICINE

## 2023-05-04 PROCEDURE — 99214 PR OFFICE/OUTPT VISIT, EST, LEVL IV, 30-39 MIN: ICD-10-PCS | Mod: S$PBB,,, | Performed by: INTERNAL MEDICINE

## 2023-05-04 RX ORDER — AZATHIOPRINE 50 MG/1
100 TABLET ORAL 2 TIMES DAILY
Qty: 360 TABLET | Refills: 0 | Status: SHIPPED | OUTPATIENT
Start: 2023-05-04 | End: 2023-05-31 | Stop reason: SDUPTHER

## 2023-05-04 ASSESSMENT — ROUTINE ASSESSMENT OF PATIENT INDEX DATA (RAPID3)
PATIENT GLOBAL ASSESSMENT SCORE: 1
TOTAL RAPID3 SCORE: 0.89
PAIN SCORE: 1
MDHAQ FUNCTION SCORE: 0.2
AM STIFFNESS SCORE: 0, NO
FATIGUE SCORE: 1
PSYCHOLOGICAL DISTRESS SCORE: 0

## 2023-05-04 NOTE — PROGRESS NOTES
"Subjective:       Patient ID: Malika Valdez is a 52 y.o. female.    Chief Complaint: RA/SLE    HPI:  Malika Valdez is a 52 y.o. female with history of RA and lupus overlap with ILD.  Lupus diagnosed 12/2017 based on TIARA that was initially negative 2009 now positive 1: 2560 speckled, +NILS, +SSA,SSB, dsDNA neg, +RNA polymerase III as well as  acute hypoxemic respiratory failure, pericardial/pleural effusion, ground glass opacities on chest CT.     In hospital 12/2017 started on solumedrol 16mg q8 and switched to prednisone 60 mg daily. SSZ stopped in hospital due to concern it could have been cause of ILD.  Discharged with home O2.  Pulmonary decided not to bronch.  Concerned for SLE overlap and she was started on HCQ 200mg BID.     Her repeat CT chest showed a decrease in the pleural and pericardial effusions.         Interval History:    She is feeling well.   She is currently on azathioprine 100 mg in AM and 100 mg PM, Plaquenil 200 mg bid.  Off prednisone since Oct 2019.    No pain today.  No morning stiffness.  Feeling well.  No ulcers in mouth or nose, no rashes, no hair loss.  Works as a school nurse now.      Review of Systems   Constitutional:  Positive for fatigue. Negative for fever and unexpected weight change.   HENT:  Negative for trouble swallowing.    Eyes:  Negative for redness.   Respiratory:  Negative for cough and shortness of breath.    Cardiovascular:  Negative for chest pain.   Gastrointestinal: Negative.  Negative for constipation and diarrhea.   Endocrine: Negative.    Genitourinary:  Negative for dysuria and genital sores.   Musculoskeletal: Negative.    Skin:  Negative for rash.   Allergic/Immunologic: Negative.    Neurological:  Negative for headaches.   Hematological:  Does not bruise/bleed easily.   Psychiatric/Behavioral: Negative.         Objective:   /77   Pulse (!) 50   Ht 5' 5" (1.651 m)   Wt (!) 162.9 kg (359 lb 2.1 oz)   LMP 11/13/2017   BMI 59.76 kg/m²   Virtual visit   "   Physical Exam   Constitutional: She is oriented to person, place, and time.   HENT:   Head: Normocephalic and atraumatic.   Eyes: Conjunctivae are normal.   Cardiovascular: Normal rate, regular rhythm and normal heart sounds.          Pulmonary/Chest: Effort normal and breath sounds normal.   Abdominal: Soft. Bowel sounds are normal.   Musculoskeletal:      Cervical back: Neck supple.      Comments: 28 joint count: 0 swollen and 0 tender   Neurological: She is alert and oriented to person, place, and time.   Skin: Skin is warm and dry.   Psychiatric: Mood and affect normal.       LABS    Component      Latest Ref Rng & Units 2/17/2023   WBC      3.90 - 12.70 K/uL 4.54   RBC      4.00 - 5.40 M/uL 4.32   Hemoglobin      12.0 - 16.0 g/dL 12.0   Hematocrit      37.0 - 48.5 % 39.2   MCV      82 - 98 fL 91   MCH      27.0 - 31.0 pg 27.8   MCHC      32.0 - 36.0 g/dL 30.6 (L)   RDW      11.5 - 14.5 % 14.7 (H)   Platelets      150 - 450 K/uL 336   MPV      9.2 - 12.9 fL 9.3   Immature Granulocytes      0.0 - 0.5 % 0.2   Gran # (ANC)      1.8 - 7.7 K/uL 2.8   Immature Grans (Abs)      0.00 - 0.04 K/uL 0.01   Lymph #      1.0 - 4.8 K/uL 1.2   Mono #      0.3 - 1.0 K/uL 0.3   Eos #      0.0 - 0.5 K/uL 0.2   Baso #      0.00 - 0.20 K/uL 0.03   nRBC      0 /100 WBC 0   Gran %      38.0 - 73.0 % 62.4   Lymph %      18.0 - 48.0 % 26.2   Mono %      4.0 - 15.0 % 6.8   Eosinophil %      0.0 - 8.0 % 3.7   Basophil %      0.0 - 1.9 % 0.7   Differential Method       Automated   Sodium      136 - 145 mmol/L 142   Potassium      3.5 - 5.1 mmol/L 4.3   Chloride      95 - 110 mmol/L 106   CO2      23 - 29 mmol/L 28   Glucose      70 - 110 mg/dL 95   BUN      6 - 20 mg/dL 11   Creatinine      0.5 - 1.4 mg/dL 1.0   Calcium      8.7 - 10.5 mg/dL 9.6   PROTEIN TOTAL      6.0 - 8.4 g/dL 8.5 (H)   Albumin      3.5 - 5.2 g/dL 3.5   BILIRUBIN TOTAL      0.1 - 1.0 mg/dL 0.3   Alkaline Phosphatase      55 - 135 U/L 91   AST      10 - 40 U/L 20    ALT      10 - 44 U/L 11   Anion Gap      8 - 16 mmol/L 8   eGFR      >60 mL/min/1.73 m:2 >60.0   Specimen UA       Urine, Clean Catch   Color, UA      Yellow, Straw, Mai Colorless (A)   Appearance, UA      Clear Clear   pH, UA      5.0 - 8.0 6.0   Specific Gravity, UA      1.005 - 1.030 1.015   Protein, UA      Negative Negative   Glucose, UA      Negative Negative   Ketones, UA      Negative Negative   Bilirubin (UA)      Negative Negative   Occult Blood UA      Negative Negative   NITRITE UA      Negative Negative   Leukocytes, UA      Negative Negative   Protein, Urine Random      0 - 15 mg/dL 7   Creatinine, Urine      15.0 - 325.0 mg/dL 58.0   Prot/Creat Ratio, Urine      0.00 - 0.20 0.12   ds DNA Ab      Negative 1:10 Negative 1:10   Complement (C-3)      50 - 180 mg/dL 167   Complement (C-4)      11 - 44 mg/dL 28   CPK      20 - 180 U/L 165   Aldolase      1.2 - 7.6 U/L 3.9   CRP      0.0 - 8.2 mg/L 3.8   Sed Rate      0 - 36 mm/Hr 49 (H)        Assessment:       1. SLE manifested by hypoxemic respiratory failure with CT with interstial changes, pericardial effusion and pleural effusion, TIARA that was initially negative 2009 now positive 1: 2560 speckled, +NILS, +SSA,SSB, dsDNA neg, +RNA polymerase III.   On Imuran and Plaquenil.  She is doing well  2. Rheumatoid arthritis. Manifested by previous small joint involvement of the hands, +RF/CCP and elevated inflammatory markers.  Now with improvement activity in hand with voltaren gel.  3. Morbid obesity. Patient has not been exercising at [place].  Encourage walking and restarting weight watchers (50 pounds since hospitalization and Weight Watchers).  Discussed with her primary doctor possible weight loss surgery  4. Abnormal SPEP without monoclonal gammopathy  5. Bradycardia.  Persistent despite beta blocker.   6. Insomnia.  Improved with meditation    Plan:       1.  Labs q3 months  2.  Continue azathioprine 100 mg in am and 100 mg in pm  3.  Continue  Plaquenil.  Patient to get Plaquenil 5/2023 due to eye doctor rescheduling.  Last done January 2022.  4.  Encourage self care   5.  Had flu vaccination  6.  Discussed weight loss.  Doing bariatric evaluation soon.  7.  Voltaren gel 2g qid prn     RTO in 6 months/prn.

## 2023-05-05 ENCOUNTER — PATIENT MESSAGE (OUTPATIENT)
Dept: RHEUMATOLOGY | Facility: CLINIC | Age: 53
End: 2023-05-05
Payer: OTHER GOVERNMENT

## 2023-05-05 LAB — DSDNA AB SER-ACNC: NORMAL [IU]/ML

## 2023-05-22 DIAGNOSIS — M05.79 RHEUMATOID ARTHRITIS INVOLVING MULTIPLE SITES WITH POSITIVE RHEUMATOID FACTOR: ICD-10-CM

## 2023-05-22 DIAGNOSIS — M32.8 LUPUS ERYTHEMATOSUS OVERLAP SYNDROME: ICD-10-CM

## 2023-05-22 RX ORDER — HYDROXYCHLOROQUINE SULFATE 200 MG/1
TABLET, FILM COATED ORAL
Qty: 180 TABLET | Refills: 3 | Status: SHIPPED | OUTPATIENT
Start: 2023-05-22

## 2023-05-24 DIAGNOSIS — E11.9 TYPE 2 DIABETES MELLITUS WITHOUT COMPLICATION: ICD-10-CM

## 2023-05-26 ENCOUNTER — OFFICE VISIT (OUTPATIENT)
Dept: OPTOMETRY | Facility: CLINIC | Age: 53
End: 2023-05-26
Payer: OTHER GOVERNMENT

## 2023-05-26 ENCOUNTER — TELEPHONE (OUTPATIENT)
Dept: OPHTHALMOLOGY | Facility: CLINIC | Age: 53
End: 2023-05-26
Payer: OTHER GOVERNMENT

## 2023-05-26 DIAGNOSIS — H25.13 NUCLEAR SCLEROSIS OF BOTH EYES: ICD-10-CM

## 2023-05-26 DIAGNOSIS — H35.033 HYPERTENSIVE RETINOPATHY OF BOTH EYES: ICD-10-CM

## 2023-05-26 DIAGNOSIS — M32.8 LUPUS ERYTHEMATOSUS OVERLAP SYNDROME: ICD-10-CM

## 2023-05-26 DIAGNOSIS — Z79.899 ENCOUNTER FOR EYE EXAM DUE TO HIGH RISK MEDICATION: Primary | ICD-10-CM

## 2023-05-26 DIAGNOSIS — H52.4 MYOPIA WITH ASTIGMATISM AND PRESBYOPIA, BILATERAL: ICD-10-CM

## 2023-05-26 DIAGNOSIS — M05.79 RHEUMATOID ARTHRITIS INVOLVING MULTIPLE SITES WITH POSITIVE RHEUMATOID FACTOR: ICD-10-CM

## 2023-05-26 DIAGNOSIS — H52.203 MYOPIA WITH ASTIGMATISM AND PRESBYOPIA, BILATERAL: ICD-10-CM

## 2023-05-26 DIAGNOSIS — Z79.899 LONG-TERM USE OF PLAQUENIL: ICD-10-CM

## 2023-05-26 DIAGNOSIS — Z97.3 WEARS CONTACT LENSES: ICD-10-CM

## 2023-05-26 DIAGNOSIS — H52.13 MYOPIA WITH ASTIGMATISM AND PRESBYOPIA, BILATERAL: ICD-10-CM

## 2023-05-26 PROCEDURE — 92310 PR CONTACT LENS FITTING (NO CHANGE): ICD-10-PCS | Mod: CSM,,, | Performed by: OPTOMETRIST

## 2023-05-26 PROCEDURE — 99213 OFFICE O/P EST LOW 20 MIN: CPT | Mod: PBBFAC | Performed by: OPTOMETRIST

## 2023-05-26 PROCEDURE — 99999 PR PBB SHADOW E&M-EST. PATIENT-LVL III: CPT | Mod: PBBFAC,,, | Performed by: OPTOMETRIST

## 2023-05-26 PROCEDURE — 92134 OCT, RETINA - OU - BOTH EYES: ICD-10-PCS | Mod: 26,S$PBB,, | Performed by: OPTOMETRIST

## 2023-05-26 PROCEDURE — 92083 EXTENDED VISUAL FIELD XM: CPT | Mod: 26,S$PBB,, | Performed by: OPTOMETRIST

## 2023-05-26 PROCEDURE — 92014 COMPRE OPH EXAM EST PT 1/>: CPT | Mod: S$PBB,,, | Performed by: OPTOMETRIST

## 2023-05-26 PROCEDURE — 92083 HUMPHREY VISUAL FIELD - OU - BOTH EYES: ICD-10-PCS | Mod: 26,S$PBB,, | Performed by: OPTOMETRIST

## 2023-05-26 PROCEDURE — 92134 CPTRZ OPH DX IMG PST SGM RTA: CPT | Mod: 26,S$PBB,, | Performed by: OPTOMETRIST

## 2023-05-26 PROCEDURE — 92015 DETERMINE REFRACTIVE STATE: CPT | Mod: S$PBB,,, | Performed by: OPTOMETRIST

## 2023-05-26 PROCEDURE — 92310 CONTACT LENS FITTING OU: CPT | Mod: CSM,,, | Performed by: OPTOMETRIST

## 2023-05-26 PROCEDURE — 92014 PR EYE EXAM, EST PATIENT,COMPREHESV: ICD-10-PCS | Mod: S$PBB,,, | Performed by: OPTOMETRIST

## 2023-05-26 PROCEDURE — 92015 PR REFRACTION: ICD-10-PCS | Mod: S$PBB,,, | Performed by: OPTOMETRIST

## 2023-05-26 PROCEDURE — 99999 PR PBB SHADOW E&M-EST. PATIENT-LVL III: ICD-10-PCS | Mod: PBBFAC,,, | Performed by: OPTOMETRIST

## 2023-05-26 NOTE — PROGRESS NOTES
HPI    CC: Annual eye exam. Pt states that she is taking plaquenil 200mg BID and   is here for a general ocular health exam. Pt would also like an updated   glasses and contact lens Rx.    TONIO:1/25/2022 Dr. Mcgee    (-) Changes in vision   (-) Pain  (-) Irritation   (-) Itching   (-) Flashes  (-) Floaters  (+) Glasses wearer  (+) CL wearer  (-) Uses eye gtts    Does patient want a refraction today? Yes    (-) Eye injury  (-) Eye surgery   (+)POHx: (+)HTN retinopathy OU (+)cataracts OU  (+)FOHx: (+)cataracts - mother, father     (+)Medication: Plaquenil 200 mg po BID  (+)PMHx: (+)lupus (+)rheumatoid arthritis (+)rheumatoid lung disease   (+)h/o steroid-induced hyperglycemia    (-)DM  Hemoglobin A1C       Date                     Value               Ref Range             Status                10/10/2022               5.8 (H)             4.0 - 5.6 %           Final                  03/19/2022               5.7 (H)             4.0 - 5.6 %           Final                  09/25/2021               5.7 (H)             4.0 - 5.6 %           Final                   Last edited by Tess Mcgee, OD on 5/26/2023  8:32 AM.            Assessment /Plan     For exam results, see Encounter Report.    Encounter for eye exam due to high risk medication  -     OCT, Retina - OU - Both Eyes; Future  -     [place] Visual [facility] - OU - Extended - Both Eyes; Future  -     OCT, Retina - OU - Both Eyes  -     [place] Visual [facility] - OU - Extended - Both Eyes    Long-term use of Plaquenil  -     OCT, Retina - OU - Both Eyes; Future  -     [place] Visual [facility] - OU - Extended - Both Eyes; Future  -     OCT, Retina - OU - Both Eyes  -     [place] Visual [facility] - OU - Extended - Both Eyes    Lupus erythematosus overlap syndrome  -     OCT, Retina - OU - Both Eyes; Future  -     [place] Visual Field - OU - Extended - Both Eyes; Future  -     OCT, Retina - OU - Both Eyes  -     [place] Visual Field - OU - Extended - Both Eyes    Rheumatoid  arthritis involving multiple sites with positive rheumatoid factor  -     OCT, Retina - OU - Both Eyes; Future  -     [place] Visual Field - OU - Extended - Both Eyes; Future  -     OCT, Retina - OU - Both Eyes  -     [place] Visual Field - OU - Extended - Both Eyes    Hypertensive retinopathy of both eyes    Nuclear sclerosis of both eyes    Myopia with astigmatism and presbyopia, bilateral    Wears contact lenses      1-4. Educated pt on findings. No bullseye maculopathy noted OU on DFE. 10-2 HVF and Mac OCT WNL OU. Patient currently taking Plaquenil 200mg BID. No vision changes noted since starting medication >5 years ago. Discussed need to monitor yearly with DFE and testing. Monitor yearly.     5. Vessel changes, OU. Stable since TONIO (01/2022). Discussed importance of BP control, taking medications as directed, and following-up with primary care. If changes in vision noted, RTC. Monitor yearly unless changes noted sooner.      6. Educated pt on findings. Not visually significant. No need for removal at this time. Monitor yearly.      7-8. Updated SRx. Mild change OD, moderate change OS from habitual. Educated on possible adaptation symptoms. Monitor yearly.    Updated CL Rx. Initially good comfort and vision. Given CL trials to take home. If pt happy with comfort and vision of trial lenses, she is to send a portal message in order to finalize CL Rx. If issues arise, RTC for CL f/u. Reviewed proper CL care and hygiene. Monitor yearly unless changes noted sooner.        RTC in 1 year for annual eye exam + Mac OCT + 10-2 HVF or sooner if needed.       Addendum 07/11/2023  Pt happy with new CL trial lenses. Updated CL Rx in chart. Pt okay to order annual supply. Monitor yearly.

## 2023-05-29 ENCOUNTER — PATIENT MESSAGE (OUTPATIENT)
Dept: ADMINISTRATIVE | Facility: HOSPITAL | Age: 53
End: 2023-05-29
Payer: OTHER GOVERNMENT

## 2023-05-31 DIAGNOSIS — M32.8 LUPUS ERYTHEMATOSUS OVERLAP SYNDROME: ICD-10-CM

## 2023-05-31 RX ORDER — AZATHIOPRINE 50 MG/1
100 TABLET ORAL 2 TIMES DAILY
Qty: 360 TABLET | Refills: 0 | Status: SHIPPED | OUTPATIENT
Start: 2023-05-31 | End: 2023-08-23

## 2023-06-23 ENCOUNTER — OFFICE VISIT (OUTPATIENT)
Dept: BARIATRICS | Facility: CLINIC | Age: 53
End: 2023-06-23
Payer: OTHER GOVERNMENT

## 2023-06-23 VITALS
HEART RATE: 53 BPM | OXYGEN SATURATION: 96 % | BODY MASS INDEX: 48.82 KG/M2 | DIASTOLIC BLOOD PRESSURE: 71 MMHG | WEIGHT: 293 LBS | HEIGHT: 65 IN | SYSTOLIC BLOOD PRESSURE: 140 MMHG

## 2023-06-23 DIAGNOSIS — I10 ESSENTIAL HYPERTENSION: ICD-10-CM

## 2023-06-23 DIAGNOSIS — Z71.3 ENCOUNTER FOR WEIGHT LOSS COUNSELING: ICD-10-CM

## 2023-06-23 DIAGNOSIS — E66.01 CLASS 3 SEVERE OBESITY DUE TO EXCESS CALORIES WITH SERIOUS COMORBIDITY AND BODY MASS INDEX (BMI) OF 50.0 TO 59.9 IN ADULT: Primary | ICD-10-CM

## 2023-06-23 DIAGNOSIS — M05.79 RHEUMATOID ARTHRITIS INVOLVING MULTIPLE SITES WITH POSITIVE RHEUMATOID FACTOR: ICD-10-CM

## 2023-06-23 PROCEDURE — 99999 PR PBB SHADOW E&M-EST. PATIENT-LVL V: ICD-10-PCS | Mod: PBBFAC,,, | Performed by: STUDENT IN AN ORGANIZED HEALTH CARE EDUCATION/TRAINING PROGRAM

## 2023-06-23 PROCEDURE — 99204 PR OFFICE/OUTPT VISIT, NEW, LEVL IV, 45-59 MIN: ICD-10-PCS | Mod: S$PBB,,, | Performed by: STUDENT IN AN ORGANIZED HEALTH CARE EDUCATION/TRAINING PROGRAM

## 2023-06-23 PROCEDURE — 99204 OFFICE O/P NEW MOD 45 MIN: CPT | Mod: S$PBB,,, | Performed by: STUDENT IN AN ORGANIZED HEALTH CARE EDUCATION/TRAINING PROGRAM

## 2023-06-23 PROCEDURE — 99215 OFFICE O/P EST HI 40 MIN: CPT | Mod: PBBFAC | Performed by: STUDENT IN AN ORGANIZED HEALTH CARE EDUCATION/TRAINING PROGRAM

## 2023-06-23 PROCEDURE — 99999 PR PBB SHADOW E&M-EST. PATIENT-LVL V: CPT | Mod: PBBFAC,,, | Performed by: STUDENT IN AN ORGANIZED HEALTH CARE EDUCATION/TRAINING PROGRAM

## 2023-06-23 NOTE — PROGRESS NOTES
Subjective     Patient ID: Malika Valdez is a 52 y.o. female.    Chief Complaint: Consult and Obesity    Patient presents for treatment of obesity.   PCP tried to prescribe Ozempic or Wegovy, but denied by insurance  Has tried Noom    Co-morbidities   HTN  RA    Current Physical Activity  No regular exercise routine  Walks, but not consistently    Current Eating Habits  Breakfast - coffee, breakfast sandwich or pastry; eggs on weekend; yogurt; oatmeal  Lunch - fast food (sandwiches, burger, chicken)  Dinner - [employer]  Snacks - fruit, salty  Beverages - soft drinks, lemonade    Medical Weight Loss  6/23/2023: 354.2 lbs, BMI 58.9, BFP 56.4%, .9 lbs, SMM 85.3 lbs, BMR 1882 kcal    Review of Systems   Constitutional:  Negative for chills and fever.   Respiratory:  Negative for shortness of breath.    Cardiovascular:  Negative for chest pain and palpitations.   Gastrointestinal:  Negative for abdominal pain, nausea and vomiting.   Neurological:  Negative for dizziness and light-headedness.   Psychiatric/Behavioral:  The patient is not nervous/anxious.         Objective    Latest Reference Range & Units 10/10/22 14:35 02/17/23 10:30 05/04/23 09:31   WBC 3.90 - 12.70 K/uL 3.97  3.97 4.54 4.17   RBC 4.00 - 5.40 M/uL 4.17  4.17 4.32 4.16   Hemoglobin 12.0 - 16.0 g/dL 11.6 (L)  11.6 (L) 12.0 11.5 (L)   Hematocrit 37.0 - 48.5 % 38.9  38.9 39.2 37.9   MCV 82 - 98 fL 93  93 91 91   MCH 27.0 - 31.0 pg 27.8  27.8 27.8 27.6   MCHC 32.0 - 36.0 g/dL 29.8 (L)  29.8 (L) 30.6 (L) 30.3 (L)   RDW 11.5 - 14.5 % 14.9 (H)  14.9 (H) 14.7 (H) 14.4   Platelets 150 - 450 K/uL 313  313 336 277   MPV 9.2 - 12.9 fL 10.3  10.3 9.3 9.8   Gran % 38.0 - 73.0 % 63.4  63.4 62.4 66.9   Lymph % 18.0 - 48.0 % 24.9  24.9 26.2 20.1   Mono % 4.0 - 15.0 % 7.3  7.3 6.8 7.7   Eosinophil % 0.0 - 8.0 % 3.3  3.3 3.7 4.8   Basophil % 0.0 - 1.9 % 0.8  0.8 0.7 0.5   Immature Granulocytes 0.0 - 0.5 % 0.3  0.3 0.2 0.0   Gran # (ANC) 1.8 - 7.7 K/uL 2.5  2.5 2.8  2.8   Lymph # 1.0 - 4.8 K/uL 1.0  1.0 1.2 0.8 (L)   Mono # 0.3 - 1.0 K/uL 0.3  0.3 0.3 0.3   Eos # 0.0 - 0.5 K/uL 0.1  0.1 0.2 0.2   Baso # 0.00 - 0.20 K/uL 0.03  0.03 0.03 0.02   Immature Grans (Abs) 0.00 - 0.04 K/uL 0.01  0.01 0.01 0.00   nRBC 0 /100 WBC 0  0 0 0   Differential Method  Automated  Automated Automated Automated   Sed Rate 0 - 36 mm/Hr 119 (H) 49 (H) 57 (H)   Sodium 136 - 145 mmol/L 139 142 142   Potassium 3.5 - 5.1 mmol/L 4.0 4.3 4.5   Chloride 95 - 110 mmol/L 103 106 106   CO2 23 - 29 mmol/L 26 28 29   Anion Gap 8 - 16 mmol/L 10 8 7 (L)   BUN 6 - 20 mg/dL 13 11 15   Creatinine 0.5 - 1.4 mg/dL 0.8 1.0 0.9   eGFR >60 mL/min/1.73 m^2 >60.0 >60.0 >60.0   Glucose 70 - 110 mg/dL 88 95 88   Calcium 8.7 - 10.5 mg/dL 9.8 9.6 9.6   Alkaline Phosphatase 55 - 135 U/L 89 91 91   PROTEIN TOTAL 6.0 - 8.4 g/dL 8.5 (H) 8.5 (H) 8.2   Albumin 3.5 - 5.2 g/dL 3.6 3.5 3.4 (L)   BILIRUBIN TOTAL 0.1 - 1.0 mg/dL 0.4 0.3 0.4   AST 10 - 40 U/L 16 20 15   ALT 10 - 44 U/L 7 (L) 11 6 (L)   CRP 0.0 - 8.2 mg/L 2.3 3.8 8.8 (H)   CPK 20 - 180 U/L 188 (H) 165 192 (H)   Hemoglobin A1C External 4.0 - 5.6 % 5.8 (H)     Estimated Avg Glucose 68 - 131 mg/dL 120     (L): Data is abnormally low  (H): Data is abnormally high    Vitals:    06/23/23 0910   BP: (!) 140/71   Pulse: (!) 53       Physical Exam  Vitals reviewed.   Constitutional:       General: She is not in acute distress.     Appearance: Normal appearance. She is obese. She is not ill-appearing, toxic-appearing or diaphoretic.   HENT:      Head: Normocephalic and atraumatic.   Cardiovascular:      Rate and Rhythm: Normal rate.   Pulmonary:      Effort: Pulmonary effort is normal. No respiratory distress.   Skin:     General: Skin is warm and dry.   Neurological:      Mental Status: She is alert and oriented to person, place, and time.          Assessment and Plan     1. Class 3 severe obesity due to excess calories with serious comorbidity and body mass index (BMI) of 50.0 to  59.9 in adult    2. Essential hypertension  -     Ambulatory referral/consult to Nutrition Services; Future; Expected date: 06/30/2023    3. Rheumatoid arthritis involving multiple sites with positive rheumatoid factor  -     Ambulatory referral/consult to Nutrition Services; Future; Expected date: 06/30/2023    4. Encounter for weight loss counseling      - Referral to dietician services    - Log all food and beverage intake with a daily calorie goal of 1500 calories per day    - Low intensity aerobic exercise for 15-30 minutes 3-5x/week

## 2023-06-23 NOTE — PATIENT INSTRUCTIONS
Copyright © 2011, [facility]. For more information about The Healthy Eating Plate, please see The Nutrition Source, Department of Nutrition, [employer], www.thenutritionsource.org, and [employer], www.health.[place].edu.    Meal Planning & Grocery Shopping    Meal planning builds the foundation for healthy eating. When you have structured ideas for healthy meals and foods available at home to prepare those meals, weight control becomes easier.  If only healthy foods are available at home, then you will be much more likely to eat healthy foods. And you will be less likely to go to a restaurant or  a fast food meal, which tend to be unhealthy and higher in calories than meals prepared at home.      Take 5-10 minutes each week to plan meals for the next 7 days.  Make a grocery list based on the meal plan.    Grocery Shopping Tips:  Shop on a full stomach.  Schedule your shopping for times when you are most motivated and able to be disciplined about your purchases. For example, after a stressful day at work it may be difficult to make the healthiest choices. Shopping at other times, such as early in the morning or after dinner, may be easier.  Focus your shopping on the outside aisles of the store, which tend to contain more fresh foods and lower calorie foods. The inside aisles tend to have more processed foods.  Stick to your list. Avoid buying unhealthy items just because they are on sale.   Compare nutrition labels to check the number of calories and percentage of fat.      What to buy:    Vegetables  Fresh vegetables  Frozen vegetables with no sauce or added salt  Canned vegetables with no sauce or added salt    Protein  Lean meats, such as chicken and turkey  Limit red meats, such as beef to no more than 1x/week  Limit processed meats, such as cold cuts, carmona, sausage, and hot dogs. Look for brands that have no nitrites and are minimally  processed. Consider turkey sausage or turkey carmona.  Fish and Shellfish  Eggs  Dried beans  Canned beans (reduced sodium)    Fat  Use healthy oils, such as olive oil or canola oil, for cooking, salad dressings, etc.  Unflavored nuts and seeds  Nut butters (no added sugar)    Dairy  Yogurt (no sugar added)  Cheese  Low-fat milk  Unsweetened nondairy milks (almond milk, soy milk, etc)    Fruit  Fresh Fruit  Frozen fruit with no added sugar  Canned fruit with no added sugar  Dried fruit with no added sugar  100% fruit juice    Whole Grains  Single ingredient grains, such as oats, quinoa, brown rice  Whole-wheat pasta  Sprouted whole-grain bread    What to avoid:  - Avoid fried foods  - Avoid foods with added sugar  - Avoid sugar-sweetened beverages  - Avoid ultra-processed foods      SEATED RESISTANCE BAND EXERCISES    If you do not have a resistance band, or do not feel comfortable using a resistance band, these exercises can also be done holding a light hand weight or water bottle.  If you are just starting to exercise, you may want to go through the motions without any weights or resistance till you become comfortable with the movements.    Do each of the movements shown 10 times (10 repetitions). You can repeat the exercises a second or  third time as well for greater benefit. The amount of tension on the resistance bands should be adjusted so  you can complete one set of 10 repetitions with effort. Increase the tension every few weeks. Do these  exercises 2 or 3 times a week.    * If an exercise hurts your back or joints, stop doing that particular exercise, but keep doing all the others *      CHEST EXERCISE        Start Position:   Sit tall, with feet shoulder width apart and feet in front of knees.   Belly pulled in.   Place the band around your upper back, grasp band in each hand, knuckles (rings) facing front. Arms  should be bent so that knuckles are in front of elbows   Slide shoulder blades down your  back and slightly together (as if making a V).   Relax your neck.    To Perform This Exercise:   Press hands forward to lengthen arms using chest muscles (not arms!).   Dont arch your back!)   Return to start position and repeat 10 times.   Breathe!        BACK EXERCISE        Start Position:   Sit tall, with feet shoulder width apart and feet in front of knees.   Belly pulled in.   Grasp band in each hand and raise overhead. Arms should be slightly wider than shoulder width and no  slack in the band.   Slide shoulder blades down your back and slightly together (as if making a V).   Relax your neck.    To Perform This Exercise:   Open arms pulling down towards chest using upper back muscles (not arms!).   Squeeze through shoulder blades at the bottom of the movement (dont arch your back!).   Return to start position and repeat 10 times.   Breathe!        SHOULDER EXERCISE        Start Position:   Sit tall, with feet shoulder width apart and feet in front of knees.   Belly pulled in.   Sit on band so that you can grasp band in one hand with tension on the band, but not so much tension that  you cannot straighten the arm. It may take a few tries to find the right amount of tension.   Slide shoulder blades down your back and slightly together (as if making a V).   Relax your neck.    To Perform This Exercise:   Press fist to the ceiling, slightly in front of the body.   SLOWLY return to start position and repeat 10 times.   Switch sides and repeat on the other side.   Breathe!        TRICEPS EXERCISE        Start Position:   Sit tall, with feet shoulder width apart and feet in front of knees.   Belly pulled in.   Sit on one end of the band. Grasp other end of band in one hand.   Point elbow directly toward the ceiling (if this is difficult, you may support the upper arm with the opposite  hand)   Be sure there is no slack in the band in the starting position.   Slide shoulder blades down your back and slightly  together (as if making a V).   Relax your neck.    To Perform This Exercise:   Extend your arm up to the ceiling, as shown.   Squeeze through shoulder blades at the bottom of the movement (dont arch your back!).   SLOWLY return to start position and repeat 10 times.   Repeat on the other side.   Breathe!        BICEP EXERCISE        Start Position:   Sit tall, with feet shoulder width apart and feet in front of knees.   Belly pulled in.   Place center of exercise band under one foot and step the end of the band under the other foot.   Grasp each end of the band with one hand. Make sure there is no slack between your foot and the hand  that holds the band.   Hold your elbows to your sides.   Pull abdominals in, lift chest, press shoulders down and back.    To Perform This Exercise:   As you curl up, keep your wrist from changing position in relation to your forearm and your arm stable  from the shoulder to the elbow.   Bend and straighten your elbow in a slow and controlled movement. Repeat this motion 10 times.   Repeat on the other side.   Breathe!

## 2023-06-28 ENCOUNTER — PATIENT MESSAGE (OUTPATIENT)
Dept: INTERNAL MEDICINE | Facility: CLINIC | Age: 53
End: 2023-06-28
Payer: OTHER GOVERNMENT

## 2023-07-06 ENCOUNTER — LAB VISIT (OUTPATIENT)
Dept: LAB | Facility: HOSPITAL | Age: 53
End: 2023-07-06
Attending: INTERNAL MEDICINE
Payer: OTHER GOVERNMENT

## 2023-07-06 DIAGNOSIS — E11.9 TYPE 2 DIABETES MELLITUS WITHOUT COMPLICATION, WITH LONG-TERM CURRENT USE OF INSULIN: ICD-10-CM

## 2023-07-06 DIAGNOSIS — M32.8 LUPUS ERYTHEMATOSUS OVERLAP SYNDROME: ICD-10-CM

## 2023-07-06 DIAGNOSIS — Z79.4 TYPE 2 DIABETES MELLITUS WITHOUT COMPLICATION, WITH LONG-TERM CURRENT USE OF INSULIN: ICD-10-CM

## 2023-07-06 LAB
ALBUMIN SERPL BCP-MCNC: 3.3 G/DL (ref 3.5–5.2)
ALP SERPL-CCNC: 87 U/L (ref 55–135)
ALT SERPL W/O P-5'-P-CCNC: 10 U/L (ref 10–44)
ANION GAP SERPL CALC-SCNC: 8 MMOL/L (ref 8–16)
AST SERPL-CCNC: 17 U/L (ref 10–40)
BASOPHILS # BLD AUTO: 0.03 K/UL (ref 0–0.2)
BASOPHILS NFR BLD: 0.7 % (ref 0–1.9)
BILIRUB SERPL-MCNC: 0.2 MG/DL (ref 0.1–1)
BUN SERPL-MCNC: 16 MG/DL (ref 6–20)
C3 SERPL-MCNC: 151 MG/DL (ref 50–180)
C4 SERPL-MCNC: 25 MG/DL (ref 11–44)
CALCIUM SERPL-MCNC: 9.6 MG/DL (ref 8.7–10.5)
CHLORIDE SERPL-SCNC: 107 MMOL/L (ref 95–110)
CHOLEST SERPL-MCNC: 148 MG/DL (ref 120–199)
CHOLEST/HDLC SERPL: 2.5 {RATIO} (ref 2–5)
CK SERPL-CCNC: 116 U/L (ref 20–180)
CO2 SERPL-SCNC: 24 MMOL/L (ref 23–29)
CREAT SERPL-MCNC: 0.9 MG/DL (ref 0.5–1.4)
CRP SERPL-MCNC: 1.5 MG/L (ref 0–8.2)
DIFFERENTIAL METHOD: ABNORMAL
EOSINOPHIL # BLD AUTO: 0.2 K/UL (ref 0–0.5)
EOSINOPHIL NFR BLD: 5 % (ref 0–8)
ERYTHROCYTE [DISTWIDTH] IN BLOOD BY AUTOMATED COUNT: 15.1 % (ref 11.5–14.5)
ERYTHROCYTE [SEDIMENTATION RATE] IN BLOOD BY PHOTOMETRIC METHOD: 72 MM/HR (ref 0–36)
EST. GFR  (NO RACE VARIABLE): >60 ML/MIN/1.73 M^2
ESTIMATED AVG GLUCOSE: 114 MG/DL (ref 68–131)
GLUCOSE SERPL-MCNC: 86 MG/DL (ref 70–110)
HBA1C MFR BLD: 5.6 % (ref 4–5.6)
HCT VFR BLD AUTO: 38.7 % (ref 37–48.5)
HDLC SERPL-MCNC: 59 MG/DL (ref 40–75)
HDLC SERPL: 39.9 % (ref 20–50)
HGB BLD-MCNC: 11.7 G/DL (ref 12–16)
IMM GRANULOCYTES # BLD AUTO: 0.01 K/UL (ref 0–0.04)
IMM GRANULOCYTES NFR BLD AUTO: 0.2 % (ref 0–0.5)
LDLC SERPL CALC-MCNC: 78.4 MG/DL (ref 63–159)
LYMPHOCYTES # BLD AUTO: 1.1 K/UL (ref 1–4.8)
LYMPHOCYTES NFR BLD: 25.4 % (ref 18–48)
MCH RBC QN AUTO: 27.1 PG (ref 27–31)
MCHC RBC AUTO-ENTMCNC: 30.2 G/DL (ref 32–36)
MCV RBC AUTO: 90 FL (ref 82–98)
MONOCYTES # BLD AUTO: 0.2 K/UL (ref 0.3–1)
MONOCYTES NFR BLD: 5.2 % (ref 4–15)
NEUTROPHILS # BLD AUTO: 2.7 K/UL (ref 1.8–7.7)
NEUTROPHILS NFR BLD: 63.5 % (ref 38–73)
NONHDLC SERPL-MCNC: 89 MG/DL
NRBC BLD-RTO: 0 /100 WBC
PLATELET # BLD AUTO: 314 K/UL (ref 150–450)
PMV BLD AUTO: 9.9 FL (ref 9.2–12.9)
POTASSIUM SERPL-SCNC: 4 MMOL/L (ref 3.5–5.1)
PROT SERPL-MCNC: 7.9 G/DL (ref 6–8.4)
RBC # BLD AUTO: 4.32 M/UL (ref 4–5.4)
SODIUM SERPL-SCNC: 139 MMOL/L (ref 136–145)
TRIGL SERPL-MCNC: 53 MG/DL (ref 30–150)
TSH SERPL DL<=0.005 MIU/L-ACNC: 2.27 UIU/ML (ref 0.4–4)
WBC # BLD AUTO: 4.22 K/UL (ref 3.9–12.7)

## 2023-07-06 PROCEDURE — 80053 COMPREHEN METABOLIC PANEL: CPT | Performed by: INTERNAL MEDICINE

## 2023-07-06 PROCEDURE — 82085 ASSAY OF ALDOLASE: CPT | Performed by: INTERNAL MEDICINE

## 2023-07-06 PROCEDURE — 36415 COLL VENOUS BLD VENIPUNCTURE: CPT | Performed by: INTERNAL MEDICINE

## 2023-07-06 PROCEDURE — 84443 ASSAY THYROID STIM HORMONE: CPT | Performed by: INTERNAL MEDICINE

## 2023-07-06 PROCEDURE — 86225 DNA ANTIBODY NATIVE: CPT | Performed by: INTERNAL MEDICINE

## 2023-07-06 PROCEDURE — 83036 HEMOGLOBIN GLYCOSYLATED A1C: CPT | Performed by: INTERNAL MEDICINE

## 2023-07-06 PROCEDURE — 86160 COMPLEMENT ANTIGEN: CPT | Mod: 59 | Performed by: INTERNAL MEDICINE

## 2023-07-06 PROCEDURE — 86160 COMPLEMENT ANTIGEN: CPT | Performed by: INTERNAL MEDICINE

## 2023-07-06 PROCEDURE — 85025 COMPLETE CBC W/AUTO DIFF WBC: CPT | Performed by: INTERNAL MEDICINE

## 2023-07-06 PROCEDURE — 82550 ASSAY OF CK (CPK): CPT | Performed by: INTERNAL MEDICINE

## 2023-07-06 PROCEDURE — 86140 C-REACTIVE PROTEIN: CPT | Performed by: INTERNAL MEDICINE

## 2023-07-06 PROCEDURE — 80061 LIPID PANEL: CPT | Performed by: INTERNAL MEDICINE

## 2023-07-06 PROCEDURE — 85652 RBC SED RATE AUTOMATED: CPT | Performed by: INTERNAL MEDICINE

## 2023-07-07 ENCOUNTER — PATIENT MESSAGE (OUTPATIENT)
Dept: RHEUMATOLOGY | Facility: CLINIC | Age: 53
End: 2023-07-07
Payer: OTHER GOVERNMENT

## 2023-07-07 ENCOUNTER — OFFICE VISIT (OUTPATIENT)
Dept: INTERNAL MEDICINE | Facility: CLINIC | Age: 53
End: 2023-07-07
Payer: OTHER GOVERNMENT

## 2023-07-07 ENCOUNTER — HOSPITAL ENCOUNTER (OUTPATIENT)
Dept: RADIOLOGY | Facility: HOSPITAL | Age: 53
Discharge: HOME | End: 2023-07-07
Attending: INTERNAL MEDICINE
Payer: OTHER GOVERNMENT

## 2023-07-07 VITALS
HEIGHT: 65 IN | WEIGHT: 293 LBS | BODY MASS INDEX: 48.82 KG/M2 | HEART RATE: 50 BPM | SYSTOLIC BLOOD PRESSURE: 138 MMHG | DIASTOLIC BLOOD PRESSURE: 80 MMHG | OXYGEN SATURATION: 98 %

## 2023-07-07 DIAGNOSIS — R60.9 EDEMA, UNSPECIFIED TYPE: Primary | ICD-10-CM

## 2023-07-07 DIAGNOSIS — I10 ESSENTIAL HYPERTENSION: ICD-10-CM

## 2023-07-07 DIAGNOSIS — E55.9 VITAMIN D DEFICIENCY DISEASE: ICD-10-CM

## 2023-07-07 DIAGNOSIS — Z12.31 SCREENING MAMMOGRAM FOR BREAST CANCER: ICD-10-CM

## 2023-07-07 DIAGNOSIS — E66.01 MORBID OBESITY WITH BMI OF 50.0-59.9, ADULT: ICD-10-CM

## 2023-07-07 DIAGNOSIS — M32.8 LUPUS ERYTHEMATOSUS OVERLAP SYNDROME: ICD-10-CM

## 2023-07-07 DIAGNOSIS — R60.9 EDEMA, UNSPECIFIED TYPE: ICD-10-CM

## 2023-07-07 DIAGNOSIS — M05.10 RHEUMATOID LUNG DISEASE WITH RHEUMATOID ARTHRITIS OF UNSPECIFIED SITE: ICD-10-CM

## 2023-07-07 LAB
ALDOLASE SERPL-CCNC: 4 U/L (ref 1.2–7.6)
DSDNA AB SER-ACNC: NORMAL [IU]/ML

## 2023-07-07 PROCEDURE — 93970 US LOWER EXTREMITY VEINS BILATERAL: ICD-10-PCS | Mod: 26,,, | Performed by: INTERNAL MEDICINE

## 2023-07-07 PROCEDURE — 93970 EXTREMITY STUDY: CPT | Mod: TC

## 2023-07-07 PROCEDURE — 93970 EXTREMITY STUDY: CPT | Mod: 26,,, | Performed by: INTERNAL MEDICINE

## 2023-07-07 PROCEDURE — 99999 PR PBB SHADOW E&M-EST. PATIENT-LVL IV: ICD-10-PCS | Mod: PBBFAC,,, | Performed by: INTERNAL MEDICINE

## 2023-07-07 PROCEDURE — 99214 OFFICE O/P EST MOD 30 MIN: CPT | Mod: S$PBB,,, | Performed by: INTERNAL MEDICINE

## 2023-07-07 PROCEDURE — 99999 PR PBB SHADOW E&M-EST. PATIENT-LVL IV: CPT | Mod: PBBFAC,,, | Performed by: INTERNAL MEDICINE

## 2023-07-07 PROCEDURE — 99214 PR OFFICE/OUTPT VISIT, EST, LEVL IV, 30-39 MIN: ICD-10-PCS | Mod: S$PBB,,, | Performed by: INTERNAL MEDICINE

## 2023-07-07 PROCEDURE — 99214 OFFICE O/P EST MOD 30 MIN: CPT | Mod: PBBFAC | Performed by: INTERNAL MEDICINE

## 2023-07-07 RX ORDER — HYDROCHLOROTHIAZIDE 12.5 MG/1
12.5 TABLET ORAL DAILY PRN
Qty: 30 TABLET | Refills: 1 | Status: SHIPPED | OUTPATIENT
Start: 2023-07-07 | End: 2024-07-06

## 2023-07-07 NOTE — PROGRESS NOTES
CHIEF COMPLAINT:   follow up of RA/sle, hypertension, steroid induced diabetes.     HISTORY OF PRESENT ILLNESS: This is a 53 year old woman who presents for follow up.    She had a slip and fall about 3 weeks - she fell on the left knee.  The knee did not swell.  Sore for 2-3 days. Knee is better. Since then  she has some swelling in the left foot.   She also has had some swelling in the right as well but not as bad.  No ibuprofen or aleve.      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  Daughter will be 11 in November and son is 9.- school is going well for them.  Daugher need her home more. She gets off at 3:30. She is off for the summer     Joints are doing well.  She uses voltaren gel as needed.  She has been off prednisone since 10/7/19.  She continues to do well off prednisone.  She is taking azathoprine 100 mg twice daily and plaquenil 200 mg twice daily for her RA and lupus overlap with hypoxemic respiratory failure 12/2017 withCT with interstial changes, pericardial effusion and pleural effusion, TIARA that was initially negative 2009  positive 1: 2560 speckled, +NILS, +SSA,SSB, dsDNA neg, +RNA polymerase III.   Breathing is good.  No fever, chills, night sweats, oral ulcers, chest pain, shortness of breath, nausea, vomiting, constipation.  No diarrhea.    She remains off pantoprazole and is doing well.          She has taken  metformin for her diabetes in the past which caused diarrhea.  She did not start on Trulicity or Ozempic - could not get approved through her insurance.  She has gained 13 pounds since our last visit.  She does the Noom diet intermittently. .       She is taking Norvasc 5 mg daily  for hypertension.  No palpitations.     She is taking vitamin D 50,000 units twice weekly.      REVIEW OF SYSTEMS: No fevers, chills, night sweats, fatigue, visual change, hearing loss, sinus congestion, sore throat, chest pain, shortness of breath, nausea, vomiting, constipation,  "diarrhea, dysuria, hematuria, polydipsia, polyuria, joint pain, muscle pain, headaches         PAST MEDICAL HISTORY:   1.  RA and lupus overlap  2. History of vitamin D deficiency.   3. History of amenorrhea   4. Morbid obesity.     PAST SURGICAL HISTORY: Negative.     ALLERGIES, MEDICATIONS: Updated on [facility]     SOCIAL HISTORY: No tobacco, alcohol use. She is , has no children. She is a RN    FAMILY HISTORY: Mother  at age 69, ESRD on dialysis, aortic stenosis, diabetes, history hypertension, breast cancer age 64 (in remission), benign colon cancer. Father is living diabetes, hypertension and rheumatoid arthritis. Brother with diverticulosis. Brother healthy. Brother healthy.      PHYSICAL EXAMINATION:        /80   Pulse (!) 50   Ht 5' 5" (1.651 m)   Wt (!) 160 kg (352 lb 11.8 oz)   LMP 2017   SpO2 98%   BMI 58.70 kg/m²        General: Is alert, oriented, in no apparent distress. Affect is within   normal limits. TM clear, OP clear  HEENT: Conjunctivae are anicteric. . Respiratory   effort is normal.  CV RRR without murmur gallops or rubs. No lower extremity edema          labs 23 reviewed      ASSESSMENT AND PLAN:         1. RA with lupus overlab with hypoxemic respiratory failure 2017 withCT with interstial changes, pericardial effusion and pleural effusion, TIARA that was initially negative  now positive 1: 2560 speckled, +NILS, +SSA,SSB, dsDNA neg, +RNA polymerase III. Hypoxemic respiratory failure -CT with interstial changes, pericardial effusion and pleural effusion. Followed by rheumatology. Doing well.    3. Steroid induced diabetes - controlled  4. Anemia - stable  5. Hypertension - HCTZ 12.5 mg daily as needed for swelling- may want to take 3 times weekly   6. Vitamin D deficiency - vitamin D 50,000 units twice weekly  7. Morbid obesity - Wegovy. As above.   8. GERD -asx off pantoprazole  9. Edema - venous us to rule out dvt. Hctz 12.5 mg daily as needed for " edema  Screening - [facility] 12/2022.  GYN exam 11/19.    Colonoscopy 12/20/22 - diverticulosis otherwise normal - due 2032

## 2023-07-09 ENCOUNTER — PATIENT MESSAGE (OUTPATIENT)
Dept: INTERNAL MEDICINE | Facility: CLINIC | Age: 53
End: 2023-07-09
Payer: OTHER GOVERNMENT

## 2023-07-10 ENCOUNTER — PATIENT MESSAGE (OUTPATIENT)
Dept: OPTOMETRY | Facility: CLINIC | Age: 53
End: 2023-07-10
Payer: OTHER GOVERNMENT

## 2023-07-12 ENCOUNTER — TELEPHONE (OUTPATIENT)
Dept: RHEUMATOLOGY | Facility: CLINIC | Age: 53
End: 2023-07-12
Payer: OTHER GOVERNMENT

## 2023-07-12 NOTE — TELEPHONE ENCOUNTER
----- Message from Sarah Robles MD sent at 7/7/2023  2:32 PM CDT -----  She has labs scheduled for 8/4/23  Sarah    ----- Message -----  From: Roxie Paz MD  Sent: 7/7/2023  12:42 PM CDT  To: Sarah Robles MD    CRP improved significantly while sed rate increased.  I would recommend she repeat labs in one month to monitor for flare.  I will reach out to her.  Thanks for letting me know.

## 2023-08-10 ENCOUNTER — PATIENT MESSAGE (OUTPATIENT)
Dept: RHEUMATOLOGY | Facility: CLINIC | Age: 53
End: 2023-08-10
Payer: OTHER GOVERNMENT

## 2023-08-11 ENCOUNTER — LAB VISIT (OUTPATIENT)
Dept: LAB | Facility: HOSPITAL | Age: 53
End: 2023-08-11
Attending: INTERNAL MEDICINE
Payer: OTHER GOVERNMENT

## 2023-08-11 DIAGNOSIS — M32.8 LUPUS ERYTHEMATOSUS OVERLAP SYNDROME: ICD-10-CM

## 2023-08-11 DIAGNOSIS — R53.83 OTHER FATIGUE: ICD-10-CM

## 2023-08-11 DIAGNOSIS — D84.9 IMMUNOSUPPRESSION: ICD-10-CM

## 2023-08-11 DIAGNOSIS — M05.79 RHEUMATOID ARTHRITIS INVOLVING MULTIPLE SITES WITH POSITIVE RHEUMATOID FACTOR: ICD-10-CM

## 2023-08-11 DIAGNOSIS — J84.9 ILD (INTERSTITIAL LUNG DISEASE): ICD-10-CM

## 2023-08-11 LAB
BASOPHILS # BLD AUTO: 0.02 K/UL (ref 0–0.2)
BASOPHILS NFR BLD: 0.5 % (ref 0–1.9)
C3 SERPL-MCNC: 159 MG/DL (ref 50–180)
C4 SERPL-MCNC: 27 MG/DL (ref 11–44)
DIFFERENTIAL METHOD: ABNORMAL
EOSINOPHIL # BLD AUTO: 0.1 K/UL (ref 0–0.5)
EOSINOPHIL NFR BLD: 2.4 % (ref 0–8)
ERYTHROCYTE [DISTWIDTH] IN BLOOD BY AUTOMATED COUNT: 14.5 % (ref 11.5–14.5)
ERYTHROCYTE [SEDIMENTATION RATE] IN BLOOD BY PHOTOMETRIC METHOD: >120 MM/HR (ref 0–36)
HCT VFR BLD AUTO: 39.3 % (ref 37–48.5)
HGB BLD-MCNC: 11.8 G/DL (ref 12–16)
IMM GRANULOCYTES # BLD AUTO: 0.01 K/UL (ref 0–0.04)
IMM GRANULOCYTES NFR BLD AUTO: 0.3 % (ref 0–0.5)
LYMPHOCYTES # BLD AUTO: 1.2 K/UL (ref 1–4.8)
LYMPHOCYTES NFR BLD: 31.5 % (ref 18–48)
MCH RBC QN AUTO: 27 PG (ref 27–31)
MCHC RBC AUTO-ENTMCNC: 30 G/DL (ref 32–36)
MCV RBC AUTO: 90 FL (ref 82–98)
MONOCYTES # BLD AUTO: 0.4 K/UL (ref 0.3–1)
MONOCYTES NFR BLD: 9.4 % (ref 4–15)
NEUTROPHILS # BLD AUTO: 2.1 K/UL (ref 1.8–7.7)
NEUTROPHILS NFR BLD: 55.9 % (ref 38–73)
NRBC BLD-RTO: 0 /100 WBC
PLATELET # BLD AUTO: 287 K/UL (ref 150–450)
PMV BLD AUTO: 10 FL (ref 9.2–12.9)
RBC # BLD AUTO: 4.37 M/UL (ref 4–5.4)
WBC # BLD AUTO: 3.81 K/UL (ref 3.9–12.7)

## 2023-08-11 PROCEDURE — 85652 RBC SED RATE AUTOMATED: CPT | Performed by: INTERNAL MEDICINE

## 2023-08-11 PROCEDURE — 86160 COMPLEMENT ANTIGEN: CPT | Performed by: INTERNAL MEDICINE

## 2023-08-11 PROCEDURE — 82550 ASSAY OF CK (CPK): CPT | Performed by: INTERNAL MEDICINE

## 2023-08-11 PROCEDURE — 36415 COLL VENOUS BLD VENIPUNCTURE: CPT | Mod: PO | Performed by: INTERNAL MEDICINE

## 2023-08-11 PROCEDURE — 86225 DNA ANTIBODY NATIVE: CPT | Performed by: INTERNAL MEDICINE

## 2023-08-11 PROCEDURE — 86140 C-REACTIVE PROTEIN: CPT | Performed by: INTERNAL MEDICINE

## 2023-08-11 PROCEDURE — 80053 COMPREHEN METABOLIC PANEL: CPT | Performed by: INTERNAL MEDICINE

## 2023-08-11 PROCEDURE — 86160 COMPLEMENT ANTIGEN: CPT | Mod: 59 | Performed by: INTERNAL MEDICINE

## 2023-08-11 PROCEDURE — 85025 COMPLETE CBC W/AUTO DIFF WBC: CPT | Performed by: INTERNAL MEDICINE

## 2023-08-12 LAB
ALBUMIN SERPL BCP-MCNC: 3.6 G/DL (ref 3.5–5.2)
ALP SERPL-CCNC: 79 U/L (ref 55–135)
ALT SERPL W/O P-5'-P-CCNC: 6 U/L (ref 10–44)
ANION GAP SERPL CALC-SCNC: 8 MMOL/L (ref 8–16)
AST SERPL-CCNC: 17 U/L (ref 10–40)
BILIRUB SERPL-MCNC: 0.4 MG/DL (ref 0.1–1)
BUN SERPL-MCNC: 13 MG/DL (ref 6–20)
CALCIUM SERPL-MCNC: 9.9 MG/DL (ref 8.7–10.5)
CHLORIDE SERPL-SCNC: 110 MMOL/L (ref 95–110)
CK SERPL-CCNC: 174 U/L (ref 20–180)
CO2 SERPL-SCNC: 24 MMOL/L (ref 23–29)
CREAT SERPL-MCNC: 0.9 MG/DL (ref 0.5–1.4)
CRP SERPL-MCNC: 1.4 MG/L (ref 0–8.2)
EST. GFR  (NO RACE VARIABLE): >60 ML/MIN/1.73 M^2
GLUCOSE SERPL-MCNC: 86 MG/DL (ref 70–110)
POTASSIUM SERPL-SCNC: 4.1 MMOL/L (ref 3.5–5.1)
PROT SERPL-MCNC: 8.3 G/DL (ref 6–8.4)
SODIUM SERPL-SCNC: 142 MMOL/L (ref 136–145)

## 2023-08-14 ENCOUNTER — PATIENT MESSAGE (OUTPATIENT)
Dept: RHEUMATOLOGY | Facility: CLINIC | Age: 53
End: 2023-08-14
Payer: OTHER GOVERNMENT

## 2023-08-14 DIAGNOSIS — R82.71 BACTERIURIA: Primary | ICD-10-CM

## 2023-08-14 LAB — DSDNA AB SER-ACNC: NORMAL [IU]/ML

## 2023-08-18 ENCOUNTER — LAB VISIT (OUTPATIENT)
Dept: LAB | Facility: HOSPITAL | Age: 53
End: 2023-08-18
Attending: INTERNAL MEDICINE
Payer: OTHER GOVERNMENT

## 2023-08-18 DIAGNOSIS — M32.8 LUPUS ERYTHEMATOSUS OVERLAP SYNDROME: ICD-10-CM

## 2023-08-18 DIAGNOSIS — J84.9 ILD (INTERSTITIAL LUNG DISEASE): ICD-10-CM

## 2023-08-18 DIAGNOSIS — E11.9 TYPE 2 DIABETES MELLITUS WITHOUT COMPLICATION: ICD-10-CM

## 2023-08-18 DIAGNOSIS — D84.9 IMMUNOSUPPRESSION: ICD-10-CM

## 2023-08-18 DIAGNOSIS — R82.71 BACTERIURIA: ICD-10-CM

## 2023-08-18 DIAGNOSIS — R53.83 OTHER FATIGUE: ICD-10-CM

## 2023-08-18 DIAGNOSIS — M05.79 RHEUMATOID ARTHRITIS INVOLVING MULTIPLE SITES WITH POSITIVE RHEUMATOID FACTOR: ICD-10-CM

## 2023-08-18 LAB
ALBUMIN/CREAT UR: 90.4 UG/MG (ref 0–30)
BACTERIA #/AREA URNS AUTO: NORMAL /HPF
BILIRUB UR QL STRIP: NEGATIVE
CLARITY UR REFRACT.AUTO: CLEAR
COLOR UR AUTO: YELLOW
CREAT UR-MCNC: 188 MG/DL (ref 15–325)
CREAT UR-MCNC: 188 MG/DL (ref 15–325)
GLUCOSE UR QL STRIP: NEGATIVE
HGB UR QL STRIP: NEGATIVE
HYALINE CASTS UR QL AUTO: 1 /LPF
KETONES UR QL STRIP: NEGATIVE
LEUKOCYTE ESTERASE UR QL STRIP: NEGATIVE
MICROALBUMIN UR DL<=1MG/L-MCNC: 170 UG/ML
MICROSCOPIC COMMENT: NORMAL
NITRITE UR QL STRIP: NEGATIVE
PH UR STRIP: 6 [PH] (ref 5–8)
PROT UR QL STRIP: ABNORMAL
PROT UR-MCNC: 39 MG/DL (ref 0–15)
PROT/CREAT UR: 0.21 MG/G{CREAT} (ref 0–0.2)
RBC #/AREA URNS AUTO: 1 /HPF (ref 0–4)
SP GR UR STRIP: 1.02 (ref 1–1.03)
SQUAMOUS #/AREA URNS AUTO: 1 /HPF
URN SPEC COLLECT METH UR: ABNORMAL
WBC #/AREA URNS AUTO: 1 /HPF (ref 0–5)

## 2023-08-18 PROCEDURE — 82570 ASSAY OF URINE CREATININE: CPT | Performed by: INTERNAL MEDICINE

## 2023-08-18 PROCEDURE — 81001 URINALYSIS AUTO W/SCOPE: CPT | Performed by: INTERNAL MEDICINE

## 2023-08-18 PROCEDURE — 87086 URINE CULTURE/COLONY COUNT: CPT | Performed by: INTERNAL MEDICINE

## 2023-08-18 PROCEDURE — 82043 UR ALBUMIN QUANTITATIVE: CPT | Performed by: INTERNAL MEDICINE

## 2023-08-20 LAB
BACTERIA UR CULT: NORMAL
BACTERIA UR CULT: NORMAL

## 2023-08-23 DIAGNOSIS — M32.8 LUPUS ERYTHEMATOSUS OVERLAP SYNDROME: ICD-10-CM

## 2023-08-23 RX ORDER — AZATHIOPRINE 50 MG/1
100 TABLET ORAL 2 TIMES DAILY
Qty: 360 TABLET | Refills: 0 | Status: SHIPPED | OUTPATIENT
Start: 2023-08-23 | End: 2023-11-16
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
## 2023-10-07 ENCOUNTER — IMMUNIZATION (OUTPATIENT)
Dept: INTERNAL MEDICINE | Facility: CLINIC | Age: 53
End: 2023-10-07
Payer: OTHER GOVERNMENT

## 2023-10-07 PROCEDURE — 99999PBSHW FLU VACCINE (QUAD) GREATER THAN OR EQUAL TO 3YO PRESERVATIVE FREE IM: ICD-10-PCS | Mod: PBBFAC,,,

## 2023-10-07 PROCEDURE — 99999PBSHW FLU VACCINE (QUAD) GREATER THAN OR EQUAL TO 3YO PRESERVATIVE FREE IM: Mod: PBBFAC,,,

## 2023-10-07 PROCEDURE — 90471 IMMUNIZATION ADMIN: CPT | Mod: PBBFAC

## 2023-10-23 DIAGNOSIS — T78.3XXD ANGIOEDEMA, SUBSEQUENT ENCOUNTER: ICD-10-CM

## 2023-10-24 RX ORDER — AMLODIPINE BESYLATE 5 MG/1
5 TABLET ORAL
Qty: 90 TABLET | Refills: 2 | Status: SHIPPED | OUTPATIENT
Start: 2023-10-24

## 2023-10-24 NOTE — TELEPHONE ENCOUNTER
Refill Decision Note      Refill Decision Note   Malika Valdez  is requesting a refill authorization.  Brief Assessment and Rationale for Refill:  Approve     Medication Therapy Plan:         Comments:     Note composed:9:52 AM 10/24/2023             Appointments     Last Visit   7/7/2023 Sarah Robles MD   Next Visit   12/22/2023 Sarah Robles MD

## 2023-10-24 NOTE — TELEPHONE ENCOUNTER
No care due was identified.  [facility] Embedded Care Due Messages. Reference number: [id number].   10/23/2023 9:16:29 PM CDT

## 2023-11-13 ENCOUNTER — LAB VISIT (OUTPATIENT)
Dept: LAB | Facility: HOSPITAL | Age: 53
End: 2023-11-13
Attending: INTERNAL MEDICINE
Payer: OTHER GOVERNMENT

## 2023-11-13 DIAGNOSIS — D84.9 IMMUNOSUPPRESSION: ICD-10-CM

## 2023-11-13 DIAGNOSIS — M05.79 RHEUMATOID ARTHRITIS INVOLVING MULTIPLE SITES WITH POSITIVE RHEUMATOID FACTOR: ICD-10-CM

## 2023-11-13 DIAGNOSIS — M32.8 LUPUS ERYTHEMATOSUS OVERLAP SYNDROME: ICD-10-CM

## 2023-11-13 DIAGNOSIS — J84.9 ILD (INTERSTITIAL LUNG DISEASE): ICD-10-CM

## 2023-11-13 DIAGNOSIS — R53.83 OTHER FATIGUE: ICD-10-CM

## 2023-11-13 LAB
ALBUMIN SERPL BCP-MCNC: 3.5 G/DL (ref 3.5–5.2)
ALP SERPL-CCNC: 87 U/L (ref 55–135)
ALT SERPL W/O P-5'-P-CCNC: 8 U/L (ref 10–44)
ANION GAP SERPL CALC-SCNC: 13 MMOL/L (ref 8–16)
AST SERPL-CCNC: 18 U/L (ref 10–40)
BASOPHILS # BLD AUTO: 0.02 K/UL (ref 0–0.2)
BASOPHILS NFR BLD: 0.5 % (ref 0–1.9)
BILIRUB SERPL-MCNC: 0.2 MG/DL (ref 0.1–1)
BUN SERPL-MCNC: 15 MG/DL (ref 6–20)
C3 SERPL-MCNC: 169 MG/DL (ref 50–180)
C4 SERPL-MCNC: 30 MG/DL (ref 11–44)
CALCIUM SERPL-MCNC: 9.8 MG/DL (ref 8.7–10.5)
CHLORIDE SERPL-SCNC: 103 MMOL/L (ref 95–110)
CK SERPL-CCNC: 186 U/L (ref 20–180)
CO2 SERPL-SCNC: 26 MMOL/L (ref 23–29)
CREAT SERPL-MCNC: 0.9 MG/DL (ref 0.5–1.4)
CRP SERPL-MCNC: 6 MG/L (ref 0–8.2)
DIFFERENTIAL METHOD: ABNORMAL
EOSINOPHIL # BLD AUTO: 0.2 K/UL (ref 0–0.5)
EOSINOPHIL NFR BLD: 4.8 % (ref 0–8)
ERYTHROCYTE [DISTWIDTH] IN BLOOD BY AUTOMATED COUNT: 14.9 % (ref 11.5–14.5)
ERYTHROCYTE [SEDIMENTATION RATE] IN BLOOD BY PHOTOMETRIC METHOD: 65 MM/HR (ref 0–36)
EST. GFR  (NO RACE VARIABLE): >60 ML/MIN/1.73 M^2
GLUCOSE SERPL-MCNC: 84 MG/DL (ref 70–110)
HCT VFR BLD AUTO: 42 % (ref 37–48.5)
HGB BLD-MCNC: 12.5 G/DL (ref 12–16)
IMM GRANULOCYTES # BLD AUTO: 0.02 K/UL (ref 0–0.04)
IMM GRANULOCYTES NFR BLD AUTO: 0.5 % (ref 0–0.5)
LYMPHOCYTES # BLD AUTO: 1.1 K/UL (ref 1–4.8)
LYMPHOCYTES NFR BLD: 26.2 % (ref 18–48)
MCH RBC QN AUTO: 27.6 PG (ref 27–31)
MCHC RBC AUTO-ENTMCNC: 29.8 G/DL (ref 32–36)
MCV RBC AUTO: 93 FL (ref 82–98)
MONOCYTES # BLD AUTO: 0.5 K/UL (ref 0.3–1)
MONOCYTES NFR BLD: 11.7 % (ref 4–15)
NEUTROPHILS # BLD AUTO: 2.4 K/UL (ref 1.8–7.7)
NEUTROPHILS NFR BLD: 56.3 % (ref 38–73)
NRBC BLD-RTO: 0 /100 WBC
PLATELET # BLD AUTO: 309 K/UL (ref 150–450)
PMV BLD AUTO: 10.1 FL (ref 9.2–12.9)
POTASSIUM SERPL-SCNC: 4.3 MMOL/L (ref 3.5–5.1)
PROT SERPL-MCNC: 8.7 G/DL (ref 6–8.4)
RBC # BLD AUTO: 4.53 M/UL (ref 4–5.4)
SODIUM SERPL-SCNC: 142 MMOL/L (ref 136–145)
WBC # BLD AUTO: 4.2 K/UL (ref 3.9–12.7)

## 2023-11-13 PROCEDURE — 36415 COLL VENOUS BLD VENIPUNCTURE: CPT | Mod: PO | Performed by: INTERNAL MEDICINE

## 2023-11-13 PROCEDURE — 86225 DNA ANTIBODY NATIVE: CPT | Performed by: INTERNAL MEDICINE

## 2023-11-13 PROCEDURE — 85025 COMPLETE CBC W/AUTO DIFF WBC: CPT | Performed by: INTERNAL MEDICINE

## 2023-11-13 PROCEDURE — 82550 ASSAY OF CK (CPK): CPT | Performed by: INTERNAL MEDICINE

## 2023-11-13 PROCEDURE — 80053 COMPREHEN METABOLIC PANEL: CPT | Performed by: INTERNAL MEDICINE

## 2023-11-13 PROCEDURE — 86140 C-REACTIVE PROTEIN: CPT | Performed by: INTERNAL MEDICINE

## 2023-11-13 PROCEDURE — 85652 RBC SED RATE AUTOMATED: CPT | Performed by: INTERNAL MEDICINE

## 2023-11-13 PROCEDURE — 86160 COMPLEMENT ANTIGEN: CPT | Mod: 59 | Performed by: INTERNAL MEDICINE

## 2023-11-13 PROCEDURE — 86160 COMPLEMENT ANTIGEN: CPT | Performed by: INTERNAL MEDICINE

## 2023-11-14 LAB — DSDNA AB SER-ACNC: NORMAL [IU]/ML

## 2023-11-15 DIAGNOSIS — M32.8 LUPUS ERYTHEMATOSUS OVERLAP SYNDROME: ICD-10-CM

## 2023-11-16 RX ORDER — AZATHIOPRINE 50 MG/1
100 TABLET ORAL 2 TIMES DAILY
Qty: 360 TABLET | Refills: 0 | Status: SHIPPED | OUTPATIENT
Start: 2023-11-16 | End: 2024-02-07

## 2023-12-18 ENCOUNTER — OFFICE VISIT (OUTPATIENT)
Dept: BARIATRICS | Facility: CLINIC | Age: 53
End: 2023-12-18
Payer: OTHER GOVERNMENT

## 2023-12-18 VITALS
HEART RATE: 62 BPM | SYSTOLIC BLOOD PRESSURE: 126 MMHG | BODY MASS INDEX: 60.41 KG/M2 | DIASTOLIC BLOOD PRESSURE: 90 MMHG | WEIGHT: 293 LBS | OXYGEN SATURATION: 96 %

## 2023-12-18 DIAGNOSIS — Z71.3 ENCOUNTER FOR WEIGHT LOSS COUNSELING: ICD-10-CM

## 2023-12-18 DIAGNOSIS — E66.01 CLASS 3 SEVERE OBESITY DUE TO EXCESS CALORIES WITH SERIOUS COMORBIDITY AND BODY MASS INDEX (BMI) OF 60.0 TO 69.9 IN ADULT: Primary | ICD-10-CM

## 2023-12-18 DIAGNOSIS — I10 ESSENTIAL HYPERTENSION: ICD-10-CM

## 2023-12-18 PROCEDURE — 99213 OFFICE O/P EST LOW 20 MIN: CPT | Mod: S$PBB,,, | Performed by: STUDENT IN AN ORGANIZED HEALTH CARE EDUCATION/TRAINING PROGRAM

## 2023-12-18 PROCEDURE — 99999 PR PBB SHADOW E&M-EST. PATIENT-LVL III: ICD-10-PCS | Mod: PBBFAC,,, | Performed by: STUDENT IN AN ORGANIZED HEALTH CARE EDUCATION/TRAINING PROGRAM

## 2023-12-18 PROCEDURE — 99213 OFFICE O/P EST LOW 20 MIN: CPT | Mod: PBBFAC | Performed by: STUDENT IN AN ORGANIZED HEALTH CARE EDUCATION/TRAINING PROGRAM

## 2023-12-18 PROCEDURE — 99213 PR OFFICE/OUTPT VISIT, EST, LEVL III, 20-29 MIN: ICD-10-PCS | Mod: S$PBB,,, | Performed by: STUDENT IN AN ORGANIZED HEALTH CARE EDUCATION/TRAINING PROGRAM

## 2023-12-18 PROCEDURE — 99999 PR PBB SHADOW E&M-EST. PATIENT-LVL III: CPT | Mod: PBBFAC,,, | Performed by: STUDENT IN AN ORGANIZED HEALTH CARE EDUCATION/TRAINING PROGRAM

## 2023-12-18 RX ORDER — METFORMIN HYDROCHLORIDE 500 MG/1
500 TABLET, EXTENDED RELEASE ORAL
Qty: 90 TABLET | Refills: 0 | Status: SHIPPED | OUTPATIENT
Start: 2023-12-18 | End: 2024-03-17

## 2023-12-18 NOTE — PROGRESS NOTES
Subjective     Patient ID: Malika Valdez is a 53 y.o. female.    Chief Complaint: Follow-up, Obesity, and Weight Check    Patient presents for treatment of obesity.   PCP tried to prescribe Ozempic or Wegovy, but denied by insurance  Previously on Metformin and had severe cramping and diarrhea  Has tried Noom    Co-morbidities   HTN  RA    Current Physical Activity  No regular exercise routine  Walks, but not consistently    Current Eating Habits  Breakfast - coffee, breakfast sandwich or pastry; eggs on weekend; yogurt; oatmeal  Lunch - fast food (sandwiches, burger, chicken)  Dinner - [employer]  Snacks - fruit, salty  Beverages - soft drinks, lemonade    Medical Weight Loss  6/23/2023: 354.2 lbs, BMI 58.9, BFP 56.4%, .9 lbs, SMM 85.3 lbs, BMR 1882 kcal  12/18/2023: 363 lbs, BMI 60.4, BFP 56.1%, .6 lbs, SMM 89.3 lbs, BMR 1932 kcal      Review of Systems   Constitutional:  Negative for chills and fever.   Respiratory:  Negative for shortness of breath.    Cardiovascular:  Negative for chest pain and palpitations.   Gastrointestinal:  Negative for abdominal pain, nausea and vomiting.   Neurological:  Negative for dizziness and light-headedness.   Psychiatric/Behavioral:  The patient is not nervous/anxious.           Objective    Latest Reference Range & Units 10/10/22 14:35 02/17/23 10:30 05/04/23 09:31   WBC 3.90 - 12.70 K/uL 3.97  3.97 4.54 4.17   RBC 4.00 - 5.40 M/uL 4.17  4.17 4.32 4.16   Hemoglobin 12.0 - 16.0 g/dL 11.6 (L)  11.6 (L) 12.0 11.5 (L)   Hematocrit 37.0 - 48.5 % 38.9  38.9 39.2 37.9   MCV 82 - 98 fL 93  93 91 91   MCH 27.0 - 31.0 pg 27.8  27.8 27.8 27.6   MCHC 32.0 - 36.0 g/dL 29.8 (L)  29.8 (L) 30.6 (L) 30.3 (L)   RDW 11.5 - 14.5 % 14.9 (H)  14.9 (H) 14.7 (H) 14.4   Platelets 150 - 450 K/uL 313  313 336 277   MPV 9.2 - 12.9 fL 10.3  10.3 9.3 9.8   Gran % 38.0 - 73.0 % 63.4  63.4 62.4 66.9   Lymph % 18.0 - 48.0 % 24.9  24.9 26.2 20.1   Mono % 4.0 - 15.0 % 7.3  7.3 6.8 7.7   Eosinophil  % 0.0 - 8.0 % 3.3  3.3 3.7 4.8   Basophil % 0.0 - 1.9 % 0.8  0.8 0.7 0.5   Immature Granulocytes 0.0 - 0.5 % 0.3  0.3 0.2 0.0   Gran # (ANC) 1.8 - 7.7 K/uL 2.5  2.5 2.8 2.8   Lymph # 1.0 - 4.8 K/uL 1.0  1.0 1.2 0.8 (L)   Mono # 0.3 - 1.0 K/uL 0.3  0.3 0.3 0.3   Eos # 0.0 - 0.5 K/uL 0.1  0.1 0.2 0.2   Baso # 0.00 - 0.20 K/uL 0.03  0.03 0.03 0.02   Immature Grans (Abs) 0.00 - 0.04 K/uL 0.01  0.01 0.01 0.00   nRBC 0 /100 WBC 0  0 0 0   Differential Method  Automated  Automated Automated Automated   Sed Rate 0 - 36 mm/Hr 119 (H) 49 (H) 57 (H)   Sodium 136 - 145 mmol/L 139 142 142   Potassium 3.5 - 5.1 mmol/L 4.0 4.3 4.5   Chloride 95 - 110 mmol/L 103 106 106   CO2 23 - 29 mmol/L 26 28 29   Anion Gap 8 - 16 mmol/L 10 8 7 (L)   BUN 6 - 20 mg/dL 13 11 15   Creatinine 0.5 - 1.4 mg/dL 0.8 1.0 0.9   eGFR >60 mL/min/1.73 m^2 >60.0 >60.0 >60.0   Glucose 70 - 110 mg/dL 88 95 88   Calcium 8.7 - 10.5 mg/dL 9.8 9.6 9.6   Alkaline Phosphatase 55 - 135 U/L 89 91 91   PROTEIN TOTAL 6.0 - 8.4 g/dL 8.5 (H) 8.5 (H) 8.2   Albumin 3.5 - 5.2 g/dL 3.6 3.5 3.4 (L)   BILIRUBIN TOTAL 0.1 - 1.0 mg/dL 0.4 0.3 0.4   AST 10 - 40 U/L 16 20 15   ALT 10 - 44 U/L 7 (L) 11 6 (L)   CRP 0.0 - 8.2 mg/L 2.3 3.8 8.8 (H)   CPK 20 - 180 U/L 188 (H) 165 192 (H)   Hemoglobin A1C External 4.0 - 5.6 % 5.8 (H)     Estimated Avg Glucose 68 - 131 mg/dL 120     (L): Data is abnormally low  (H): Data is abnormally high    Vitals:    12/18/23 0857   BP: (!) 126/90   Pulse: 62         Physical Exam  Vitals reviewed.   Constitutional:       General: She is not in acute distress.     Appearance: Normal appearance. She is obese. She is not ill-appearing, toxic-appearing or diaphoretic.   HENT:      Head: Normocephalic and atraumatic.   Cardiovascular:      Rate and Rhythm: Normal rate.   Pulmonary:      Effort: Pulmonary effort is normal. No respiratory distress.   Skin:     General: Skin is warm and dry.   Neurological:      Mental Status: She is alert and oriented to  person, place, and time.            Assessment and Plan     1. Class 3 severe obesity due to excess calories with serious comorbidity and body mass index (BMI) of 60.0 to 69.9 in adult  -     metFORMIN (GLUCOPHAGE-XR) 500 MG ER 24hr tablet; Take 1 tablet (500 mg total) by mouth daily with breakfast.  Dispense: 90 tablet; Refill: 0    2. Essential hypertension    3. Encounter for weight loss counseling        - Metformin  mg daily    - Log all food and beverage intake with a daily calorie goal of 1500 calories per day    - Low intensity aerobic exercise for 15-30 minutes 3-5x/week

## 2023-12-22 ENCOUNTER — OFFICE VISIT (OUTPATIENT)
Dept: INTERNAL MEDICINE | Facility: CLINIC | Age: 53
End: 2023-12-22
Payer: OTHER GOVERNMENT

## 2023-12-22 ENCOUNTER — HOSPITAL ENCOUNTER (OUTPATIENT)
Dept: RADIOLOGY | Facility: HOSPITAL | Age: 53
Discharge: HOME | End: 2023-12-22
Attending: INTERNAL MEDICINE
Payer: OTHER GOVERNMENT

## 2023-12-22 VITALS
BODY MASS INDEX: 48.82 KG/M2 | HEART RATE: 62 BPM | OXYGEN SATURATION: 99 % | SYSTOLIC BLOOD PRESSURE: 125 MMHG | HEIGHT: 65 IN | WEIGHT: 293 LBS | DIASTOLIC BLOOD PRESSURE: 74 MMHG

## 2023-12-22 DIAGNOSIS — Z79.4 TYPE 2 DIABETES MELLITUS WITHOUT COMPLICATION, WITH LONG-TERM CURRENT USE OF INSULIN: ICD-10-CM

## 2023-12-22 DIAGNOSIS — Z00.00 ROUTINE GENERAL MEDICAL EXAMINATION AT A HEALTH CARE FACILITY: Primary | ICD-10-CM

## 2023-12-22 DIAGNOSIS — E55.9 VITAMIN D DEFICIENCY DISEASE: ICD-10-CM

## 2023-12-22 DIAGNOSIS — E66.01 MORBID OBESITY WITH BMI OF 50.0-59.9, ADULT: ICD-10-CM

## 2023-12-22 DIAGNOSIS — M32.8 LUPUS ERYTHEMATOSUS OVERLAP SYNDROME: ICD-10-CM

## 2023-12-22 DIAGNOSIS — E11.9 TYPE 2 DIABETES MELLITUS WITHOUT COMPLICATION, WITH LONG-TERM CURRENT USE OF INSULIN: ICD-10-CM

## 2023-12-22 DIAGNOSIS — E53.8 VITAMIN B12 DEFICIENCY: ICD-10-CM

## 2023-12-22 DIAGNOSIS — Z12.31 SCREENING MAMMOGRAM FOR BREAST CANCER: ICD-10-CM

## 2023-12-22 PROCEDURE — 99999 PR PBB SHADOW E&M-EST. PATIENT-LVL IV: CPT | Mod: PBBFAC,,, | Performed by: INTERNAL MEDICINE

## 2023-12-22 PROCEDURE — 99214 OFFICE O/P EST MOD 30 MIN: CPT | Mod: PBBFAC | Performed by: INTERNAL MEDICINE

## 2023-12-22 PROCEDURE — 77063 MAMMO DIGITAL SCREENING BILAT WITH TOMO: ICD-10-PCS | Mod: 26,,, | Performed by: RADIOLOGY

## 2023-12-22 PROCEDURE — 77067 MAMMO DIGITAL SCREENING BILAT WITH TOMO: ICD-10-PCS | Mod: 26,,, | Performed by: RADIOLOGY

## 2023-12-22 PROCEDURE — 77063 BREAST TOMOSYNTHESIS BI: CPT | Mod: 26,,, | Performed by: RADIOLOGY

## 2023-12-22 PROCEDURE — 77067 SCR MAMMO BI INCL CAD: CPT | Mod: 26,,, | Performed by: RADIOLOGY

## 2023-12-22 PROCEDURE — 99396 PREV VISIT EST AGE 40-64: CPT | Mod: S$PBB,,, | Performed by: INTERNAL MEDICINE

## 2023-12-22 PROCEDURE — 77067 SCR MAMMO BI INCL CAD: CPT | Mod: TC

## 2023-12-22 PROCEDURE — 99999 PR PBB SHADOW E&M-EST. PATIENT-LVL IV: ICD-10-PCS | Mod: PBBFAC,,, | Performed by: INTERNAL MEDICINE

## 2023-12-22 PROCEDURE — 99396 PR PREVENTIVE VISIT,EST,40-64: ICD-10-PCS | Mod: S$PBB,,, | Performed by: INTERNAL MEDICINE

## 2023-12-22 NOTE — PROGRESS NOTES
CHIEF COMPLAINT:  Annual exam and  follow up of RA/sle, hypertension, steroid induced diabetes.     HISTORY OF PRESENT ILLNESS: This is a 53 year old woman who presents for follow up.     Her father fell and broke his hip on 12/6/23 - he is now in rehab and is doing ok. He will be in rehab until 1/3/23.     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  Daughter is now 12 and son is 10- school is going well for them.  Work is going ok.      Joints are doing well.  She uses voltaren gel as needed - once or twice in the last 6 months.  She has been off prednisone since 10/7/19.  She continues to do well off prednisone.  She is taking azathoprine 100 mg twice daily and plaquenil 200 mg twice daily for her RA and lupus overlap with hypoxemic respiratory failure 12/2017 withCT with interstial changes, pericardial effusion and pleural effusion, TIARA that was initially negative 2009  positive 1: 2560 speckled, +NILS, +SSA,SSB, dsDNA neg, +RNA polymerase III.   Breathing is good.  No fever, chills, night sweats, oral ulcers, chest pain, shortness of breath, nausea, vomiting, constipation.  No diarrhea.    She remains off pantoprazole and is doing well.          She has taken  metformin for her diabetes in the past which caused diarrhea.  She did not start on Trulicity or Ozempic - could not get approved through her insurance.  She has gained 13 pounds since our last visit.  She does the No[place] diet intermittently. .       She is taking Norvasc 5 mg daily  for hypertension.  No palpitations. She has not needed HCTZ     She is taking vitamin D 50,000 units twice weekly.    She saw Dr Mccracken on 12/18/23 - she will start on metformin  mg once daily for her weight.       REVIEW OF SYSTEMS: No fevers, chills, night sweats, fatigue, visual change, hearing loss, sinus congestion, sore throat, chest pain, shortness of breath, nausea, vomiting, constipation, diarrhea, dysuria, hematuria, polydipsia,  polyuria, joint pain, muscle pain, headaches         PAST MEDICAL HISTORY:   1.  RA and lupus overlap  2. History of vitamin D deficiency.   3. History of amenorrhea   4. Morbid obesity.     PAST SURGICAL HISTORY: Negative.     ALLERGIES, MEDICATIONS: Updated on [facility]     SOCIAL HISTORY: No tobacco, alcohol use. She is , has no children. She is a RN    FAMILY HISTORY: Mother  at age 69, ESRD on dialysis, aortic stenosis, diabetes, history hypertension, breast cancer age 64 (in remission), benign colon cancer. Father is living diabetes, hypertension and rheumatoid arthritis. Brother with diverticulosis. Brother healthy. Brother healthy.      PHYSICAL EXAMINATION:        General: Is alert, oriented, in no apparent distress. Affect is within   normal limits. TM clear, OP clear  HEENT: Conjunctivae are anicteric. . Respiratory   effort is normal.  CV RRR without murmur gallops or rubs. No lower extremity edema          labs23 reviewed      ASSESSMENT AND PLAN:       Annual exam - discussed diet, exercise and safety issues.        1. RA with lupus overlab with hypoxemic respiratory failure 2017 withCT with interstial changes, pericardial effusion and pleural effusion, TIARA that was initially negative  now positive 1: 2560 speckled, +NILS, +SSA,SSB, dsDNA neg, +RNA polymerase III. Hypoxemic respiratory failure -CT with interstial changes, pericardial effusion and pleural effusion. Followed by rheumatology. Doing well.    3. Steroid induced diabetes - controlled  4. Anemia - stable  5. Hypertension - stable    6. Vitamin D deficiency - vitamin D 50,000 units twice weekly  7. Morbid obesity - [place]. As above.   8. GERD -asx off pantoprazole  9. Screening - [facility] 2022- today.  GYN exam .    Colonoscopy 22 - diverticulosis otherwise normal - due

## 2024-01-02 ENCOUNTER — OFFICE VISIT (OUTPATIENT)
Dept: RHEUMATOLOGY | Facility: CLINIC | Age: 54
End: 2024-01-02
Payer: OTHER GOVERNMENT

## 2024-01-02 VITALS — HEIGHT: 65 IN | BODY MASS INDEX: 48.82 KG/M2 | WEIGHT: 293 LBS

## 2024-01-02 DIAGNOSIS — M85.88 OSTEOPENIA OF LUMBAR SPINE: Primary | ICD-10-CM

## 2024-01-02 DIAGNOSIS — M85.859 OSTEOPENIA OF NECK OF FEMUR, UNSPECIFIED LATERALITY: ICD-10-CM

## 2024-01-02 PROCEDURE — 99214 OFFICE O/P EST MOD 30 MIN: CPT | Mod: S$PBB,,, | Performed by: INTERNAL MEDICINE

## 2024-01-02 PROCEDURE — 99213 OFFICE O/P EST LOW 20 MIN: CPT | Mod: PBBFAC | Performed by: INTERNAL MEDICINE

## 2024-01-02 PROCEDURE — 99999 PR PBB SHADOW E&M-EST. PATIENT-LVL III: CPT | Mod: PBBFAC,,, | Performed by: INTERNAL MEDICINE

## 2024-01-02 NOTE — PROGRESS NOTES
1/1/2024    12:26 PM   Rapid3 Question Responses and Scores   MDHAQ Score 0.1   Psychologic Score 0   Pain Score 0   When you awakened in the morning OVER THE LAST WEEK, did you feel stiff? No   Fatigue Score 2   Global Health Score 2   RAPID3 Score 0.78     Answers submitted by the patient for this visit:  Rheumatology Questionnaire (Submitted on 1/1/2024)  fever: No  eye redness: No  mouth sores: No  headaches: No  shortness of breath: No  chest pain: No  trouble swallowing: No  diarrhea: No  constipation: No  unexpected weight change: No  genital sore: No  dysuria: No  During the last 3 days, have you had a skin rash?: No  Bruises or bleeds easily: No  cough: No

## 2024-01-02 NOTE — PROGRESS NOTES
"Subjective:       Patient ID: Malika Valdez is a 53 y.o. female.    Chief Complaint: RA/SLE    HPI:  Malika Valdez is a 53 y.o. female with history of RA and lupus overlap with ILD.  Lupus diagnosed 12/2017 based on TIARA that was initially negative 2009 repeat 2017 positive 1: 2560 speckled, +NILS, +SSA,SSB, dsDNA neg, +RNA polymerase III as well as  acute hypoxemic respiratory failure, pericardial/pleural effusion, ground glass opacities on chest CT.     In hospital 12/2017 started on solumedrol 16mg q8 and switched to prednisone 60 mg daily. SSZ stopped in hospital due to concern it could have been cause of ILD.  Discharged with home O2.  Pulmonary decided not to bronch.  Concerned for SLE overlap and she was started on HCQ 200mg BID.     Her repeat CT chest showed a decrease in the pleural and pericardial effusions.         Interval History:    She is feeling well.   She is currently on azathioprine 100 mg in AM and 100 mg PM, Plaquenil 200 mg bid.  Off prednisone since Oct 2019.    No pain today.  No morning stiffness.  Feeling well.  No ulcers in mouth or nose, no rashes, no hair loss.  Works as a school nurse still.      Review of Systems   Constitutional:  Positive for fatigue. Negative for fever and unexpected weight change.   HENT:  Negative for mouth sores and trouble swallowing.    Eyes:  Negative for redness.   Respiratory:  Negative for cough and shortness of breath.    Cardiovascular:  Negative for chest pain.   Gastrointestinal: Negative.  Negative for constipation and diarrhea.   Endocrine: Negative.    Genitourinary:  Negative for dysuria and genital sores.   Musculoskeletal: Negative.    Skin:  Negative for rash.   Allergic/Immunologic: Negative.    Neurological:  Negative for headaches.   Hematological:  Does not bruise/bleed easily.   Psychiatric/Behavioral: Negative.           Objective:   Ht 5' 5" (1.651 m)   Wt (!) 164.8 kg (363 lb 3.3 oz)   LMP 11/13/2017   BMI 60.44 kg/m²       Physical " Exam   Constitutional: She is oriented to person, place, and time.   HENT:   Head: Normocephalic and atraumatic.   Eyes: Conjunctivae are normal.   Cardiovascular: Normal rate, regular rhythm and normal heart sounds.          Pulmonary/Chest: Effort normal and breath sounds normal.   Abdominal: Soft. Bowel sounds are normal.   Musculoskeletal:      Cervical back: Neck supple.      Comments: 28 joint count: 0 swollen and 0 tender   Neurological: She is alert and oriented to person, place, and time.   Skin: Skin is warm and dry.   Psychiatric: Mood and affect normal.         LABS    Component      Latest Ref Rn 11/13/2023   WBC      3.90 - 12.70 K/uL 4.20    RBC      4.00 - 5.40 M/uL 4.53    Hemoglobin      12.0 - 16.0 g/dL 12.5    Hematocrit      37.0 - 48.5 % 42.0    MCV      82 - 98 fL 93    MCH      27.0 - 31.0 pg 27.6    MCHC      32.0 - 36.0 g/dL 29.8 (L)    RDW      11.5 - 14.5 % 14.9 (H)    Platelet Count      150 - 450 K/uL 309    MPV      9.2 - 12.9 fL 10.1    Immature Granulocytes      0.0 - 0.5 % 0.5    Gran # (ANC)      1.8 - 7.7 K/uL 2.4    Immature Grans (Abs)      0.00 - 0.04 K/uL 0.02    Lymph #      1.0 - 4.8 K/uL 1.1    Mono #      0.3 - 1.0 K/uL 0.5    Eos #      0.0 - 0.5 K/uL 0.2    Baso #      0.00 - 0.20 K/uL 0.02    nRBC      0 /100 WBC 0    Gran %      38.0 - 73.0 % 56.3    Lymph %      18.0 - 48.0 % 26.2    Mono %      4.0 - 15.0 % 11.7    Eosinophil %      0.0 - 8.0 % 4.8    Basophil %      0.0 - 1.9 % 0.5    Differential Method Automated    Sodium      136 - 145 mmol/L 142    Potassium      3.5 - 5.1 mmol/L 4.3    Chloride      95 - 110 mmol/L 103    CO2      23 - 29 mmol/L 26    Glucose      70 - 110 mg/dL 84    BUN      6 - 20 mg/dL 15    Creatinine      0.5 - 1.4 mg/dL 0.9    Calcium      8.7 - 10.5 mg/dL 9.8    PROTEIN TOTAL      6.0 - 8.4 g/dL 8.7 (H)    Albumin      3.5 - 5.2 g/dL 3.5    BILIRUBIN TOTAL      0.1 - 1.0 mg/dL 0.2    ALP      55 - 135 U/L 87    AST      10 - 40 U/L 18     ALT      10 - 44 U/L 8 (L)    eGFR      >60 mL/min/1.73 m^2 >60.0    Anion Gap      8 - 16 mmol/L 13    Specimen UA Urine, Clean Catch    Color, UA      Yellow, Straw, Mai  Yellow    Appearance, UA      Clear  Clear    pH, UA      5.0 - 8.0  7.0    Specific Gravity, UA      1.005 - 1.030  1.020    Protein, UA      Negative  Trace !    Glucose, UA      Negative  Negative    Ketones, UA      Negative  Negative    Bilirubin (UA)      Negative  Negative    Occult Blood UA      Negative  Negative    NITRITE UA      Negative  Negative    Leukocytes, UA      Negative  Negative    Urine Protein      0 - 15 mg/dL 17 (H)    Creatinine, Urine      15.0 - 325.0 mg/dL 97.0    Prot/Creat Ratio, Urine      0.00 - 0.20  0.18    ds DNA Ab      Negative 1:10  Negative 1:10    Complement (C-3)      50 - 180 mg/dL 169    Complement (C-4)      11 - 44 mg/dL 30    CPK      20 - 180 U/L 186 (H)    Sed Rate      0 - 36 mm/Hr 65 (H)    CRP      0.0 - 8.2 mg/L 6.0       Legend:  (L) Low  (H) High  ! Abnormal     Assessment:       1. SLE manifested by hypoxemic respiratory failure with CT with interstial changes, pericardial effusion and pleural effusion, TIARA that was initially negative 2009 now positive 1: 2560 speckled, +NILS, +SSA,SSB, dsDNA neg, +RNA polymerase III.   On Imuran and Plaquenil.  She is doing well  2. Rheumatoid arthritis. Manifested by previous small joint involvement of the hands, +RF/CCP and elevated inflammatory markers.  Now with improvement activity in hand with voltaren gel.  3. Morbid obesity. Patient has not been exercising at [place].  Encourage walking and restarting weight watchers (50 pounds since hospitalization and Weight Watchers).  Discussed with her primary doctor possible weight loss surgery  4. Abnormal SPEP without monoclonal gammopathy  5. Bradycardia.  Persistent despite beta blocker.   6. Insomnia.  Improved with meditation  7. Parental osteoporosis.  Father with hip fracture    Plan:       1.   Labs q3 months and DXA  2.  Continue azathioprine 100 mg in am and 100 mg in pm  3.  Continue Plaquenil.  Plaquenil exam 5/2023 normal.  4.  Encourage self care   5.  Had flu vaccination; will get COVID vaccination  6.  Discussed weight loss. About to start metformin.  7.  Voltaren gel 2g qid prn     RTO in 6 months/prn.

## 2024-02-07 DIAGNOSIS — M32.8 LUPUS ERYTHEMATOSUS OVERLAP SYNDROME: ICD-10-CM

## 2024-02-07 RX ORDER — AZATHIOPRINE 50 MG/1
100 TABLET ORAL 2 TIMES DAILY
Qty: 360 TABLET | Refills: 0 | Status: SHIPPED | OUTPATIENT
Start: 2024-02-07 | End: 2024-04-26

## 2024-02-14 ENCOUNTER — OFFICE VISIT (OUTPATIENT)
Dept: URGENT CARE | Facility: CLINIC | Age: 54
End: 2024-02-14
Payer: OTHER GOVERNMENT

## 2024-02-14 VITALS
DIASTOLIC BLOOD PRESSURE: 77 MMHG | BODY MASS INDEX: 48.82 KG/M2 | WEIGHT: 293 LBS | RESPIRATION RATE: 18 BRPM | OXYGEN SATURATION: 98 % | TEMPERATURE: 98 F | HEIGHT: 65 IN | SYSTOLIC BLOOD PRESSURE: 140 MMHG | HEART RATE: 78 BPM

## 2024-02-14 DIAGNOSIS — R09.81 SINUS CONGESTION: ICD-10-CM

## 2024-02-14 DIAGNOSIS — R51.9 SINUS HEADACHE: ICD-10-CM

## 2024-02-14 DIAGNOSIS — J32.9 BACTERIAL SINUSITIS: Primary | ICD-10-CM

## 2024-02-14 DIAGNOSIS — R09.81 NASAL CONGESTION: ICD-10-CM

## 2024-02-14 DIAGNOSIS — R05.9 COUGH, UNSPECIFIED TYPE: ICD-10-CM

## 2024-02-14 DIAGNOSIS — B96.89 BACTERIAL SINUSITIS: Primary | ICD-10-CM

## 2024-02-14 LAB
CTP QC/QA: YES
SARS-COV-2 AG RESP QL IA.RAPID: NEGATIVE

## 2024-02-14 PROCEDURE — 96372 THER/PROPH/DIAG INJ SC/IM: CPT | Mod: S$GLB,,, | Performed by: PHYSICIAN ASSISTANT

## 2024-02-14 PROCEDURE — 99204 OFFICE O/P NEW MOD 45 MIN: CPT | Mod: 25,S$GLB,, | Performed by: PHYSICIAN ASSISTANT

## 2024-02-14 PROCEDURE — 87811 SARS-COV-2 COVID19 W/OPTIC: CPT | Mod: QW,S$GLB,, | Performed by: PHYSICIAN ASSISTANT

## 2024-02-14 RX ORDER — AZELASTINE 1 MG/ML
1 SPRAY, METERED NASAL 2 TIMES DAILY
Qty: 30 ML | Refills: 0 | Status: SHIPPED | OUTPATIENT
Start: 2024-02-14 | End: 2025-02-13

## 2024-02-14 RX ORDER — FLUTICASONE PROPIONATE 50 MCG
1 SPRAY, SUSPENSION (ML) NASAL DAILY
Qty: 16 G | Refills: 3 | Status: SHIPPED | OUTPATIENT
Start: 2024-02-14

## 2024-02-14 RX ORDER — BENZONATATE 100 MG/1
100 CAPSULE ORAL 3 TIMES DAILY PRN
Qty: 21 CAPSULE | Refills: 0 | Status: SHIPPED | OUTPATIENT
Start: 2024-02-14 | End: 2024-02-21

## 2024-02-14 RX ORDER — AMOXICILLIN AND CLAVULANATE POTASSIUM 875; 125 MG/1; MG/1
1 TABLET, FILM COATED ORAL EVERY 12 HOURS
Qty: 20 TABLET | Refills: 0 | Status: SHIPPED | OUTPATIENT
Start: 2024-02-14

## 2024-02-14 RX ORDER — BETAMETHASONE SODIUM PHOSPHATE AND BETAMETHASONE ACETATE 3; 3 MG/ML; MG/ML
6 INJECTION, SUSPENSION INTRA-ARTICULAR; INTRALESIONAL; INTRAMUSCULAR; SOFT TISSUE
Status: COMPLETED | OUTPATIENT
Start: 2024-02-14 | End: 2024-02-14

## 2024-02-14 RX ORDER — PROMETHAZINE HYDROCHLORIDE AND DEXTROMETHORPHAN HYDROBROMIDE 6.25; 15 MG/5ML; MG/5ML
5 SYRUP ORAL EVERY 8 HOURS PRN
Qty: 236 ML | Refills: 0 | Status: SHIPPED | OUTPATIENT
Start: 2024-02-14

## 2024-02-14 RX ORDER — GUAIFENESIN AND DEXTROMETHORPHAN HYDROBROMIDE 1200; 60 MG/1; MG/1
1 TABLET, EXTENDED RELEASE ORAL EVERY 12 HOURS
Qty: 20 TABLET | Refills: 0 | Status: SHIPPED | OUTPATIENT
Start: 2024-02-14 | End: 2024-02-24

## 2024-02-14 RX ORDER — VITAMIN A 3000 MCG
1 CAPSULE ORAL
Qty: 60 ML | Refills: 12 | Status: SHIPPED | OUTPATIENT
Start: 2024-02-14

## 2024-02-14 RX ADMIN — BETAMETHASONE SODIUM PHOSPHATE AND BETAMETHASONE ACETATE 6 MG: 3; 3 INJECTION, SUSPENSION INTRA-ARTICULAR; INTRALESIONAL; INTRAMUSCULAR; SOFT TISSUE at 03:02

## 2024-02-14 NOTE — PROGRESS NOTES
"Subjective:      Patient ID: Malika Valdez is a 53 y.o. female.    Vitals:  height is 5' 5" (1.651 m) and weight is 158.8 kg (350 lb) (abnormal). Her oral temperature is 98.2 °F (36.8 °C). Her blood pressure is 140/77 (abnormal) and her pulse is 78. Her respiration is 18 and oxygen saturation is 98%.     Chief Complaint: Nasal Congestion    Patient presents with nasal congestion. She complains of a headache with sinus pressure . Patient also has a moderate cough that is producing yellow sputum. Patient denies fever and reports taking zrytec with only mild relief. Onset 2 days ago .  PATIENT IS A 53-YEAR-OLD FEMALE WITH RHEUMATOID ARTHRITIS ON IMMUNOSUPPRESSANT MEDICATIONS.  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 Episode onset: 2 days ago. The problem is unchanged. There has been no fever. Her pain is at a severity of 3/10. The pain is moderate. Associated symptoms include congestion, coughing, headaches, sinus pressure and sneezing. Pertinent negatives include no hoarse voice, neck pain or sore throat. Treatments tried: mucinex ,zrytec. The treatment provided mild relief.       HENT:  Positive for congestion and sinus pressure. Negative for sore throat.    Neck: Negative for neck pain.   Respiratory:  Positive for cough.    Skin:  Negative for erythema.   Allergic/Immunologic: Positive for sneezing.   Neurological:  Positive for headaches.      Objective:     Physical Exam   Constitutional: She is oriented to person, place, and time. She appears well-developed. She is cooperative.  Non-toxic appearance. She does not appear ill. No distress.   HENT:   Head: Normocephalic and atraumatic.   Ears:   Right Ear: Hearing, tympanic membrane, external ear and ear canal normal.   Left Ear: Hearing, tympanic membrane, external ear and ear canal normal.   Nose: Rhinorrhea and congestion present. No mucosal edema " or nasal deformity. No epistaxis. Right sinus exhibits no maxillary sinus tenderness and no frontal sinus tenderness. Left sinus exhibits no maxillary sinus tenderness and no frontal sinus tenderness.      Comments: THERE IS SOME MILD LOWER EYELID MAXILLARY SWELLING BILATERALLY.  NO OVERLYING ERYTHEMA.  SHE IS SLIGHTLY TENDER WITH PERCUSSION OVER FRONTAL AND MAXILLARY SINUSES  Mouth/Throat: Uvula is midline and mucous membranes are normal. No trismus in the jaw. Normal dentition. No uvula swelling. Posterior oropharyngeal erythema present. No oropharyngeal exudate or posterior oropharyngeal edema.   Eyes: EOM and lids are normal. Pupils are equal, round, and reactive to light. Right eye exhibits no discharge. Left eye exhibits no discharge. No scleral icterus. Extraocular movement intact      Comments: CONJUNCTIVA SLIGHTLY INJECTED BILATERALLY.  THERE IS NO PURULENT DISCHARGE.   Neck: Trachea normal and phonation normal. Neck supple. No JVD present. No tracheal deviation present. No thyromegaly present. No edema present. No erythema present. No neck rigidity present.   Cardiovascular: Normal rate, regular rhythm, normal heart sounds and normal pulses.   No murmur heard.Exam reveals no gallop and no friction rub.   Pulmonary/Chest: Effort normal and breath sounds normal. No stridor. No respiratory distress. She has no decreased breath sounds. She has no wheezes. She has no rhonchi. She has no rales. She exhibits no tenderness.   Abdominal: Normal appearance. She exhibits no distension. Soft. There is no abdominal tenderness. There is no rebound and no guarding.   Musculoskeletal: Normal range of motion.         General: No deformity. Normal range of motion.   Neurological: She is alert and oriented to person, place, and time. She exhibits normal muscle tone. Coordination normal.   Skin: Skin is warm, dry, intact, not diaphoretic, not pale and no rash. Capillary refill takes less than 2 seconds. No erythema    Psychiatric: Her speech is normal and behavior is normal. Judgment and thought content normal.   Nursing note and vitals reviewed.    Results for orders placed or performed in visit on 02/14/24   SARS Coronavirus 2 Antigen, POCT Manual Read   Result Value Ref Range    SARS Coronavirus 2 Antigen Negative Negative     Acceptable Yes     No results found.       BECAUSE OF HER PRESENTATION AND A NEGATIVE COVID SWAB, ALONG WITH HER BEING IMMUNOCOMPROMISED ON IMMUNOSUPPRESSANT MEDICATIONS I WILL ELECT TO TREAT HER AS A BACTERIAL SINUS INFECTION IN LIGHT OF HER PROGRESSION OF SYMPTOMS DESPITE OVER-THE-COUNTER MEDICATIONS.    Assessment:     1. Bacterial sinusitis    2. Nasal congestion    3. Sinus congestion    4. Sinus headache    5. Cough, unspecified type        Plan:       Bacterial sinusitis  -     amoxicillin-clavulanate 875-125mg (AUGMENTIN) 875-125 mg per tablet; Take 1 tablet by mouth every 12 (twelve) hours.  Dispense: 20 tablet; Refill: 0    Nasal congestion  -     SARS Coronavirus 2 Antigen, POCT Manual Read  -     fluticasone propionate (FLONASE) 50 mcg/actuation nasal spray; 1 spray (50 mcg total) by Each Nostril route once daily.  Dispense: 16 g; Refill: 3  -     azelastine (ASTELIN) 137 mcg (0.1 %) nasal spray; 1 spray (137 mcg total) by Nasal route 2 (two) times daily.  Dispense: 30 mL; Refill: 0  -     sodium chloride (SALINE NASAL) 0.65 % nasal spray; 1 spray by Nasal route as needed for Congestion.  Dispense: 60 mL; Refill: 12    Sinus congestion  -     betamethasone acetate-betamethasone sodium phosphate injection 6 mg    Sinus headache  -     betamethasone acetate-betamethasone sodium phosphate injection 6 mg    Cough, unspecified type  -     dextromethorphan-guaiFENesin (MUCINEX DM) 60-1,200 mg per 12 hr tablet; Take 1 tablet by mouth every 12 (twelve) hours. for 10 days  Dispense: 20 tablet; Refill: 0  -     promethazine-dextromethorphan (PROMETHAZINE-DM) 6.25-15 mg/5 mL Syrp; Take  5 mLs by mouth every 8 (eight) hours as needed (COUGH).  Dispense: 236 mL; Refill: 0  -     benzonatate (TESSALON) 100 MG capsule; Take 1 capsule (100 mg total) by mouth 3 (three) times daily as needed for Cough.  Dispense: 21 capsule; Refill: 0    Follow up if symptoms worsen or fail to improve, for F/U with PCP or ED.   Patient Instructions   Patient Education       Upper Respiratory Infection ED   General Information   You came to the Emergency Department (ED) for an upper respiratory infection or URI. A URI can affect your nose, throat, ears, and sinuses. A virus is the cause of almost all URIs and antibiotics will not help you feel better more quickly. The common cold is an example of a viral URI.  URIs are easy to spread from person to person, most often through coughing or sneezing. A URI will almost always get better in a week or two without any treatment.  What care is needed at home?   Call your regular doctor to let them know you were in the ED. Make a follow-up appointment if you were told to.  If you smoke, try to quit. Your doctor or nurse can help.  Drink lots of fluids like water, juice, or broth. This will help replace any fluids lost if you have a runny nose or fever. Warm tea or soup can help soothe a sore throat.  If the air in your home feels dry, use a cool mist humidifier. This can help a stuffy nose and make it easier to breathe.  You can also use saline nose drops to relieve stuffiness.  If you decide to take over-the-counter cough or cold medicines, follow the directions on the label carefully. Be sure you do not take more than 1 medicine that contains acetaminophen. Also, if you have a heart problem or high blood pressure, check with your doctor before you take any of these medicines.  Wash your hands often. Cough or sneeze into a tissue or your elbow instead of your hands. This will help keep others healthy.  When do I need to get emergency help?   Return to the ED if:   You have trouble  breathing when talking or sitting still.  When do I need to call the doctor?   You have a fever of 100.4°F (38°C) or higher for several days, chills, a very bad sore throat, or ear or sinus pain.  You develop a new fever after several days of feeling the same or improving.  You develop chest pain when you cough.  You have a cough that lasts more than 10 days.  You cough up blood, or the color of the mucus you cough up changes.  You have new or worsening symptoms.  Last Reviewed Date   2020-09-25  Consumer Information Use and Disclaimer   This information is not specific medical advice and does not replace information you receive from your health care provider. This is only a brief summary of general information. It does NOT include all information about conditions, illnesses, injuries, tests, procedures, treatments, therapies, discharge instructions or life-style choices that may apply to you. You must talk with your health care provider for complete information about your health and treatment options. This information should not be used to decide whether or not to accept your health care providers advice, instructions or recommendations. Only your health care provider has the knowledge and training to provide advice that is right for you.  Copyright   Copyright © 2021 [employer] and its affiliates and/or licensors. All rights reserved.

## 2024-02-15 ENCOUNTER — PATIENT MESSAGE (OUTPATIENT)
Dept: RHEUMATOLOGY | Facility: CLINIC | Age: 54
End: 2024-02-15
Payer: OTHER GOVERNMENT

## 2024-02-16 ENCOUNTER — HOSPITAL ENCOUNTER (OUTPATIENT)
Dept: RADIOLOGY | Facility: CLINIC | Age: 54
Discharge: HOME | End: 2024-02-16
Attending: INTERNAL MEDICINE
Payer: OTHER GOVERNMENT

## 2024-02-16 ENCOUNTER — TELEPHONE (OUTPATIENT)
Dept: RHEUMATOLOGY | Facility: CLINIC | Age: 54
End: 2024-02-16
Payer: OTHER GOVERNMENT

## 2024-02-16 DIAGNOSIS — M85.859 OSTEOPENIA OF NECK OF FEMUR, UNSPECIFIED LATERALITY: ICD-10-CM

## 2024-02-16 DIAGNOSIS — M85.88 OSTEOPENIA OF LUMBAR SPINE: ICD-10-CM

## 2024-02-16 PROCEDURE — 77080 DXA BONE DENSITY AXIAL: CPT | Mod: 26,,, | Performed by: INTERNAL MEDICINE

## 2024-02-16 PROCEDURE — 77080 DXA BONE DENSITY AXIAL: CPT | Mod: TC

## 2024-02-19 ENCOUNTER — PATIENT MESSAGE (OUTPATIENT)
Dept: RHEUMATOLOGY | Facility: CLINIC | Age: 54
End: 2024-02-19
Payer: OTHER GOVERNMENT

## 2024-02-19 DIAGNOSIS — M32.8 LUPUS ERYTHEMATOSUS OVERLAP SYNDROME: Primary | ICD-10-CM

## 2024-02-21 ENCOUNTER — PATIENT MESSAGE (OUTPATIENT)
Dept: RHEUMATOLOGY | Facility: CLINIC | Age: 54
End: 2024-02-21
Payer: OTHER GOVERNMENT

## 2024-02-21 ENCOUNTER — TELEPHONE (OUTPATIENT)
Dept: RHEUMATOLOGY | Facility: CLINIC | Age: 54
End: 2024-02-21
Payer: OTHER GOVERNMENT

## 2024-02-21 DIAGNOSIS — R53.83 OTHER FATIGUE: ICD-10-CM

## 2024-02-21 DIAGNOSIS — M32.8 LUPUS ERYTHEMATOSUS OVERLAP SYNDROME: Primary | ICD-10-CM

## 2024-02-21 NOTE — TELEPHONE ENCOUNTER
----- Message from Emily Taylor sent at 2/21/2024  3:07 AM CST -----  Regarding: Lab Client Services  Contact: [phone]  Good Morning,     My name is Emily Taylor, I work in the Lab Client Services. We had a problem with some lab work on this patient. If someone from your office could call us at [phone] or ext. [phone] that would be great. Anyone in my department can help.      Thank you,

## 2024-02-22 ENCOUNTER — LAB VISIT (OUTPATIENT)
Dept: LAB | Facility: HOSPITAL | Age: 54
End: 2024-02-22
Attending: INTERNAL MEDICINE
Payer: OTHER GOVERNMENT

## 2024-02-22 DIAGNOSIS — M32.8 LUPUS ERYTHEMATOSUS OVERLAP SYNDROME: ICD-10-CM

## 2024-02-22 LAB
BACTERIA #/AREA URNS AUTO: NORMAL /HPF
BILIRUB UR QL STRIP: NEGATIVE
CLARITY UR REFRACT.AUTO: CLEAR
COLOR UR AUTO: YELLOW
CREAT UR-MCNC: 183 MG/DL (ref 15–325)
GLUCOSE UR QL STRIP: NEGATIVE
HGB UR QL STRIP: NEGATIVE
HYALINE CASTS UR QL AUTO: 0 /LPF
KETONES UR QL STRIP: NEGATIVE
LEUKOCYTE ESTERASE UR QL STRIP: NEGATIVE
MICROSCOPIC COMMENT: NORMAL
NITRITE UR QL STRIP: NEGATIVE
PH UR STRIP: 5 [PH] (ref 5–8)
PROT UR QL STRIP: ABNORMAL
PROT UR-MCNC: 15 MG/DL (ref 0–15)
PROT/CREAT UR: 0.08 MG/G{CREAT} (ref 0–0.2)
RBC #/AREA URNS AUTO: 2 /HPF (ref 0–4)
SP GR UR STRIP: 1.02 (ref 1–1.03)
SQUAMOUS #/AREA URNS AUTO: 1 /HPF
URN SPEC COLLECT METH UR: ABNORMAL
WBC #/AREA URNS AUTO: 1 /HPF (ref 0–5)

## 2024-02-22 PROCEDURE — 81001 URINALYSIS AUTO W/SCOPE: CPT | Performed by: INTERNAL MEDICINE

## 2024-02-22 PROCEDURE — 82570 ASSAY OF URINE CREATININE: CPT | Performed by: INTERNAL MEDICINE

## 2024-02-23 ENCOUNTER — PATIENT MESSAGE (OUTPATIENT)
Dept: RHEUMATOLOGY | Facility: CLINIC | Age: 54
End: 2024-02-23
Payer: OTHER GOVERNMENT

## 2024-04-24 DIAGNOSIS — M32.8 LUPUS ERYTHEMATOSUS OVERLAP SYNDROME: ICD-10-CM

## 2024-04-26 ENCOUNTER — TELEPHONE (OUTPATIENT)
Dept: OPTOMETRY | Facility: CLINIC | Age: 54
End: 2024-04-26
Payer: OTHER GOVERNMENT

## 2024-04-26 RX ORDER — AZATHIOPRINE 50 MG/1
100 TABLET ORAL 2 TIMES DAILY
Qty: 360 TABLET | Refills: 0 | Status: SHIPPED | OUTPATIENT
Start: 2024-04-26

## 2024-04-26 NOTE — TELEPHONE ENCOUNTER
----- Message from Judith Massey sent at 4/26/2024  9:31 AM CDT -----  Contact: [phone]  Pt is calling to schedule appt for Plaquenil Unable to schedule with visual fields test Please call to assist

## 2024-05-15 DIAGNOSIS — M32.8 LUPUS ERYTHEMATOSUS OVERLAP SYNDROME: ICD-10-CM

## 2024-05-15 DIAGNOSIS — M05.79 RHEUMATOID ARTHRITIS INVOLVING MULTIPLE SITES WITH POSITIVE RHEUMATOID FACTOR: ICD-10-CM

## 2024-05-16 ENCOUNTER — HOSPITAL ENCOUNTER (EMERGENCY)
Facility: HOSPITAL | Age: 54
Discharge: HOME | End: 2024-05-16
Attending: EMERGENCY MEDICINE
Payer: OTHER GOVERNMENT

## 2024-05-16 VITALS
SYSTOLIC BLOOD PRESSURE: 134 MMHG | BODY MASS INDEX: 48.82 KG/M2 | DIASTOLIC BLOOD PRESSURE: 83 MMHG | RESPIRATION RATE: 17 BRPM | WEIGHT: 293 LBS | TEMPERATURE: 99 F | HEART RATE: 53 BPM | HEIGHT: 65 IN | OXYGEN SATURATION: 99 %

## 2024-05-16 DIAGNOSIS — J84.9 ILD (INTERSTITIAL LUNG DISEASE): Primary | ICD-10-CM

## 2024-05-16 DIAGNOSIS — M79.606 LEG PAIN: ICD-10-CM

## 2024-05-16 DIAGNOSIS — R06.02 SHORTNESS OF BREATH: ICD-10-CM

## 2024-05-16 DIAGNOSIS — R00.2 PALPITATION: ICD-10-CM

## 2024-05-16 LAB
ALBUMIN SERPL BCP-MCNC: 3.4 G/DL (ref 3.5–5.2)
ALP SERPL-CCNC: 87 U/L (ref 55–135)
ALT SERPL W/O P-5'-P-CCNC: 9 U/L (ref 10–44)
ANION GAP SERPL CALC-SCNC: 10 MMOL/L (ref 8–16)
AST SERPL-CCNC: 17 U/L (ref 10–40)
BASOPHILS # BLD AUTO: 0.02 K/UL (ref 0–0.2)
BASOPHILS NFR BLD: 0.5 % (ref 0–1.9)
BILIRUB SERPL-MCNC: 0.5 MG/DL (ref 0.1–1)
BNP SERPL-MCNC: 27 PG/ML (ref 0–99)
BUN SERPL-MCNC: 12 MG/DL (ref 6–20)
CALCIUM SERPL-MCNC: 10.1 MG/DL (ref 8.7–10.5)
CHLORIDE SERPL-SCNC: 106 MMOL/L (ref 95–110)
CO2 SERPL-SCNC: 22 MMOL/L (ref 23–29)
CREAT SERPL-MCNC: 0.9 MG/DL (ref 0.5–1.4)
D DIMER PPP IA.FEU-MCNC: 0.64 MG/L FEU
DIFFERENTIAL METHOD BLD: ABNORMAL
EOSINOPHIL # BLD AUTO: 0.2 K/UL (ref 0–0.5)
EOSINOPHIL NFR BLD: 4.3 % (ref 0–8)
ERYTHROCYTE [DISTWIDTH] IN BLOOD BY AUTOMATED COUNT: 14.4 % (ref 11.5–14.5)
EST. GFR  (NO RACE VARIABLE): >60 ML/MIN/1.73 M^2
GLUCOSE SERPL-MCNC: 106 MG/DL (ref 70–110)
HCT VFR BLD AUTO: 41.1 % (ref 37–48.5)
HCV AB SERPL QL IA: NORMAL
HGB BLD-MCNC: 12.3 G/DL (ref 12–16)
HIV 1+2 AB+HIV1 P24 AG SERPL QL IA: NORMAL
IMM GRANULOCYTES # BLD AUTO: 0.01 K/UL (ref 0–0.04)
IMM GRANULOCYTES NFR BLD AUTO: 0.3 % (ref 0–0.5)
LYMPHOCYTES # BLD AUTO: 0.9 K/UL (ref 1–4.8)
LYMPHOCYTES NFR BLD: 23.4 % (ref 18–48)
MAGNESIUM SERPL-MCNC: 1.9 MG/DL (ref 1.6–2.6)
MCH RBC QN AUTO: 27.6 PG (ref 27–31)
MCHC RBC AUTO-ENTMCNC: 29.9 G/DL (ref 32–36)
MCV RBC AUTO: 92 FL (ref 82–98)
MONOCYTES # BLD AUTO: 0.2 K/UL (ref 0.3–1)
MONOCYTES NFR BLD: 6.1 % (ref 4–15)
NEUTROPHILS # BLD AUTO: 2.5 K/UL (ref 1.8–7.7)
NEUTROPHILS NFR BLD: 65.4 % (ref 38–73)
NRBC BLD-RTO: 0 /100 WBC
OHS QRS DURATION: 98 MS
OHS QTC CALCULATION: 420 MS
PLATELET # BLD AUTO: 285 K/UL (ref 150–450)
PMV BLD AUTO: 9.8 FL (ref 9.2–12.9)
POTASSIUM SERPL-SCNC: 4.3 MMOL/L (ref 3.5–5.1)
PROT SERPL-MCNC: 8.3 G/DL (ref 6–8.4)
RBC # BLD AUTO: 4.45 M/UL (ref 4–5.4)
SODIUM SERPL-SCNC: 138 MMOL/L (ref 136–145)
TROPONIN I SERPL DL<=0.01 NG/ML-MCNC: <0.006 NG/ML (ref 0–0.03)
TSH SERPL DL<=0.005 MIU/L-ACNC: 1.41 UIU/ML (ref 0.4–4)
WBC # BLD AUTO: 3.76 K/UL (ref 3.9–12.7)

## 2024-05-16 PROCEDURE — 84484 ASSAY OF TROPONIN QUANT: CPT | Performed by: PHYSICIAN ASSISTANT

## 2024-05-16 PROCEDURE — 80053 COMPREHEN METABOLIC PANEL: CPT | Performed by: PHYSICIAN ASSISTANT

## 2024-05-16 PROCEDURE — 86803 HEPATITIS C AB TEST: CPT | Performed by: PHYSICIAN ASSISTANT

## 2024-05-16 PROCEDURE — 83735 ASSAY OF MAGNESIUM: CPT | Performed by: PHYSICIAN ASSISTANT

## 2024-05-16 PROCEDURE — 25500020 PHARM REV CODE 255: Performed by: EMERGENCY MEDICINE

## 2024-05-16 PROCEDURE — 85025 COMPLETE CBC W/AUTO DIFF WBC: CPT | Performed by: PHYSICIAN ASSISTANT

## 2024-05-16 PROCEDURE — 87389 HIV-1 AG W/HIV-1&-2 AB AG IA: CPT | Performed by: PHYSICIAN ASSISTANT

## 2024-05-16 PROCEDURE — 93010 ELECTROCARDIOGRAM REPORT: CPT | Mod: ,,, | Performed by: INTERNAL MEDICINE

## 2024-05-16 PROCEDURE — 83880 ASSAY OF NATRIURETIC PEPTIDE: CPT | Performed by: PHYSICIAN ASSISTANT

## 2024-05-16 PROCEDURE — 93005 ELECTROCARDIOGRAM TRACING: CPT

## 2024-05-16 PROCEDURE — 85379 FIBRIN DEGRADATION QUANT: CPT | Performed by: PHYSICIAN ASSISTANT

## 2024-05-16 PROCEDURE — 84443 ASSAY THYROID STIM HORMONE: CPT | Performed by: PHYSICIAN ASSISTANT

## 2024-05-16 PROCEDURE — 99285 EMERGENCY DEPT VISIT HI MDM: CPT | Mod: 25

## 2024-05-16 RX ORDER — HYDROXYCHLOROQUINE SULFATE 200 MG/1
200 TABLET ORAL 2 TIMES DAILY
Qty: 180 TABLET | Refills: 3 | Status: SHIPPED | OUTPATIENT
Start: 2024-05-16

## 2024-05-16 RX ADMIN — IOHEXOL 100 ML: 350 INJECTION, SOLUTION INTRAVENOUS at 10:05

## 2024-05-16 NOTE — ED TRIAGE NOTES
Pt arrives to ED reporting of off and on weakness and not feeling well. Pt reports palpitations. Pt reports pain in left leg. Denies numbness or tingling.  Pt reports shortness of breath with activity. Pt denies chest pain.

## 2024-05-16 NOTE — ED PROVIDER NOTES
Encounter Date: 5/16/2024       History     Chief Complaint   Patient presents with    Palpitations    Leg Pain     53-year-old female with a PMHx of ILD, lupus, rheumatoid arthritis, obesity presents to ED with palpitations, shortness of breath, atraumatic left lower leg pain x1 week.  She feels a flutter in her chest intermittently.  She also notes shortness of breath with exertion.  She felt lightheaded over the past week.  She felt a sharp pain in her chest 3 days ago though has not had chest pain since.  Leg pain is mainly over her shin though she occasionally has pain in her left calf.  She feels as if her left leg is swollen.  Denies alcohol use, drug use, excessive caffeine use. Denies prior history of blood clots, estrogen use, recent surgery or travel, clotting disorders, personal history of cancer, fever, cough.    The history is provided by the patient.     Review of patient's allergies indicates:   Allergen Reactions    Lisinopril Swelling     Mouth swelled, had to go to ED    Ventolin [albuterol] Swelling     Lips - swelling     Past Medical History:   Diagnosis Date    Arthritis     Hypertension 11/11/2013    ILD (interstitial lung disease)     Insomnia 2/26/2013    Lupus     Obesity 2/26/2013    RA (refractory anemia)     Rheumatoid arthritis     Steroid-induced hyperglycemia 3/1/2018    Thyroid nodule 3/1/2018     Past Surgical History:   Procedure Laterality Date    BREAST BIOPSY Left 10/13/2019    fibroadenomatoid change    COLONOSCOPY N/A 12/20/2022    Procedure: COLONOSCOPY;  Surgeon: Elmre Stuart MD;  Location: [address]);  Service: Endoscopy;  Laterality: N/A;  BMI 58.24  inst portal-RB     Family History   Problem Relation Name Age of Onset    Hypertension Father      Diabetes Father      Rheum arthritis Father          methotrexate    Diabetes Mother      Hypertension Mother      Breast cancer Mother  62    Kidney disease Mother          on dialysis    Rheum arthritis Paternal  Grandmother      Hypertension Paternal Grandmother      Breast cancer Other mat second cousin 45    Hypertension Maternal Grandmother 91     Lupus Neg Hx      Inflammatory bowel disease Neg Hx      Psoriasis Neg Hx      Thyroid disease Neg Hx      Ovarian cancer Neg Hx      Heart attack Neg Hx      Heart disease Neg Hx      Colon cancer Neg Hx       Social History     Tobacco Use    Smoking status: Never     Passive exposure: Never    Smokeless tobacco: Never    Tobacco comments:     nurse;    Substance Use Topics    Alcohol use: No    Drug use: No     Review of Systems   Constitutional:  Negative for fever.   Respiratory:  Positive for shortness of breath. Negative for cough.    Cardiovascular:  Positive for chest pain, palpitations and leg swelling.   Neurological:  Positive for light-headedness.       Physical Exam     Initial Vitals [05/16/24 0813]   BP Pulse Resp Temp SpO2   (!) 180/95 66 20 98.3 °F (36.8 °C) 99 %      MAP       --         Physical Exam    Nursing note and vitals reviewed.  Constitutional: She appears well-developed and well-nourished. She is not diaphoretic. No distress.   HENT:   Head: Normocephalic and atraumatic.   Nose: Nose normal.   Eyes: Conjunctivae and EOM are normal.   Neck: Neck supple.   Cardiovascular:  Normal rate, regular rhythm, normal heart sounds and intact distal pulses.           Pulmonary/Chest: Breath sounds normal. No respiratory distress.   Musculoskeletal:      Cervical back: Neck supple.      Right lower leg: No swelling.      Left lower leg: No swelling.     Neurological: She is alert and oriented to person, place, and time. Gait normal.   Skin: No rash noted.   Psychiatric: She has a normal mood and affect. Thought content normal.         ED Course   Procedures  Labs Reviewed   CBC W/ AUTO DIFFERENTIAL - Abnormal; Notable for the following components:       Result Value    WBC 3.76 (*)     MCHC 29.9 (*)     Lymph # 0.9 (*)     Mono # 0.2 (*)     All other  components within normal limits   COMPREHENSIVE METABOLIC PANEL - Abnormal; Notable for the following components:    CO2 22 (*)     Albumin 3.4 (*)     ALT 9 (*)     All other components within normal limits   D DIMER, QUANTITATIVE - Abnormal; Notable for the following components:    D-Dimer 0.64 (*)     All other components within normal limits   HIV 1 / 2 ANTIBODY    Narrative:     Release to patient->Immediate   HEPATITIS C ANTIBODY    Narrative:     Release to patient->Immediate   TSH   TROPONIN I   B-TYPE NATRIURETIC PEPTIDE   MAGNESIUM     EKG Readings: (Independently Interpreted)   Rhythm: Normal Sinus Rhythm. Heart Rate: 60. Ectopy: PACs. Axis: Left Axis Deviation. Clinical Impression: Normal Sinus Rhythm with PACs     ECG Results              EKG 12-lead (Final result)        Collection Time Result Time QRS Duration OHS QTC Calculation    05/16/24 08:23:58 05/16/24 08:59:38 98 420                     Final result by Interface, Lab In [facility] (05/16/24 08:59:46)                   Narrative:    Test Reason : R00.2,    Vent. Rate : 060 BPM     Atrial Rate : 060 BPM     P-R Int : 200 ms          QRS Dur : 098 ms      QT Int : 420 ms       P-R-T Axes : 072 -13 044 degrees     QTc Int : 420 ms    Sinus rhythm with Premature atrial complexes  Minimal voltage criteria for LVH, may be normal variant ( R in aVL )  Nonspecific T wave abnormality  Abnormal ECG  When compared with ECG of 11-DEC-2018 14:02,  Premature atrial complexes are now Present  Confirmed by Marcel JENSEN MD ([id number]) on 5/16/2024 8:59:37 AM    Referred By:             Confirmed By:Marcel JENSEN MD                                  Imaging Results              US Lower Extremity Veins Left (Final result)  Result time 05/16/24 11:59:59      Final result by Yi Amador MD (05/16/24 11:59:59)                   Impression:      No evidence of deep venous thrombosis in the left lower extremity.      Electronically signed by: Yi Amador  MD  Date:    05/16/2024  Time:    11:59               Narrative:    EXAMINATION:  US LOWER EXTREMITY VEINS LEFT    CLINICAL HISTORY:  Pain in leg, unspecified    TECHNIQUE:  Duplex and color flow Doppler evaluation and graded compression of the left lower extremity veins was performed.    COMPARISON:  07/07/2023    FINDINGS:  Left thigh veins: The common femoral, femoral, popliteal, upper greater saphenous, and deep femoral veins are patent and free of thrombus. The veins are normally compressible and have normal phasic flow and augmentation response.    Left calf veins: The visualized calf veins are patent.    Contralateral CFV: The contralateral (right) common femoral vein is patent and free of thrombus.    Miscellaneous: None                                       CTA Chest Non-Coronary (PE Studies) (Final result)  Result time 05/16/24 10:42:39      Final result by Yi Amador MD (05/16/24 10:42:39)                   Impression:      No evidence of pulmonary embolus.    A subtle mosaic attenuation noted throughout both lung fields, nonspecific often seen in the setting of inflammatory small airway disease.    Slightly prominent left ventricle.      Electronically signed by: Yi Amador MD  Date:    05/16/2024  Time:    10:42               Narrative:    EXAMINATION:  CTA CHEST NON CORONARY (PE STUDIES)    CLINICAL HISTORY:  Pulmonary embolism (PE) suspected, positive D-dimer;    TECHNIQUE:  Low dose axial images, sagittal and coronal reformations were obtained from the thoracic inlet to the lung bases following the IV administration of 100 mL of Omnipaque 350.  Contrast timing was optimized to evaluate the pulmonary arteries.  MIP images were performed.    COMPARISON:  02/21/2018 CT chest    FINDINGS:  Vasculature: Good opacification of the pulmonary arterial system, there is no pulmonary embolus to the level of the subsegmental pulmonary arteries.  Thoracic aorta normal caliber. No evidence of  dissection.    Lungs: The tracheobronchial tree is clear. Subtle mosaic attenuation, nonspecific often seen in the setting of inflammatory small airway disease.  Small linear band of atelectasis within the left upper lobe.  No emphysematous lung changes.No pleural effusion.    Mediastinum: No lymphadenopathy.  The left ventricle appears slightly prominent.No significant coronary artery calcifications.  No pericardial effusion.The esophagus course normally.    Upper abdomen (visualized portion):No abnormality seen.    Bones/chest wall: No marrow replacement process.                                       X-Ray Chest PA And Lateral (Final result)  Result time 05/16/24 10:16:44      Final result by Haim Rowell MD (05/16/24 10:16:44)                   Impression:      Cardiomegaly.  No evidence of failure.    Unchanged subsegmental atelectasis.      Electronically signed by: Haim Rowell MD  Date:    05/16/2024  Time:    10:16               Narrative:    EXAMINATION:  XR CHEST PA AND LATERAL    CLINICAL HISTORY:  Shortness of breath    TECHNIQUE:  PA and lateral views of the chest were performed.    COMPARISON:  12/11/2018.    FINDINGS:  Monitoring EKG leads are present.  The trachea is unremarkable.  There are calcifications of the aortic knob.  The cardiomediastinal silhouette is enlarged.  There is no evidence of free air beneath the hemidiaphragms.  There are no pleural effusions.  There is no evidence of a pneumothorax.  There is no evidence of pneumomediastinum.  No airspace opacity is present.  There is bilateral subsegmental atelectasis.  There is no focal consolidation.  There are degenerative changes in the osseous structures.                                       Medications   iohexoL (OMNIPAQUE 350) injection 100 mL (100 mLs Intravenous Given 5/16/24 1033)     Medical Decision Making  53-year-old female with a past medical history of ILD, lupus, rheumatoid arthritis, obesity presents to ED with  palpitations, shortness of breath, atraumatic left lower leg pain x1 week.  Nontoxic appearing.  Vitals with hypertension. Afebrile. Exam as above. I will initiate workup and reassess.    Ddx:  Arrhythmia, ACS, PE, interstitial lung disease, new onset CHF, electrolyte derangement, thyroid disease    - Labs without leukocytosis. H&H stable. No significant electrolyte derangements.  - Dimer elevated, CTA negative for PE.  Mosaic attenuation noted throughout both lung fields, most likely related to ILD  - DVT study negative.  Distal pulses intact.  No gross swelling, skin changes, deformities noted.  - Troponin negative. She has not had chest pain in 3 days. I do not believe that trending is necessary at this time  - EKG with normal sinus rhythm, occasional PACs noted, may consider as cause of palpitations.  No prior EKGs to compare with  - Normal TSH  - CXR without infiltrate, effusion, pneumothorax, mass or other acute findings.  Cardiomegaly noted.  Normal BNP.  No signs of fluid overload on exam.  - On reexamination she was resting comfortably.  Blood pressure much improved from presentation.  Patient has a history of ILD and rheumatoid lung disease.  I will place referral with pulmonology as patient was not had an evaluation in quite some time.  I also placed a referral with cardiology for further palpitations workup. Strict ED precautions given to return immediately for new, worsening, or concerning symptoms    Amount and/or Complexity of Data Reviewed  Labs: ordered. Decision-making details documented in ED Course.  Radiology: ordered.    Risk  Prescription drug management.               ED Course as of 05/16/24 1514   Thu May 16, 2024   1020 WBC(!): 3.76 [HM]   1020 Hemoglobin: 12.3 [HM]   1020 Hematocrit: 41.1 [HM]   1020 D-Dimer(!): 0.64  Will obtain CTA for further evaluation  [HM]   1021 Magnesium : 1.9 [HM]   1021 Troponin I: <0.006 []   1021 BNP: 27 [HM]      ED Course User Index  [HM] Tita Harvey  TIM ALTAMIRANO                           Clinical Impression:  Final diagnoses:  [R00.2] Palpitation  [R06.02] Shortness of breath  [M79.606] Leg pain  [J84.9] ILD (interstitial lung disease) (Primary)          ED Disposition Condition    Discharge Stable          ED Prescriptions    None       Follow-up Information       Follow up With Specialties Details Why Contact Info Additional Information    [facility] - Emergency Dept Emergency Medicine  If symptoms worsen [facility] [id number] [phone]     [facility] - Cardiology - [facility] Cardiology Schedule an appointment as soon as possible for a visit   [facility] [id number] [phone] Cardiology Services Clinics - 3rd floor    [facility] - Pulmonary 3rd Fl Pulmonology Schedule an appointment as soon as possible for a visit   [address] [place] [phone] Cardiology Services Clinics - Main Building, 3rd Floor Please park in [facility] and use Atrium elevator             Tita Harvey PA-C  05/16/24 1515

## 2024-05-16 NOTE — DISCHARGE INSTRUCTIONS
I am unsure of  the cause of your palpitations and shortness of breath today. Please follow up with cardiology for further evaulation     I also placed a referral with pulmonology as symptoms may be secondary to ILD or rheumatoid lung disease    Strict ED precautions given to return immediately for new, worsening, or concerning symptoms

## 2024-05-19 RX ORDER — ERGOCALCIFEROL 1.25 MG/1
CAPSULE ORAL
Qty: 24 CAPSULE | Refills: 3 | Status: SHIPPED | OUTPATIENT
Start: 2024-05-19

## 2024-05-19 NOTE — TELEPHONE ENCOUNTER
Care Due:                  Date            Visit Type   Department     Provider  --------------------------------------------------------------------------------                                EP -                              PRIMARY      [facility] INTERNAL  Last Visit: 12-      CARE ([facility])   MEDICINE       TRINITY GONZALEZ                              EP                               PRIMARY      [facility] INTERNAL  Next Visit: 06-      CARE ([facility])   MEDICINE       TRINITY GONZALEZ                                                            Last  Test          Frequency    Reason                     Performed    Due Date  --------------------------------------------------------------------------------    Vitamin D...  12 months..  ergocalciferol...........  Not Found    Overdue    [employer] Embedded Care Due Messages. Reference number: [id number].   5/18/2024 11:43:03 PM CDT

## 2024-05-28 ENCOUNTER — OFFICE VISIT (OUTPATIENT)
Dept: OPTOMETRY | Facility: CLINIC | Age: 54
End: 2024-05-28
Payer: OTHER GOVERNMENT

## 2024-05-28 ENCOUNTER — CLINICAL SUPPORT (OUTPATIENT)
Dept: OPHTHALMOLOGY | Facility: CLINIC | Age: 54
End: 2024-05-28
Payer: OTHER GOVERNMENT

## 2024-05-28 ENCOUNTER — OFFICE VISIT (OUTPATIENT)
Dept: OPTOMETRY | Facility: CLINIC | Age: 54
End: 2024-05-28
Payer: COMMERCIAL

## 2024-05-28 DIAGNOSIS — Z79.899 ENCOUNTER FOR EYE EXAM DUE TO HIGH RISK MEDICATION: ICD-10-CM

## 2024-05-28 DIAGNOSIS — Z01.00 ROUTINE EYE EXAM: Primary | ICD-10-CM

## 2024-05-28 DIAGNOSIS — Z46.0 FITTING AND ADJUSTMENT OF SPECTACLES AND CONTACT LENSES: Primary | ICD-10-CM

## 2024-05-28 DIAGNOSIS — H35.033 HYPERTENSIVE RETINOPATHY OF BOTH EYES: ICD-10-CM

## 2024-05-28 DIAGNOSIS — H25.13 NUCLEAR SCLEROSIS OF BOTH EYES: ICD-10-CM

## 2024-05-28 DIAGNOSIS — H52.203 MYOPIA WITH ASTIGMATISM AND PRESBYOPIA, BILATERAL: ICD-10-CM

## 2024-05-28 DIAGNOSIS — Z97.3 WEARS CONTACT LENSES: ICD-10-CM

## 2024-05-28 DIAGNOSIS — Z79.899 LONG-TERM USE OF PLAQUENIL: ICD-10-CM

## 2024-05-28 DIAGNOSIS — H52.4 MYOPIA WITH ASTIGMATISM AND PRESBYOPIA, BILATERAL: ICD-10-CM

## 2024-05-28 DIAGNOSIS — M32.8 LUPUS ERYTHEMATOSUS OVERLAP SYNDROME: ICD-10-CM

## 2024-05-28 DIAGNOSIS — H52.13 MYOPIA WITH ASTIGMATISM AND PRESBYOPIA, BILATERAL: ICD-10-CM

## 2024-05-28 DIAGNOSIS — M05.79 RHEUMATOID ARTHRITIS INVOLVING MULTIPLE SITES WITH POSITIVE RHEUMATOID FACTOR: ICD-10-CM

## 2024-05-28 PROCEDURE — 99999 PR PBB SHADOW E&M-EST. PATIENT-LVL III: CPT | Mod: PBBFAC,,, | Performed by: OPTOMETRIST

## 2024-05-28 PROCEDURE — 92083 EXTENDED VISUAL FIELD XM: CPT | Mod: S$GLB,,, | Performed by: OPTOMETRIST

## 2024-05-28 PROCEDURE — 92310 CONTACT LENS FITTING OU: CPT | Mod: CSM,S$GLB,, | Performed by: OPTOMETRIST

## 2024-05-28 PROCEDURE — 92134 CPTRZ OPH DX IMG PST SGM RTA: CPT | Mod: S$GLB,,, | Performed by: OPTOMETRIST

## 2024-05-28 PROCEDURE — 99499 UNLISTED E&M SERVICE: CPT | Mod: ,,, | Performed by: OPTOMETRIST

## 2024-05-28 PROCEDURE — 92014 COMPRE OPH EXAM EST PT 1/>: CPT | Mod: S$GLB,,, | Performed by: OPTOMETRIST

## 2024-05-28 PROCEDURE — 92015 DETERMINE REFRACTIVE STATE: CPT | Mod: S$GLB,,, | Performed by: OPTOMETRIST

## 2024-05-28 NOTE — PROGRESS NOTES
HPI    CC: annual plaquenil exam; no new ocular complaints; doing well with   current glasses    TONIO: may 2023 Everardo    (-) Changes in vision   (-) Pain  (-) Irritation   (-) Itching   (-) Flashes  (-) Floaters  (+) Glasses wearer  (+) CL wearer  (-) Uses eye gtts    Does patient want a refraction today? Yes    (-) Eye injury  (-) Eye surgery   (+)POHx: (+)HTN retinopathy OU (+)cataracts OU  (+)FOHx: (+)cataracts - mother, father     (+)Medication: Plaquenil 200 mg po BID  (+)PMHx: (+)lupus (+)rheumatoid arthritis (+)rheumatoid lung disease   (+)h/o steroid-induced hyperglycemia        (+)DM  Hemoglobin A1C       Date                     Value               Ref Range             Status                07/06/2023               5.6                 4.0 - 5.6 %           Final                  10/10/2022               5.8 (H)             4.0 - 5.6 %           Final                  03/19/2022               5.7 (H)             4.0 - 5.6 %           Final                     Last edited by Tess Mcgee, OD on 5/28/2024  9:23 AM.            Assessment /Plan     For exam results, see Encounter Report.    Routine eye exam    Myopia with astigmatism and presbyopia, bilateral    Encounter for eye exam due to high risk medication    Long-term use of Plaquenil    Lupus erythematosus overlap syndrome    Rheumatoid arthritis involving multiple sites with positive rheumatoid factor    Hypertensive retinopathy of both eyes    Nuclear sclerosis of both eyes      Yoel Vision Exam.     2. Updated SRx. Mild change OD, moderate change OS from habitual. Monitor yearly.    Ordered CL trials for pt to  from [facility]. If happy with comfort and vision of new trial lenses, pt to send a portal message and will update CL Rx at that time. If issues arise, RTC for CL f/u. Reviewed proper CL care and hygiene. Monitor yearly unless changes noted sooner.      3-6. Educated pt on findings. No bullseye maculopathy noted OU on DFE. 10-2  HVF and Mac OCT WNL OU. Patient currently taking Plaquenil 200mg BID. No vision changes noted since starting medication >5 years ago. Discussed need to monitor yearly with DFE and testing. Monitor yearly.     7. Vessel changes, OU. Stable since TONIO (05/2023). Discussed importance of BP control, taking medications as directed, and following-up with primary care. If changes in vision noted, RTC. Monitor yearly unless changes noted sooner.      8. Educated pt on findings. Not visually significant. No need for removal at this time. Monitor yearly.        RTC in 1 year for annual eye exam + 10-2 HVF + Mac OCT or sooner if needed.

## 2024-05-28 NOTE — PROGRESS NOTES
OCT/HVF done ou./ rel/fix/coop. Good ou./ chart checked for latex allergy./ -6.25   + 1.50 x 35/od -5.75 + 1.00 x 135/os-h

## 2024-05-31 ENCOUNTER — OFFICE VISIT (OUTPATIENT)
Dept: CARDIOLOGY | Facility: CLINIC | Age: 54
End: 2024-05-31
Payer: OTHER GOVERNMENT

## 2024-05-31 VITALS
HEART RATE: 52 BPM | DIASTOLIC BLOOD PRESSURE: 73 MMHG | WEIGHT: 293 LBS | BODY MASS INDEX: 60.31 KG/M2 | SYSTOLIC BLOOD PRESSURE: 141 MMHG

## 2024-05-31 DIAGNOSIS — M79.89 LEG SWELLING: Primary | ICD-10-CM

## 2024-05-31 DIAGNOSIS — E66.01 MORBID OBESITY WITH BMI OF 50.0-59.9, ADULT: ICD-10-CM

## 2024-05-31 DIAGNOSIS — R00.2 PALPITATION: ICD-10-CM

## 2024-05-31 DIAGNOSIS — I10 ESSENTIAL HYPERTENSION: ICD-10-CM

## 2024-05-31 PROCEDURE — 99214 OFFICE O/P EST MOD 30 MIN: CPT | Mod: PBBFAC | Performed by: INTERNAL MEDICINE

## 2024-05-31 PROCEDURE — 99204 OFFICE O/P NEW MOD 45 MIN: CPT | Mod: S$PBB,,, | Performed by: INTERNAL MEDICINE

## 2024-05-31 PROCEDURE — 99999 PR PBB SHADOW E&M-EST. PATIENT-LVL IV: CPT | Mod: PBBFAC,,, | Performed by: INTERNAL MEDICINE

## 2024-05-31 RX ORDER — HYDROCHLOROTHIAZIDE 25 MG/1
25 TABLET ORAL DAILY PRN
Qty: 90 TABLET | Refills: 3 | Status: SHIPPED | OUTPATIENT
Start: 2024-05-31 | End: 2025-05-31

## 2024-05-31 NOTE — PROGRESS NOTES
HISTORY:    53-year-old female with a history of PVCs, hypertension, pericardial effusion 2017 with resolution after treatment of autoimmune disease, interstitial lung disease, rheumatoid arthritis, lupus, obesity presenting for initial evaluation by me.    Recently evaluated in the ED for a variety of symptoms that have mostly resolved. Still has some LLE swelling, but it is improving. Feel a throbbing in that leg mainly with walking, but sometimes at rest. Feels better when she raises it. Has a chronic issue of proximal LLE discomfort for years.     No CP or SOB at this time.     Activity levels moderate. Works as a high school nurse. Walks a fair amount at work. No dedicated exercise. No CV limitations.    The patient denies any previous history of myocardial infarction, coronary artery disease, peripheral arterial disease, stroke, congestive heart failure, or cardiomyopathy. No VTE.     Tolerates amlodipine 5 x 1 and hctz prn.    PHYSICAL EXAM:    Vitals:    05/31/24 1343   BP: (!) 141/73   Pulse: (!) 52       NAD, A+Ox3.  No jvd, no bruit.  RRR nml s1,s2. No murmurs.  CTA B no wheezes or crackles.  No edema.    LABS/STUDIES (imaging reviewed during clinic visit):    May 2024 CBC and CMP unremarkable.  BNP and normal troponin negative.  TSH normal.    ECG May 2024 sinus rhythm with PACs.  No Q-waves or ST changes.  Holter 2018 sinus rhythm with a 13% PVC burden.    TTE 2018 normal LV size and ejection fraction with an EF of 60-65%.  Grade 1 diastology.  CVP 3.    CTA chest May 2024 no evidence of PE.  No pericardial effusion.  No coronary artery calcification.  Venous ultrasound May 2024 no evidence of left lower extremity DVT.      ASSESSMENT & PLAN:    1. Leg swelling    2. Palpitation    3. Morbid obesity with BMI of 50.0-59.9, adult    4. Essential hypertension        Orders Placed This Encounter    CV US Lower Extremity Veins Bilateral Insufficiency    Ankle Brachial Indices (FEDERICO)    Echo        Left leg  swelling not apparent on exam. Can check an FEDERICO and venous insufficiency study. On hctz 1x/week for over a year so suspect she may have venous insufficiency. Can take daily. Start compression stockings and increase walking distance.    Bps controlled at home on amlodipine 5x1.     We discussed the importance of diet modifications and instituting an exercise regimen. Following with bariatrics.     Follow up in about 4 months (around 9/30/2024).      Josias Lenz MD

## 2024-06-01 ENCOUNTER — HOSPITAL ENCOUNTER (EMERGENCY)
Facility: HOSPITAL | Age: 54
Discharge: HOME | End: 2024-06-01
Attending: EMERGENCY MEDICINE
Payer: OTHER GOVERNMENT

## 2024-06-01 VITALS
BODY MASS INDEX: 48.82 KG/M2 | WEIGHT: 293 LBS | RESPIRATION RATE: 14 BRPM | HEIGHT: 65 IN | HEART RATE: 85 BPM | OXYGEN SATURATION: 99 % | TEMPERATURE: 99 F | SYSTOLIC BLOOD PRESSURE: 132 MMHG | DIASTOLIC BLOOD PRESSURE: 87 MMHG

## 2024-06-01 DIAGNOSIS — M79.662 PAIN OF LEFT CALF: Primary | ICD-10-CM

## 2024-06-01 DIAGNOSIS — M79.606 LEG PAIN: ICD-10-CM

## 2024-06-01 PROCEDURE — 25000003 PHARM REV CODE 250

## 2024-06-01 PROCEDURE — 99284 EMERGENCY DEPT VISIT MOD MDM: CPT | Mod: 25

## 2024-06-01 RX ORDER — LIDOCAINE 50 MG/G
1 PATCH TOPICAL DAILY
Qty: 10 PATCH | Refills: 0 | Status: SHIPPED | OUTPATIENT
Start: 2024-06-01 | End: 2024-06-01

## 2024-06-01 RX ORDER — ACETAMINOPHEN 500 MG
1000 TABLET ORAL
Status: COMPLETED | OUTPATIENT
Start: 2024-06-01 | End: 2024-06-01

## 2024-06-01 RX ORDER — LIDOCAINE 50 MG/G
1 PATCH TOPICAL DAILY
Qty: 10 PATCH | Refills: 0 | Status: SHIPPED | OUTPATIENT
Start: 2024-06-01

## 2024-06-01 RX ORDER — METHOCARBAMOL 500 MG/1
1000 TABLET, FILM COATED ORAL 4 TIMES DAILY PRN
Qty: 20 TABLET | Refills: 0 | Status: SHIPPED | OUTPATIENT
Start: 2024-06-01 | End: 2024-06-01

## 2024-06-01 RX ORDER — LIDOCAINE 50 MG/G
1 PATCH TOPICAL DAILY
Qty: 10 PATCH | Refills: 0 | OUTPATIENT
Start: 2024-06-01 | End: 2024-06-01

## 2024-06-01 RX ORDER — LIDOCAINE 50 MG/G
1 PATCH TOPICAL
Status: DISCONTINUED | OUTPATIENT
Start: 2024-06-01 | End: 2024-06-01 | Stop reason: HOSPADM

## 2024-06-01 RX ORDER — METHOCARBAMOL 500 MG/1
1000 TABLET, FILM COATED ORAL 4 TIMES DAILY PRN
Qty: 20 TABLET | Refills: 0 | OUTPATIENT
Start: 2024-06-01 | End: 2024-06-01

## 2024-06-01 RX ORDER — METHOCARBAMOL 500 MG/1
1000 TABLET, FILM COATED ORAL EVERY 8 HOURS PRN
Qty: 20 TABLET | Refills: 0 | Status: SHIPPED | OUTPATIENT
Start: 2024-06-01

## 2024-06-01 RX ADMIN — ACETAMINOPHEN 1000 MG: 500 TABLET ORAL at 08:06

## 2024-06-01 RX ADMIN — LIDOCAINE 1 PATCH: 700 PATCH TOPICAL at 08:06

## 2024-06-01 NOTE — ED TRIAGE NOTES
C/o pain in left lower leg  from knee to ankle- states she stepped wrong yesterday. Painful to bear weight. .

## 2024-06-01 NOTE — DISCHARGE INSTRUCTIONS
Please schedule an appointment with sports medicine (contact information provided in discharge paperwork).    Please speak with your PCP in order to discuss today's visit.    Please return to the ED if you experience:  - Worsening pain  - Fevers  - Inability to walk or perform daily activities  - Any other symptoms you find overly concerning    Home Care:  - Alternate tylenol/ibuprofen for pain  - Take your muscle relaxer as prescribed  - Rest. Avoid doing things that increase the pain or swelling.   - Ice. To decrease pain, muscle spasm and swelling, apply ice to the injured area for up to 20 minutes several times a day. Ice packs, ice massage or slush baths with ice and water all can help.   - Compression. Because swelling can cause loss of motion in an injured joint, wrap the area tightly until the swelling stops. Use wraps or elastic bandages.  - Elevation. Raise the hurt leg above the level of your heart to reduce swelling.

## 2024-06-01 NOTE — ED PROVIDER NOTES
"Encounter Date: 6/1/2024       History     Chief Complaint   Patient presents with    Leg Pain     L lower leg pain     HPI    Malika Valdez is a 53 y.o. female  with a history of rheumatoid arthritis presents to the emergency department for sudden onset of worsening left lower leg pain occurring yesterday; she states that she is had chronic left lower extremity pain for the past 3-4 weeks and was recently worked up for DVT/PE w/o any acute findings. Patient states that yesterday she was leaning back on her leg when she felt a "pop" in her LLE in her mid-calf.  Patient now having trouble bearing weight on this extremity, but it is still able to bear some weight which reproduces her pain.  Patient is also endorsing some pain posterior aspect of her ankle. Patient denies falls, HA, vision changes, CP, palpitations, SOB, abdominal pain, N/V/D, hematuria/dysuria.    Review of patient's allergies indicates:   Allergen Reactions    Lisinopril Swelling     Mouth swelled, had to go to ED    Ventolin [albuterol] Swelling     Lips - swelling     Past Medical History:   Diagnosis Date    Arthritis     Hypertension 11/11/2013    ILD (interstitial lung disease)     Insomnia 2/26/2013    Lupus     Obesity 2/26/2013    RA (refractory anemia)     Rheumatoid arthritis     Steroid-induced hyperglycemia 3/1/2018    Thyroid nodule 3/1/2018     Past Surgical History:   Procedure Laterality Date    BREAST BIOPSY Left 10/13/2019    fibroadenomatoid change    COLONOSCOPY N/A 12/20/2022    Procedure: COLONOSCOPY;  Surgeon: Elmer Stuart MD;  Location: [address]);  Service: Endoscopy;  Laterality: N/A;  BMI 58.24  inst portal-RB     Family History   Problem Relation Name Age of Onset    Hypertension Father      Diabetes Father      Rheum arthritis Father          methotrexate    Diabetes Mother      Hypertension Mother      Breast cancer Mother  62    Kidney disease Mother          on dialysis    Rheum arthritis Paternal " Grandmother      Hypertension Paternal Grandmother      Breast cancer Other mat second cousin 45    Hypertension Maternal Grandmother 91     Lupus Neg Hx      Inflammatory bowel disease Neg Hx      Psoriasis Neg Hx      Thyroid disease Neg Hx      Ovarian cancer Neg Hx      Heart attack Neg Hx      Heart disease Neg Hx      Colon cancer Neg Hx       Social History     Tobacco Use    Smoking status: Never     Passive exposure: Never    Smokeless tobacco: Never    Tobacco comments:     nurse;    Substance Use Topics    Alcohol use: No    Drug use: No     Review of Systems    Physical Exam     Initial Vitals [06/01/24 0741]   BP Pulse Resp Temp SpO2   (!) 134/93 96 20 98.4 °F (36.9 °C) 96 %      MAP       --         Physical Exam    Nursing note and vitals reviewed.  Constitutional: She appears well-developed and well-nourished. No distress.   HENT:   Head: Normocephalic and atraumatic.   Nose: Nose normal.   Eyes: Conjunctivae and EOM are normal.   Neck: Neck supple.   Normal range of motion.  Cardiovascular:  Normal rate, regular rhythm, normal heart sounds and intact distal pulses.           Pulmonary/Chest: Breath sounds normal. No respiratory distress.   Abdominal: Abdomen is soft. Bowel sounds are normal. She exhibits no distension. There is no abdominal tenderness.   Musculoskeletal:         General: Tenderness (Left calf and posterior aspect of left ankle) present. No edema. Normal range of motion.      Cervical back: Normal range of motion and neck supple.     Neurological: She is alert and oriented to person, place, and time. No sensory deficit.   Skin: Skin is warm and dry. Capillary refill takes less than 2 seconds.   Psychiatric: She has a normal mood and affect. Her behavior is normal. Judgment and thought content normal.         ED Course   Procedures  Labs Reviewed - No data to display       Imaging Results              X-Ray Ankle Complete Left (Final result)  Result time 06/01/24 10:49:46       Final result by Rich Sahu MD (06/01/24 10:49:46)                   Impression:      No evidence of acute fracture or dislocation.      Electronically signed by: Rich Sahu  Date:    06/01/2024  Time:    10:49               Narrative:    EXAMINATION:  XR ANKLE COMPLETE 3 VIEW LEFT    CLINICAL HISTORY:  Pain in leg, unspecified    TECHNIQUE:  AP, lateral and oblique views of the left ankle were performed.    COMPARISON:  None    FINDINGS:  No definite evidence of acute fracture or dislocation.  Talar dome intact.  Ankle mortise symmetric.  No evidence of syndesmotic widening.  No large joint effusion.    Enthesopathic change at the calcaneus.  Minor degenerative spurring of the dorsal midfoot.    Reticulation of subcutaneous fat planes could relate to edema and/or cellulitis.  No evidence of radiopaque foreign body.                                       X-Ray Tibia Fibula 2 View Left (Final result)  Result time 06/01/24 10:48:41      Final result by Rich Sahu MD (06/01/24 10:48:41)                   Impression:      No convincing evidence of acute fracture or dislocation.      Electronically signed by: Rich Sahu  Date:    06/01/2024  Time:    10:48               Narrative:    EXAMINATION:  XR TIBIA FIBULA 2 VIEW LEFT    CLINICAL HISTORY:  Pain in leg, unspecified    TECHNIQUE:  AP and lateral views of the left tibia and fibula were performed.    COMPARISON:  None.    FINDINGS:  No definite evidence of acute fracture or dislocation.    Joint spaces appear grossly maintained.    Degenerative spurring of the dorsal midfoot.  Minor enthesopathic change at the calcaneus.  Atherosclerotic calcifications.  No definite radiopaque foreign body.    Reticulation of subcutaneous fat planes could relate to edema and/or cellulitis.                                       US Lower Extremity Veins Left (Final result)  Result time 06/01/24 10:30:03      Final result by Rich Sahu MD (06/01/24  10:30:03)                   Impression:      No evidence of deep venous thrombosis in the left lower extremity.      Electronically signed by: Rich Sahu  Date:    06/01/2024  Time:    10:30               Narrative:    EXAMINATION:  US LOWER EXTREMITY VEINS LEFT    CLINICAL HISTORY:  Pain in leg, unspecified    TECHNIQUE:  Duplex and color flow Doppler evaluation and graded compression of the left lower extremity veins was performed.    COMPARISON:  None    FINDINGS:  Left thigh veins: The common femoral, femoral, popliteal, upper greater saphenous, and deep femoral veins are patent and free of thrombus. The veins are normally compressible and have normal phasic flow and augmentation response.    Left calf veins: The visualized calf veins are patent.    Contralateral CFV: The contralateral (right) common femoral vein is patent and free of thrombus.  Note is made of minimal flow within the posterior tibial vein.    Miscellaneous: None                                       Medications   LIDOcaine 5 % patch 1 patch (1 patch Transdermal Patch Applied 6/1/24 0824)   acetaminophen tablet 1,000 mg (1,000 mg Oral Given 6/1/24 0824)     Medical Decision Making  Patient is a 53-year-old female who presents to the ED for acute worsening of left lower leg pain occurring yesterday after she was leaning against an object on the back of her calf. On initial evaluation,  patient in and VSS.  On exam, patient has some tenderness to palpation in the posterior aspect of her calf posterior ankle near Achilles tendon insertion site.  No obvious deformities tear endorsed reflex left foot w/o significant worsening of pain.  Patient is strength in plantar flexion is weaker on the left compared to the right  which she attributes to pain.  Patient otherwise neurovascularly intact.     ED interventions include   -  Tylenol and lidocaine patch for analgesia     Labs include:  - none indicated     Imaging include:  -  DVT US  w/o evidence  of DVT  -  XR tib-fib: No evidence of acute fracture or dislocation.  - XR L ankle: No evidence of acute fracture or dislocation.     Ddx including, but not limited to:  MSK strain v. Fracture v. Ruptured Baker cyst v. DVT     Most likely Dx:  this time, most likely diagnosis is MSK strain given history, exam, and imaging findings showing no DVT, fracture, or dislocation in lower extremity.     Disposition:   Patient discharged home. Discussed strict return precautions and plan with patient and/or caregiver who expressed understanding and agreement.    Please see HPI, physical exam, ED course for additional details.      Amount and/or Complexity of Data Reviewed  Radiology: ordered.    Risk  OTC drugs.  Prescription drug management.              Attending Attestation:   Physician Attestation Statement for Resident:  As the supervising MD   Physician Attestation Statement: I have personally seen and examined this patient.   I agree with the above history.  -: Patient notes 3-4 weeks of left calf pain with previous healthcare encounters including an ED visit with a negative DVT laurence presents with acute worsening since yesterday.  She was pushing against a door and felt a pop in the left proximal calf with radiation down her calf.  No weakness, numbness.  Pain is worsened with weight-bearing.  On exam she has no palpable cords.  She was tender to the calf.  Full active range of motion of the ankle and knee.  No bony tenderness.  Plain films are negative for fracture.  Ultrasound is negative for DVT.  Could be due to ligamentous injury, muscular strain, partial gastroc tear, venous insufficiency, Baker's cyst.  Multimodal pain control with acetaminophen, antispasmodics, lidocaine patch.  She was provided with a walker.  Ambulatory referral to Sports Medicine.     As the supervising MD I agree with the above PE.     As the supervising MD I agree with the above treatment, course, plan, and disposition.                                            Clinical Impression:  Final diagnoses:  [M79.606] Leg pain  [M79.662] Pain of left calf (Primary)                 Vitaliy Hines MD  Resident  06/01/24 1587

## 2024-06-07 NOTE — PROGRESS NOTES
NEW PATIENT    HISTORY OF PRESENT ILLNESS   53 y.o. Female with a history of left calf pain who ***.   She reports left calf/leg pain since 5/31/2024. She states that she leaned back onto the left leg when she felt a pop in her mid calf of her left leg. She states that she did have some minor calf pain for about a month prior to this incident. She was worked up for DVT/PE but there were no acute findings. She states that the pain is located at the posterior aspect of her left ankle. Since she felt the pop in her calf, her pain has been increased. She is able to bear weight but it is painful. She went to the ER the day after it happened. She had a left ankle and tib/fib xrays done. She had a doppler done as well. She is here for a follow-up and further evaluation. She states that she does have a history of rheumatoid arthritis.    *** mechanical symptoms, *** instability    Is affecting ADLs.  Pain is ***/10 at it's worst.        PAST MEDICAL HISTORY    Past Medical History:   Diagnosis Date    Arthritis     Hypertension 11/11/2013    ILD (interstitial lung disease)     Insomnia 2/26/2013    Lupus     Obesity 2/26/2013    RA (refractory anemia)     Rheumatoid arthritis     Steroid-induced hyperglycemia 3/1/2018    Thyroid nodule 3/1/2018       PAST SURGICAL HISTORY     Past Surgical History:   Procedure Laterality Date    BREAST BIOPSY Left 10/13/2019    fibroadenomatoid change    COLONOSCOPY N/A 12/20/2022    Procedure: COLONOSCOPY;  Surgeon: Elmer Stuart MD;  Location: [address]);  Service: Endoscopy;  Laterality: N/A;  BMI 58.24  inst portal-RB       FAMILY HISTORY    Family History   Problem Relation Name Age of Onset    Hypertension Father      Diabetes Father      Rheum arthritis Father          methotrexate    Diabetes Mother      Hypertension Mother      Breast cancer Mother  62    Kidney disease Mother          on dialysis    Rheum arthritis Paternal Grandmother      Hypertension Paternal  Grandmother      Breast cancer Other mat second cousin 45    Hypertension Maternal Grandmother 91     Lupus Neg Hx      Inflammatory bowel disease Neg Hx      Psoriasis Neg Hx      Thyroid disease Neg Hx      Ovarian cancer Neg Hx      Heart attack Neg Hx      Heart disease Neg Hx      Colon cancer Neg Hx         SOCIAL HISTORY    Social History     Socioeconomic History    Marital status:    Tobacco Use    Smoking status: Never     Passive exposure: Never    Smokeless tobacco: Never    Tobacco comments:     nurse;    Substance and Sexual Activity    Alcohol use: No    Drug use: No    Sexual activity: Not Currently     Partners: Male     Birth control/protection: None     Social Determinants of Health     Financial Resource Strain: Low Risk  (5/21/2024)    Overall Financial Resource Strain (CARDIA)     Difficulty of Paying Living Expenses: Not very hard   Food Insecurity: No Food Insecurity (5/21/2024)    Hunger Vital Sign     Worried About Running Out of Food in the Last Year: Never true     Ran Out of Food in the Last Year: Never true   Physical Activity: Insufficiently Active (5/21/2024)    Exercise Vital Sign     Days of Exercise per Week: 2 days     Minutes of Exercise per Session: 10 min   Stress: Stress Concern Present (5/21/2024)    [employer] - Occupational Stress Questionnaire     Feeling of Stress : To some extent   Housing Stability: Unknown (5/21/2024)    Housing Stability Vital Sign     Unable to Pay for Housing in the Last Year: No       MEDICATIONS      Current Outpatient Medications:     amLODIPine (NORVASC) 5 MG tablet, TAKE 1 TABLET DAILY, Disp: 90 tablet, Rfl: 2    amoxicillin-clavulanate 875-125mg (AUGMENTIN) 875-125 mg per tablet, Take 1 tablet by mouth every 12 (twelve) hours., Disp: 20 tablet, Rfl: 0    azaTHIOprine (IMURAN) 50 mg Tab, TAKE 2 TABLETS TWICE A DAY, Disp: 360 tablet, Rfl: 0    azelastine (ASTELIN) 137 mcg (0.1 %) nasal spray, 1 spray  "(137 mcg total) by Nasal route 2 (two) times daily., Disp: 30 mL, Rfl: 0    blood sugar diagnostic (FREESTYLE LITE STRIPS) Strp, TEST BLOOD SUGAR EVERY DAY, Disp: 150 strip, Rfl: 11    blood-glucose meter kit, Use as instructed, Disp: 1 each, Rfl: 0    diclofenac sodium (VOLTAREN) 1 % Gel, Apply 2 g topically 4 (four) times daily., Disp: 2 Tube, Rfl: 0    ergocalciferol (VITAMIN D2) 50,000 unit Cap, TAKE 1 CAPSULE TWICE WEEKLY, Disp: 24 capsule, Rfl: 3    fluticasone propionate (FLONASE) 50 mcg/actuation nasal spray, 1 spray (50 mcg total) by Each Nostril route once daily., Disp: 16 g, Rfl: 3    FREESTYLE LANCETS 28 gauge lancets, Inject 1 lancet into the skin once daily., Disp: 100 each, Rfl: 0    hydroCHLOROthiazide (HYDRODIURIL) 25 MG tablet, Take 1 tablet (25 mg total) by mouth daily as needed (swelling)., Disp: 90 tablet, Rfl: 3    LIDOcaine (LIDODERM) 5 %, Place 1 patch onto the skin once daily. Remove & Discard patch within 12 hours or as directed by MD, Disp: 10 patch, Rfl: 0    metFORMIN (GLUCOPHAGE-XR) 500 MG ER 24hr tablet, Take 1 tablet (500 mg total) by mouth daily with breakfast., Disp: 90 tablet, Rfl: 0    methocarbamoL (ROBAXIN) 500 MG Tab, Take 2 tablets (1,000 mg total) by mouth every 8 (eight) hours as needed (spasms)., Disp: 20 tablet, Rfl: 0    multivitamin with iron Tab, Take 1 tablet by mouth once daily., Disp: , Rfl: 0    pen needle, diabetic (BD ULTRA-FINE SOLOMON PEN NEEDLE) 32 gauge x 5/32" Ndle, Takes once daily, Disp: 200 each, Rfl: 0    PLAQUENIL 200 mg tablet, TAKE 1 TABLET TWICE A DAY, Disp: 180 tablet, Rfl: 3    promethazine-dextromethorphan (PROMETHAZINE-DM) 6.25-15 mg/5 mL Syrp, Take 5 mLs by mouth every 8 (eight) hours as needed (COUGH)., Disp: 236 mL, Rfl: 0    sodium chloride (SALINE NASAL) 0.65 % nasal spray, 1 spray by Nasal route as needed for Congestion., Disp: 60 mL, Rfl: 12    ALLERGIES     Review of patient's allergies indicates:   Allergen Reactions    Lisinopril Swelling "     Mouth swelled, had to go to ED    Ventolin [albuterol] Swelling     Lips - swelling         REVIEW OF SYSTEMS   Constitution: Negative. Negative for chills, fever and night sweats.   HENT: Negative for congestion and headaches.    Eyes: Negative for blurred vision, left vision loss and right vision loss.   Cardiovascular: Negative for chest pain and syncope.   Respiratory: Negative for cough and shortness of breath.    Endocrine: Negative for polydipsia, polyphagia and polyuria.   Hematologic/Lymphatic: Negative for bleeding problem. Does not bruise/bleed easily.   Skin: Negative for dry skin, itching and rash.   Musculoskeletal: Negative for falls. Positive for left calf pain.  Gastrointestinal: Negative for abdominal pain and bowel incontinence.   Genitourinary: Negative for bladder incontinence and nocturia.   Neurological: Negative for disturbances in coordination, loss of balance and seizures.   Psychiatric/Behavioral: Negative for depression. The patient does not have insomnia.    Allergic/Immunologic: Negative for hives and persistent infections.     PHYSICAL EXAMINATION    Vitals: [facility] 11/13/2017     General: The patient appears active and healthy with no apparent physical problems.  No disturbance of mood or affect is demonstrated. Alert and Oriented.    HEENT: Eyes normal, pupils equally round, nose normal.    Resp: Equal and symmetrical chest rises. No wheezing    CV: Regular rate    Neck: Supple; nonpainful range of motion.    Extremities: no cyanosis, clubbing, edema, or diffuse swelling.  Palpable pulses, good capillary refill of the digits.  No coolness, discoloration, edema or obvious varicosities.    Skin: no lesions noted.    Lymphatic: no detected adenopathy in the upper or lower extremities.    Neurologic: normal mental status, normal reflexes, normal gait and balance.  Patient is alert and oriented to person, place and time.  No flaccidity or spasticity is noted.  No motor or sensory deficits  are noted.  Light touch is intact    Orthopaedic: Ankle Exam-LEFT    Inspection: Normal skin color and appearance with no scars, no ecchymosis and no swelling.  Longitudinal arch is normal.     ROM: 20° dorsiflexion, 40° plantar flexion, 5° inversion and 5° eversion.  There is no pain with ROM testing.      Palpation: There is no tenderness over the navicular, deltoid ligament, medial malleolus, anterior ankle joint, tibiofibular syndesmosis, lateral malleolus, ATFL, CFL, posterior talofibular ligament, sinus tarsi, calcaneus, plantar fascia origin, cuboid, fifth metatarsal, Achilles tendon, posterior tibialis tendon or peroneal tendons.      Stability: - Anterior drawer - Talar tilt  Varus and valgus stress reveals no gapping or instability.     Strength: Muscle testing is 5/5 dorsiflexion, 5/5 plantar flexion, 5/5 inversion and 5/5 eversion.      Neuro: Sensation is normal in L4, L5 and S1 nerve distribution.  Reflexes are 2/2 ankle and 2/2 knee jerk.     Vascular: 2+ Pedal pulses are palpable.  Negative Homans.      IMAGING    ***    IMPRESSION     No diagnosis found.    MEDICATIONS PRESCRIBED      ***    RECOMMENDATIONS     ***      All questions were answered, pt will contact us for questions or concerns in the interim.

## 2024-06-10 ENCOUNTER — PATIENT MESSAGE (OUTPATIENT)
Dept: INTERNAL MEDICINE | Facility: CLINIC | Age: 54
End: 2024-06-10
Payer: OTHER GOVERNMENT

## 2024-06-10 ENCOUNTER — OFFICE VISIT (OUTPATIENT)
Dept: SPORTS MEDICINE | Facility: CLINIC | Age: 54
End: 2024-06-10
Payer: OTHER GOVERNMENT

## 2024-06-10 VITALS
SYSTOLIC BLOOD PRESSURE: 151 MMHG | DIASTOLIC BLOOD PRESSURE: 90 MMHG | BODY MASS INDEX: 60.53 KG/M2 | WEIGHT: 293 LBS | HEART RATE: 49 BPM

## 2024-06-10 DIAGNOSIS — M79.662 PAIN OF LEFT CALF: Primary | ICD-10-CM

## 2024-06-10 DIAGNOSIS — M79.606 LEG PAIN: ICD-10-CM

## 2024-06-10 PROCEDURE — 99203 OFFICE O/P NEW LOW 30 MIN: CPT | Mod: S$PBB,,, | Performed by: ORTHOPAEDIC SURGERY

## 2024-06-10 PROCEDURE — 99214 OFFICE O/P EST MOD 30 MIN: CPT | Mod: PBBFAC | Performed by: ORTHOPAEDIC SURGERY

## 2024-06-10 PROCEDURE — 99999 PR PBB SHADOW E&M-EST. PATIENT-LVL IV: CPT | Mod: PBBFAC,,, | Performed by: ORTHOPAEDIC SURGERY

## 2024-06-10 NOTE — PROGRESS NOTES
NEW PATIENT    HISTORY OF PRESENT ILLNESS   53 y.o. 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   She reports left calf/leg pain since 5/31/2024. She states that she leaned back onto the left leg when she felt a pop in her mid calf of her left leg. She states that she did have some minor calf pain for about a month prior to this incident. She was worked up for DVT/PE but there were no acute findings. She states that the pain is located at the posterior aspect of her left ankle. Since she felt the pop in her calf, her pain has been increased. She is able to bear weight but it is painful. She went to the ER the day after it happened. She had a left ankle and tib/fib xrays done. She had a doppler done as well. She is here for a follow-up and further evaluation. She states that she does have a history of rheumatoid arthritis.    No memory of any instance where she could have injured calf.   Hx of L knee pain     - mechanical symptoms, - instability    Is affecting ADLs.  Pain is 2/10 at it's worst.        PAST MEDICAL HISTORY    Past Medical History:   Diagnosis Date    Arthritis     Hypertension 11/11/2013    ILD (interstitial lung disease)     Insomnia 2/26/2013    Lupus     Obesity 2/26/2013    RA (refractory anemia)     Rheumatoid arthritis     Steroid-induced hyperglycemia 3/1/2018    Thyroid nodule 3/1/2018       PAST SURGICAL HISTORY     Past Surgical History:   Procedure Laterality Date    BREAST BIOPSY Left 10/13/2019    fibroadenomatoid change    COLONOSCOPY N/A 12/20/2022    Procedure: COLONOSCOPY;  Surgeon: Elmer Stuart MD;  Location: [facility] ([address] [place] [address]);  Service: Endoscopy;  Laterality: N/A;  BMI 58.24  inst portal-RB       FAMILY HISTORY    Family History   Problem Relation Name Age of Onset    Hypertension Father      Diabetes Father      Rheum arthritis Father          methotrexate    Diabetes Mother      Hypertension Mother      Breast cancer Mother  62    Kidney disease Mother           on dialysis    Rheum arthritis Paternal Grandmother      Hypertension Paternal Grandmother      Breast cancer Other mat second cousin 45    Hypertension Maternal Grandmother 91     Lupus Neg Hx      Inflammatory bowel disease Neg Hx      Psoriasis Neg Hx      Thyroid disease Neg Hx      Ovarian cancer Neg Hx      Heart attack Neg Hx      Heart disease Neg Hx      Colon cancer Neg Hx         SOCIAL HISTORY    Social History     Socioeconomic History    Marital status:    Tobacco Use    Smoking status: Never     Passive exposure: Never    Smokeless tobacco: Never    Tobacco comments:     nurse;    Substance and Sexual Activity    Alcohol use: No    Drug use: No    Sexual activity: Not Currently     Partners: Male     Birth control/protection: None     Social Determinants of Health     Financial Resource Strain: Low Risk  (5/21/2024)    Overall Financial Resource Strain (CARDIA)     Difficulty of Paying Living Expenses: Not very hard   Food Insecurity: No Food Insecurity (5/21/2024)    Hunger Vital Sign     Worried About Running Out of Food in the Last Year: Never true     Ran Out of Food in the Last Year: Never true   Physical Activity: Insufficiently Active (5/21/2024)    Exercise Vital Sign     Days of Exercise per Week: 2 days     Minutes of Exercise per Session: 10 min   Stress: Stress Concern Present (5/21/2024)    [employer] - Occupational Stress Questionnaire     Feeling of Stress : To some extent   Housing Stability: Unknown (5/21/2024)    Housing Stability Vital Sign     Unable to Pay for Housing in the Last Year: No       MEDICATIONS      Current Outpatient Medications:     amLODIPine (NORVASC) 5 MG tablet, TAKE 1 TABLET DAILY, Disp: 90 tablet, Rfl: 2    azaTHIOprine (IMURAN) 50 mg Tab, TAKE 2 TABLETS TWICE A DAY, Disp: 360 tablet, Rfl: 0    diclofenac sodium (VOLTAREN) 1 % Gel, Apply 2 g topically 4 (four) times daily., Disp: 2 Tube, Rfl: 0     "ergocalciferol (VITAMIN D2) 50,000 unit Cap, TAKE 1 CAPSULE TWICE WEEKLY, Disp: 24 capsule, Rfl: 3    hydroCHLOROthiazide (HYDRODIURIL) 25 MG tablet, Take 1 tablet (25 mg total) by mouth daily as needed (swelling)., Disp: 90 tablet, Rfl: 3    LIDOcaine (LIDODERM) 5 %, Place 1 patch onto the skin once daily. Remove & Discard patch within 12 hours or as directed by MD, Disp: 10 patch, Rfl: 0    methocarbamoL (ROBAXIN) 500 MG Tab, Take 2 tablets (1,000 mg total) by mouth every 8 (eight) hours as needed (spasms)., Disp: 20 tablet, Rfl: 0    multivitamin with iron Tab, Take 1 tablet by mouth once daily., Disp: , Rfl: 0    PLAQUENIL 200 mg tablet, TAKE 1 TABLET TWICE A DAY, Disp: 180 tablet, Rfl: 3    amoxicillin-clavulanate 875-125mg (AUGMENTIN) 875-125 mg per tablet, Take 1 tablet by mouth every 12 (twelve) hours. (Patient not taking: Reported on 6/10/2024), Disp: 20 tablet, Rfl: 0    azelastine (ASTELIN) 137 mcg (0.1 %) nasal spray, 1 spray (137 mcg total) by Nasal route 2 (two) times daily. (Patient not taking: Reported on 6/10/2024), Disp: 30 mL, Rfl: 0    blood sugar diagnostic (FREESTYLE LITE STRIPS) Strp, TEST BLOOD SUGAR EVERY DAY (Patient not taking: Reported on 6/10/2024), Disp: 150 strip, Rfl: 11    blood-glucose meter kit, Use as instructed, Disp: 1 each, Rfl: 0    fluticasone propionate (FLONASE) 50 mcg/actuation nasal spray, 1 spray (50 mcg total) by Each Nostril route once daily. (Patient not taking: Reported on 6/10/2024), Disp: 16 g, Rfl: 3    FREESTYLE LANCETS 28 gauge lancets, Inject 1 lancet into the skin once daily. (Patient not taking: Reported on 6/10/2024), Disp: 100 each, Rfl: 0    metFORMIN (GLUCOPHAGE-XR) 500 MG ER 24hr tablet, Take 1 tablet (500 mg total) by mouth daily with breakfast., Disp: 90 tablet, Rfl: 0    pen needle, diabetic (BD ULTRA-FINE SOLOMON PEN NEEDLE) 32 gauge x 5/32" Ndle, Takes once daily (Patient not taking: Reported on 6/10/2024), Disp: 200 each, Rfl: 0    " promethazine-dextromethorphan (PROMETHAZINE-DM) 6.25-15 mg/5 mL Syrp, Take 5 mLs by mouth every 8 (eight) hours as needed (COUGH). (Patient not taking: Reported on 6/10/2024), Disp: 236 mL, Rfl: 0    sodium chloride (SALINE NASAL) 0.65 % nasal spray, 1 spray by Nasal route as needed for Congestion. (Patient not taking: Reported on 6/10/2024), Disp: 60 mL, Rfl: 12    ALLERGIES     Review of patient's allergies indicates:   Allergen Reactions    Lisinopril Swelling     Mouth swelled, had to go to ED    Ventolin [albuterol] Swelling     Lips - swelling         REVIEW OF SYSTEMS   Constitution: Negative. Negative for chills, fever and night sweats.   HENT: Negative for congestion and headaches.    Eyes: Negative for blurred vision, left vision loss and right vision loss.   Cardiovascular: Negative for chest pain and syncope.   Respiratory: Negative for cough and shortness of breath.    Endocrine: Negative for polydipsia, polyphagia and polyuria.   Hematologic/Lymphatic: Negative for bleeding problem. Does not bruise/bleed easily.   Skin: Negative for dry skin, itching and rash.   Musculoskeletal: Negative for falls. Positive for left calf pain.  Gastrointestinal: Negative for abdominal pain and bowel incontinence.   Genitourinary: Negative for bladder incontinence and nocturia.   Neurological: Negative for disturbances in coordination, loss of balance and seizures.   Psychiatric/Behavioral: Negative for depression. The patient does not have insomnia.    Allergic/Immunologic: Negative for hives and persistent infections.     PHYSICAL EXAMINATION    Vitals: BP (!) 151/90   Pulse (!) 49   Wt (!) 165 kg (363 lb 12.1 oz)   LMP 11/13/2017   BMI 60.53 kg/m²     General: The patient appears active and healthy with no apparent physical problems.  No disturbance of mood or affect is demonstrated. Alert and Oriented.    HEENT: Eyes normal, pupils equally round, nose normal.    Resp: Equal and symmetrical chest rises. No  wheezing    CV: Regular rate    Neck: Supple; nonpainful range of motion.    Extremities: no cyanosis, clubbing, edema, or diffuse swelling.  Palpable pulses, good capillary refill of the digits.  No coolness, discoloration, edema or obvious varicosities.    Skin: no lesions noted.    Lymphatic: no detected adenopathy in the upper or lower extremities.    Neurologic: normal mental status, normal reflexes, normal gait and balance.  Patient is alert and oriented to person, place and time.  No flaccidity or spasticity is noted.  No motor or sensory deficits are noted.  Light touch is intact    Orthopaedic: Ankle Exam-LEFT    Inspection: Normal skin color and appearance with no scars, no ecchymosis and no swelling.  Longitudinal arch is normal.     ROM: 20° dorsiflexion, 40° plantar flexion, 5° inversion and 5° eversion.  There is no pain with ROM testing.      Palpation:   Tenderness over mid substance of gastrocnemius      Stability: - Anterior drawer - Talar tilt  Varus and valgus stress reveals no gapping or instability.     Strength: Muscle testing is 5/5 dorsiflexion, 5/5 plantar flexion, 5/5 inversion and 5/5 eversion.      Neuro: Sensation is normal in L4, L5 and S1 nerve distribution.  Reflexes are 2/2 ankle and 2/2 knee jerk.     Vascular: 2+ Pedal pulses are palpable.  Negative Homans.     Patella femoral crepitus in left knee. Possibly a previous Baker's cyst that was ruptured and caused swelling.     IMAGING  6/1/24  XR TIBIA FIBULA 2 VIEW LEFT     CLINICAL HISTORY:  Pain in leg, unspecified     TECHNIQUE:  AP and lateral views of the left tibia and fibula were performed.     COMPARISON:  None.     FINDINGS:  No definite evidence of acute fracture or dislocation.     Joint spaces appear grossly maintained.     Degenerative spurring of the dorsal midfoot.  Minor enthesopathic change at the calcaneus.  Atherosclerotic calcifications.  No definite radiopaque foreign body.     Reticulation of subcutaneous fat  planes could relate to edema and/or cellulitis.     Impression:     No convincing evidence of acute fracture or dislocation.    IMPRESSION       ICD-10-CM ICD-9-CM   1. Pain of left calf  M79.662 729.5   2. Leg pain  M79.606 729.5       MEDICATIONS PRESCRIBED      N/a    RECOMMENDATIONS     F/u 4 weeks with Ari   2.   Order MRI if pain persists, possible old Bakers cyst   3.   We discussed diet and exercise. I advised her that this could be a potential remnant of a ruptured Baker cyst secondary to intra-articular knee pathology.  Considering she has absolutely no history or recalls an acute event, this would be the most likely presentation for posterior calf pain after we have ruled out DVT as well as having a normal image.      All questions were answered, pt will contact us for questions or concerns in the interim.

## 2024-06-14 ENCOUNTER — LAB VISIT (OUTPATIENT)
Dept: LAB | Facility: HOSPITAL | Age: 54
End: 2024-06-14
Attending: INTERNAL MEDICINE
Payer: OTHER GOVERNMENT

## 2024-06-14 DIAGNOSIS — J84.9 ILD (INTERSTITIAL LUNG DISEASE): ICD-10-CM

## 2024-06-14 DIAGNOSIS — D84.9 IMMUNOSUPPRESSION: ICD-10-CM

## 2024-06-14 DIAGNOSIS — R53.83 OTHER FATIGUE: ICD-10-CM

## 2024-06-14 DIAGNOSIS — E11.9 TYPE 2 DIABETES MELLITUS WITHOUT COMPLICATION, WITH LONG-TERM CURRENT USE OF INSULIN: ICD-10-CM

## 2024-06-14 DIAGNOSIS — M32.8 LUPUS ERYTHEMATOSUS OVERLAP SYNDROME: ICD-10-CM

## 2024-06-14 DIAGNOSIS — E53.8 VITAMIN B12 DEFICIENCY: ICD-10-CM

## 2024-06-14 DIAGNOSIS — Z79.4 TYPE 2 DIABETES MELLITUS WITHOUT COMPLICATION, WITH LONG-TERM CURRENT USE OF INSULIN: ICD-10-CM

## 2024-06-14 DIAGNOSIS — M05.79 RHEUMATOID ARTHRITIS INVOLVING MULTIPLE SITES WITH POSITIVE RHEUMATOID FACTOR: ICD-10-CM

## 2024-06-14 DIAGNOSIS — E55.9 VITAMIN D DEFICIENCY DISEASE: ICD-10-CM

## 2024-06-14 LAB
25(OH)D3+25(OH)D2 SERPL-MCNC: 48 NG/ML (ref 30–96)
ALBUMIN SERPL BCP-MCNC: 3.3 G/DL (ref 3.5–5.2)
ALP SERPL-CCNC: 76 U/L (ref 55–135)
ALT SERPL W/O P-5'-P-CCNC: 8 U/L (ref 10–44)
ANION GAP SERPL CALC-SCNC: 11 MMOL/L (ref 8–16)
AST SERPL-CCNC: 17 U/L (ref 10–40)
BASOPHILS # BLD AUTO: 0.03 K/UL (ref 0–0.2)
BASOPHILS NFR BLD: 0.9 % (ref 0–1.9)
BILIRUB SERPL-MCNC: 0.3 MG/DL (ref 0.1–1)
BUN SERPL-MCNC: 15 MG/DL (ref 6–20)
C3 SERPL-MCNC: 152 MG/DL (ref 50–180)
C4 SERPL-MCNC: 26 MG/DL (ref 11–44)
CALCIUM SERPL-MCNC: 9.7 MG/DL (ref 8.7–10.5)
CHLORIDE SERPL-SCNC: 103 MMOL/L (ref 95–110)
CHOLEST SERPL-MCNC: 157 MG/DL (ref 120–199)
CHOLEST/HDLC SERPL: 2.9 {RATIO} (ref 2–5)
CK SERPL-CCNC: 142 U/L (ref 20–180)
CO2 SERPL-SCNC: 25 MMOL/L (ref 23–29)
CREAT SERPL-MCNC: 1 MG/DL (ref 0.5–1.4)
CRP SERPL-MCNC: 2.3 MG/L (ref 0–8.2)
DIFFERENTIAL METHOD BLD: ABNORMAL
EOSINOPHIL # BLD AUTO: 0.1 K/UL (ref 0–0.5)
EOSINOPHIL NFR BLD: 4.1 % (ref 0–8)
ERYTHROCYTE [DISTWIDTH] IN BLOOD BY AUTOMATED COUNT: 14.1 % (ref 11.5–14.5)
ERYTHROCYTE [SEDIMENTATION RATE] IN BLOOD BY PHOTOMETRIC METHOD: 81 MM/HR (ref 0–36)
EST. GFR  (NO RACE VARIABLE): >60 ML/MIN/1.73 M^2
ESTIMATED AVG GLUCOSE: 123 MG/DL (ref 68–131)
GLUCOSE SERPL-MCNC: 98 MG/DL (ref 70–110)
HBA1C MFR BLD: 5.9 % (ref 4–5.6)
HCT VFR BLD AUTO: 39.8 % (ref 37–48.5)
HDLC SERPL-MCNC: 55 MG/DL (ref 40–75)
HDLC SERPL: 35 % (ref 20–50)
HGB BLD-MCNC: 11.8 G/DL (ref 12–16)
IMM GRANULOCYTES # BLD AUTO: 0.01 K/UL (ref 0–0.04)
IMM GRANULOCYTES NFR BLD AUTO: 0.3 % (ref 0–0.5)
LDLC SERPL CALC-MCNC: 93.4 MG/DL (ref 63–159)
LYMPHOCYTES # BLD AUTO: 0.9 K/UL (ref 1–4.8)
LYMPHOCYTES NFR BLD: 26 % (ref 18–48)
MCH RBC QN AUTO: 27.1 PG (ref 27–31)
MCHC RBC AUTO-ENTMCNC: 29.6 G/DL (ref 32–36)
MCV RBC AUTO: 91 FL (ref 82–98)
MONOCYTES # BLD AUTO: 0.3 K/UL (ref 0.3–1)
MONOCYTES NFR BLD: 8.8 % (ref 4–15)
NEUTROPHILS # BLD AUTO: 2.1 K/UL (ref 1.8–7.7)
NEUTROPHILS NFR BLD: 59.9 % (ref 38–73)
NONHDLC SERPL-MCNC: 102 MG/DL
NRBC BLD-RTO: 0 /100 WBC
PLATELET # BLD AUTO: 288 K/UL (ref 150–450)
PMV BLD AUTO: 10.1 FL (ref 9.2–12.9)
POTASSIUM SERPL-SCNC: 4.5 MMOL/L (ref 3.5–5.1)
PROT SERPL-MCNC: 8.1 G/DL (ref 6–8.4)
RBC # BLD AUTO: 4.36 M/UL (ref 4–5.4)
SODIUM SERPL-SCNC: 139 MMOL/L (ref 136–145)
TRIGL SERPL-MCNC: 43 MG/DL (ref 30–150)
TSH SERPL DL<=0.005 MIU/L-ACNC: 1.42 UIU/ML (ref 0.4–4)
VIT B12 SERPL-MCNC: 430 PG/ML (ref 210–950)
WBC # BLD AUTO: 3.42 K/UL (ref 3.9–12.7)

## 2024-06-14 PROCEDURE — 36415 COLL VENOUS BLD VENIPUNCTURE: CPT | Performed by: INTERNAL MEDICINE

## 2024-06-14 PROCEDURE — 80053 COMPREHEN METABOLIC PANEL: CPT | Performed by: INTERNAL MEDICINE

## 2024-06-14 PROCEDURE — 80061 LIPID PANEL: CPT | Performed by: INTERNAL MEDICINE

## 2024-06-14 PROCEDURE — 86225 DNA ANTIBODY NATIVE: CPT | Performed by: INTERNAL MEDICINE

## 2024-06-14 PROCEDURE — 85652 RBC SED RATE AUTOMATED: CPT | Performed by: INTERNAL MEDICINE

## 2024-06-14 PROCEDURE — 86160 COMPLEMENT ANTIGEN: CPT | Mod: 59 | Performed by: INTERNAL MEDICINE

## 2024-06-14 PROCEDURE — 82550 ASSAY OF CK (CPK): CPT | Performed by: INTERNAL MEDICINE

## 2024-06-14 PROCEDURE — 85025 COMPLETE CBC W/AUTO DIFF WBC: CPT | Performed by: INTERNAL MEDICINE

## 2024-06-14 PROCEDURE — 84443 ASSAY THYROID STIM HORMONE: CPT | Performed by: INTERNAL MEDICINE

## 2024-06-14 PROCEDURE — 82607 VITAMIN B-12: CPT | Performed by: INTERNAL MEDICINE

## 2024-06-14 PROCEDURE — 86160 COMPLEMENT ANTIGEN: CPT | Performed by: INTERNAL MEDICINE

## 2024-06-14 PROCEDURE — 83036 HEMOGLOBIN GLYCOSYLATED A1C: CPT | Performed by: INTERNAL MEDICINE

## 2024-06-14 PROCEDURE — 86140 C-REACTIVE PROTEIN: CPT | Performed by: INTERNAL MEDICINE

## 2024-06-14 PROCEDURE — 82306 VITAMIN D 25 HYDROXY: CPT | Performed by: INTERNAL MEDICINE

## 2024-06-17 LAB — DSDNA AB SER-ACNC: NORMAL [IU]/ML

## 2024-06-21 ENCOUNTER — OFFICE VISIT (OUTPATIENT)
Dept: INTERNAL MEDICINE | Facility: CLINIC | Age: 54
End: 2024-06-21
Payer: OTHER GOVERNMENT

## 2024-06-21 VITALS
OXYGEN SATURATION: 98 % | HEIGHT: 65 IN | WEIGHT: 293 LBS | SYSTOLIC BLOOD PRESSURE: 126 MMHG | BODY MASS INDEX: 48.82 KG/M2 | DIASTOLIC BLOOD PRESSURE: 84 MMHG | HEART RATE: 53 BPM

## 2024-06-21 DIAGNOSIS — M32.8 LUPUS ERYTHEMATOSUS OVERLAP SYNDROME: ICD-10-CM

## 2024-06-21 DIAGNOSIS — Z12.31 SCREENING MAMMOGRAM FOR BREAST CANCER: ICD-10-CM

## 2024-06-21 DIAGNOSIS — M05.10 RHEUMATOID LUNG DISEASE WITH RHEUMATOID ARTHRITIS OF UNSPECIFIED SITE: Primary | ICD-10-CM

## 2024-06-21 DIAGNOSIS — I10 ESSENTIAL HYPERTENSION: ICD-10-CM

## 2024-06-21 DIAGNOSIS — T78.3XXD ANGIOEDEMA, SUBSEQUENT ENCOUNTER: ICD-10-CM

## 2024-06-21 DIAGNOSIS — E55.9 VITAMIN D DEFICIENCY DISEASE: ICD-10-CM

## 2024-06-21 DIAGNOSIS — E66.01 CLASS 3 SEVERE OBESITY DUE TO EXCESS CALORIES WITHOUT SERIOUS COMORBIDITY WITH BODY MASS INDEX (BMI) OF 50.0 TO 59.9 IN ADULT: ICD-10-CM

## 2024-06-21 DIAGNOSIS — J84.9 ILD (INTERSTITIAL LUNG DISEASE): ICD-10-CM

## 2024-06-21 PROCEDURE — 99999 PR PBB SHADOW E&M-EST. PATIENT-LVL IV: CPT | Mod: PBBFAC,,, | Performed by: INTERNAL MEDICINE

## 2024-06-21 PROCEDURE — 99214 OFFICE O/P EST MOD 30 MIN: CPT | Mod: PBBFAC | Performed by: INTERNAL MEDICINE

## 2024-06-21 RX ORDER — AMLODIPINE BESYLATE 5 MG/1
5 TABLET ORAL DAILY
Qty: 90 TABLET | Refills: 4 | Status: SHIPPED | OUTPATIENT
Start: 2024-06-21

## 2024-06-21 NOTE — PROGRESS NOTES
CHIEF COMPLAINT:   follow up of RA/sle, hypertension, steroid induced diabetes.     HISTORY OF PRESENT ILLNESS: This is a 53 year old woman who presents for follow up.    She has been having left leg pain - calf and knee pain. She went to the ED on 5/16/24 and 6/1/24.      On 5/16/24 she had leg pain and palpitations - D dimer was 0.64 - CTA chest- no pulmonary embolus. A subtle mosaic attenuation noted throughout both lung fields, nonspecific often seen in the setting of inflammatory small airway disease. US venous lower left leg - no thrombosis    6/1/24 - Venous US - no dvt. Xray Tib fib and ankle - no fracture    She saw cardiology 5/31/24- will have ECHo and US veing bilaterally and FEDERICO. Wonders if she has venous insufficiency       Saw ortho 6/10/24 - thinks it could be a baker's cyst - will do MRI of pain persists.    Pain in the left lower leg is 2/10 intensity - can get worse as the day goes on. Pain has not been as bad lately. She will take tylenol or advil as needed. She is using a lidocaine patch which has been helping as well.   Her father fell and broke his hip on 12/6/23 - he is now in rehab and is doing ok. He will be in rehab until 1/3/23.     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  Daughter is now 12 and son is 10- school is going well for them.  Work is going ok.      Joints are doing well.  She uses voltaren gel as needed - once or twice in the last 6 months.  She has been off prednisone since 10/7/19.  She continues to do well off prednisone.  She is taking azathoprine 100 mg twice daily and plaquenil 200 mg twice daily for her RA and lupus overlap with hypoxemic respiratory failure 12/2017 withCT with interstial changes, pericardial effusion and pleural effusion, TIARA that was initially negative 2009  positive 1: 2560 speckled, +NILS, +SSA,SSB, dsDNA neg, +RNA polymerase III.   Breathing is good.  No fever, chills, night sweats, oral ulcers, chest pain, shortness  "of breath, nausea, vomiting, constipation.  No diarrhea.    She remains off pantoprazole and is doing well.       She is taking Norvasc 5 mg daily  for hypertension.  No palpitations. She has not needed HCTZ regularly - likely once a week.      She is taking vitamin D 50,000 units twice weekly.     She saw Dr Mccracken on 23 - she will start on metformin  mg once daily for her weight.       REVIEW OF SYSTEMS: No fevers, chills, night sweats, fatigue, visual change, hearing loss, sinus congestion, sore throat, chest pain, shortness of breath, nausea, vomiting, constipation, diarrhea, dysuria, hematuria, polydipsia, polyuria, headaches         PAST MEDICAL HISTORY:   1.  RA and lupus overlap  2. History of vitamin D deficiency.   3. History of amenorrhea   4. Morbid obesity.     PAST SURGICAL HISTORY: Negative.     ALLERGIES, MEDICATIONS: Updated on [facility]     SOCIAL HISTORY: No tobacco, alcohol use. She is , has no children. She is a RN    FAMILY HISTORY: Mother  at age 69, ESRD on dialysis, aortic stenosis, diabetes, history hypertension, breast cancer age 64 (in remission), benign colon cancer. Father is living diabetes, hypertension and rheumatoid arthritis. Brother with diverticulosis. Brother healthy. Brother healthy.      PHYSICAL EXAMINATION:      /84 (BP Location: Right arm, Patient Position: Sitting, BP Method: X-Large (Manual))   Pulse (!) 53   Ht 5' 5" (1.651 m)   Wt (!) 164.8 kg (363 lb 3.3 oz)   LMP 2017   SpO2 98%   BMI 60.44 kg/m²     General: Is alert, oriented, in no apparent distress. Affect is within   normal limits. TM clear, OP clear  HEENT: Conjunctivae are anicteric. . Respiratory   effort is normal.  CV RRR without murmur gallops or rubs. No lower extremity edema          labs 24 reviewed      ASSESSMENT AND PLAN:          1. RA with lupus overlab with hypoxemic respiratory failure 2017 withCT with interstial changes, pericardial effusion and " pleural effusion, TIARA that was initially negative 2009 now positive 1: 2560 speckled, +NILS, +SSA,SSB, dsDNA neg, +RNA polymerase III. Hypoxemic respiratory failure -CT with interstial changes, pericardial effusion and pleural effusion. Followed by rheumatology. Doing well.    3. Steroid induced diabetes - controlled  4. Anemia - stable  5. Hypertension - stable     6. Vitamin D deficiency - vitamin D 50,000 units twice weekly  7. Morbid obesity - to start on metformin  8. GERD -asx off pantoprazole  9. Screening - [facility] 12/2023  GYN exam 11/19.    Colonoscopy 12/20/22 - diverticulosis otherwise normal - due 2032

## 2024-07-02 ENCOUNTER — HOSPITAL ENCOUNTER (OUTPATIENT)
Dept: CARDIOLOGY | Facility: HOSPITAL | Age: 54
Discharge: HOME | End: 2024-07-02
Attending: INTERNAL MEDICINE
Payer: OTHER GOVERNMENT

## 2024-07-02 VITALS
HEART RATE: 100 BPM | SYSTOLIC BLOOD PRESSURE: 140 MMHG | HEIGHT: 65 IN | BODY MASS INDEX: 48.82 KG/M2 | WEIGHT: 293 LBS | DIASTOLIC BLOOD PRESSURE: 80 MMHG

## 2024-07-02 DIAGNOSIS — M79.89 LEG SWELLING: ICD-10-CM

## 2024-07-02 LAB
ASCENDING AORTA: 3.1 CM
AV INDEX (PROSTH): 0.63
AV MEAN GRADIENT: 8 MMHG
AV PEAK GRADIENT: 15 MMHG
AV VALVE AREA BY VELOCITY RATIO: 1.74 CM²
AV VALVE AREA: 2.05 CM²
AV VELOCITY RATIO: 0.54
BSA FOR ECHO PROCEDURE: 2.75 M2
CV ECHO LV RWT: 0.39 CM
DOP CALC AO PEAK VEL: 1.91 M/S
DOP CALC AO VTI: 39.59 CM
DOP CALC LVOT AREA: 3.2 CM2
DOP CALC LVOT DIAMETER: 2.03 CM
DOP CALC LVOT PEAK VEL: 1.03 M/S
DOP CALC LVOT STROKE VOLUME: 81 CM3
DOP CALC MV VTI: 35.68 CM
DOP CALCLVOT PEAK VEL VTI: 25.04 CM
E WAVE DECELERATION TIME: 177.6 MSEC
E/A RATIO: 1.09
E/E' RATIO: 6.45 M/S
ECHO LV POSTERIOR WALL: 1.01 CM (ref 0.6–1.1)
FRACTIONAL SHORTENING: 23 % (ref 28–44)
HR MV ECHO: 100 BPM
INTERVENTRICULAR SEPTUM: 1.04 CM (ref 0.6–1.1)
LA MAJOR: 5.24 CM
LA MINOR: 4.4 CM
LA WIDTH: 3.97 CM
LEFT ABI: 1.1
LEFT ARM BP: 143 MMHG
LEFT ATRIUM SIZE: 4.12 CM
LEFT ATRIUM VOLUME INDEX MOD: 21.3 ML/M2
LEFT ATRIUM VOLUME INDEX: 26.1 ML/M2
LEFT ATRIUM VOLUME MOD: 54.27 CM3
LEFT ATRIUM VOLUME: 66.5 CM3
LEFT DORSALIS PEDIS: 145 MMHG
LEFT INTERNAL DIMENSION IN SYSTOLE: 4.04 CM (ref 2.1–4)
LEFT POSTERIOR TIBIAL: 158 MMHG
LEFT VENTRICLE DIASTOLIC VOLUME INDEX: 51.23 ML/M2
LEFT VENTRICLE DIASTOLIC VOLUME: 130.63 ML
LEFT VENTRICLE MASS INDEX: 79 G/M2
LEFT VENTRICLE SYSTOLIC VOLUME INDEX: 28 ML/M2
LEFT VENTRICLE SYSTOLIC VOLUME: 71.46 ML
LEFT VENTRICULAR INTERNAL DIMENSION IN DIASTOLE: 5.22 CM (ref 3.5–6)
LEFT VENTRICULAR MASS: 201.95 G
LV LATERAL E/E' RATIO: 5.88 M/S
LV SEPTAL E/E' RATIO: 7.14 M/S
MV A" WAVE DURATION": 10.56 MSEC
MV MEAN GRADIENT: 3 MMHG
MV PEAK A VEL: 0.92 M/S
MV PEAK E VEL: 1 M/S
MV PEAK GRADIENT: 6 MMHG
MV STENOSIS PRESSURE HALF TIME: 51.5 MS
MV VALVE AREA BY CONTINUITY EQUATION: 2.27 CM2
MV VALVE AREA P 1/2 METHOD: 4.27 CM2
PISA TR MAX VEL: 3.09 M/S
PULM VEIN S/D RATIO: 1.57
PV PEAK D VEL: 0.35 M/S
PV PEAK S VEL: 0.55 M/S
RA MAJOR: 4.85 CM
RA PRESSURE ESTIMATED: 3 MMHG
RA WIDTH: 3.31 CM
RIGHT ABI: 1.01
RIGHT ARM BP: 138 MMHG
RIGHT DORSALIS PEDIS: 140 MMHG
RIGHT POSTERIOR TIBIAL: 144 MMHG
RIGHT VENTRICLE DIASTOLIC BASEL DIMENSION: 3 CM
RV TB RVSP: 6 MMHG
SINUS: 2.72 CM
STJ: 2.79 CM
TDI LATERAL: 0.17 M/S
TDI SEPTAL: 0.14 M/S
TDI: 0.16 M/S
TR MAX PG: 38 MMHG
TRICUSPID ANNULAR PLANE SYSTOLIC EXCURSION: 2.18 CM
TV REST PULMONARY ARTERY PRESSURE: 41 MMHG
Z-SCORE OF LEFT VENTRICULAR DIMENSION IN END DIASTOLE: -11.17
Z-SCORE OF LEFT VENTRICULAR DIMENSION IN END SYSTOLE: -6.69

## 2024-07-02 PROCEDURE — 93922 UPR/L XTREMITY ART 2 LEVELS: CPT

## 2024-07-02 PROCEDURE — 93306 TTE W/DOPPLER COMPLETE: CPT | Mod: 26,,, | Performed by: INTERNAL MEDICINE

## 2024-07-02 PROCEDURE — 93306 TTE W/DOPPLER COMPLETE: CPT

## 2024-07-02 PROCEDURE — 93922 UPR/L XTREMITY ART 2 LEVELS: CPT | Mod: 26,,, | Performed by: INTERNAL MEDICINE

## 2024-07-03 ENCOUNTER — HOSPITAL ENCOUNTER (OUTPATIENT)
Dept: CARDIOLOGY | Facility: HOSPITAL | Age: 54
Discharge: HOME | End: 2024-07-03
Attending: INTERNAL MEDICINE
Payer: OTHER GOVERNMENT

## 2024-07-03 DIAGNOSIS — M79.89 LEG SWELLING: ICD-10-CM

## 2024-07-03 LAB
LEFT GREAT SAPHENOUS DISTAL THIGH DIA: 0.52 CM
LEFT GREAT SAPHENOUS JUNCTION DIA: 0.66 CM
LEFT GREAT SAPHENOUS KNEE DIA: 0.36 CM
LEFT GREAT SAPHENOUS MIDDLE THIGH DIA: 0.57 CM
LEFT GREAT SAPHENOUS PROXIMAL CALF DIA: 0.2 CM
LEFT SMALL SAPHENOUS SPJ DIA: 0.25 CM
RIGHT GREAT SAPHENOUS DISTAL THIGH DIA: 0.52 CM
RIGHT GREAT SAPHENOUS JUNCTION DIA: 0.72 CM
RIGHT GREAT SAPHENOUS KNEE DIA: 0.34 CM
RIGHT GREAT SAPHENOUS MIDDLE THIGH DIA: 0.85 CM
RIGHT GREAT SAPHENOUS PROXIMAL CALF DIA: 0.18 CM
RIGHT SMALL SAPHENOUS SPJ DIA: 0.23 CM

## 2024-07-03 PROCEDURE — 93970 EXTREMITY STUDY: CPT | Mod: TC

## 2024-07-03 PROCEDURE — 93970 EXTREMITY STUDY: CPT | Mod: 26,,, | Performed by: INTERNAL MEDICINE

## 2024-07-10 ENCOUNTER — TELEPHONE (OUTPATIENT)
Dept: PULMONOLOGY | Facility: CLINIC | Age: 54
End: 2024-07-10
Payer: OTHER GOVERNMENT

## 2024-07-10 DIAGNOSIS — R06.02 SOB (SHORTNESS OF BREATH): Primary | ICD-10-CM

## 2024-08-18 DIAGNOSIS — M32.8 LUPUS ERYTHEMATOSUS OVERLAP SYNDROME: ICD-10-CM

## 2024-08-19 RX ORDER — AZATHIOPRINE 50 MG/1
100 TABLET ORAL 2 TIMES DAILY
Qty: 360 TABLET | Refills: 0 | Status: SHIPPED | OUTPATIENT
Start: 2024-08-19

## 2024-08-21 DIAGNOSIS — E11.9 TYPE 2 DIABETES MELLITUS WITHOUT COMPLICATION: ICD-10-CM

## 2024-11-06 DIAGNOSIS — M32.8 LUPUS ERYTHEMATOSUS OVERLAP SYNDROME: ICD-10-CM

## 2024-11-07 RX ORDER — AZATHIOPRINE 50 MG/1
100 TABLET ORAL 2 TIMES DAILY
Qty: 360 TABLET | Refills: 0 | Status: SHIPPED | OUTPATIENT
Start: 2024-11-07

## 2024-12-02 DIAGNOSIS — M32.8 LUPUS ERYTHEMATOSUS OVERLAP SYNDROME: ICD-10-CM

## 2024-12-11 DIAGNOSIS — M32.8 LUPUS ERYTHEMATOSUS OVERLAP SYNDROME: ICD-10-CM

## 2024-12-12 RX ORDER — AZATHIOPRINE 50 MG/1
100 TABLET ORAL 2 TIMES DAILY
Qty: 360 TABLET | Refills: 0 | Status: SHIPPED | OUTPATIENT
Start: 2024-12-12

## 2024-12-12 RX ORDER — AZATHIOPRINE 50 MG/1
100 TABLET ORAL 2 TIMES DAILY
Qty: 360 TABLET | Refills: 0 | OUTPATIENT
Start: 2024-12-12

## 2024-12-21 ENCOUNTER — LAB VISIT (OUTPATIENT)
Dept: LAB | Facility: HOSPITAL | Age: 54
End: 2024-12-21
Attending: INTERNAL MEDICINE
Payer: OTHER GOVERNMENT

## 2024-12-21 DIAGNOSIS — M32.8 LUPUS ERYTHEMATOSUS OVERLAP SYNDROME: ICD-10-CM

## 2024-12-21 DIAGNOSIS — D84.9 IMMUNOSUPPRESSION: ICD-10-CM

## 2024-12-21 DIAGNOSIS — J84.9 ILD (INTERSTITIAL LUNG DISEASE): ICD-10-CM

## 2024-12-21 DIAGNOSIS — M05.79 RHEUMATOID ARTHRITIS INVOLVING MULTIPLE SITES WITH POSITIVE RHEUMATOID FACTOR: ICD-10-CM

## 2024-12-21 DIAGNOSIS — R53.83 OTHER FATIGUE: ICD-10-CM

## 2024-12-21 LAB
ALBUMIN SERPL BCP-MCNC: 3.2 G/DL (ref 3.5–5.2)
ALP SERPL-CCNC: 83 U/L (ref 40–150)
ALT SERPL W/O P-5'-P-CCNC: 9 U/L (ref 10–44)
ANION GAP SERPL CALC-SCNC: 9 MMOL/L (ref 8–16)
AST SERPL-CCNC: 16 U/L (ref 10–40)
BASOPHILS # BLD AUTO: 0.02 K/UL (ref 0–0.2)
BASOPHILS NFR BLD: 0.4 % (ref 0–1.9)
BILIRUB SERPL-MCNC: 0.5 MG/DL (ref 0.1–1)
BUN SERPL-MCNC: 12 MG/DL (ref 6–20)
C3 SERPL-MCNC: 155 MG/DL (ref 50–180)
C4 SERPL-MCNC: 24 MG/DL (ref 11–44)
CALCIUM SERPL-MCNC: 9.3 MG/DL (ref 8.7–10.5)
CHLORIDE SERPL-SCNC: 106 MMOL/L (ref 95–110)
CK SERPL-CCNC: 195 U/L (ref 20–180)
CO2 SERPL-SCNC: 26 MMOL/L (ref 23–29)
CREAT SERPL-MCNC: 0.9 MG/DL (ref 0.5–1.4)
CRP SERPL-MCNC: 8.6 MG/L (ref 0–8.2)
DIFFERENTIAL METHOD BLD: ABNORMAL
EOSINOPHIL # BLD AUTO: 0.2 K/UL (ref 0–0.5)
EOSINOPHIL NFR BLD: 3.7 % (ref 0–8)
ERYTHROCYTE [DISTWIDTH] IN BLOOD BY AUTOMATED COUNT: 14.6 % (ref 11.5–14.5)
ERYTHROCYTE [SEDIMENTATION RATE] IN BLOOD BY PHOTOMETRIC METHOD: 71 MM/HR (ref 0–36)
EST. GFR  (NO RACE VARIABLE): >60 ML/MIN/1.73 M^2
GLUCOSE SERPL-MCNC: 103 MG/DL (ref 70–110)
HCT VFR BLD AUTO: 38.2 % (ref 37–48.5)
HGB BLD-MCNC: 11.4 G/DL (ref 12–16)
IMM GRANULOCYTES # BLD AUTO: 0.02 K/UL (ref 0–0.04)
IMM GRANULOCYTES NFR BLD AUTO: 0.4 % (ref 0–0.5)
LYMPHOCYTES # BLD AUTO: 1 K/UL (ref 1–4.8)
LYMPHOCYTES NFR BLD: 20.1 % (ref 18–48)
MCH RBC QN AUTO: 27.7 PG (ref 27–31)
MCHC RBC AUTO-ENTMCNC: 29.8 G/DL (ref 32–36)
MCV RBC AUTO: 93 FL (ref 82–98)
MONOCYTES # BLD AUTO: 0.4 K/UL (ref 0.3–1)
MONOCYTES NFR BLD: 8.3 % (ref 4–15)
NEUTROPHILS # BLD AUTO: 3.2 K/UL (ref 1.8–7.7)
NEUTROPHILS NFR BLD: 67.1 % (ref 38–73)
NRBC BLD-RTO: 0 /100 WBC
PLATELET # BLD AUTO: 284 K/UL (ref 150–450)
PMV BLD AUTO: 10 FL (ref 9.2–12.9)
POTASSIUM SERPL-SCNC: 3.9 MMOL/L (ref 3.5–5.1)
PROT SERPL-MCNC: 7.9 G/DL (ref 6–8.4)
RBC # BLD AUTO: 4.12 M/UL (ref 4–5.4)
SODIUM SERPL-SCNC: 141 MMOL/L (ref 136–145)
WBC # BLD AUTO: 4.83 K/UL (ref 3.9–12.7)

## 2024-12-21 PROCEDURE — 85652 RBC SED RATE AUTOMATED: CPT | Performed by: INTERNAL MEDICINE

## 2024-12-21 PROCEDURE — 86225 DNA ANTIBODY NATIVE: CPT | Performed by: INTERNAL MEDICINE

## 2024-12-21 PROCEDURE — 86160 COMPLEMENT ANTIGEN: CPT | Mod: 59 | Performed by: INTERNAL MEDICINE

## 2024-12-21 PROCEDURE — 82550 ASSAY OF CK (CPK): CPT | Performed by: INTERNAL MEDICINE

## 2024-12-21 PROCEDURE — 86140 C-REACTIVE PROTEIN: CPT | Performed by: INTERNAL MEDICINE

## 2024-12-21 PROCEDURE — 86160 COMPLEMENT ANTIGEN: CPT | Performed by: INTERNAL MEDICINE

## 2024-12-21 PROCEDURE — 80053 COMPREHEN METABOLIC PANEL: CPT | Performed by: INTERNAL MEDICINE

## 2024-12-21 PROCEDURE — 85025 COMPLETE CBC W/AUTO DIFF WBC: CPT | Performed by: INTERNAL MEDICINE

## 2024-12-21 PROCEDURE — 36415 COLL VENOUS BLD VENIPUNCTURE: CPT | Performed by: INTERNAL MEDICINE

## 2024-12-23 ENCOUNTER — IMMUNIZATION (OUTPATIENT)
Dept: INTERNAL MEDICINE | Facility: CLINIC | Age: 54
End: 2024-12-23
Payer: OTHER GOVERNMENT

## 2024-12-23 ENCOUNTER — OFFICE VISIT (OUTPATIENT)
Dept: INTERNAL MEDICINE | Facility: CLINIC | Age: 54
End: 2024-12-23
Payer: OTHER GOVERNMENT

## 2024-12-23 ENCOUNTER — HOSPITAL ENCOUNTER (OUTPATIENT)
Dept: RADIOLOGY | Facility: HOSPITAL | Age: 54
Discharge: HOME | End: 2024-12-23
Attending: INTERNAL MEDICINE
Payer: OTHER GOVERNMENT

## 2024-12-23 VITALS
DIASTOLIC BLOOD PRESSURE: 89 MMHG | HEART RATE: 59 BPM | HEIGHT: 65 IN | SYSTOLIC BLOOD PRESSURE: 139 MMHG | BODY MASS INDEX: 61.57 KG/M2 | WEIGHT: 293 LBS | BODY MASS INDEX: 48.82 KG/M2 | WEIGHT: 293 LBS | OXYGEN SATURATION: 98 %

## 2024-12-23 DIAGNOSIS — E53.8 VITAMIN B12 DEFICIENCY: ICD-10-CM

## 2024-12-23 DIAGNOSIS — Z79.4 TYPE 2 DIABETES MELLITUS WITHOUT COMPLICATION, WITH LONG-TERM CURRENT USE OF INSULIN: ICD-10-CM

## 2024-12-23 DIAGNOSIS — Z23 NEED FOR VACCINATION: Primary | ICD-10-CM

## 2024-12-23 DIAGNOSIS — Z12.31 SCREENING MAMMOGRAM FOR BREAST CANCER: ICD-10-CM

## 2024-12-23 DIAGNOSIS — Z00.00 ROUTINE GENERAL MEDICAL EXAMINATION AT A HEALTH CARE FACILITY: Primary | ICD-10-CM

## 2024-12-23 DIAGNOSIS — T78.3XXD ANGIOEDEMA, SUBSEQUENT ENCOUNTER: ICD-10-CM

## 2024-12-23 DIAGNOSIS — E55.9 VITAMIN D DEFICIENCY DISEASE: ICD-10-CM

## 2024-12-23 DIAGNOSIS — E11.9 TYPE 2 DIABETES MELLITUS WITHOUT COMPLICATION, WITH LONG-TERM CURRENT USE OF INSULIN: ICD-10-CM

## 2024-12-23 LAB — DSDNA AB SER-ACNC: NORMAL [IU]/ML

## 2024-12-23 PROCEDURE — 99396 PREV VISIT EST AGE 40-64: CPT | Mod: S$PBB,,, | Performed by: INTERNAL MEDICINE

## 2024-12-23 PROCEDURE — 99999 PR PBB SHADOW E&M-EST. PATIENT-LVL III: CPT | Mod: PBBFAC,,, | Performed by: INTERNAL MEDICINE

## 2024-12-23 PROCEDURE — 99999PBSHW INFLUENZA - TRIVALENT - PF (ADULT): Mod: PBBFAC,,,

## 2024-12-23 PROCEDURE — 99213 OFFICE O/P EST LOW 20 MIN: CPT | Mod: PBBFAC | Performed by: INTERNAL MEDICINE

## 2024-12-23 PROCEDURE — G0008 ADMIN INFLUENZA VIRUS VAC: HCPCS | Mod: PBBFAC

## 2024-12-23 PROCEDURE — 77063 BREAST TOMOSYNTHESIS BI: CPT | Mod: 26,,, | Performed by: RADIOLOGY

## 2024-12-23 PROCEDURE — 91320 SARSCV2 VAC 30MCG TRS-SUC IM: CPT | Mod: PBBFAC

## 2024-12-23 PROCEDURE — 90480 ADMN SARSCOV2 VAC 1/ONLY CMP: CPT | Mod: PBBFAC

## 2024-12-23 PROCEDURE — 77067 SCR MAMMO BI INCL CAD: CPT | Mod: 26,,, | Performed by: RADIOLOGY

## 2024-12-23 PROCEDURE — 99999PBSHW COVID-19 VAC, TRIS 2023 (PFIZER)(PF) 30 MCG/0.3 ML IM SUSR (12+): Mod: PBBFAC,,,

## 2024-12-23 PROCEDURE — 77063 BREAST TOMOSYNTHESIS BI: CPT | Mod: TC

## 2024-12-23 RX ORDER — AMLODIPINE BESYLATE 5 MG/1
5 TABLET ORAL 2 TIMES DAILY
Qty: 180 TABLET | Refills: 4 | Status: SHIPPED | OUTPATIENT
Start: 2024-12-23

## 2024-12-23 RX ORDER — PREDNISONE 10 MG/1
TABLET ORAL
Qty: 18 TABLET | Refills: 0 | Status: SHIPPED | OUTPATIENT
Start: 2024-12-23

## 2024-12-23 NOTE — Clinical Note
Reviewed labs with patient and saw her today. She is slightly stiff.  Gave her a prednisone taper today.  Sarah Robles M.D.

## 2024-12-23 NOTE — PATIENT INSTRUCTIONS
Increase amlodipine 5 mg 1.5 tablets daily.    Monitor Blood pressure at home and if consistently greater than 140/90, increase amlodipine to 5 mg 2 tablets daily.

## 2025-01-30 ENCOUNTER — LAB VISIT (OUTPATIENT)
Dept: LAB | Facility: HOSPITAL | Age: 55
End: 2025-01-30
Attending: INTERNAL MEDICINE
Payer: OTHER GOVERNMENT

## 2025-01-30 DIAGNOSIS — M32.8 LUPUS ERYTHEMATOSUS OVERLAP SYNDROME: ICD-10-CM

## 2025-01-30 DIAGNOSIS — J84.9 ILD (INTERSTITIAL LUNG DISEASE): ICD-10-CM

## 2025-01-30 DIAGNOSIS — M05.79 RHEUMATOID ARTHRITIS INVOLVING MULTIPLE SITES WITH POSITIVE RHEUMATOID FACTOR: ICD-10-CM

## 2025-01-30 DIAGNOSIS — Z79.4 TYPE 2 DIABETES MELLITUS WITHOUT COMPLICATION, WITH LONG-TERM CURRENT USE OF INSULIN: ICD-10-CM

## 2025-01-30 DIAGNOSIS — E11.9 TYPE 2 DIABETES MELLITUS WITHOUT COMPLICATION, WITH LONG-TERM CURRENT USE OF INSULIN: ICD-10-CM

## 2025-01-30 DIAGNOSIS — R53.83 OTHER FATIGUE: ICD-10-CM

## 2025-01-30 DIAGNOSIS — D84.9 IMMUNOSUPPRESSION: ICD-10-CM

## 2025-01-30 LAB
ALBUMIN SERPL BCP-MCNC: 3.4 G/DL (ref 3.5–5.2)
ALP SERPL-CCNC: 90 U/L (ref 40–150)
ALT SERPL W/O P-5'-P-CCNC: 10 U/L (ref 10–44)
ANION GAP SERPL CALC-SCNC: 13 MMOL/L (ref 8–16)
AST SERPL-CCNC: 18 U/L (ref 10–40)
BASOPHILS # BLD AUTO: 0.03 K/UL (ref 0–0.2)
BASOPHILS NFR BLD: 0.6 % (ref 0–1.9)
BILIRUB SERPL-MCNC: 0.3 MG/DL (ref 0.1–1)
BUN SERPL-MCNC: 12 MG/DL (ref 6–20)
C3 SERPL-MCNC: 175 MG/DL (ref 50–180)
C4 SERPL-MCNC: 31 MG/DL (ref 11–44)
CALCIUM SERPL-MCNC: 9.9 MG/DL (ref 8.7–10.5)
CHLORIDE SERPL-SCNC: 103 MMOL/L (ref 95–110)
CK SERPL-CCNC: 243 U/L (ref 20–180)
CO2 SERPL-SCNC: 24 MMOL/L (ref 23–29)
CREAT SERPL-MCNC: 1 MG/DL (ref 0.5–1.4)
CRP SERPL-MCNC: 4.7 MG/L (ref 0–8.2)
DIFFERENTIAL METHOD BLD: ABNORMAL
EOSINOPHIL # BLD AUTO: 0.3 K/UL (ref 0–0.5)
EOSINOPHIL NFR BLD: 5.2 % (ref 0–8)
ERYTHROCYTE [DISTWIDTH] IN BLOOD BY AUTOMATED COUNT: 14.6 % (ref 11.5–14.5)
ERYTHROCYTE [SEDIMENTATION RATE] IN BLOOD BY PHOTOMETRIC METHOD: 61 MM/HR (ref 0–36)
EST. GFR  (NO RACE VARIABLE): >60 ML/MIN/1.73 M^2
ESTIMATED AVG GLUCOSE: 126 MG/DL (ref 68–131)
GLUCOSE SERPL-MCNC: 80 MG/DL (ref 70–110)
HBA1C MFR BLD: 6 % (ref 4–5.6)
HCT VFR BLD AUTO: 41.7 % (ref 37–48.5)
HGB BLD-MCNC: 12.3 G/DL (ref 12–16)
IMM GRANULOCYTES # BLD AUTO: 0.01 K/UL (ref 0–0.04)
IMM GRANULOCYTES NFR BLD AUTO: 0.2 % (ref 0–0.5)
LYMPHOCYTES # BLD AUTO: 1.2 K/UL (ref 1–4.8)
LYMPHOCYTES NFR BLD: 23.8 % (ref 18–48)
MCH RBC QN AUTO: 27.3 PG (ref 27–31)
MCHC RBC AUTO-ENTMCNC: 29.5 G/DL (ref 32–36)
MCV RBC AUTO: 93 FL (ref 82–98)
MONOCYTES # BLD AUTO: 0.6 K/UL (ref 0.3–1)
MONOCYTES NFR BLD: 11.2 % (ref 4–15)
NEUTROPHILS # BLD AUTO: 3 K/UL (ref 1.8–7.7)
NEUTROPHILS NFR BLD: 59 % (ref 38–73)
NRBC BLD-RTO: 0 /100 WBC
PLATELET # BLD AUTO: 339 K/UL (ref 150–450)
PMV BLD AUTO: 9.8 FL (ref 9.2–12.9)
POTASSIUM SERPL-SCNC: 4.3 MMOL/L (ref 3.5–5.1)
PROT SERPL-MCNC: 8.9 G/DL (ref 6–8.4)
RBC # BLD AUTO: 4.51 M/UL (ref 4–5.4)
SODIUM SERPL-SCNC: 140 MMOL/L (ref 136–145)
WBC # BLD AUTO: 5.01 K/UL (ref 3.9–12.7)

## 2025-01-30 PROCEDURE — 36415 COLL VENOUS BLD VENIPUNCTURE: CPT | Mod: PO | Performed by: INTERNAL MEDICINE

## 2025-01-30 PROCEDURE — 85652 RBC SED RATE AUTOMATED: CPT | Performed by: INTERNAL MEDICINE

## 2025-01-30 PROCEDURE — 86225 DNA ANTIBODY NATIVE: CPT | Performed by: INTERNAL MEDICINE

## 2025-01-30 PROCEDURE — 86140 C-REACTIVE PROTEIN: CPT | Performed by: INTERNAL MEDICINE

## 2025-01-30 PROCEDURE — 80053 COMPREHEN METABOLIC PANEL: CPT | Performed by: INTERNAL MEDICINE

## 2025-01-30 PROCEDURE — 86160 COMPLEMENT ANTIGEN: CPT | Performed by: INTERNAL MEDICINE

## 2025-01-30 PROCEDURE — 86160 COMPLEMENT ANTIGEN: CPT | Mod: 59 | Performed by: INTERNAL MEDICINE

## 2025-01-30 PROCEDURE — 85025 COMPLETE CBC W/AUTO DIFF WBC: CPT | Performed by: INTERNAL MEDICINE

## 2025-01-30 PROCEDURE — 82550 ASSAY OF CK (CPK): CPT | Performed by: INTERNAL MEDICINE

## 2025-01-30 PROCEDURE — 83036 HEMOGLOBIN GLYCOSYLATED A1C: CPT | Performed by: INTERNAL MEDICINE

## 2025-01-31 LAB — DSDNA AB SER-ACNC: NORMAL [IU]/ML

## 2025-02-03 ENCOUNTER — PATIENT MESSAGE (OUTPATIENT)
Dept: RHEUMATOLOGY | Facility: CLINIC | Age: 55
End: 2025-02-03
Payer: OTHER GOVERNMENT

## 2025-02-16 ENCOUNTER — HOSPITAL ENCOUNTER (EMERGENCY)
Facility: HOSPITAL | Age: 55
Discharge: HOME | End: 2025-02-16
Attending: EMERGENCY MEDICINE
Payer: OTHER GOVERNMENT

## 2025-02-16 VITALS
WEIGHT: 293 LBS | DIASTOLIC BLOOD PRESSURE: 88 MMHG | HEART RATE: 56 BPM | RESPIRATION RATE: 20 BRPM | OXYGEN SATURATION: 97 % | TEMPERATURE: 98 F | BODY MASS INDEX: 48.82 KG/M2 | HEIGHT: 65 IN | SYSTOLIC BLOOD PRESSURE: 166 MMHG

## 2025-02-16 DIAGNOSIS — R07.9 CHEST PAIN: ICD-10-CM

## 2025-02-16 DIAGNOSIS — R09.1 PLEURISY: Primary | ICD-10-CM

## 2025-02-16 DIAGNOSIS — R07.89 CHEST DISCOMFORT: ICD-10-CM

## 2025-02-16 DIAGNOSIS — I10 HYPERTENSION, UNSPECIFIED TYPE: ICD-10-CM

## 2025-02-16 LAB
ALBUMIN SERPL BCP-MCNC: 3.3 G/DL (ref 3.5–5.2)
ALP SERPL-CCNC: 81 U/L (ref 40–150)
ALT SERPL W/O P-5'-P-CCNC: 7 U/L (ref 10–44)
ANION GAP SERPL CALC-SCNC: 13 MMOL/L (ref 8–16)
AST SERPL-CCNC: 21 U/L (ref 10–40)
BASOPHILS # BLD AUTO: 0.03 K/UL (ref 0–0.2)
BASOPHILS NFR BLD: 0.7 % (ref 0–1.9)
BILIRUB SERPL-MCNC: 0.4 MG/DL (ref 0.1–1)
BNP SERPL-MCNC: 22 PG/ML (ref 0–99)
BUN SERPL-MCNC: 11 MG/DL (ref 6–20)
CALCIUM SERPL-MCNC: 9.6 MG/DL (ref 8.7–10.5)
CHLORIDE SERPL-SCNC: 108 MMOL/L (ref 95–110)
CO2 SERPL-SCNC: 18 MMOL/L (ref 23–29)
CREAT SERPL-MCNC: 0.8 MG/DL (ref 0.5–1.4)
D DIMER PPP IA.FEU-MCNC: 0.64 MG/L FEU
DIFFERENTIAL METHOD BLD: ABNORMAL
EOSINOPHIL # BLD AUTO: 0.1 K/UL (ref 0–0.5)
EOSINOPHIL NFR BLD: 3.5 % (ref 0–8)
ERYTHROCYTE [DISTWIDTH] IN BLOOD BY AUTOMATED COUNT: 14.3 % (ref 11.5–14.5)
EST. GFR  (NO RACE VARIABLE): >60 ML/MIN/1.73 M^2
GLUCOSE SERPL-MCNC: 96 MG/DL (ref 70–110)
HCT VFR BLD AUTO: 41.6 % (ref 37–48.5)
HCV AB SERPL QL IA: NORMAL
HGB BLD-MCNC: 12.3 G/DL (ref 12–16)
HIV 1+2 AB+HIV1 P24 AG SERPL QL IA: NORMAL
IMM GRANULOCYTES # BLD AUTO: 0.01 K/UL (ref 0–0.04)
IMM GRANULOCYTES NFR BLD AUTO: 0.2 % (ref 0–0.5)
LYMPHOCYTES # BLD AUTO: 0.9 K/UL (ref 1–4.8)
LYMPHOCYTES NFR BLD: 22.9 % (ref 18–48)
MCH RBC QN AUTO: 26.8 PG (ref 27–31)
MCHC RBC AUTO-ENTMCNC: 29.6 G/DL (ref 32–36)
MCV RBC AUTO: 91 FL (ref 82–98)
MONOCYTES # BLD AUTO: 0.3 K/UL (ref 0.3–1)
MONOCYTES NFR BLD: 7.7 % (ref 4–15)
NEUTROPHILS # BLD AUTO: 2.6 K/UL (ref 1.8–7.7)
NEUTROPHILS NFR BLD: 65 % (ref 38–73)
NRBC BLD-RTO: 0 /100 WBC
OHS QRS DURATION: 104 MS
OHS QTC CALCULATION: 565 MS
PLATELET # BLD AUTO: 295 K/UL (ref 150–450)
PMV BLD AUTO: 10.3 FL (ref 9.2–12.9)
POTASSIUM SERPL-SCNC: 4.4 MMOL/L (ref 3.5–5.1)
PROT SERPL-MCNC: 8.1 G/DL (ref 6–8.4)
RBC # BLD AUTO: 4.59 M/UL (ref 4–5.4)
SODIUM SERPL-SCNC: 139 MMOL/L (ref 136–145)
TROPONIN I SERPL DL<=0.01 NG/ML-MCNC: 5 NG/L (ref 0–14)
TROPONIN I SERPL DL<=0.01 NG/ML-MCNC: 6 NG/L (ref 0–14)
WBC # BLD AUTO: 4.01 K/UL (ref 3.9–12.7)

## 2025-02-16 PROCEDURE — 87389 HIV-1 AG W/HIV-1&-2 AB AG IA: CPT | Performed by: PHYSICIAN ASSISTANT

## 2025-02-16 PROCEDURE — 80053 COMPREHEN METABOLIC PANEL: CPT | Performed by: PHYSICIAN ASSISTANT

## 2025-02-16 PROCEDURE — 25500020 PHARM REV CODE 255: Performed by: EMERGENCY MEDICINE

## 2025-02-16 PROCEDURE — 86803 HEPATITIS C AB TEST: CPT | Performed by: PHYSICIAN ASSISTANT

## 2025-02-16 PROCEDURE — 85025 COMPLETE CBC W/AUTO DIFF WBC: CPT | Performed by: PHYSICIAN ASSISTANT

## 2025-02-16 PROCEDURE — 93005 ELECTROCARDIOGRAM TRACING: CPT

## 2025-02-16 PROCEDURE — 99285 EMERGENCY DEPT VISIT HI MDM: CPT | Mod: 25

## 2025-02-16 PROCEDURE — 84484 ASSAY OF TROPONIN QUANT: CPT | Mod: 91 | Performed by: PHYSICIAN ASSISTANT

## 2025-02-16 PROCEDURE — 85379 FIBRIN DEGRADATION QUANT: CPT | Performed by: PHYSICIAN ASSISTANT

## 2025-02-16 PROCEDURE — 83880 ASSAY OF NATRIURETIC PEPTIDE: CPT | Performed by: PHYSICIAN ASSISTANT

## 2025-02-16 RX ADMIN — IOHEXOL 100 ML: 350 INJECTION, SOLUTION INTRAVENOUS at 09:02

## 2025-02-16 NOTE — ED PROVIDER NOTES
Encounter Date: 2/16/2025       History     Chief Complaint   Patient presents with    Chest Pain     Under L breast on chest comes and goes, denies cardiac hx     54 y.o. Female with a PMHx of ILD, lupus, RA presents to the ED with intermittent chest pain x2 days. It is located under her left breast.  Does not radiate.  It is sharp in nature.  It woke her up 2 nights ago.  She took Tylenol with relief of chest pain.  She had a similar episode of chest pain again last night. She notes a productive cough for the past 2 weeks as she is getting over a cold.  She is coughing up clear mucus.  She was denies fever, shortness of breath, palpitations, lower extremity swelling    The history is provided by the patient.     Review of patient's allergies indicates:   Allergen Reactions    Lisinopril Swelling     Mouth swelled, had to go to ED    Ventolin [albuterol] Swelling     Lips - swelling     Past Medical History:   Diagnosis Date    Arthritis     Hypertension 11/11/2013    ILD (interstitial lung disease)     Insomnia 2/26/2013    Lupus     Obesity 2/26/2013    RA (refractory anemia)     Rheumatoid arthritis     Steroid-induced hyperglycemia 3/1/2018    Thyroid nodule 3/1/2018     Past Surgical History:   Procedure Laterality Date    BREAST BIOPSY Left 10/13/2019    fibroadenomatoid change    COLONOSCOPY N/A 12/20/2022    Procedure: COLONOSCOPY;  Surgeon: Elmer Stuart MD;  Location: [facility] ([address] [place] [address]);  Service: Endoscopy;  Laterality: N/A;  BMI 58.24  inst portal-RB     Family History   Problem Relation Name Age of Onset    Hypertension Father      Diabetes Father      Rheum arthritis Father          methotrexate    Diabetes Mother      Hypertension Mother      Breast cancer Mother  62    Kidney disease Mother          on dialysis    Rheum arthritis Paternal Grandmother      Hypertension Paternal Grandmother      Breast cancer Other mat second cousin 45    Hypertension Maternal Grandmother 91     Lupus Neg Hx       Inflammatory bowel disease Neg Hx      Psoriasis Neg Hx      Thyroid disease Neg Hx      Ovarian cancer Neg Hx      Heart attack Neg Hx      Heart disease Neg Hx      Colon cancer Neg Hx       Social History[1]  Review of Systems    Physical Exam     Initial Vitals [02/16/25 0709]   BP Pulse Resp Temp SpO2   (!) 185/91 67 20 98.4 °F (36.9 °C) 100 %      MAP       --         Physical Exam    Nursing note and vitals reviewed.  Constitutional: She appears well-developed and well-nourished. She is not diaphoretic. No distress.   HENT:   Head: Normocephalic and atraumatic.   Nose: Nose normal.   Eyes: Conjunctivae and EOM are normal.   Neck: Neck supple.   Cardiovascular:  Normal rate, regular rhythm, normal heart sounds and intact distal pulses.           Pulmonary/Chest: Breath sounds normal. No respiratory distress. She exhibits no tenderness.   Musculoskeletal:      Cervical back: Neck supple.      Right lower leg: No edema.      Left lower leg: No edema.     Neurological: She is alert and oriented to person, place, and time. Gait normal.   Skin: No rash noted.   Psychiatric: She has a normal mood and affect. Thought content normal.         ED Course   Procedures  Labs Reviewed   D DIMER, QUANTITATIVE - Abnormal       Result Value    D-Dimer 0.64 (*)    CBC W/ AUTO DIFFERENTIAL - Abnormal    WBC 4.01      RBC 4.59      Hemoglobin 12.3      Hematocrit 41.6      MCV 91      MCH 26.8 (*)     MCHC 29.6 (*)     RDW 14.3      Platelets 295      MPV 10.3      Immature Granulocytes 0.2      Gran # (ANC) 2.6      Immature Grans (Abs) 0.01      Lymph # 0.9 (*)     Mono # 0.3      Eos # 0.1      Baso # 0.03      nRBC 0      Gran % 65.0      Lymph % 22.9      Mono % 7.7      Eosinophil % 3.5      Basophil % 0.7      Differential Method Automated     COMPREHENSIVE METABOLIC PANEL - Abnormal    Sodium 139      Potassium 4.4      Chloride 108      CO2 18 (*)     Glucose 96      BUN 11      Creatinine 0.8      Calcium 9.6      Total  Protein 8.1      Albumin 3.3 (*)     Total Bilirubin 0.4      Alkaline Phosphatase 81      AST 21      ALT 7 (*)     eGFR >60.0      Anion Gap 13     HEPATITIS C ANTIBODY    Hepatitis C Ab Non-reactive      Narrative:     Release to patient->Immediate   HIV 1 / 2 ANTIBODY    HIV 1/2 Ag/Ab Non-reactive      Narrative:     Release to patient->Immediate   TROPONIN I HIGH SENSITIVITY    Troponin I High Sensitivity 5     B-TYPE NATRIURETIC PEPTIDE    BNP 22     TROPONIN I HIGH SENSITIVITY    Troponin I High Sensitivity 6       EKG Readings: (Independently Interpreted)   Initial Reading: No STEMI. Previous EKG Date: 5/16/24. Rhythm: Sinus Bradycardia. Heart Rate: 54. Ectopy: PACs. Axis: Left Axis Deviation. Clinical Impression: Sinus Bradycardia with PACs   T-wave inversions in the lateral leads     ECG Results              EKG 12-lead (Final result)        Collection Time Result Time QRS Duration OHS QTC Calculation    02/16/25 07:13:38 02/16/25 07:56:52 104 565                     Final result by Interface, Lab In [facility] (02/16/25 07:56:54)                   Narrative:    Test Reason : R07.89,    Vent. Rate :  54 BPM     Atrial Rate :  54 BPM     P-R Int : 194 ms          QRS Dur : 104 ms      QT Int : 596 ms       P-R-T Axes :  40 -15  72 degrees    QTcB Int : 565 ms    Sinus bradycardia with Premature atrial complexes  Moderate voltage criteria for LVH, may be normal variant ( R in aVL ,  Larry product )  Nonspecific T wave abnormality  Abnormal ECG  When compared with ECG of 16-May-2024 08:23,  Nonspecific T wave abnormality, worse in Lateral leads  QT has lengthened  Confirmed by Marcel Hobson ([id number]) on 2/16/2025 7:56:50 AM    Referred By: AAAREFERRAL SELF           Confirmed By: Marcel Hobson                                  Imaging Results              CTA Chest Non-Coronary (PE Studies) (Final result)  Result time 02/16/25 10:21:55      Final result by Lupillo Kelley MD (02/16/25 10:21:55)                    Impression:      No evidence of acute pulmonary embolus to the proximal segmental level.    Mild cardiomegaly.  Lungs demonstrate mosaic attenuation, which can be seen with elevated pulmonary vascular resistance, small airway disease, or both.  Findings are similar to prior CT chest.    Multinodular thyroid, noting that prior thyroid ultrasound and biopsy was obtained in 2018.    Borderline enlarged axillary lymph nodes, more pronounced on the right side, similar to 05/16/2024 CT.      Electronically signed by: Lupillo Kelley MD  Date:    02/16/2025  Time:    10:21               Narrative:    EXAMINATION:  CTA CHEST NON CORONARY (PE STUDIES)    CLINICAL HISTORY:  Pulmonary embolism (PE) suspected, positive D-dimer;    TECHNIQUE:  Low dose axial images, sagittal and coronal reformations were obtained from the thoracic inlet to the lung bases following the IV administration of 100 mL of Omnipaque 350.  Contrast timing was optimized to evaluate the pulmonary arteries.  MIP images were performed.    COMPARISON:  CTA, 05/16/2024.    FINDINGS:  Examination of the soft tissue and vascular structures at the base of the neck is negative for acute finding.  There is heterogeneous enlargement of the left lobe of the thyroid gland with probable large nodule in the left lobe of the thyroid gland.  Consider thyroid ultrasound follow-up, noting thyroid ultrasound and thyroid FNA was obtained in 2018.    The thoracic aorta maintains normal caliber, contour, and course without significant atherosclerotic calcification.  There is no evidence of aneurysmal dilation or dissection.    The pulmonary arteries distribute normally without evidence of filling defect to indicate pulmonary thromboembolism.    The trachea and proximal airways are patent.    The lungs are symmetrically expanded and there is scattered linear subsegmental atelectasis or scarring most notable in the left upper lobe.  No consolidation or pleural effusion.  No  pneumothorax.  The lungs demonstrate mosaic attenuation.  This can be seen with elevated pulmonary vascular resistance, small airway disease, or both.    The heart is mildly enlarged.  No abnormal bowing of the interventricular septum.  No significant pericardial fluid.  No advanced coronary artery calcifications.    There is no bulky superior mediastinal lymphadenopathy.  Similar borderline enlarged axillary lymph nodes, more pronounced on the right side.    The esophagus maintains a normal course and caliber.    Limited images of the upper abdomen obtained during the course of this dedicated thoracic CT is negative for acute findings.    The osseous structures are negative for acute finding or aggressive osseous lesion.                                       Medications   iohexoL (OMNIPAQUE 350) injection 100 mL (100 mLs Intravenous Given 2/16/25 0931)     Medical Decision Making  54 y.o. Female with a PMHx of ILD, lupus, RA presents to the ED with intermittent chest pain x2 days. Nontoxic appearing. Vitals with HTN. Afebrile. Exam as above. I will initiate workup and reassess.    Ddx:  ACS, pneumonia, PE, pleurisy    Workup today is reassuring. EKG shows nonspecific T-wave changes in the lateral leads.  Troponin trended and remains flat. She has a heart score of 3.  Labs without leukocytosis.  Dimer elevated today.  CTA obtained and negative for PE or infiltrative process concerning for pneumonia.  Patient does not currently have chest pain.  Vitals without tachypnea, tachycardia or hypoxia.  On reassessment she is resting comfortably.  She is overall well-appearing.  I suspect her chest pain is related to chest wall inflammation in the setting of recent URI.  I discussed pain control with Tylenol or ibuprofen.  Patient is agreeable to this plan.  At this time she is stable for discharge. Strict ED precautions given to return immediately for new, worsening, or concerning symptoms      Amount and/or Complexity of  Data Reviewed  Labs: ordered. Decision-making details documented in ED Course.  Radiology: ordered.    Risk  Prescription drug management.      Additional MDM:     Well's Criteria Score:  -Clinical symptoms of DVT (leg swelling, pain with palpation) = 0.0  -PE is #1 diagnosis OR equally likely =            3.0  -Heart Rate >100 =   0.0  -Immobilization (= or > than 3 days) or surgery in the previous 4 weeks = 0.0  -Previous DVT/PE = 0.0  -Hemoptysis =          0.0  -Malignancy =           0.0  Well's Probability Score =    3      Heart Score:    History:          Slightly suspicious.  ECG:             Nonspecific repolarisation disturbance  Age:               45-65 years  Risk factors: 1-2 risk factors  Troponin:       Less than or equal to normal limit  Heart Score = 3                ED Course as of 02/16/25 1258   Sun Feb 16, 2025   0938 WBC: 4.01 [HM]   0938 D-Dimer(!): 0.64  Dimer is elevated. Will obtain CTA to r/o PE [HM]   1251 Troponin I High Sensitivity: 6  Troponin without significant up trending 5>>6 [HM]   1253 BNP: 22 [HM]      ED Course User Index  [HM] Tita Harvey PA-C                           Clinical Impression:  Final diagnoses:  [R07.89] Chest discomfort  [R07.9] Chest pain  [R09.1] Pleurisy (Primary)  [I10] Hypertension, unspecified type          ED Disposition Condition    Discharge Stable          ED Prescriptions    None       Follow-up Information       Follow up With Specialties Details Why Contact Info    [facility] - Emergency Dept Emergency Medicine  If symptoms worsen [address] [facility] [id number] [phone]    Sarah Robles MD Internal Medicine  As needed [address]  [place] [address]  [phone]                 [1]   Social History  Tobacco Use    Smoking status: Never     Passive exposure: Never    Smokeless tobacco: Never    Tobacco comments:     nurse;    Substance Use Topics    Alcohol use: No    Drug use: No         Tita Harvey PA-C  02/16/25 [id number]

## 2025-02-16 NOTE — DISCHARGE INSTRUCTIONS
Your workup today is reassuring.  I suspect your chest pain is from inflammation of the chest wall.  You may take Tylenol or ibuprofen over-the-counter as needed for pain. Strict ED precautions given to return immediately for new, worsening, or concerning symptoms

## 2025-02-16 NOTE — ED NOTES
LOC: The patient is awake and alert; oriented x 3 and speaking appropriately.  APPEARANCE: Patient resting comfortably, patient is clean and well groomed  SKIN: warm and dry, normal skin turgor & moist mucus membranes, skin intact, no breakdown noted.  MUSCULOSKELETAL: Patient moving all extremities well, no obvious swelling or deformities noted  RESPIRATORY: Airway is open and patent,  respirations are spontaneous, normal effort and rate  CARDIAC: Patient has a normal rate, no peripheral edema noted, capillary refill < 3 seconds; complaints of intermittent  sharp pain under left breast  ABDOMEN: Soft and non tender to palpation, no distention noted.

## 2025-02-16 NOTE — ED TRIAGE NOTES
C/o pain under left breast that began at 3am yesterday while sleeping.  [place] tylenol w/ relief but pain returned this am. Pain resolved at this time.

## 2025-02-18 ENCOUNTER — OFFICE VISIT (OUTPATIENT)
Dept: RHEUMATOLOGY | Facility: CLINIC | Age: 55
End: 2025-02-18
Payer: OTHER GOVERNMENT

## 2025-02-18 DIAGNOSIS — M05.79 RHEUMATOID ARTHRITIS INVOLVING MULTIPLE SITES WITH POSITIVE RHEUMATOID FACTOR: ICD-10-CM

## 2025-02-18 DIAGNOSIS — D84.821 IMMUNOSUPPRESSION DUE TO DRUG THERAPY: ICD-10-CM

## 2025-02-18 DIAGNOSIS — M32.8 LUPUS ERYTHEMATOSUS OVERLAP SYNDROME: Primary | ICD-10-CM

## 2025-02-18 DIAGNOSIS — Z79.899 IMMUNOSUPPRESSION DUE TO DRUG THERAPY: ICD-10-CM

## 2025-02-18 DIAGNOSIS — E66.813 CLASS 3 SEVERE OBESITY DUE TO EXCESS CALORIES WITHOUT SERIOUS COMORBIDITY WITH BODY MASS INDEX (BMI) OF 50.0 TO 59.9 IN ADULT: ICD-10-CM

## 2025-02-18 DIAGNOSIS — R93.89 ABNORMAL CT OF THE CHEST: ICD-10-CM

## 2025-02-18 DIAGNOSIS — E66.01 CLASS 3 SEVERE OBESITY DUE TO EXCESS CALORIES WITHOUT SERIOUS COMORBIDITY WITH BODY MASS INDEX (BMI) OF 50.0 TO 59.9 IN ADULT: ICD-10-CM

## 2025-02-18 ASSESSMENT — SYSTEMIC LUPUS ERYTHEMATOSUS DISEASE ACTIVITY INDEX (SLEDAI): TOTAL_SCORE: 0

## 2025-02-18 NOTE — PROGRESS NOTES
2/18/2025     8:00 AM   Rapid3 Question Responses and Scores   MDHAQ Score 0.2   Psychologic Score 0   Pain Score 0   When you awakened in the morning OVER THE LAST WEEK, did you feel stiff? Yes   If Yes, please indicate the number of hours until you are as limber as you will be for the day 1   Fatigue Score 1   Global Health Score 2   RAPID3 Score 0.89         Answers submitted by the patient for this visit:  Rheumatology Questionnaire (Submitted on 2/18/2025)  fever: No  eye redness: No  mouth sores: No  headaches: No  shortness of breath: No  chest pain: Yes  trouble swallowing: No  diarrhea: No  constipation: No  unexpected weight change: No  genital sore: No  dysuria: No  During the last 3 days, have you had a skin rash?: No  Bruises or bleeds easily: No  cough: Yes

## 2025-02-18 NOTE — PROGRESS NOTES
Subjective:     The patient location is: [place]  The chief complaint leading to consultation is: Lupus and RA    Visit type: audiovisual    Face to Face time with patient: 16 minutes  25 minutes of total time spent on the encounter, which includes face to face time and non-face to face time preparing to see the patient (eg, review of tests), Obtaining and/or reviewing separately obtained history, Documenting clinical information in the electronic or other health record, Independently interpreting results (not separately reported) and communicating results to the patient/family/caregiver, or Care coordination (not separately reported).         Each patient to whom he or she provides medical services by telemedicine is:  (1) informed of the relationship between the physician and patient and the respective role of any other health care provider with respect to management of the patient; and (2) notified that he or she may decline to receive medical services by telemedicine and may withdraw from such care at any time.    Notes:        Patient ID: Malika Valdez is a 54 y.o. female.    Chief Complaint: Lupus/RA    HPI  female with history of RA and lupus overlap with ILD.  Lupus diagnosed 12/2017 based on TIARA that was initially negative 2009 repeat 2017 positive 1:2560 speckled, +NILS, +SSA,SSB, dsDNA neg, +RNA polymerase III as well as  acute hypoxemic respiratory failure, pericardial/pleural effusion, ground glass opacities on chest CT.     In hospital 12/2017 started on solumedrol 16mg q8 and switched to prednisone 60 mg daily. SSZ stopped in hospital due to concern it could have been cause of ILD.  Discharged with home O2.  Pulmonary decided not to bronch.  Concerned for SLE overlap and she was started on HCQ 200mg BID.     Her repeat CT chest showed a decrease in the pleural and pericardial effusions.         Interval History:     Seen in ED for CP that was intermittent for 2 days.  Previously had a sore throat and  cough.  Cough lingering but improved.    CT chest negative for PE, EKG normal.  Troponin normal and D-dimer elevated as previous.   She is feeling well.   She is currently on azathioprine 100 mg in AM and 100 mg PM, Plaquenil 200 mg bid.  Off prednisone since Oct 2019.     No pain today.  No morning stiffness.  Feeling well.  No ulcers in mouth or nose, no rashes, no hair loss.  Works as a school nurse still.    Saw weight loss doctor who gave her metformin.  She started taking a month ago.       Review of Systems   Constitutional:  Negative for fever and unexpected weight change.   HENT:  Negative for mouth sores and trouble swallowing.    Eyes:  Negative for redness.   Respiratory:  Positive for cough. Negative for shortness of breath.    Cardiovascular:  Positive for chest pain.   Gastrointestinal:  Negative for constipation and diarrhea.   Genitourinary:  Negative for dysuria and genital sores.   Skin:  Negative for rash.   Neurological:  Negative for headaches.   Hematological:  Does not bruise/bleed easily.   Psychiatric/Behavioral: Negative.          Objective:   [facility] 11/13/2017   Virtual visit  Physical Exam   Constitutional: She is oriented to person, place, and time. normal appearance.   HENT:   Head: Normocephalic and atraumatic.   Eyes: Conjunctivae are normal.   Pulmonary/Chest: Effort normal.   Neurological: She is alert and oriented to person, place, and time.   Skin: No erythema.   Psychiatric: Her behavior is normal. Mood normal.              LABS    Component      Latest Ref Rng 1/30/2025 2/16/2025   WBC      3.90 - 12.70 K/uL 5.01  4.01    RBC      4.00 - 5.40 M/uL 4.51  4.59    Hemoglobin      12.0 - 16.0 g/dL 12.3  12.3    Hematocrit      37.0 - 48.5 % 41.7  41.6    MCV      82 - 98 fL 93  91    MCH      27.0 - 31.0 pg 27.3  26.8 (L)    MCHC      32.0 - 36.0 g/dL 29.5 (L)  29.6 (L)    RDW      11.5 - 14.5 % 14.6 (H)  14.3    Platelet Count      150 - 450 K/uL 339  295    MPV      9.2 - 12.9 fL  9.8  10.3    Immature Granulocytes      0.0 - 0.5 % 0.2  0.2    Gran # (ANC)      1.8 - 7.7 K/uL 3.0  2.6    Immature Grans (Abs)      0.00 - 0.04 K/uL 0.01  0.01    Lymph #      1.0 - 4.8 K/uL 1.2  0.9 (L)    Mono #      0.3 - 1.0 K/uL 0.6  0.3    Eos #      0.0 - 0.5 K/uL 0.3  0.1    Baso #      0.00 - 0.20 K/uL 0.03  0.03    nRBC      0 /100 WBC 0  0    Gran %      38.0 - 73.0 % 59.0  65.0    Lymph %      18.0 - 48.0 % 23.8  22.9    Mono %      4.0 - 15.0 % 11.2  7.7    Eos %      0.0 - 8.0 % 5.2  3.5    Basophil %      0.0 - 1.9 % 0.6  0.7    Differential Method Automated  Automated    Sodium      136 - 145 mmol/L 140  139    Potassium      3.5 - 5.1 mmol/L 4.3  4.4    Chloride      95 - 110 mmol/L 103  108    CO2      23 - 29 mmol/L 24  18 (L)    Glucose      70 - 110 mg/dL 80  96    BUN      6 - 20 mg/dL 12  11    Creatinine      0.5 - 1.4 mg/dL 1.0  0.8    Calcium      8.7 - 10.5 mg/dL 9.9  9.6    PROTEIN TOTAL      6.0 - 8.4 g/dL 8.9 (H)  8.1    Albumin      3.5 - 5.2 g/dL 3.4 (L)  3.3 (L)    BILIRUBIN TOTAL      0.1 - 1.0 mg/dL 0.3  0.4    ALP      40 - 150 U/L 90  81    AST      10 - 40 U/L 18  21    ALT      10 - 44 U/L 10  7 (L)    eGFR      >60 mL/min/1.73 m^2 >60.0  >60.0    Anion Gap      8 - 16 mmol/L 13  13    Specimen UA Urine, Clean Catch     Color, UA      Yellow, Straw, Mai  Yellow     Appearance, UA      Clear  Hazy !     pH, UA      5.0 - 8.0  6.0     Spec Grav UA      1.005 - 1.030  1.020     Protein, UA      Negative  1+ !     Glucose, UA      Negative  Negative     Ketones, UA      Negative  Negative     Bilirubin (UA)      Negative  Negative     Blood, UA      Negative  Negative     NITRITE UA      Negative  Negative     Leukocyte Esterase, UA      Negative  Negative     RBC, UA      0 - 4 /hpf 2     WBC, UA      0 - 5 /hpf 6 (H)     Bacteria, UA      None-Occ /hpf Many !     Squam Epithel, UA      /hpf 1     Hyaline Casts, UA      0-1/lpf /lpf 0     Microscopic Comment SEE COMMENT      Urine Protein      0 - 15 mg/dL 14     Urine Creatinine      15.0 - 325.0 mg/dL 164.0     Urine Protein/Creatinine Ratio      0.00 - 0.20  0.09     Hemoglobin A1C External      4.0 - 5.6 % 6.0 (H)     Estimated Avg Glucose      68 - 131 mg/dL 126     ds DNA Ab      Negative 1:10  Negative 1:10     Complement (C-3)      50 - 180 mg/dL 175     Complement (C-4)      11 - 44 mg/dL 31     CPK      20 - 180 U/L 243 (H)     Sed Rate      0 - 36 mm/Hr 61 (H)     CRP      0.0 - 8.2 mg/L 4.7     Hepatitis C Ab      Non-reactive   Non-reactive    HIV 1/2 Ag/Ab      Non-reactive   Non-reactive    D-Dimer      <0.50 mg/L FEU  0.64 (H)    Troponin I High Sensitivity      0 - 14 ng/L  6    Troponin I High Sensitivity        5    BNP      0 - 99 pg/mL  22       Legend:  (L) Low  (H) High  ! Abnormal  Assessment:     1. Lupus erythematosus overlap syndrome.  Stable on AZA and PLQ   2. Rheumatoid arthritis involving multiple sites with positive rheumatoid factor.  Stable on PLQ   3. Class 3 severe obesity due to excess calories without serious comorbidity with body mass index (BMI) of 50.0 to 59.9 in adult    4. Immunosuppression due to drug therapy    5.      Abnormal CT chest.  Stable lung findings however not of thyroid nodule.  Patient to follow with primary    SLICC Score: 1  Any cataract ever: No  Retinal change or optic atrophy: No  Cognitive Impairment or Major Psychosis: No  Seizures requiring therapy for 6 months: No  Cerebrovascular accident ever: No  Cranial or Peripheral Neuropathy (excluding optic): No  Transverse Myelitis: No  Estimated or measured glomerular filtration rate < 50%: No  Proteinuria >= 3.5 gm/24 hours: No  End Stage Renal Disease: No  Pulmonary Hypertension: No  Pulmonary Fibrosis: No  Shrinking Lung: No  Pleural Fibrosis: No  Pulmonary Infarction: No  Angina or Coronary Artery Bypass: No  Myocardial Infarction ever: No  Cardiomyopathy: No  Valvular Disease: No  Pericarditis for 6 months, or  Pericardiectomy: No  Claudication for 6 months: No  Minor tissue loss: No  Significant tissue loss ever: No  Venous Thrombosis with swelling, ulceration, or venous stasis: No  Infarction or resction of bowel below duodenum spleen, liver, or gall bladder ever: No  Mesenteric Insufficiency: No  Chronic Peritonitis: No  Stricture or Upper GI Tract surgery ever: No  Muscle Atrophy or weakness: No  Deforming or Erosive Arthritis: No  Osteoporosis with fracture or vertebral collapse: No  Avascular Necrosis: No  Osteomyelitis: No  Scarring Chronic Alopecia: No  Extensive scarring or Panniculum other than scalp or pulp space: No  Skin Ulceration for > 6 months: No  Premature Gonadal Failure: No  Diabetes (regardless of treatment): Yes  Malignancy: No    Plan:     Problem List Items Addressed This Visit          Immunology/Multi System    Rheumatoid arthritis involving multiple sites with positive rheumatoid factor.  Continue Plaquenil.  Labs 3 and 6 months.    Lupus erythematosus overlap syndrome - Primary.  Continue Azathioprine and Plaquenil.  Last eye exam 5/2024.  Labs 3 and 6 months    Immunosuppression due to drug therapy       Endocrine    Class 3 severe obesity in adult.  Discussed increasing walking to 20 minutes daily and focusing on dietary adjustments anti-inflammatory foods     RTO 6 months/prn and labs every 3 months;  Patient to follow with primary regarding thyroid findings on CT chest done at ED visit.

## 2025-02-26 DIAGNOSIS — M32.8 LUPUS ERYTHEMATOSUS OVERLAP SYNDROME: ICD-10-CM

## 2025-02-27 RX ORDER — AZATHIOPRINE 50 MG/1
100 TABLET ORAL 2 TIMES DAILY
Qty: 360 TABLET | Refills: 0 | Status: SHIPPED | OUTPATIENT
Start: 2025-02-27

## 2025-04-30 RX ORDER — ERGOCALCIFEROL 1.25 MG/1
CAPSULE ORAL
Qty: 24 CAPSULE | Refills: 3 | Status: SHIPPED | OUTPATIENT
Start: 2025-04-30

## 2025-04-30 NOTE — TELEPHONE ENCOUNTER
Care Due:                  Date            Visit Type   Department     Provider  --------------------------------------------------------------------------------                                EP -                              PRIMARY      [facility] INTERNAL  Last Visit: 12-      CARE ([facility])   MEDICINE       Sarah Robles                              EP -                              PRIMARY      [facility] INTERNAL  Next Visit: 06-      CARE ([facility])   MEDICINE       Sarah Robles                                                            Last  Test          Frequency    Reason                     Performed    Due Date  --------------------------------------------------------------------------------    Vitamin D...  12 months..  ergocalciferol...........  06- 06-    [employer] Embedded Care Due Messages. Reference number: [id number].   4/30/2025 4:21:57 PM CDT

## 2025-05-24 ENCOUNTER — LAB VISIT (OUTPATIENT)
Dept: LAB | Facility: HOSPITAL | Age: 55
End: 2025-05-24
Attending: INTERNAL MEDICINE
Payer: OTHER GOVERNMENT

## 2025-05-24 DIAGNOSIS — M32.8 LUPUS ERYTHEMATOSUS OVERLAP SYNDROME: ICD-10-CM

## 2025-05-24 DIAGNOSIS — J84.9 ILD (INTERSTITIAL LUNG DISEASE): ICD-10-CM

## 2025-05-24 DIAGNOSIS — M05.79 RHEUMATOID ARTHRITIS INVOLVING MULTIPLE SITES WITH POSITIVE RHEUMATOID FACTOR: ICD-10-CM

## 2025-05-24 DIAGNOSIS — D84.9 IMMUNOSUPPRESSION: ICD-10-CM

## 2025-05-24 DIAGNOSIS — R53.83 OTHER FATIGUE: ICD-10-CM

## 2025-05-24 LAB
ABSOLUTE EOSINOPHIL (OHS): 0.15 K/UL
ABSOLUTE MONOCYTE (OHS): 0.3 K/UL (ref 0.3–1)
ABSOLUTE NEUTROPHIL COUNT (OHS): 2.63 K/UL (ref 1.8–7.7)
ALBUMIN SERPL BCP-MCNC: 3.2 G/DL (ref 3.5–5.2)
ALP SERPL-CCNC: 86 UNIT/L (ref 40–150)
ALT SERPL W/O P-5'-P-CCNC: 5 UNIT/L (ref 10–44)
ANION GAP (OHS): 10 MMOL/L (ref 8–16)
AST SERPL-CCNC: 16 UNIT/L (ref 11–45)
BASOPHILS # BLD AUTO: 0.03 K/UL
BASOPHILS NFR BLD AUTO: 0.7 %
BILIRUB SERPL-MCNC: 0.3 MG/DL (ref 0.1–1)
BILIRUB UR QL STRIP.AUTO: NEGATIVE
BUN SERPL-MCNC: 12 MG/DL (ref 6–20)
C3 SERPL-MCNC: 146 MG/DL (ref 50–180)
C4 COMPLEMENT (OHS): 26 MG/DL (ref 11–44)
CALCIUM SERPL-MCNC: 9.3 MG/DL (ref 8.7–10.5)
CHLORIDE SERPL-SCNC: 105 MMOL/L (ref 95–110)
CK SERPL-CCNC: 161 U/L (ref 20–180)
CLARITY UR: CLEAR
CO2 SERPL-SCNC: 24 MMOL/L (ref 23–29)
COLOR UR AUTO: YELLOW
CREAT SERPL-MCNC: 0.9 MG/DL (ref 0.5–1.4)
CREAT UR-MCNC: 146 MG/DL (ref 15–325)
CRP SERPL-MCNC: 1.6 MG/L
ERYTHROCYTE [DISTWIDTH] IN BLOOD BY AUTOMATED COUNT: 14.2 % (ref 11.5–14.5)
ERYTHROCYTE [SEDIMENTATION RATE] IN BLOOD BY PHOTOMETRIC METHOD: >120 MM/HR
GFR SERPLBLD CREATININE-BSD FMLA CKD-EPI: >60 ML/MIN/1.73/M2
GLUCOSE SERPL-MCNC: 100 MG/DL (ref 70–110)
GLUCOSE UR QL STRIP: NEGATIVE
HCT VFR BLD AUTO: 40.6 % (ref 37–48.5)
HGB BLD-MCNC: 12.1 GM/DL (ref 12–16)
HGB UR QL STRIP: NEGATIVE
IMM GRANULOCYTES # BLD AUTO: 0.01 K/UL (ref 0–0.04)
IMM GRANULOCYTES NFR BLD AUTO: 0.2 % (ref 0–0.5)
KETONES UR QL STRIP: NEGATIVE
LEUKOCYTE ESTERASE UR QL STRIP: NEGATIVE
LYMPHOCYTES # BLD AUTO: 0.98 K/UL (ref 1–4.8)
MCH RBC QN AUTO: 27.1 PG (ref 27–31)
MCHC RBC AUTO-ENTMCNC: 29.8 G/DL (ref 32–36)
MCV RBC AUTO: 91 FL (ref 82–98)
NITRITE UR QL STRIP: NEGATIVE
NUCLEATED RBC (/100WBC) (OHS): 0 /100 WBC
PH UR STRIP: 6 [PH]
PLATELET # BLD AUTO: 334 K/UL (ref 150–450)
PMV BLD AUTO: 9.8 FL (ref 9.2–12.9)
POTASSIUM SERPL-SCNC: 4.4 MMOL/L (ref 3.5–5.1)
PROT SERPL-MCNC: 8 GM/DL (ref 6–8.4)
PROT UR QL STRIP: ABNORMAL
PROT UR-MCNC: 13 MG/DL
PROT/CREAT UR: 0.09 MG/G{CREAT}
RBC # BLD AUTO: 4.46 M/UL (ref 4–5.4)
RELATIVE EOSINOPHIL (OHS): 3.7 %
RELATIVE LYMPHOCYTE (OHS): 23.9 % (ref 18–48)
RELATIVE MONOCYTE (OHS): 7.3 % (ref 4–15)
RELATIVE NEUTROPHIL (OHS): 64.2 % (ref 38–73)
SODIUM SERPL-SCNC: 139 MMOL/L (ref 136–145)
SP GR UR STRIP: 1.01
UROBILINOGEN UR STRIP-ACNC: NEGATIVE EU/DL
WBC # BLD AUTO: 4.1 K/UL (ref 3.9–12.7)

## 2025-05-24 PROCEDURE — 81003 URINALYSIS AUTO W/O SCOPE: CPT

## 2025-05-24 PROCEDURE — 80053 COMPREHEN METABOLIC PANEL: CPT

## 2025-05-24 PROCEDURE — 85652 RBC SED RATE AUTOMATED: CPT

## 2025-05-24 PROCEDURE — 86140 C-REACTIVE PROTEIN: CPT

## 2025-05-24 PROCEDURE — 82570 ASSAY OF URINE CREATININE: CPT

## 2025-05-24 PROCEDURE — 36415 COLL VENOUS BLD VENIPUNCTURE: CPT | Mod: PO

## 2025-05-24 PROCEDURE — 85025 COMPLETE CBC W/AUTO DIFF WBC: CPT

## 2025-05-24 PROCEDURE — 86225 DNA ANTIBODY NATIVE: CPT

## 2025-05-24 PROCEDURE — 86160 COMPLEMENT ANTIGEN: CPT

## 2025-05-24 PROCEDURE — 82550 ASSAY OF CK (CPK): CPT

## 2025-05-24 PROCEDURE — 86160 COMPLEMENT ANTIGEN: CPT | Mod: 59

## 2025-05-25 ENCOUNTER — RESULTS FOLLOW-UP (OUTPATIENT)
Dept: RHEUMATOLOGY | Facility: HOSPITAL | Age: 55
End: 2025-05-25
Payer: OTHER GOVERNMENT

## 2025-05-26 LAB
DSDNA ANTIBODY (OHS): NORMAL
DSDNA ANTIBODY TITER (OHS): NORMAL

## 2025-06-23 DIAGNOSIS — M32.8 LUPUS ERYTHEMATOSUS OVERLAP SYNDROME: ICD-10-CM

## 2025-06-23 DIAGNOSIS — M05.79 RHEUMATOID ARTHRITIS INVOLVING MULTIPLE SITES WITH POSITIVE RHEUMATOID FACTOR: ICD-10-CM

## 2025-06-24 RX ORDER — AZATHIOPRINE 50 MG/1
100 TABLET ORAL 2 TIMES DAILY
Qty: 360 TABLET | Refills: 3 | Status: SHIPPED | OUTPATIENT
Start: 2025-06-24

## 2025-06-24 RX ORDER — HYDROXYCHLOROQUINE SULFATE 200 MG/1
200 TABLET ORAL 2 TIMES DAILY
Qty: 180 TABLET | Refills: 3 | Status: SHIPPED | OUTPATIENT
Start: 2025-06-24

## 2025-07-21 ENCOUNTER — PATIENT MESSAGE (OUTPATIENT)
Dept: ADMINISTRATIVE | Facility: HOSPITAL | Age: 55
End: 2025-07-21
Payer: OTHER GOVERNMENT

## 2025-08-11 DIAGNOSIS — T78.3XXD ANGIOEDEMA, SUBSEQUENT ENCOUNTER: ICD-10-CM

## 2025-08-11 RX ORDER — AMLODIPINE BESYLATE 5 MG/1
5 TABLET ORAL
Qty: 90 TABLET | Refills: 3 | Status: SHIPPED | OUTPATIENT
Start: 2025-08-11

## 2025-08-15 ENCOUNTER — LAB VISIT (OUTPATIENT)
Dept: LAB | Facility: HOSPITAL | Age: 55
End: 2025-08-15
Attending: INTERNAL MEDICINE
Payer: OTHER GOVERNMENT

## 2025-08-15 DIAGNOSIS — J84.9 ILD (INTERSTITIAL LUNG DISEASE): ICD-10-CM

## 2025-08-15 DIAGNOSIS — R53.83 OTHER FATIGUE: ICD-10-CM

## 2025-08-15 DIAGNOSIS — M05.79 RHEUMATOID ARTHRITIS INVOLVING MULTIPLE SITES WITH POSITIVE RHEUMATOID FACTOR: ICD-10-CM

## 2025-08-15 DIAGNOSIS — D84.9 IMMUNOSUPPRESSION: ICD-10-CM

## 2025-08-15 DIAGNOSIS — M32.8 LUPUS ERYTHEMATOSUS OVERLAP SYNDROME: ICD-10-CM

## 2025-08-15 LAB
ABSOLUTE EOSINOPHIL (OHS): 0.18 K/UL
ABSOLUTE MONOCYTE (OHS): 0.31 K/UL (ref 0.3–1)
ABSOLUTE NEUTROPHIL COUNT (OHS): 2.33 K/UL (ref 1.8–7.7)
ALBUMIN SERPL BCP-MCNC: 3.3 G/DL (ref 3.5–5.2)
ALP SERPL-CCNC: 84 UNIT/L (ref 40–150)
ALT SERPL W/O P-5'-P-CCNC: 9 UNIT/L (ref 0–55)
ANION GAP (OHS): 8 MMOL/L (ref 8–16)
AST SERPL-CCNC: 23 UNIT/L (ref 0–50)
BASOPHILS # BLD AUTO: 0.03 K/UL
BASOPHILS NFR BLD AUTO: 0.8 %
BILIRUB SERPL-MCNC: 0.3 MG/DL (ref 0.1–1)
BUN SERPL-MCNC: 15 MG/DL (ref 6–20)
C3 SERPL-MCNC: 156 MG/DL (ref 50–180)
C4 COMPLEMENT (OHS): 26 MG/DL (ref 11–44)
CALCIUM SERPL-MCNC: 9.7 MG/DL (ref 8.7–10.5)
CHLORIDE SERPL-SCNC: 106 MMOL/L (ref 95–110)
CK SERPL-CCNC: 159 U/L (ref 20–180)
CO2 SERPL-SCNC: 26 MMOL/L (ref 23–29)
CREAT SERPL-MCNC: 1.1 MG/DL (ref 0.5–1.4)
CRP SERPL-MCNC: 1.5 MG/L
ERYTHROCYTE [DISTWIDTH] IN BLOOD BY AUTOMATED COUNT: 14.5 % (ref 11.5–14.5)
ERYTHROCYTE [SEDIMENTATION RATE] IN BLOOD BY PHOTOMETRIC METHOD: 83 MM/HR
GFR SERPLBLD CREATININE-BSD FMLA CKD-EPI: 59 ML/MIN/1.73/M2
GLUCOSE SERPL-MCNC: 88 MG/DL (ref 70–110)
HCT VFR BLD AUTO: 39.3 % (ref 37–48.5)
HGB BLD-MCNC: 12 GM/DL (ref 12–16)
IMM GRANULOCYTES # BLD AUTO: 0.01 K/UL (ref 0–0.04)
IMM GRANULOCYTES NFR BLD AUTO: 0.3 % (ref 0–0.5)
LYMPHOCYTES # BLD AUTO: 1.01 K/UL (ref 1–4.8)
MCH RBC QN AUTO: 27.9 PG (ref 27–31)
MCHC RBC AUTO-ENTMCNC: 30.5 G/DL (ref 32–36)
MCV RBC AUTO: 91 FL (ref 82–98)
NUCLEATED RBC (/100WBC) (OHS): 0 /100 WBC
PLATELET # BLD AUTO: 277 K/UL (ref 150–450)
PMV BLD AUTO: 10.3 FL (ref 9.2–12.9)
POTASSIUM SERPL-SCNC: 4.5 MMOL/L (ref 3.5–5.1)
PROT SERPL-MCNC: 7.8 GM/DL (ref 6–8.4)
RBC # BLD AUTO: 4.3 M/UL (ref 4–5.4)
RELATIVE EOSINOPHIL (OHS): 4.7 %
RELATIVE LYMPHOCYTE (OHS): 26.1 % (ref 18–48)
RELATIVE MONOCYTE (OHS): 8 % (ref 4–15)
RELATIVE NEUTROPHIL (OHS): 60.1 % (ref 38–73)
SODIUM SERPL-SCNC: 140 MMOL/L (ref 136–145)
WBC # BLD AUTO: 3.87 K/UL (ref 3.9–12.7)

## 2025-08-15 PROCEDURE — 86140 C-REACTIVE PROTEIN: CPT

## 2025-08-15 PROCEDURE — 36415 COLL VENOUS BLD VENIPUNCTURE: CPT | Mod: PO

## 2025-08-15 PROCEDURE — 86225 DNA ANTIBODY NATIVE: CPT

## 2025-08-15 PROCEDURE — 85652 RBC SED RATE AUTOMATED: CPT

## 2025-08-15 PROCEDURE — 86160 COMPLEMENT ANTIGEN: CPT

## 2025-08-15 PROCEDURE — 80053 COMPREHEN METABOLIC PANEL: CPT

## 2025-08-15 PROCEDURE — 86160 COMPLEMENT ANTIGEN: CPT | Mod: 59

## 2025-08-15 PROCEDURE — 82550 ASSAY OF CK (CPK): CPT

## 2025-08-15 PROCEDURE — 85025 COMPLETE CBC W/AUTO DIFF WBC: CPT

## 2025-08-18 ENCOUNTER — PATIENT MESSAGE (OUTPATIENT)
Dept: RHEUMATOLOGY | Facility: CLINIC | Age: 55
End: 2025-08-18
Payer: OTHER GOVERNMENT

## 2025-08-18 LAB
DSDNA ANTIBODY (OHS): NORMAL
DSDNA ANTIBODY TITER (OHS): NORMAL

## 2025-09-05 ENCOUNTER — TELEPHONE (OUTPATIENT)
Dept: RHEUMATOLOGY | Facility: CLINIC | Age: 55
End: 2025-09-05
Payer: OTHER GOVERNMENT